# Patient Record
Sex: MALE | Race: WHITE | Employment: OTHER | URBAN - NONMETROPOLITAN AREA
[De-identification: names, ages, dates, MRNs, and addresses within clinical notes are randomized per-mention and may not be internally consistent; named-entity substitution may affect disease eponyms.]

---

## 2017-01-19 ENCOUNTER — OFFICE VISIT (OUTPATIENT)
Dept: FAMILY MEDICINE CLINIC | Age: 52
End: 2017-01-19

## 2017-01-19 VITALS
WEIGHT: 234.4 LBS | HEIGHT: 71 IN | SYSTOLIC BLOOD PRESSURE: 124 MMHG | BODY MASS INDEX: 32.81 KG/M2 | DIASTOLIC BLOOD PRESSURE: 80 MMHG | OXYGEN SATURATION: 96 % | HEART RATE: 81 BPM | RESPIRATION RATE: 20 BRPM | TEMPERATURE: 97.7 F

## 2017-01-19 DIAGNOSIS — J06.9 UPPER RESPIRATORY TRACT INFECTION, UNSPECIFIED TYPE: Primary | ICD-10-CM

## 2017-01-19 DIAGNOSIS — N64.4 BREAST PAIN, RIGHT: ICD-10-CM

## 2017-01-19 DIAGNOSIS — N64.4 BREAST PAIN: Primary | ICD-10-CM

## 2017-01-19 PROCEDURE — G8427 DOCREV CUR MEDS BY ELIG CLIN: HCPCS | Performed by: PHYSICIAN ASSISTANT

## 2017-01-19 PROCEDURE — 99213 OFFICE O/P EST LOW 20 MIN: CPT | Performed by: PHYSICIAN ASSISTANT

## 2017-01-19 PROCEDURE — G8419 CALC BMI OUT NRM PARAM NOF/U: HCPCS | Performed by: PHYSICIAN ASSISTANT

## 2017-01-19 PROCEDURE — 1036F TOBACCO NON-USER: CPT | Performed by: PHYSICIAN ASSISTANT

## 2017-01-19 PROCEDURE — G8484 FLU IMMUNIZE NO ADMIN: HCPCS | Performed by: PHYSICIAN ASSISTANT

## 2017-01-19 PROCEDURE — 3017F COLORECTAL CA SCREEN DOC REV: CPT | Performed by: PHYSICIAN ASSISTANT

## 2017-01-19 RX ORDER — CEFUROXIME AXETIL 500 MG/1
500 TABLET ORAL 2 TIMES DAILY
Qty: 20 TABLET | Refills: 0 | Status: SHIPPED | OUTPATIENT
Start: 2017-01-19 | End: 2017-01-29

## 2017-01-19 RX ORDER — GUAIFENESIN AND CODEINE PHOSPHATE 100; 10 MG/5ML; MG/5ML
5 SOLUTION ORAL 4 TIMES DAILY PRN
Qty: 150 ML | Refills: 1 | Status: SHIPPED | OUTPATIENT
Start: 2017-01-19 | End: 2017-01-26

## 2017-01-19 ASSESSMENT — ENCOUNTER SYMPTOMS
RHINORRHEA: 0
SORE THROAT: 1
COUGH: 1
WHEEZING: 0

## 2017-01-30 ENCOUNTER — TELEPHONE (OUTPATIENT)
Dept: PRIMARY CARE CLINIC | Age: 52
End: 2017-01-30

## 2017-01-30 DIAGNOSIS — D17.1 LIPOMA OF BREAST: Primary | ICD-10-CM

## 2017-02-06 ENCOUNTER — PROCEDURE VISIT (OUTPATIENT)
Dept: SURGERY | Age: 52
End: 2017-02-06

## 2017-02-06 VITALS
WEIGHT: 229 LBS | SYSTOLIC BLOOD PRESSURE: 146 MMHG | TEMPERATURE: 97.2 F | DIASTOLIC BLOOD PRESSURE: 84 MMHG | BODY MASS INDEX: 32.06 KG/M2 | HEIGHT: 71 IN | HEART RATE: 72 BPM

## 2017-02-06 DIAGNOSIS — N64.4 BREAST PAIN, RIGHT: Primary | ICD-10-CM

## 2017-02-06 PROCEDURE — 99213 OFFICE O/P EST LOW 20 MIN: CPT | Performed by: SURGERY

## 2017-02-06 PROCEDURE — 3017F COLORECTAL CA SCREEN DOC REV: CPT | Performed by: SURGERY

## 2017-02-06 PROCEDURE — G8484 FLU IMMUNIZE NO ADMIN: HCPCS | Performed by: SURGERY

## 2017-02-06 PROCEDURE — G8419 CALC BMI OUT NRM PARAM NOF/U: HCPCS | Performed by: SURGERY

## 2017-02-06 PROCEDURE — G8427 DOCREV CUR MEDS BY ELIG CLIN: HCPCS | Performed by: SURGERY

## 2017-02-06 PROCEDURE — 1036F TOBACCO NON-USER: CPT | Performed by: SURGERY

## 2017-02-13 ENCOUNTER — OFFICE VISIT (OUTPATIENT)
Dept: PRIMARY CARE CLINIC | Age: 52
End: 2017-02-13

## 2017-02-13 VITALS
HEIGHT: 71 IN | SYSTOLIC BLOOD PRESSURE: 130 MMHG | OXYGEN SATURATION: 94 % | HEART RATE: 73 BPM | TEMPERATURE: 97 F | DIASTOLIC BLOOD PRESSURE: 70 MMHG | WEIGHT: 226 LBS | BODY MASS INDEX: 31.64 KG/M2

## 2017-02-13 DIAGNOSIS — B34.9 VIRAL ILLNESS: ICD-10-CM

## 2017-02-13 DIAGNOSIS — R05.9 COUGH: Primary | ICD-10-CM

## 2017-02-13 PROCEDURE — 1036F TOBACCO NON-USER: CPT | Performed by: PHYSICIAN ASSISTANT

## 2017-02-13 PROCEDURE — G8417 CALC BMI ABV UP PARAM F/U: HCPCS | Performed by: PHYSICIAN ASSISTANT

## 2017-02-13 PROCEDURE — 3017F COLORECTAL CA SCREEN DOC REV: CPT | Performed by: PHYSICIAN ASSISTANT

## 2017-02-13 PROCEDURE — G8484 FLU IMMUNIZE NO ADMIN: HCPCS | Performed by: PHYSICIAN ASSISTANT

## 2017-02-13 PROCEDURE — G8427 DOCREV CUR MEDS BY ELIG CLIN: HCPCS | Performed by: PHYSICIAN ASSISTANT

## 2017-02-13 PROCEDURE — 99213 OFFICE O/P EST LOW 20 MIN: CPT | Performed by: PHYSICIAN ASSISTANT

## 2017-02-13 RX ORDER — BENZONATATE 200 MG/1
200 CAPSULE ORAL 3 TIMES DAILY PRN
Qty: 30 CAPSULE | Refills: 2 | Status: SHIPPED | OUTPATIENT
Start: 2017-02-13 | End: 2017-02-20

## 2017-02-13 ASSESSMENT — ENCOUNTER SYMPTOMS
WHEEZING: 1
VOMITING: 0
DIARRHEA: 1
RHINORRHEA: 0
TROUBLE SWALLOWING: 0
SORE THROAT: 0
CHEST TIGHTNESS: 0
COUGH: 1
NAUSEA: 0

## 2017-05-08 ENCOUNTER — OFFICE VISIT (OUTPATIENT)
Dept: FAMILY MEDICINE CLINIC | Age: 52
End: 2017-05-08
Payer: MEDICARE

## 2017-05-08 VITALS
HEART RATE: 88 BPM | DIASTOLIC BLOOD PRESSURE: 78 MMHG | WEIGHT: 236 LBS | HEIGHT: 70 IN | BODY MASS INDEX: 33.79 KG/M2 | SYSTOLIC BLOOD PRESSURE: 122 MMHG

## 2017-05-08 DIAGNOSIS — B20 HIV (HUMAN IMMUNODEFICIENCY VIRUS INFECTION) (HCC): ICD-10-CM

## 2017-05-08 DIAGNOSIS — G89.29 CHRONIC BILATERAL LOW BACK PAIN, WITH SCIATICA PRESENCE UNSPECIFIED: Primary | ICD-10-CM

## 2017-05-08 DIAGNOSIS — R73.01 IFG (IMPAIRED FASTING GLUCOSE): ICD-10-CM

## 2017-05-08 DIAGNOSIS — M54.5 CHRONIC BILATERAL LOW BACK PAIN, WITH SCIATICA PRESENCE UNSPECIFIED: Primary | ICD-10-CM

## 2017-05-08 PROCEDURE — 1036F TOBACCO NON-USER: CPT | Performed by: PHYSICIAN ASSISTANT

## 2017-05-08 PROCEDURE — 3017F COLORECTAL CA SCREEN DOC REV: CPT | Performed by: PHYSICIAN ASSISTANT

## 2017-05-08 PROCEDURE — G8417 CALC BMI ABV UP PARAM F/U: HCPCS | Performed by: PHYSICIAN ASSISTANT

## 2017-05-08 PROCEDURE — G8427 DOCREV CUR MEDS BY ELIG CLIN: HCPCS | Performed by: PHYSICIAN ASSISTANT

## 2017-05-08 PROCEDURE — 99214 OFFICE O/P EST MOD 30 MIN: CPT | Performed by: PHYSICIAN ASSISTANT

## 2017-05-08 RX ORDER — MELOXICAM 15 MG/1
15 TABLET ORAL DAILY
Qty: 30 TABLET | Refills: 6 | Status: SHIPPED | OUTPATIENT
Start: 2017-05-08 | End: 2017-08-22 | Stop reason: ALTCHOICE

## 2017-05-08 ASSESSMENT — ENCOUNTER SYMPTOMS
RESPIRATORY NEGATIVE: 1
BACK PAIN: 1
BOWEL INCONTINENCE: 0

## 2017-05-09 ENCOUNTER — TELEPHONE (OUTPATIENT)
Dept: PRIMARY CARE CLINIC | Age: 52
End: 2017-05-09

## 2017-06-11 ENCOUNTER — APPOINTMENT (OUTPATIENT)
Dept: GENERAL RADIOLOGY | Age: 52
End: 2017-06-11
Payer: MEDICARE

## 2017-06-11 ENCOUNTER — HOSPITAL ENCOUNTER (EMERGENCY)
Age: 52
Discharge: ANOTHER HOSPITAL | End: 2017-06-11
Attending: EMERGENCY MEDICINE
Payer: MEDICARE

## 2017-06-11 ENCOUNTER — APPOINTMENT (OUTPATIENT)
Dept: CT IMAGING | Age: 52
End: 2017-06-11
Payer: MEDICARE

## 2017-06-11 VITALS
SYSTOLIC BLOOD PRESSURE: 152 MMHG | RESPIRATION RATE: 12 BRPM | HEART RATE: 96 BPM | DIASTOLIC BLOOD PRESSURE: 88 MMHG | OXYGEN SATURATION: 96 % | TEMPERATURE: 98.6 F

## 2017-06-11 DIAGNOSIS — J18.9 PNEUMONIA DUE TO ORGANISM: Primary | ICD-10-CM

## 2017-06-11 LAB
ABSOLUTE EOS #: 0.3 K/UL (ref 0–0.4)
ABSOLUTE LYMPH #: 2.5 K/UL (ref 1–4.8)
ABSOLUTE MONO #: 0.9 K/UL (ref 0.1–1.2)
ALBUMIN SERPL-MCNC: 4.6 G/DL (ref 3.5–5.2)
ALBUMIN/GLOBULIN RATIO: 1.6 (ref 1–2.5)
ALP BLD-CCNC: 81 U/L (ref 40–129)
ALT SERPL-CCNC: 23 U/L (ref 5–41)
ANION GAP SERPL CALCULATED.3IONS-SCNC: 12 MMOL/L (ref 9–17)
AST SERPL-CCNC: 22 U/L
BASOPHILS # BLD: 1 %
BASOPHILS ABSOLUTE: 0.1 K/UL (ref 0–0.2)
BILIRUB SERPL-MCNC: 0.28 MG/DL (ref 0.3–1.2)
BILIRUBIN DIRECT: <0.08 MG/DL
BILIRUBIN, INDIRECT: ABNORMAL MG/DL (ref 0–1)
BNP INTERPRETATION: NORMAL
BUN BLDV-MCNC: 12 MG/DL (ref 6–20)
BUN/CREAT BLD: 13 (ref 9–20)
CALCIUM SERPL-MCNC: 9.4 MG/DL (ref 8.6–10.4)
CHLORIDE BLD-SCNC: 101 MMOL/L (ref 98–107)
CO2: 25 MMOL/L (ref 20–31)
CREAT SERPL-MCNC: 0.91 MG/DL (ref 0.7–1.2)
DIFFERENTIAL TYPE: ABNORMAL
EOSINOPHILS RELATIVE PERCENT: 3 %
GFR AFRICAN AMERICAN: >60 ML/MIN
GFR NON-AFRICAN AMERICAN: >60 ML/MIN
GFR SERPL CREATININE-BSD FRML MDRD: ABNORMAL ML/MIN/{1.73_M2}
GFR SERPL CREATININE-BSD FRML MDRD: ABNORMAL ML/MIN/{1.73_M2}
GLOBULIN: 2.9 G/DL (ref 1.5–3.8)
GLUCOSE BLD-MCNC: 132 MG/DL (ref 70–99)
HCT VFR BLD CALC: 42.1 % (ref 41–53)
HEMOGLOBIN: 13.9 G/DL (ref 13.5–17.5)
LYMPHOCYTES # BLD: 24 %
MCH RBC QN AUTO: 31.3 PG (ref 26–34)
MCHC RBC AUTO-ENTMCNC: 33.1 G/DL (ref 31–37)
MCV RBC AUTO: 94.7 FL (ref 80–100)
MONOCYTES # BLD: 9 %
MYOGLOBIN: 107 NG/ML (ref 28–72)
MYOGLOBIN: 94 NG/ML (ref 28–72)
PDW BLD-RTO: 12.8 % (ref 11–14.5)
PLATELET # BLD: 268 K/UL (ref 140–450)
PLATELET ESTIMATE: ABNORMAL
PMV BLD AUTO: 7.6 FL (ref 6–12)
POTASSIUM SERPL-SCNC: 4.1 MMOL/L (ref 3.7–5.3)
PRO-BNP: 70 PG/ML
RBC # BLD: 4.44 M/UL (ref 4.5–5.9)
RBC # BLD: ABNORMAL 10*6/UL
SEG NEUTROPHILS: 63 %
SEGMENTED NEUTROPHILS ABSOLUTE COUNT: 6.6 K/UL (ref 1.8–7.7)
SODIUM BLD-SCNC: 138 MMOL/L (ref 135–144)
TOTAL PROTEIN: 7.5 G/DL (ref 6.4–8.3)
TROPONIN INTERP: NORMAL
TROPONIN INTERP: NORMAL
TROPONIN T: <0.03 NG/ML
TROPONIN T: <0.03 NG/ML
WBC # BLD: 10.3 K/UL (ref 3.5–11)
WBC # BLD: ABNORMAL 10*3/UL

## 2017-06-11 PROCEDURE — 70450 CT HEAD/BRAIN W/O DYE: CPT | Performed by: RADIOLOGY

## 2017-06-11 PROCEDURE — 99285 EMERGENCY DEPT VISIT HI MDM: CPT

## 2017-06-11 PROCEDURE — 70450 CT HEAD/BRAIN W/O DYE: CPT

## 2017-06-11 PROCEDURE — 80076 HEPATIC FUNCTION PANEL: CPT

## 2017-06-11 PROCEDURE — 83880 ASSAY OF NATRIURETIC PEPTIDE: CPT

## 2017-06-11 PROCEDURE — 87040 BLOOD CULTURE FOR BACTERIA: CPT

## 2017-06-11 PROCEDURE — 6360000002 HC RX W HCPCS: Performed by: EMERGENCY MEDICINE

## 2017-06-11 PROCEDURE — 80048 BASIC METABOLIC PNL TOTAL CA: CPT

## 2017-06-11 PROCEDURE — 36415 COLL VENOUS BLD VENIPUNCTURE: CPT

## 2017-06-11 PROCEDURE — 71020 XR CHEST STANDARD TWO VW: CPT

## 2017-06-11 PROCEDURE — 71020 XR CHEST STANDARD TWO VW: CPT | Performed by: RADIOLOGY

## 2017-06-11 PROCEDURE — 84484 ASSAY OF TROPONIN QUANT: CPT

## 2017-06-11 PROCEDURE — 83874 ASSAY OF MYOGLOBIN: CPT

## 2017-06-11 PROCEDURE — 85025 COMPLETE CBC W/AUTO DIFF WBC: CPT

## 2017-06-11 PROCEDURE — 96365 THER/PROPH/DIAG IV INF INIT: CPT

## 2017-06-11 PROCEDURE — 6370000000 HC RX 637 (ALT 250 FOR IP): Performed by: EMERGENCY MEDICINE

## 2017-06-11 PROCEDURE — 2580000003 HC RX 258: Performed by: EMERGENCY MEDICINE

## 2017-06-11 PROCEDURE — 93005 ELECTROCARDIOGRAM TRACING: CPT

## 2017-06-11 RX ORDER — ASPIRIN 325 MG
325 TABLET ORAL ONCE
Status: COMPLETED | OUTPATIENT
Start: 2017-06-11 | End: 2017-06-11

## 2017-06-11 RX ORDER — BENZONATATE 100 MG/1
100 CAPSULE ORAL ONCE
Status: COMPLETED | OUTPATIENT
Start: 2017-06-11 | End: 2017-06-11

## 2017-06-11 RX ORDER — 0.9 % SODIUM CHLORIDE 0.9 %
1000 INTRAVENOUS SOLUTION INTRAVENOUS ONCE
Status: COMPLETED | OUTPATIENT
Start: 2017-06-11 | End: 2017-06-11

## 2017-06-11 RX ORDER — LEVOFLOXACIN 5 MG/ML
750 INJECTION, SOLUTION INTRAVENOUS ONCE
Status: COMPLETED | OUTPATIENT
Start: 2017-06-11 | End: 2017-06-11

## 2017-06-11 RX ADMIN — BENZONATATE 100 MG: 100 CAPSULE ORAL at 22:10

## 2017-06-11 RX ADMIN — LEVOFLOXACIN 750 MG: 5 INJECTION, SOLUTION INTRAVENOUS at 22:15

## 2017-06-11 RX ADMIN — SODIUM CHLORIDE 1000 ML: 9 INJECTION, SOLUTION INTRAVENOUS at 22:10

## 2017-06-11 RX ADMIN — ASPIRIN 325 MG ORAL TABLET 325 MG: 325 PILL ORAL at 22:10

## 2017-06-11 ASSESSMENT — ENCOUNTER SYMPTOMS
COUGH: 1
ABDOMINAL DISTENTION: 0
CHEST TIGHTNESS: 0
EYE DISCHARGE: 0
EYE REDNESS: 0
SHORTNESS OF BREATH: 0
ABDOMINAL PAIN: 0
FACIAL SWELLING: 0

## 2017-06-12 LAB
EKG ATRIAL RATE: 104 BPM
EKG P AXIS: 54 DEGREES
EKG P-R INTERVAL: 136 MS
EKG Q-T INTERVAL: 350 MS
EKG QRS DURATION: 112 MS
EKG QTC CALCULATION (BAZETT): 460 MS
EKG R AXIS: 5 DEGREES
EKG T AXIS: 31 DEGREES
EKG VENTRICULAR RATE: 104 BPM

## 2017-06-15 ENCOUNTER — TELEPHONE (OUTPATIENT)
Dept: FAMILY MEDICINE CLINIC | Age: 52
End: 2017-06-15

## 2017-06-16 ENCOUNTER — TELEPHONE (OUTPATIENT)
Dept: FAMILY MEDICINE CLINIC | Age: 52
End: 2017-06-16

## 2017-06-16 ENCOUNTER — TELEPHONE (OUTPATIENT)
Dept: PRIMARY CARE CLINIC | Age: 52
End: 2017-06-16

## 2017-06-16 DIAGNOSIS — J18.9 PNEUMONIA DUE TO INFECTIOUS ORGANISM, UNSPECIFIED LATERALITY, UNSPECIFIED PART OF LUNG: Primary | ICD-10-CM

## 2017-06-16 RX ORDER — NEBULIZER ACCESSORIES
1 KIT MISCELLANEOUS DAILY
Qty: 1 KIT | Refills: 0 | Status: SHIPPED | OUTPATIENT
Start: 2017-06-16

## 2017-06-16 RX ORDER — IPRATROPIUM BROMIDE AND ALBUTEROL SULFATE 2.5; .5 MG/3ML; MG/3ML
1 SOLUTION RESPIRATORY (INHALATION) EVERY 4 HOURS PRN
Qty: 360 ML | Refills: 1 | Status: SHIPPED | OUTPATIENT
Start: 2017-06-16 | End: 2017-06-16 | Stop reason: CLARIF

## 2017-06-16 RX ORDER — ALBUTEROL SULFATE 2.5 MG/3ML
2.5 SOLUTION RESPIRATORY (INHALATION) EVERY 6 HOURS PRN
COMMUNITY

## 2017-06-18 LAB
CULTURE: NORMAL
Lab: NORMAL
Lab: NORMAL
SPECIMEN DESCRIPTION: NORMAL
STATUS: NORMAL
STATUS: NORMAL

## 2017-06-20 ENCOUNTER — TELEPHONE (OUTPATIENT)
Dept: FAMILY MEDICINE CLINIC | Age: 52
End: 2017-06-20

## 2017-06-20 ENCOUNTER — OFFICE VISIT (OUTPATIENT)
Dept: FAMILY MEDICINE CLINIC | Age: 52
End: 2017-06-20
Payer: MEDICARE

## 2017-06-20 VITALS
BODY MASS INDEX: 33.33 KG/M2 | OXYGEN SATURATION: 95 % | HEART RATE: 80 BPM | SYSTOLIC BLOOD PRESSURE: 130 MMHG | DIASTOLIC BLOOD PRESSURE: 80 MMHG | TEMPERATURE: 98 F | WEIGHT: 229 LBS

## 2017-06-20 DIAGNOSIS — H61.23 BILATERAL IMPACTED CERUMEN: ICD-10-CM

## 2017-06-20 DIAGNOSIS — J18.9 PNEUMONIA DUE TO INFECTIOUS ORGANISM, UNSPECIFIED LATERALITY, UNSPECIFIED PART OF LUNG: Primary | ICD-10-CM

## 2017-06-20 DIAGNOSIS — B37.0 THRUSH: ICD-10-CM

## 2017-06-20 PROCEDURE — 69210 REMOVE IMPACTED EAR WAX UNI: CPT | Performed by: PHYSICIAN ASSISTANT

## 2017-06-20 PROCEDURE — 99213 OFFICE O/P EST LOW 20 MIN: CPT | Performed by: PHYSICIAN ASSISTANT

## 2017-06-20 PROCEDURE — 3017F COLORECTAL CA SCREEN DOC REV: CPT | Performed by: PHYSICIAN ASSISTANT

## 2017-06-20 PROCEDURE — G8417 CALC BMI ABV UP PARAM F/U: HCPCS | Performed by: PHYSICIAN ASSISTANT

## 2017-06-20 PROCEDURE — G8427 DOCREV CUR MEDS BY ELIG CLIN: HCPCS | Performed by: PHYSICIAN ASSISTANT

## 2017-06-20 PROCEDURE — 1036F TOBACCO NON-USER: CPT | Performed by: PHYSICIAN ASSISTANT

## 2017-06-20 RX ORDER — GUAIFENESIN AND CODEINE PHOSPHATE 100; 10 MG/5ML; MG/5ML
5 SOLUTION ORAL 4 TIMES DAILY PRN
Qty: 150 ML | Refills: 0 | Status: SHIPPED | OUTPATIENT
Start: 2017-06-20 | End: 2017-06-27

## 2017-06-20 RX ORDER — LEVOFLOXACIN 500 MG/1
TABLET, FILM COATED ORAL
Qty: 7 TABLET | Refills: 0 | OUTPATIENT
Start: 2017-06-20 | End: 2017-08-22 | Stop reason: ALTCHOICE

## 2017-06-25 ASSESSMENT — ENCOUNTER SYMPTOMS
SHORTNESS OF BREATH: 0
RHINORRHEA: 0
CHEST TIGHTNESS: 0
WHEEZING: 0
DIFFICULTY BREATHING: 1
COUGH: 1

## 2017-06-26 DIAGNOSIS — M54.5 CHRONIC BILATERAL LOW BACK PAIN, WITH SCIATICA PRESENCE UNSPECIFIED: ICD-10-CM

## 2017-06-26 DIAGNOSIS — G89.29 CHRONIC BILATERAL LOW BACK PAIN, WITH SCIATICA PRESENCE UNSPECIFIED: ICD-10-CM

## 2017-06-26 RX ORDER — MELOXICAM 15 MG/1
TABLET ORAL
Qty: 90 TABLET | Refills: 6 | OUTPATIENT
Start: 2017-06-26

## 2017-06-28 ENCOUNTER — TELEPHONE (OUTPATIENT)
Dept: FAMILY MEDICINE CLINIC | Age: 52
End: 2017-06-28

## 2017-06-28 DIAGNOSIS — J18.9 PNEUMONIA OF RIGHT LOWER LOBE DUE TO INFECTIOUS ORGANISM: Primary | ICD-10-CM

## 2017-07-05 ENCOUNTER — TELEPHONE (OUTPATIENT)
Dept: FAMILY MEDICINE CLINIC | Age: 52
End: 2017-07-05

## 2017-08-22 ENCOUNTER — OFFICE VISIT (OUTPATIENT)
Dept: FAMILY MEDICINE CLINIC | Age: 52
End: 2017-08-22
Payer: MEDICARE

## 2017-08-22 VITALS
HEIGHT: 70 IN | WEIGHT: 236 LBS | SYSTOLIC BLOOD PRESSURE: 122 MMHG | HEART RATE: 72 BPM | OXYGEN SATURATION: 97 % | DIASTOLIC BLOOD PRESSURE: 78 MMHG | BODY MASS INDEX: 33.79 KG/M2

## 2017-08-22 DIAGNOSIS — M54.10 BACK PAIN WITH RIGHT-SIDED RADICULOPATHY: Primary | ICD-10-CM

## 2017-08-22 DIAGNOSIS — M54.10 BACK PAIN WITH RIGHT-SIDED RADICULOPATHY: ICD-10-CM

## 2017-08-22 PROCEDURE — 1036F TOBACCO NON-USER: CPT | Performed by: PHYSICIAN ASSISTANT

## 2017-08-22 PROCEDURE — 99213 OFFICE O/P EST LOW 20 MIN: CPT | Performed by: PHYSICIAN ASSISTANT

## 2017-08-22 PROCEDURE — G8427 DOCREV CUR MEDS BY ELIG CLIN: HCPCS | Performed by: PHYSICIAN ASSISTANT

## 2017-08-22 PROCEDURE — G8417 CALC BMI ABV UP PARAM F/U: HCPCS | Performed by: PHYSICIAN ASSISTANT

## 2017-08-22 PROCEDURE — 3017F COLORECTAL CA SCREEN DOC REV: CPT | Performed by: PHYSICIAN ASSISTANT

## 2017-08-22 RX ORDER — BACLOFEN 10 MG/1
TABLET ORAL
Qty: 270 TABLET | Refills: 1 | OUTPATIENT
Start: 2017-08-22

## 2017-08-22 RX ORDER — ETODOLAC 400 MG/1
400 TABLET, FILM COATED ORAL 2 TIMES DAILY
Qty: 180 TABLET | Refills: 1 | OUTPATIENT
Start: 2017-08-22 | End: 2018-08-03

## 2017-08-22 RX ORDER — ETODOLAC 400 MG/1
400 TABLET, FILM COATED ORAL 2 TIMES DAILY
Qty: 60 TABLET | Refills: 3 | Status: SHIPPED | OUTPATIENT
Start: 2017-08-22 | End: 2017-08-22 | Stop reason: SDUPTHER

## 2017-08-22 RX ORDER — ETODOLAC 400 MG/1
400 TABLET, FILM COATED ORAL 2 TIMES DAILY
Qty: 180 TABLET | Refills: 1 | Status: CANCELLED | OUTPATIENT
Start: 2017-08-22 | End: 2018-08-22

## 2017-08-22 RX ORDER — BACLOFEN 10 MG/1
10 TABLET ORAL 3 TIMES DAILY PRN
Qty: 180 TABLET | Refills: 1 | OUTPATIENT
Start: 2017-08-22 | End: 2018-07-03 | Stop reason: SINTOL

## 2017-08-22 RX ORDER — ETODOLAC 400 MG/1
TABLET, FILM COATED ORAL
Qty: 180 TABLET | Refills: 3 | OUTPATIENT
Start: 2017-08-22

## 2017-08-22 RX ORDER — BACLOFEN 10 MG/1
10 TABLET ORAL 3 TIMES DAILY PRN
Qty: 60 TABLET | Refills: 1 | Status: SHIPPED | OUTPATIENT
Start: 2017-08-22 | End: 2017-08-22 | Stop reason: SDUPTHER

## 2017-08-22 RX ORDER — BACLOFEN 10 MG/1
10 TABLET ORAL 3 TIMES DAILY PRN
Qty: 180 TABLET | Refills: 1 | Status: CANCELLED | OUTPATIENT
Start: 2017-08-22

## 2017-08-22 ASSESSMENT — ENCOUNTER SYMPTOMS
BOWEL INCONTINENCE: 0
RESPIRATORY NEGATIVE: 1
BACK PAIN: 1

## 2017-09-06 ENCOUNTER — HOSPITAL ENCOUNTER (OUTPATIENT)
Dept: MRI IMAGING | Age: 52
Discharge: HOME | End: 2017-09-06
Payer: MEDICARE

## 2017-09-06 DIAGNOSIS — M54.10 BACK PAIN WITH RIGHT-SIDED RADICULOPATHY: ICD-10-CM

## 2017-09-06 PROCEDURE — 72148 MRI LUMBAR SPINE W/O DYE: CPT

## 2017-09-07 ENCOUNTER — TELEPHONE (OUTPATIENT)
Dept: PRIMARY CARE CLINIC | Age: 52
End: 2017-09-07

## 2017-09-07 DIAGNOSIS — M48.061 LUMBAR STENOSIS: Primary | ICD-10-CM

## 2017-09-21 ENCOUNTER — OFFICE VISIT (OUTPATIENT)
Dept: PAIN MANAGEMENT | Age: 52
End: 2017-09-21
Payer: MEDICARE

## 2017-09-21 VITALS
BODY MASS INDEX: 32.45 KG/M2 | HEIGHT: 71 IN | HEART RATE: 64 BPM | WEIGHT: 231.8 LBS | SYSTOLIC BLOOD PRESSURE: 134 MMHG | DIASTOLIC BLOOD PRESSURE: 88 MMHG

## 2017-09-21 DIAGNOSIS — B20 HIV (HUMAN IMMUNODEFICIENCY VIRUS INFECTION) (HCC): ICD-10-CM

## 2017-09-21 DIAGNOSIS — M47.816 LUMBAR FACET JOINT SYNDROME: ICD-10-CM

## 2017-09-21 DIAGNOSIS — M54.16 LUMBAR RADICULOPATHY: Primary | ICD-10-CM

## 2017-09-21 PROCEDURE — 1036F TOBACCO NON-USER: CPT | Performed by: PHYSICAL MEDICINE & REHABILITATION

## 2017-09-21 PROCEDURE — G8427 DOCREV CUR MEDS BY ELIG CLIN: HCPCS | Performed by: PHYSICAL MEDICINE & REHABILITATION

## 2017-09-21 PROCEDURE — 3017F COLORECTAL CA SCREEN DOC REV: CPT | Performed by: PHYSICAL MEDICINE & REHABILITATION

## 2017-09-21 PROCEDURE — G8417 CALC BMI ABV UP PARAM F/U: HCPCS | Performed by: PHYSICAL MEDICINE & REHABILITATION

## 2017-09-21 PROCEDURE — 99204 OFFICE O/P NEW MOD 45 MIN: CPT | Performed by: PHYSICAL MEDICINE & REHABILITATION

## 2017-09-21 ASSESSMENT — ENCOUNTER SYMPTOMS
CONSTIPATION: 0
EYES NEGATIVE: 1
RESPIRATORY NEGATIVE: 1
NAUSEA: 0
ALLERGIC/IMMUNOLOGIC NEGATIVE: 1
BACK PAIN: 1

## 2017-09-26 ENCOUNTER — HOSPITAL ENCOUNTER (OUTPATIENT)
Age: 52
Setting detail: OUTPATIENT SURGERY
Discharge: HOME | End: 2017-09-26
Attending: PHYSICAL MEDICINE & REHABILITATION | Admitting: PHYSICAL MEDICINE & REHABILITATION
Payer: MEDICARE

## 2017-09-26 ENCOUNTER — APPOINTMENT (OUTPATIENT)
Dept: GENERAL RADIOLOGY | Age: 52
End: 2017-09-26
Attending: PHYSICAL MEDICINE & REHABILITATION
Payer: MEDICARE

## 2017-09-26 VITALS
BODY MASS INDEX: 32.06 KG/M2 | RESPIRATION RATE: 16 BRPM | HEART RATE: 62 BPM | TEMPERATURE: 97.2 F | HEIGHT: 71 IN | SYSTOLIC BLOOD PRESSURE: 144 MMHG | DIASTOLIC BLOOD PRESSURE: 71 MMHG | OXYGEN SATURATION: 99 % | WEIGHT: 229 LBS

## 2017-09-26 PROBLEM — G89.29 CHRONIC LOWER BACK PAIN: Status: ACTIVE | Noted: 2017-09-26

## 2017-09-26 PROBLEM — M54.16 LUMBAR RADICULAR PAIN: Status: ACTIVE | Noted: 2017-09-26

## 2017-09-26 PROBLEM — M54.50 CHRONIC LOWER BACK PAIN: Status: ACTIVE | Noted: 2017-09-26

## 2017-09-26 PROCEDURE — 6360000004 HC RX CONTRAST MEDICATION: Performed by: PHYSICAL MEDICINE & REHABILITATION

## 2017-09-26 PROCEDURE — 64484 NJX AA&/STRD TFRM EPI L/S EA: CPT | Performed by: PHYSICAL MEDICINE & REHABILITATION

## 2017-09-26 PROCEDURE — 64483 NJX AA&/STRD TFRM EPI L/S 1: CPT | Performed by: PHYSICAL MEDICINE & REHABILITATION

## 2017-09-26 PROCEDURE — 2500000003 HC RX 250 WO HCPCS: Performed by: PHYSICAL MEDICINE & REHABILITATION

## 2017-09-26 PROCEDURE — 3209999900 FLUORO FOR SURGICAL PROCEDURES

## 2017-09-26 PROCEDURE — 6360000002 HC RX W HCPCS: Performed by: PHYSICAL MEDICINE & REHABILITATION

## 2017-09-26 PROCEDURE — 7100000010 HC PHASE II RECOVERY - FIRST 15 MIN: Performed by: PHYSICAL MEDICINE & REHABILITATION

## 2017-09-26 PROCEDURE — 3600000056 HC PAIN LEVEL 4 BASE: Performed by: PHYSICAL MEDICINE & REHABILITATION

## 2017-09-26 PROCEDURE — 2580000003 HC RX 258: Performed by: PHYSICAL MEDICINE & REHABILITATION

## 2017-09-26 RX ORDER — DEXAMETHASONE SODIUM PHOSPHATE 10 MG/ML
INJECTION INTRAMUSCULAR; INTRAVENOUS PRN
Status: DISCONTINUED | OUTPATIENT
Start: 2017-09-26 | End: 2017-09-26 | Stop reason: HOSPADM

## 2017-09-26 RX ORDER — 0.9 % SODIUM CHLORIDE 0.9 %
VIAL (ML) INJECTION PRN
Status: DISCONTINUED | OUTPATIENT
Start: 2017-09-26 | End: 2017-09-26 | Stop reason: HOSPADM

## 2017-09-26 ASSESSMENT — PAIN SCALES - GENERAL: PAINLEVEL_OUTOF10: 0

## 2017-09-26 ASSESSMENT — PAIN - FUNCTIONAL ASSESSMENT: PAIN_FUNCTIONAL_ASSESSMENT: 0-10

## 2017-10-03 ENCOUNTER — APPOINTMENT (OUTPATIENT)
Dept: GENERAL RADIOLOGY | Age: 52
End: 2017-10-03
Attending: PHYSICAL MEDICINE & REHABILITATION
Payer: MEDICARE

## 2017-10-03 ENCOUNTER — HOSPITAL ENCOUNTER (OUTPATIENT)
Age: 52
Setting detail: OUTPATIENT SURGERY
Discharge: HOME | End: 2017-10-03
Attending: PHYSICAL MEDICINE & REHABILITATION | Admitting: PHYSICAL MEDICINE & REHABILITATION
Payer: MEDICARE

## 2017-10-03 VITALS
OXYGEN SATURATION: 96 % | HEIGHT: 71 IN | BODY MASS INDEX: 31.36 KG/M2 | TEMPERATURE: 97.2 F | WEIGHT: 224 LBS | HEART RATE: 75 BPM | SYSTOLIC BLOOD PRESSURE: 134 MMHG | RESPIRATION RATE: 16 BRPM | DIASTOLIC BLOOD PRESSURE: 108 MMHG

## 2017-10-03 PROCEDURE — 3600000056 HC PAIN LEVEL 4 BASE: Performed by: PHYSICAL MEDICINE & REHABILITATION

## 2017-10-03 PROCEDURE — 64484 NJX AA&/STRD TFRM EPI L/S EA: CPT | Performed by: PHYSICAL MEDICINE & REHABILITATION

## 2017-10-03 PROCEDURE — 2500000003 HC RX 250 WO HCPCS: Performed by: PHYSICAL MEDICINE & REHABILITATION

## 2017-10-03 PROCEDURE — 6360000002 HC RX W HCPCS: Performed by: PHYSICAL MEDICINE & REHABILITATION

## 2017-10-03 PROCEDURE — 7100000010 HC PHASE II RECOVERY - FIRST 15 MIN: Performed by: PHYSICAL MEDICINE & REHABILITATION

## 2017-10-03 PROCEDURE — 3209999900 FLUORO FOR SURGICAL PROCEDURES

## 2017-10-03 PROCEDURE — 2580000003 HC RX 258: Performed by: PHYSICAL MEDICINE & REHABILITATION

## 2017-10-03 PROCEDURE — 6360000004 HC RX CONTRAST MEDICATION: Performed by: PHYSICAL MEDICINE & REHABILITATION

## 2017-10-03 PROCEDURE — 64483 NJX AA&/STRD TFRM EPI L/S 1: CPT | Performed by: PHYSICAL MEDICINE & REHABILITATION

## 2017-10-03 RX ORDER — DEXAMETHASONE SODIUM PHOSPHATE 10 MG/ML
INJECTION INTRAMUSCULAR; INTRAVENOUS PRN
Status: DISCONTINUED | OUTPATIENT
Start: 2017-10-03 | End: 2017-10-03 | Stop reason: HOSPADM

## 2017-10-03 RX ORDER — 0.9 % SODIUM CHLORIDE 0.9 %
VIAL (ML) INJECTION PRN
Status: DISCONTINUED | OUTPATIENT
Start: 2017-10-03 | End: 2017-10-03 | Stop reason: HOSPADM

## 2017-10-03 RX ORDER — BUPIVACAINE HYDROCHLORIDE 5 MG/ML
INJECTION, SOLUTION EPIDURAL; INTRACAUDAL PRN
Status: DISCONTINUED | OUTPATIENT
Start: 2017-10-03 | End: 2017-10-03 | Stop reason: HOSPADM

## 2017-10-03 ASSESSMENT — PAIN SCALES - GENERAL
PAINLEVEL_OUTOF10: 0
PAINLEVEL_OUTOF10: 0

## 2017-10-03 ASSESSMENT — PAIN - FUNCTIONAL ASSESSMENT: PAIN_FUNCTIONAL_ASSESSMENT: 0-10

## 2017-10-03 ASSESSMENT — PAIN DESCRIPTION - DESCRIPTORS: DESCRIPTORS: CONSTANT

## 2017-10-03 NOTE — INTERVAL H&P NOTE
I have interviewed and examined the patient and reviewed the recent History and Physical.  There have been no changes to the recent H&P documentation. The surgical consent form has been signed. Last anticoagulant medication use was:na    Premedication taken for contrast allergy? No    Valium taken for oral sedation? No    Outpatient Prescriptions Marked as Taking for the 10/3/17 encounter [facility] Encounter)   Medication Sig Dispense Refill    nystatin (MYCOSTATIN) 947082 UNIT/ML suspension Take 5 mLs by mouth 4 times daily 180 mL 1    Darunavir-Cobicistat (PREZCOBIX) 800-150 MG TABS Take by mouth nightly      TURMERIC PO Take by mouth three times daily      Glucosamine HCl (GLUCOSAMINE PO) Take by mouth daily      vitamin D 1000 UNITS CAPS Take by mouth daily      GARLIC PO Take by mouth daily      Multiple Vitamins-Minerals (MULTIVITAMIN PO) Take by mouth daily      INVEGA 6 MG extended release tablet Take 6 mg by mouth daily      DESCOVY 200-25 MG TABS Take 1 tablet by mouth daily      MYTESI 125 MG TBEC Take 125 mg by mouth      chlorhexidine (PERIDEX) 0.12 % solution       nystatin (MYCOSTATIN) 907532 UNIT/GM cream Apply topically 2 times daily Apply topically 2 times daily.  Naproxen Sodium (ALEVE PO) Take 2 tablets by mouth every 4 hours as needed       gabapentin (NEURONTIN) 300 MG capsule Take 300 mg by mouth daily. The patient understands the planned operation and its associated risks and benefits and agrees to proceed.         Electronically signed by Amanda Tafoya MD on 10/3/2017 at 9:40 AM

## 2017-10-03 NOTE — IP AVS SNAPSHOT
After Visit Summary  (Discharge Instructions)    Medication List for Home    Based on the information you provided to us as well as any changes during this visit, the following is your updated medication list.  Compare this with your prescription bottles at home. If you have any questions or concerns, contact your primary care physician's office. Daily Medication List (This medication list can be shared with any healthcare provider who is helping you manage your medications)      These are medications you told us you were taking at home, CONTINUE taking them after you leave the hospital        Last Dose    Next Dose Due AM NOON PM NIGHT    albuterol (2.5 MG/3ML) 0.083% nebulizer solution   Commonly known as:  PROVENTIL   Take 2.5 mg by nebulization every 6 hours as needed for Wheezing                                         ALEVE PO   Take 2 tablets by mouth every 4 hours as needed                                         baclofen 10 MG tablet   Commonly known as:  LIORESAL   Take 1 tablet by mouth 3 times daily as needed (spasm)                                         chlorhexidine 0.12 % solution   Commonly known as:  PERIDEX                                         Compressor/Nebulizer Misc   As directed                                         DESCOVY 200-25 MG Tabs tablet   Generic drug:  emtricitabine-tenofovir alafenamide   Take 1 tablet by mouth daily                                         etodolac 400 MG tablet   Commonly known as:  LODINE   Take 1 tablet by mouth 2 times daily                                         gabapentin 300 MG capsule   Commonly known as:  NEURONTIN   Take 300 mg by mouth daily.                                          GARLIC PO   Take by mouth daily                                         GLUCOSAMINE PO   Take by mouth daily                                         INVEGA 6 MG extended release tablet   Generic drug:  paliperidone   Take 6 mg by mouth daily MULTIVITAMIN PO   Take by mouth daily                                         MYTESI 125 MG Tbec   Generic drug:  crofelemer   Take 125 mg by mouth                                         Nebulizer/Tubing/Mouthpiece Kit   1 kit by Does not apply route daily                                         nystatin 998122 UNIT/GM cream   Commonly known as:  MYCOSTATIN   Apply topically 2 times daily Apply topically 2 times daily. nystatin 869145 UNIT/ML suspension   Commonly known as:  MYCOSTATIN   Take 5 mLs by mouth 4 times daily                                         PREZCOBIX 800-150 MG Tabs   Generic drug:  Darunavir-Cobicistat   Take by mouth nightly                                         TURMERIC PO   Take by mouth three times daily                                         vitamin D 1000 units Caps   Take by mouth daily                                                 Allergies as of 10/3/2017        Reactions    Pcn [Penicillins] Rash    Sulfa Antibiotics Rash      Immunizations as of 10/3/2017     Name Date Dose VIS Date Route    Influenza Vaccine, unspecified formulation 9/19/2016 -- -- --    Comment: unknown    Influenza Vaccine, unspecified formulation 9/8/2015 -- -- --    Comment: unknown    Influenza Virus Vaccine 9/6/2017 0.5 -- --    Pneumococcal 13-valent Conjugate Anne Ariana) 10/26/2016 -- -- --      Last Vitals          Most Recent Value    Temp  97.2 °F (36.2 °C)    Pulse  75    Resp  16    BP  (!)  134/108 [pt states BP gets high when see MD]         After Visit Summary    This summary was created for you. Thank you for entrusting your care to us.   The following information includes details about your hospital/visit stay along with steps you should take to help with your recovery once you leave the hospital.  In this packet, you will find information about the topics listed below: · Instructions about your medications including a list of your home medications  · A summary of your hospital visit  · Follow-up appointments once you have left the hospital  · Your care plan at home      You may receive a survey regarding the care you received during your stay. Your input is valuable to us. We encourage you to complete and return your survey in the envelope provided. We hope you will choose us in the future for your healthcare needs. Patient Information     Patient Name RAYMOND Kwon 1965      Care Provided at:     Name Address Phone       [address] [place] [phone]            Your Visit    Here you will find information about your visit, including the reason for your visit. Please take this sheet with you when you visit your doctor or other health care provider in the future. It will help determine the best possible medical care for you at that time. If you have any questions once you leave the hospital, please call the department phone number listed below. Why you were here     Your primary diagnosis was:  Not on File    Your diagnoses also included:  Lumbar Radicular Pain, Chronic Lower Back Pain      Visit Information     Date & Time Provider Department Dept. Phone    10/3/2017 Yudy Mendez MD [facility]  [phone]       Follow-up Appointments    Below is a list of your follow-up and future appointments. This may not be a complete list as you may have made appointments directly with providers that we are not aware of or your providers may have made some for you. Please call your providers to confirm appointments. It is important to keep your appointments. Please bring your current insurance card, photo ID, co-pay, and all medication bottles to your appointment. If self-pay, payment is expected at the time of service.         Follow-up Information     Follow up with Dada Quezada NP. Specialty:  Nurse Practitioner    Why:  as scheduled    Contact information:    [address] [place] [address] [phone]        Future Appointments     10/17/2017 9:00 AM     Appointment with Xin Dowling NP at [facility] ([phone])   Please arrive 15 minutes prior to appointment, bring photo ID and insurance card. [address]       11/6/2017 10:40 AM     Appointment with TAMMY Ireland at [facility] ([phone])   Please arrive 15 minutes prior to appointment, bring photo ID and insurance card. [address]         Preventive Care        Date Due    Hepatitis C screening is recommended for all adults regardless of risk factors born between [facility] at least once (lifetime) who have never been tested. 1965    Tetanus Combination Vaccine (1 - Tdap) 8/8/1984    Pneumococcal Vaccines (two) for adults aged 19-64  years who are immunocompromised or whose spleen is missing or not working  (2 of 3 - PPSV23) 12/21/2016    Diabetes Screening 5/9/2020    Cholesterol Screening 11/7/2021    Colonoscopy 1/5/2027                 Care Plan Once You Return Home    This section includes instructions you will need to follow once you leave the hospital.  Your care team will discuss these with you, so you and those caring for you know how to best care for your health needs at home. This section may also include educational information about certain health topics that may be of help to you. Discharge Instructions             Home Care after Transforaminal Epidural Steroid Injection/Nerve Block    The doctor has done an injection in your neck; upper back; or lower back to       decrease pain and inflammation. This may help diagnose the source of your pain. You may feel sore at the injection site for the next 2-4 days.  You may apply ice to the site for 20 minutes on and 20 minutes off to decrease this form and make a copy for your own records. Then, mail it back to us or drop it off at the pain management clinic. We will need this information to decide the next step in your treatment plan. Signs of infection  ? Fever greater than 100.4°F by mouth for 2 readings taken 4 hours apart  ? Increased redness, swelling around the site  ? Any drainage from the site    If you have any new symptoms or any signs of infection, please call [phone] during business hours to notify us. You can also notify your primary care physician. After hours, nights and weekends, call [phone]. Important information for a smoker       SMOKING: QUIT SMOKING. THIS IS THE MOST IMPORTANT ACTION YOU CAN TAKE TO IMPROVE YOUR CURRENT AND FUTURE HEALTH. Call the [facility] [place] NOW ([phone])    Smoking harms nonsmokers. When nonsmokers are around people who smoke, they absorb nicotine, carbon monoxide, and other ingredients of tobacco smoke. DO NOT SMOKE AROUND CHILDREN     Children exposed to secondhand smoke are at an increased risk of:  Sudden Infant Death Syndrome (SIDS), acute respiratory infections, inflammation of the middle ear, and severe asthma. Over a longer time, it causes heart disease and lung cancer. There is no safe level of exposure to secondhand smoke. [employer] Signup     [employer] allows you to send messages to your doctor, view your test results, renew your prescriptions, schedule appointments, view visit notes, and more. How Do I Sign Up? 1. In your Internet browser, go to [email or web address]  2. Click on the Sign Up Now link in the Sign In box. You will see the New Member Sign Up page. 3. Enter your [employer] Access Code exactly as it appears below. You will not need to use this code after youve completed the sign-up process. If you do not sign up before the expiration date, you must request a new code. [employer] Access Code: [email or web address]  Expires: 10/21/2017  2:25 PM    4. Enter your Social Security Number ([id number]) and Date of Birth (mm/dd/yyyy) as indicated and click Submit. You will be taken to the next sign-up page. 5. Create a [employer] ID. This will be your [employer] login ID and cannot be changed, so think of one that is secure and easy to remember. 6. Create a [employer] password. You can change your password at any time. 7. Enter your Password Reset Question and Answer. This can be used at a later time if you forget your password. 8. Enter your e-mail address. You will receive e-mail notification when new information is available in [address] 9. Click Sign Up. You can now view your medical record. Additional Information  If you have questions, please contact the physician practice where you receive care. Remember, [employer] is NOT to be used for urgent needs. For medical emergencies, dial 911. For questions regarding your [employer] account call [phone]. If you have a clinical question, please call your doctor's office. View your information online  ? Review your current list of  medications, immunization, and allergies. ? Review your future test results online . ? Review your discharge instructions provided by your caregivers at discharge    Certain functionality such as prescription refills, scheduling appointments or sending messages to your provider are not activated if your provider does not use [employer] in his/her office    For questions regarding your [employer] account call [phone]. If you have a clinical question, please call your doctor's office. The information on all pages of the After Visit Summary has been reviewed with me, the patient and/or responsible adult, by my health care provider(s). I had the opportunity to ask questions regarding this information.  I understand I should dispose of my armband safely at home to protect my health information. A complete copy of the After Visit Summary has been given to me, the patient and/or responsible adult.            Patient Signature/Responsible Adult:____________________    Clinician Signature:_____________________    Date:_____________________    Time:_____________________
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
## 2017-10-17 ENCOUNTER — OFFICE VISIT (OUTPATIENT)
Dept: PAIN MANAGEMENT | Age: 52
End: 2017-10-17
Payer: MEDICARE

## 2017-10-17 VITALS
SYSTOLIC BLOOD PRESSURE: 124 MMHG | DIASTOLIC BLOOD PRESSURE: 82 MMHG | HEART RATE: 80 BPM | HEIGHT: 71 IN | WEIGHT: 229 LBS | BODY MASS INDEX: 32.06 KG/M2

## 2017-10-17 DIAGNOSIS — M54.16 LUMBAR RADICULAR PAIN: ICD-10-CM

## 2017-10-17 DIAGNOSIS — M54.41 CHRONIC RIGHT-SIDED LOW BACK PAIN WITH RIGHT-SIDED SCIATICA: Primary | ICD-10-CM

## 2017-10-17 DIAGNOSIS — G89.29 CHRONIC RIGHT-SIDED LOW BACK PAIN WITH RIGHT-SIDED SCIATICA: Primary | ICD-10-CM

## 2017-10-17 DIAGNOSIS — B37.0 THRUSH: Primary | ICD-10-CM

## 2017-10-17 PROCEDURE — 1036F TOBACCO NON-USER: CPT | Performed by: NURSE PRACTITIONER

## 2017-10-17 PROCEDURE — G8427 DOCREV CUR MEDS BY ELIG CLIN: HCPCS | Performed by: NURSE PRACTITIONER

## 2017-10-17 PROCEDURE — 3017F COLORECTAL CA SCREEN DOC REV: CPT | Performed by: NURSE PRACTITIONER

## 2017-10-17 PROCEDURE — G8482 FLU IMMUNIZE ORDER/ADMIN: HCPCS | Performed by: NURSE PRACTITIONER

## 2017-10-17 PROCEDURE — 99214 OFFICE O/P EST MOD 30 MIN: CPT | Performed by: NURSE PRACTITIONER

## 2017-10-17 PROCEDURE — G8417 CALC BMI ABV UP PARAM F/U: HCPCS | Performed by: NURSE PRACTITIONER

## 2017-10-17 RX ORDER — GABAPENTIN 300 MG/1
300 CAPSULE ORAL 3 TIMES DAILY
Qty: 90 CAPSULE | Refills: 2 | Status: SHIPPED | OUTPATIENT
Start: 2017-10-17 | End: 2018-01-08 | Stop reason: SDUPTHER

## 2017-10-17 RX ORDER — GABAPENTIN 300 MG/1
300 CAPSULE ORAL DAILY
Qty: 90 CAPSULE | Refills: 2 | Status: SHIPPED | OUTPATIENT
Start: 2017-10-17 | End: 2017-10-17 | Stop reason: SDUPTHER

## 2017-10-17 ASSESSMENT — ENCOUNTER SYMPTOMS
RESPIRATORY NEGATIVE: 1
BACK PAIN: 1

## 2017-10-17 NOTE — TELEPHONE ENCOUNTER
Pt requesting a refill, states he uses this for his thrush, no call back unless we're unable to fill.

## 2017-11-20 ENCOUNTER — OFFICE VISIT (OUTPATIENT)
Dept: FAMILY MEDICINE CLINIC | Age: 52
End: 2017-11-20
Payer: MEDICARE

## 2017-11-20 VITALS
BODY MASS INDEX: 32.87 KG/M2 | WEIGHT: 234.8 LBS | DIASTOLIC BLOOD PRESSURE: 76 MMHG | HEART RATE: 92 BPM | HEIGHT: 71 IN | SYSTOLIC BLOOD PRESSURE: 114 MMHG

## 2017-11-20 DIAGNOSIS — R73.01 IFG (IMPAIRED FASTING GLUCOSE): ICD-10-CM

## 2017-11-20 DIAGNOSIS — M54.16 LUMBAR RADICULAR PAIN: ICD-10-CM

## 2017-11-20 DIAGNOSIS — E66.9 CLASS 1 OBESITY WITHOUT SERIOUS COMORBIDITY WITH BODY MASS INDEX (BMI) OF 32.0 TO 32.9 IN ADULT, UNSPECIFIED OBESITY TYPE: ICD-10-CM

## 2017-11-20 DIAGNOSIS — Z23 NEED FOR TDAP VACCINATION: ICD-10-CM

## 2017-11-20 DIAGNOSIS — M54.41 CHRONIC RIGHT-SIDED LOW BACK PAIN WITH RIGHT-SIDED SCIATICA: Primary | ICD-10-CM

## 2017-11-20 DIAGNOSIS — Z23 NEED FOR PNEUMOCOCCAL VACCINATION: ICD-10-CM

## 2017-11-20 DIAGNOSIS — G89.29 CHRONIC RIGHT-SIDED LOW BACK PAIN WITH RIGHT-SIDED SCIATICA: Primary | ICD-10-CM

## 2017-11-20 PROCEDURE — G0009 ADMIN PNEUMOCOCCAL VACCINE: HCPCS | Performed by: PHYSICIAN ASSISTANT

## 2017-11-20 PROCEDURE — 3017F COLORECTAL CA SCREEN DOC REV: CPT | Performed by: PHYSICIAN ASSISTANT

## 2017-11-20 PROCEDURE — 99214 OFFICE O/P EST MOD 30 MIN: CPT | Performed by: PHYSICIAN ASSISTANT

## 2017-11-20 PROCEDURE — 1036F TOBACCO NON-USER: CPT | Performed by: PHYSICIAN ASSISTANT

## 2017-11-20 PROCEDURE — G8427 DOCREV CUR MEDS BY ELIG CLIN: HCPCS | Performed by: PHYSICIAN ASSISTANT

## 2017-11-20 PROCEDURE — 90732 PPSV23 VACC 2 YRS+ SUBQ/IM: CPT | Performed by: PHYSICIAN ASSISTANT

## 2017-11-20 PROCEDURE — G8482 FLU IMMUNIZE ORDER/ADMIN: HCPCS | Performed by: PHYSICIAN ASSISTANT

## 2017-11-20 PROCEDURE — 96372 THER/PROPH/DIAG INJ SC/IM: CPT | Performed by: PHYSICIAN ASSISTANT

## 2017-11-20 PROCEDURE — G8417 CALC BMI ABV UP PARAM F/U: HCPCS | Performed by: PHYSICIAN ASSISTANT

## 2017-11-20 RX ORDER — KETOROLAC TROMETHAMINE 30 MG/ML
60 INJECTION, SOLUTION INTRAMUSCULAR; INTRAVENOUS ONCE
Status: COMPLETED | OUTPATIENT
Start: 2017-11-20 | End: 2017-11-20

## 2017-11-20 RX ORDER — TRAMADOL HYDROCHLORIDE 50 MG/1
50 TABLET ORAL EVERY 6 HOURS PRN
Qty: 30 TABLET | Refills: 0 | Status: SHIPPED | OUTPATIENT
Start: 2017-11-20 | End: 2017-11-30

## 2017-11-20 RX ADMIN — KETOROLAC TROMETHAMINE 60 MG: 30 INJECTION, SOLUTION INTRAMUSCULAR; INTRAVENOUS at 09:17

## 2017-11-24 DIAGNOSIS — M54.5 CHRONIC BILATERAL LOW BACK PAIN, WITH SCIATICA PRESENCE UNSPECIFIED: ICD-10-CM

## 2017-11-24 DIAGNOSIS — G89.29 CHRONIC BILATERAL LOW BACK PAIN, WITH SCIATICA PRESENCE UNSPECIFIED: ICD-10-CM

## 2017-11-24 RX ORDER — MELOXICAM 15 MG/1
15 TABLET ORAL DAILY
Qty: 30 TABLET | Refills: 0 | OUTPATIENT
Start: 2017-11-24

## 2017-11-26 ASSESSMENT — ENCOUNTER SYMPTOMS
BACK PAIN: 1
BOWEL INCONTINENCE: 0

## 2017-11-26 NOTE — PROGRESS NOTES
Subjective:      Patient ID: Alfreda Lomeli is a 46 y.o. male. Back Pain   This is a chronic problem. The current episode started more than 1 month ago. The problem has been waxing and waning since onset. The pain is present in the lumbar spine. The quality of the pain is described as aching. The symptoms are aggravated by position. Associated symptoms include leg pain. Pertinent negatives include no bladder incontinence, bowel incontinence or chest pain. He has tried NSAIDs (injections from pain management) for the symptoms. Diabetes   He presents for his follow-up diabetic visit. Diabetes type: IFG. Pertinent negatives for diabetes include no chest pain, no polydipsia, no polyphagia and no polyuria. Current diabetic treatment includes diet. He rarely participates in exercise. Patient continues to follow closely with his Infectious Disease provider. He has follow-up scheduled next week. Review of Systems   Cardiovascular: Negative for chest pain. Gastrointestinal: Negative for bowel incontinence. Endocrine: Negative for polydipsia, polyphagia and polyuria. Genitourinary: Negative for bladder incontinence. Musculoskeletal: Positive for back pain. Past Medical History:   Diagnosis Date    [facility]     [facility]     HIV (human immunodeficiency virus infection) ([facility] 2001    followed by Claudia Gonzalez (Infectious Disease at [address] [place] [address])    Pneumonia 06/11/2017       Past Surgical History:   Procedure Laterality Date    COLONOSCOPY  01/05/2016    Normal colon. Hemorrhoids.  Dr Christiano Villatoro  2015    full dental extraction    LUMBAR SPINE SURGERY Right 9/26/2017    Right L4 & L5 Transforaminal  performed by Amanda Tafoya MD at [address] [place] [address] Dr Cifuentes 10/3/2017    Right L4 & L5 Transforaminal performed by Amanda Tafoya MD at [place] [address] History   Substance Use Topics    Smoking status: Former Smoker     Packs/day: 3.00     Years: 30.00     Quit date: 10/7/2014    Smokeless tobacco: Never Used    Alcohol use Yes      Comment: socially       Objective:   Physical Exam    Assessment:      1. Chronic right-sided low back pain with right-sided sciatica    2. Lumbar radicular pain    3. IFG (impaired fasting glucose)    4. Need for Tdap vaccination    5. Need for pneumococcal vaccination    6. Class 1 obesity without serious comorbidity with body mass index (BMI) of 32.0 to 32.9 in adult, unspecified obesity type            Plan:      Toradol 60 mg IM x 1. Take NSAIDs consistently. Tramadol PRN pain. Controlled Substances Monitoring: Attestation: The Prescription Monitoring Report for this patient was reviewed today. Oswaldo Rodriguez). Update fasting laboratory studies. Healthy diet and routine exercise. Patient has received influenza vaccination this season. Pneumovax today. Tdap prescription. Continue to follow with Infectious Disease provider and pain management. Follow-up in six months/sooner PRN.

## 2017-11-30 ENCOUNTER — TELEPHONE (OUTPATIENT)
Dept: PRIMARY CARE CLINIC | Age: 52
End: 2017-11-30

## 2017-12-12 ENCOUNTER — OFFICE VISIT (OUTPATIENT)
Dept: PAIN MANAGEMENT | Age: 52
End: 2017-12-12
Payer: MEDICARE

## 2017-12-12 VITALS
HEIGHT: 71 IN | WEIGHT: 228 LBS | DIASTOLIC BLOOD PRESSURE: 96 MMHG | BODY MASS INDEX: 31.92 KG/M2 | RESPIRATION RATE: 16 BRPM | SYSTOLIC BLOOD PRESSURE: 136 MMHG | HEART RATE: 68 BPM

## 2017-12-12 DIAGNOSIS — M54.07 PANNICULITIS INVOLVING LUMBOSACRAL REGION: Primary | ICD-10-CM

## 2017-12-12 DIAGNOSIS — M47.816 LUMBAR FACET JOINT SYNDROME: ICD-10-CM

## 2017-12-12 PROCEDURE — G8482 FLU IMMUNIZE ORDER/ADMIN: HCPCS | Performed by: NURSE PRACTITIONER

## 2017-12-12 PROCEDURE — 99214 OFFICE O/P EST MOD 30 MIN: CPT | Performed by: NURSE PRACTITIONER

## 2017-12-12 PROCEDURE — 1036F TOBACCO NON-USER: CPT | Performed by: NURSE PRACTITIONER

## 2017-12-12 PROCEDURE — G8427 DOCREV CUR MEDS BY ELIG CLIN: HCPCS | Performed by: NURSE PRACTITIONER

## 2017-12-12 PROCEDURE — G8417 CALC BMI ABV UP PARAM F/U: HCPCS | Performed by: NURSE PRACTITIONER

## 2017-12-12 PROCEDURE — 3017F COLORECTAL CA SCREEN DOC REV: CPT | Performed by: NURSE PRACTITIONER

## 2017-12-12 RX ORDER — ACETAMINOPHEN AND CODEINE PHOSPHATE 300; 30 MG/1; MG/1
1 TABLET ORAL EVERY 8 HOURS PRN
Qty: 90 TABLET | Refills: 0 | Status: SHIPPED | OUTPATIENT
Start: 2017-12-12 | End: 2018-02-28 | Stop reason: SDUPTHER

## 2017-12-12 NOTE — PROGRESS NOTES
daily      Glucosamine HCl (GLUCOSAMINE PO) Take by mouth daily      vitamin D 1000 UNITS CAPS Take by mouth daily      GARLIC PO Take by mouth daily      Multiple Vitamins-Minerals (MULTIVITAMIN PO) Take by mouth daily      INVEGA 6 MG extended release tablet Take 6 mg by mouth daily      DESCOVY 200-25 MG TABS Take 1 tablet by mouth daily      MYTESI 125 MG TBEC Take 125 mg by mouth      chlorhexidine (PERIDEX) 0.12 % solution       Naproxen Sodium (ALEVE PO) Take 2 tablets by mouth every 4 hours as needed          Past Medical History:   Diagnosis Date    [facility] ([facility]     follows with Dr. Mat Jimenez    Centrilobular emphysema ([facility]     HIV (human immunodeficiency virus infection) ([facility] 2001    followed by Tania Jensen (Infectious Disease at [address] [place] [address])    Pneumonia 06/11/2017       Past Surgical History:   Procedure Laterality Date    COLONOSCOPY  01/05/2016    Normal colon. Hemorrhoids. Dr Rima Lizarraga  2015    full dental extraction    LUMBAR SPINE SURGERY Right 9/26/2017    Right L4 & L5 Transforaminal  performed by Gurjit Snow MD at [address] [place] [address] Right 10/3/2017    Right L4 & L5 Transforaminal performed by Gurjit Snow MD at [facility] OR       Family History   Problem Relation Age of Onset    Cancer Mother      uterine s/p hysterectomy    Cancer Maternal Grandmother        Social History     Social History    Marital status: Single     Spouse name: N/A    Number of children: N/A    Years of education: N/A     Social History Main Topics    Smoking status: Former Smoker     Packs/day: 3.00     Years: 30.00     Quit date: 10/7/2014    Smokeless tobacco: Never Used    Alcohol use Yes      Comment: socially    Drug use: No    Sexual activity: Not Asked     Other Topics Concern    None     Social History Narrative    None     Review of Systems   Respiratory: Negative. Cardiovascular: Negative.     Musculoskeletal: Positive for arthralgias, back pain and myalgias. Psychiatric/Behavioral: Positive for sleep disturbance. Objective:   Physical Exam   Constitutional: He is oriented to person, place, and time. He appears well-developed and well-nourished. No distress. HENT:   Head: Normocephalic and atraumatic. Cardiovascular: Normal rate. Pulmonary/Chest: Effort normal. No accessory muscle usage. No apnea. No respiratory distress. Musculoskeletal:        Lumbar back: He exhibits decreased range of motion and pain. Neurological: He is alert and oriented to person, place, and time. No cranial nerve deficit. GCS eye subscore is 4. GCS verbal subscore is 5. GCS motor subscore is 6. Skin: Skin is warm, dry and intact. He is not diaphoretic. No cyanosis. No pallor. Nails show no clubbing. Psychiatric: He has a normal mood and affect. His behavior is normal. Judgment normal. His affect is not angry. His speech is not slurred. He is not agitated, not aggressive and not withdrawn. He does not express impulsivity or inappropriate judgment. He does not exhibit a depressed mood. He is communicative. Vitals reviewed. Assessment:      1. Panniculitis involving lumbosacral region    2. Lumbar facet joint syndrome          Plan:     Bilateral L3, L4, L5 Diagnostic Medial Branch Block    Informed consent has been obtained for procedure. Tariq Duckworthis not on blood thinners. Medications to hold have been reviewed with patient. Blood thinners must be held with permission from your cardiologist or primary care physician. A letter is not required to besent to PCP/Cardiologist regarding holding medications for procedure to decrease bleeding risk. Tylenol #3 prn for btp  Controlled Substances Monitoring:     Attestation: The Prescription Monitoring Report for this patient was reviewed today. Juanita Hendricks NP)  Documentation: No signs of potential drug abuse or diversion identified.  Juanita Hendricks NP)

## 2017-12-12 NOTE — PATIENT INSTRUCTIONS
recent infections. If required, please take pre-procedure antibiotic or other pre-procedure medications as instructed. 10. Bring inhalers and pain medications with you to your procedure. 11. Bring your MRI/CT films if they were done outside of the [facility]. 12. If you should develop a cold, sore throat, cough, fever or other new indication of illness or infection, or are started on antibiotics within 2 weeks of the scheduled procedure, please notify the [facility] office as early as possible at [phone].

## 2017-12-14 PROBLEM — M47.816 LUMBAR FACET JOINT SYNDROME: Status: ACTIVE | Noted: 2017-12-14

## 2017-12-14 PROBLEM — M54.07 PANNICULITIS INVOLVING LUMBOSACRAL REGION: Status: ACTIVE | Noted: 2017-12-14

## 2017-12-14 ASSESSMENT — ENCOUNTER SYMPTOMS
RESPIRATORY NEGATIVE: 1
BACK PAIN: 1

## 2018-01-08 RX ORDER — GABAPENTIN 300 MG/1
CAPSULE ORAL
Qty: 90 CAPSULE | Refills: 11 | Status: SHIPPED | OUTPATIENT
Start: 2018-01-08 | End: 2018-03-09 | Stop reason: SDUPTHER

## 2018-01-09 ENCOUNTER — HOSPITAL ENCOUNTER (EMERGENCY)
Age: 53
Discharge: HOME | End: 2018-01-09
Attending: EMERGENCY MEDICINE
Payer: OTHER MISCELLANEOUS

## 2018-01-09 ENCOUNTER — APPOINTMENT (OUTPATIENT)
Dept: GENERAL RADIOLOGY | Age: 53
End: 2018-01-09
Payer: OTHER MISCELLANEOUS

## 2018-01-09 ENCOUNTER — APPOINTMENT (OUTPATIENT)
Dept: CT IMAGING | Age: 53
End: 2018-01-09
Payer: OTHER MISCELLANEOUS

## 2018-01-09 VITALS
RESPIRATION RATE: 14 BRPM | OXYGEN SATURATION: 96 % | WEIGHT: 223 LBS | BODY MASS INDEX: 31.22 KG/M2 | TEMPERATURE: 96.8 F | HEART RATE: 74 BPM | HEIGHT: 71 IN | SYSTOLIC BLOOD PRESSURE: 141 MMHG | DIASTOLIC BLOOD PRESSURE: 70 MMHG

## 2018-01-09 DIAGNOSIS — S39.012A STRAIN OF LUMBAR REGION, INITIAL ENCOUNTER: Primary | ICD-10-CM

## 2018-01-09 DIAGNOSIS — S93.402A SPRAIN OF LEFT ANKLE, UNSPECIFIED LIGAMENT, INITIAL ENCOUNTER: ICD-10-CM
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
## 2018-01-09 PROCEDURE — 72131 CT LUMBAR SPINE W/O DYE: CPT

## 2018-01-09 PROCEDURE — 73610 X-RAY EXAM OF ANKLE: CPT

## 2018-01-09 PROCEDURE — 99284 EMERGENCY DEPT VISIT MOD MDM: CPT

## 2018-01-09 ASSESSMENT — ENCOUNTER SYMPTOMS
TROUBLE SWALLOWING: 0
WHEEZING: 0
BLOOD IN STOOL: 0
DIARRHEA: 0
CONSTIPATION: 0
BACK PAIN: 1
SHORTNESS OF BREATH: 0
VOMITING: 0
NAUSEA: 0
SORE THROAT: 0
ABDOMINAL PAIN: 0

## 2018-01-09 NOTE — ED NOTES
Ace wrap was given to pt and placement was explained- he has a high boot for his footwear and feels he would not be mayra to put it on with the ace wrap in place     Clifford Gutierrez RN  01/09/18 1200
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
## 2018-01-10 ENCOUNTER — CARE COORDINATION (OUTPATIENT)
Dept: CARE COORDINATION | Age: 53
End: 2018-01-10

## 2018-01-10 NOTE — CARE COORDINATION
Patient was called to follow up with most recent ER visit. There was no answer. A message was left on voicemail to have patient call back regarding ER visit. Office number given [phone].

## 2018-01-12 ENCOUNTER — HOSPITAL ENCOUNTER (OUTPATIENT)
Age: 53
Setting detail: OUTPATIENT SURGERY
Discharge: HOME | End: 2018-01-12
Attending: PHYSICAL MEDICINE & REHABILITATION | Admitting: PHYSICAL MEDICINE & REHABILITATION
Payer: MEDICARE

## 2018-01-12 ENCOUNTER — APPOINTMENT (OUTPATIENT)
Dept: GENERAL RADIOLOGY | Age: 53
End: 2018-01-12
Attending: PHYSICAL MEDICINE & REHABILITATION
Payer: MEDICARE

## 2018-01-12 VITALS
DIASTOLIC BLOOD PRESSURE: 98 MMHG | RESPIRATION RATE: 16 BRPM | SYSTOLIC BLOOD PRESSURE: 132 MMHG | HEIGHT: 71 IN | BODY MASS INDEX: 32.34 KG/M2 | WEIGHT: 231 LBS | OXYGEN SATURATION: 95 % | HEART RATE: 69 BPM | TEMPERATURE: 97.5 F

## 2018-01-12 PROCEDURE — 64494 INJ PARAVERT F JNT L/S 2 LEV: CPT | Performed by: PHYSICAL MEDICINE & REHABILITATION

## 2018-01-12 PROCEDURE — 2500000003 HC RX 250 WO HCPCS: Performed by: PHYSICAL MEDICINE & REHABILITATION

## 2018-01-12 PROCEDURE — A6413 ADHESIVE BANDAGE, FIRST-AID: HCPCS | Performed by: PHYSICAL MEDICINE & REHABILITATION

## 2018-01-12 PROCEDURE — 64495 INJ PARAVERT F JNT L/S 3 LEV: CPT | Performed by: PHYSICAL MEDICINE & REHABILITATION

## 2018-01-12 PROCEDURE — 3600000056 HC PAIN LEVEL 4 BASE: Performed by: PHYSICAL MEDICINE & REHABILITATION

## 2018-01-12 PROCEDURE — 6360000004 HC RX CONTRAST MEDICATION: Performed by: PHYSICAL MEDICINE & REHABILITATION

## 2018-01-12 PROCEDURE — 3209999900 FLUORO FOR SURGICAL PROCEDURES

## 2018-01-12 PROCEDURE — 7100000011 HC PHASE II RECOVERY - ADDTL 15 MIN: Performed by: PHYSICAL MEDICINE & REHABILITATION

## 2018-01-12 PROCEDURE — 64493 INJ PARAVERT F JNT L/S 1 LEV: CPT | Performed by: PHYSICAL MEDICINE & REHABILITATION

## 2018-01-12 PROCEDURE — 7100000010 HC PHASE II RECOVERY - FIRST 15 MIN: Performed by: PHYSICAL MEDICINE & REHABILITATION

## 2018-01-12 RX ORDER — LIDOCAINE HYDROCHLORIDE 20 MG/ML
INJECTION, SOLUTION EPIDURAL; INFILTRATION; INTRACAUDAL; PERINEURAL PRN
Status: DISCONTINUED | OUTPATIENT
Start: 2018-01-12 | End: 2018-01-12 | Stop reason: HOSPADM

## 2018-01-12 ASSESSMENT — PAIN - FUNCTIONAL ASSESSMENT: PAIN_FUNCTIONAL_ASSESSMENT: 0-10

## 2018-01-12 ASSESSMENT — PAIN SCALES - GENERAL: PAINLEVEL_OUTOF10: 0

## 2018-01-12 ASSESSMENT — ENCOUNTER SYMPTOMS: BACK PAIN: 1

## 2018-01-12 NOTE — H&P
11/1/16  Yes Historical Provider, MD   Naproxen Sodium (ALEVE PO) Take 2 tablets by mouth every 4 hours as needed    Yes Historical Provider, MD   diclofenac sodium (VOLTAREN) 1 % GEL Apply 4 g topically 4 times daily 11/20/17 12/20/17  TAMMY Moore   nystatin (MYCOSTATIN) 054166 UNIT/ML suspension Take 5 mLs by mouth 4 times daily 10/17/17   Mario Headley, DO   Nebulizers (COMPRESSOR/NEBULIZER) MISC As directed 6/16/17   TAMMY Moore   Respiratory Therapy Supplies (NEBULIZER/TUBING/MOUTHPIECE) KIT 1 kit by Does not apply route daily 6/16/17   TAMMY Moore   albuterol (PROVENTIL) (2.5 MG/3ML) 0.083% nebulizer solution Take 2.5 mg by nebulization every 6 hours as needed for Wheezing    Historical Provider, MD   chlorhexidine (PERIDEX) 0.12 % solution  10/20/16   Historical Provider, MD       Past Medical History:   Diagnosis Date    [facility] ([facility]     follows with Dr. Candy Smith    Centrilobular emphysema ([facility]     HIV (human immunodeficiency virus infection) ([facility] 2001    followed by Kumar Kerr (Infectious Disease at [address] [place] [address])    Pneumonia 06/11/2017       Past Surgical History:   Procedure Laterality Date    COLONOSCOPY  01/05/2016    Normal colon. Hemorrhoids. Dr Usha Nguyen  2015    full dental extraction    LUMBAR SPINE SURGERY Right 9/26/2017    Right L4 & L5 Transforaminal  performed by Briana Silverman MD at [address] [place] [address] Dr Cifuentes 10/3/2017    Right L4 & L5 Transforaminal performed by Briana Silverman MD at [facility] and Social History reviewed in the electronic medical record. Imaging: Reviewed available imaging in our system with the patient. No results found. Objective:     Vitals:    01/12/18 0905   BP: 129/80   Pulse: 92   Resp: 16   Temp: 97.5 °F (36.4 °C)   SpO2: 94%          Physical Exam  Neurologic Exam  Back Exam     Tenderness   The patient is experiencing tenderness in the lumbar.     Comments:  + Kg VILLEDA ,

## 2018-01-12 NOTE — OP NOTE
guidelines.  Needle tip positions were confirmed with 0.2 mL of Omnipaque 240 contrast medium. Then, 1mL of equal volumes of 0.75% bupivacaine // 2% lidocaine / and Celestone> were instilled at each site. EBL: no blood loss    SPECIMEN: none    The patient tolerated the procedure well and without complications and was noted to be in stable condition prior to discharge from the procedure center with discharge instructions. IMPRESSIONS:  1.     bilateral L2, L3, L4, L5 medial branch blocks performed uneventfully. 2.      bilateralL2, L3, L4, L5 medial branch blocks decreased pain to a 2 on 0 to 10 numeric pain scale at 15 and 30 minutes after the procedure. RECOMMENDATIONS:  1. Complete and return the \"Post-Procedure Pain and Activity Diary.   2. Contact me for symptom exacerbation, fever or unusual symptoms. 4.      Post-procedure care according to verbal and written instructions at discharge. 3. Follow this block with physical therapy, as per MD directions. 4. Consider confirmatory MBB if there is > 80% pain relief and improved activity scores. POST-PROCEDURE LUMBAR/CERVICAL SPINE FLUOROSCOPIC IMAGE INTERPRETATION:    EXAMINATION: AP, lateral, and oblique views. FLUORO TIME: 33 seconds    DISCUSSION:  Spot views of the spine reveal normal alignment and segmentation. Spinal needles are positioned at the base of the SAP at the junction with the transverse process/sacral ala  OR center of  the articular pillars on PA and lateral views. Contrast at needle tip confirms satisfactory placement. Visualized spine reveals bilateral See radiology report. Soft tissues reveal no abnormalities. IMPRESSION:  Satisfactory needle placement and contrast dispersal for bilateral L2, L3, L4, L5 medial branch block.      Electronically signed by Briana Silverman MD on 1/12/2018 at 10:15 AM

## 2018-01-18 ENCOUNTER — TELEPHONE (OUTPATIENT)
Dept: PRIMARY CARE CLINIC | Age: 53
End: 2018-01-18

## 2018-01-19 ENCOUNTER — HOSPITAL ENCOUNTER (OUTPATIENT)
Age: 53
Setting detail: OUTPATIENT SURGERY
Discharge: HOME | End: 2018-01-19
Attending: PHYSICAL MEDICINE & REHABILITATION | Admitting: PHYSICAL MEDICINE & REHABILITATION
Payer: MEDICARE

## 2018-01-19 ENCOUNTER — APPOINTMENT (OUTPATIENT)
Dept: GENERAL RADIOLOGY | Age: 53
End: 2018-01-19
Attending: PHYSICAL MEDICINE & REHABILITATION
Payer: MEDICARE

## 2018-01-19 VITALS
HEIGHT: 71 IN | BODY MASS INDEX: 32.68 KG/M2 | SYSTOLIC BLOOD PRESSURE: 150 MMHG | RESPIRATION RATE: 16 BRPM | TEMPERATURE: 97.9 F | OXYGEN SATURATION: 94 % | DIASTOLIC BLOOD PRESSURE: 115 MMHG | WEIGHT: 233.4 LBS | HEART RATE: 75 BPM

## 2018-01-19 PROCEDURE — 6360000004 HC RX CONTRAST MEDICATION: Performed by: PHYSICAL MEDICINE & REHABILITATION

## 2018-01-19 PROCEDURE — 64495 INJ PARAVERT F JNT L/S 3 LEV: CPT | Performed by: PHYSICAL MEDICINE & REHABILITATION

## 2018-01-19 PROCEDURE — 64494 INJ PARAVERT F JNT L/S 2 LEV: CPT | Performed by: PHYSICAL MEDICINE & REHABILITATION

## 2018-01-19 PROCEDURE — 7100000010 HC PHASE II RECOVERY - FIRST 15 MIN: Performed by: PHYSICAL MEDICINE & REHABILITATION

## 2018-01-19 PROCEDURE — 3209999900 FLUORO FOR SURGICAL PROCEDURES

## 2018-01-19 PROCEDURE — 3600000056 HC PAIN LEVEL 4 BASE: Performed by: PHYSICAL MEDICINE & REHABILITATION

## 2018-01-19 PROCEDURE — A6413 ADHESIVE BANDAGE, FIRST-AID: HCPCS | Performed by: PHYSICAL MEDICINE & REHABILITATION

## 2018-01-19 PROCEDURE — 7100000011 HC PHASE II RECOVERY - ADDTL 15 MIN: Performed by: PHYSICAL MEDICINE & REHABILITATION

## 2018-01-19 PROCEDURE — 2500000003 HC RX 250 WO HCPCS: Performed by: PHYSICAL MEDICINE & REHABILITATION

## 2018-01-19 PROCEDURE — 3600000057 HC PAIN LEVEL 4 ADDL 15 MIN: Performed by: PHYSICAL MEDICINE & REHABILITATION

## 2018-01-19 PROCEDURE — 64493 INJ PARAVERT F JNT L/S 1 LEV: CPT | Performed by: PHYSICAL MEDICINE & REHABILITATION

## 2018-01-19 RX ORDER — BUPIVACAINE HYDROCHLORIDE 7.5 MG/ML
INJECTION, SOLUTION EPIDURAL; RETROBULBAR PRN
Status: DISCONTINUED | OUTPATIENT
Start: 2018-01-19 | End: 2018-01-19 | Stop reason: HOSPADM

## 2018-01-19 ASSESSMENT — PAIN SCALES - GENERAL
PAINLEVEL_OUTOF10: 0
PAINLEVEL_OUTOF10: 0

## 2018-01-19 ASSESSMENT — PAIN - FUNCTIONAL ASSESSMENT: PAIN_FUNCTIONAL_ASSESSMENT: 0-10

## 2018-01-19 NOTE — H&P
History reviewed in the electronic medical record. Imaging: Reviewed available imaging in our system with the patient. No results found. Objective:          Vitals:     01/12/18 0905   BP: 129/80   Pulse: 92   Resp: 16   Temp: 97.5 °F (36.4 °C)   SpO2: 94%            Physical Exam  Neurologic Exam  Back Exam      Tenderness   The patient is experiencing tenderness in the lumbar. Comments:  + [place] B , SLR ? B  S-I ? Lab Results   Component Value Date     WBC 10.3 06/11/2017     HGB 13.9 06/11/2017     HCT 42.1 06/11/2017      06/11/2017     CHOL 195 11/07/2016     TRIG 269 (H) 11/07/2016     HDL 55 11/07/2016     ALT 23 06/11/2017     AST 22 06/11/2017      06/11/2017     K 4.1 06/11/2017      06/11/2017     CREATININE 0.91 06/11/2017     BUN 12 06/11/2017     CO2 25 06/11/2017     TSH 1.23 11/07/2016     LABA1C 5.6 05/09/2017         Assessment:                            There are no active hospital problems to display for this patient. Plan:   Proceed with planned procedure  -B L2,3,4,5  Diagnostic MBB     The patient understands the planned operation and its associated risks and benefits and agrees to proceed. The surgical consent form has been signed. Last NSAID/anticoagulant medication use was:na     Premedication taken for contrast allergy? na     Valium taken for oral sedation?  NA                  Routing History    Date/Time From To Method   1/12/2018  9:58 AM MD Haider Back PA In [place]

## 2018-01-25 ENCOUNTER — TELEPHONE (OUTPATIENT)
Dept: PAIN MANAGEMENT | Age: 53
End: 2018-01-25

## 2018-01-25 ENCOUNTER — ANESTHESIA (OUTPATIENT)
Dept: OPERATING ROOM | Age: 53
End: 2018-01-25
Payer: MEDICARE

## 2018-01-25 ENCOUNTER — ANESTHESIA EVENT (OUTPATIENT)
Dept: OPERATING ROOM | Age: 53
End: 2018-01-25
Payer: MEDICARE

## 2018-01-25 ENCOUNTER — HOSPITAL ENCOUNTER (OUTPATIENT)
Age: 53
Setting detail: OUTPATIENT SURGERY
Discharge: HOME | End: 2018-01-25
Attending: PHYSICAL MEDICINE & REHABILITATION | Admitting: PHYSICAL MEDICINE & REHABILITATION
Payer: MEDICARE

## 2018-01-25 ENCOUNTER — APPOINTMENT (OUTPATIENT)
Dept: GENERAL RADIOLOGY | Age: 53
End: 2018-01-25
Attending: PHYSICAL MEDICINE & REHABILITATION
Payer: MEDICARE

## 2018-01-25 VITALS
WEIGHT: 230.8 LBS | HEIGHT: 71 IN | RESPIRATION RATE: 18 BRPM | TEMPERATURE: 98.1 F | SYSTOLIC BLOOD PRESSURE: 118 MMHG | OXYGEN SATURATION: 98 % | HEART RATE: 74 BPM | BODY MASS INDEX: 32.31 KG/M2 | DIASTOLIC BLOOD PRESSURE: 78 MMHG

## 2018-01-25 VITALS
RESPIRATION RATE: 8 BRPM | DIASTOLIC BLOOD PRESSURE: 63 MMHG | OXYGEN SATURATION: 93 % | SYSTOLIC BLOOD PRESSURE: 111 MMHG

## 2018-01-25 DIAGNOSIS — I45.10 BUNDLE BRANCH BLOCK, RIGHT: Primary | ICD-10-CM

## 2018-01-25 DIAGNOSIS — M54.06 PANNICULITIS INVOLVING LUMBAR REGION: ICD-10-CM

## 2018-01-25 PROCEDURE — 2500000003 HC RX 250 WO HCPCS: Performed by: PHYSICAL MEDICINE & REHABILITATION

## 2018-01-25 PROCEDURE — 3700000001 HC ADD 15 MINUTES (ANESTHESIA): Performed by: PHYSICAL MEDICINE & REHABILITATION

## 2018-01-25 PROCEDURE — A6413 ADHESIVE BANDAGE, FIRST-AID: HCPCS | Performed by: PHYSICAL MEDICINE & REHABILITATION

## 2018-01-25 PROCEDURE — 3600000057 HC PAIN LEVEL 4 ADDL 15 MIN: Performed by: PHYSICAL MEDICINE & REHABILITATION

## 2018-01-25 PROCEDURE — 7100000011 HC PHASE II RECOVERY - ADDTL 15 MIN: Performed by: PHYSICAL MEDICINE & REHABILITATION

## 2018-01-25 PROCEDURE — 64636 DESTROY L/S FACET JNT ADDL: CPT | Performed by: PHYSICAL MEDICINE & REHABILITATION

## 2018-01-25 PROCEDURE — 7100000010 HC PHASE II RECOVERY - FIRST 15 MIN: Performed by: PHYSICAL MEDICINE & REHABILITATION

## 2018-01-25 PROCEDURE — 3600000056 HC PAIN LEVEL 4 BASE: Performed by: PHYSICAL MEDICINE & REHABILITATION

## 2018-01-25 PROCEDURE — 3209999900 FLUORO FOR SURGICAL PROCEDURES

## 2018-01-25 PROCEDURE — 6360000002 HC RX W HCPCS

## 2018-01-25 PROCEDURE — 6360000002 HC RX W HCPCS: Performed by: NURSE ANESTHETIST, CERTIFIED REGISTERED

## 2018-01-25 PROCEDURE — 64635 DESTROY LUMB/SAC FACET JNT: CPT | Performed by: PHYSICAL MEDICINE & REHABILITATION

## 2018-01-25 PROCEDURE — 3700000000 HC ANESTHESIA ATTENDED CARE: Performed by: PHYSICAL MEDICINE & REHABILITATION

## 2018-01-25 PROCEDURE — 6360000002 HC RX W HCPCS: Performed by: PHYSICAL MEDICINE & REHABILITATION

## 2018-01-25 PROCEDURE — 01992 ANES DX/THER NRV BLK&INJ PRN: CPT | Performed by: NURSE ANESTHETIST, CERTIFIED REGISTERED

## 2018-01-25 PROCEDURE — 2580000003 HC RX 258: Performed by: PHYSICAL MEDICINE & REHABILITATION

## 2018-01-25 RX ORDER — PROPOFOL 10 MG/ML
INJECTION, EMULSION INTRAVENOUS PRN
Status: DISCONTINUED | OUTPATIENT
Start: 2018-01-25 | End: 2018-01-25 | Stop reason: SDUPTHER

## 2018-01-25 RX ORDER — SODIUM CHLORIDE 0.9 % (FLUSH) 0.9 %
10 SYRINGE (ML) INJECTION EVERY 12 HOURS SCHEDULED
Status: DISCONTINUED | OUTPATIENT
Start: 2018-01-25 | End: 2018-01-25 | Stop reason: HOSPADM

## 2018-01-25 RX ORDER — SODIUM CHLORIDE 0.9 % (FLUSH) 0.9 %
10 SYRINGE (ML) INJECTION PRN
Status: DISCONTINUED | OUTPATIENT
Start: 2018-01-25 | End: 2018-01-25 | Stop reason: HOSPADM

## 2018-01-25 RX ORDER — BUPIVACAINE HYDROCHLORIDE 5 MG/ML
INJECTION, SOLUTION EPIDURAL; INTRACAUDAL PRN
Status: DISCONTINUED | OUTPATIENT
Start: 2018-01-25 | End: 2018-01-25 | Stop reason: HOSPADM

## 2018-01-25 RX ORDER — SODIUM CHLORIDE, SODIUM LACTATE, POTASSIUM CHLORIDE, CALCIUM CHLORIDE 600; 310; 30; 20 MG/100ML; MG/100ML; MG/100ML; MG/100ML
INJECTION, SOLUTION INTRAVENOUS CONTINUOUS
Status: DISCONTINUED | OUTPATIENT
Start: 2018-01-25 | End: 2018-01-25 | Stop reason: HOSPADM

## 2018-01-25 RX ORDER — LORAZEPAM 1 MG/1
1 TABLET ORAL SEE ADMIN INSTRUCTIONS
Qty: 1 TABLET | Refills: 1 | Status: SHIPPED | OUTPATIENT
Start: 2018-01-25 | End: 2018-01-26

## 2018-01-25 RX ORDER — DEXAMETHASONE SODIUM PHOSPHATE 10 MG/ML
INJECTION INTRAMUSCULAR; INTRAVENOUS PRN
Status: DISCONTINUED | OUTPATIENT
Start: 2018-01-25 | End: 2018-01-25 | Stop reason: HOSPADM

## 2018-01-25 RX ORDER — LIDOCAINE HYDROCHLORIDE 40 MG/ML
INJECTION, SOLUTION RETROBULBAR; TOPICAL PRN
Status: DISCONTINUED | OUTPATIENT
Start: 2018-01-25 | End: 2018-01-25 | Stop reason: HOSPADM

## 2018-01-25 RX ADMIN — SODIUM CHLORIDE, POTASSIUM CHLORIDE, SODIUM LACTATE AND CALCIUM CHLORIDE: 600; 310; 30; 20 INJECTION, SOLUTION INTRAVENOUS at 09:46

## 2018-01-25 RX ADMIN — PROPOFOL 600 MG: 10 INJECTION, EMULSION INTRAVENOUS at 10:00

## 2018-01-25 ASSESSMENT — PULMONARY FUNCTION TESTS
PIF_VALUE: 0

## 2018-01-25 ASSESSMENT — PAIN SCALES - GENERAL
PAINLEVEL_OUTOF10: 0
PAINLEVEL_OUTOF10: 1
PAINLEVEL_OUTOF10: 0
PAINLEVEL_OUTOF10: 0

## 2018-01-25 ASSESSMENT — PAIN DESCRIPTION - PAIN TYPE: TYPE: CHRONIC PAIN

## 2018-01-25 ASSESSMENT — COPD QUESTIONNAIRES: CAT_SEVERITY: NO INTERVAL CHANGE

## 2018-01-25 ASSESSMENT — PAIN - FUNCTIONAL ASSESSMENT: PAIN_FUNCTIONAL_ASSESSMENT: 0-10

## 2018-01-25 NOTE — H&P
Ilana Cardoso MD Physician Signed General Surgery  H&P Date of Service: 1/19/2018  9:54 AM   Related encounter: Admission (Discharged) from 1/19/2018 in [address] [facility] copied text  Ilana Cardoso MD Physician Signed General Surgery   H&P Date of Service: 1/12/2018  9:57 AM   Related encounter: Admission (Discharged) from 1/12/2018 in [address] copied text  Rosendo for attribution information     Subjective:       Patient is here today for a diagnostic/therapeutic injection. 1/12/18     There are no active hospital problems to display for this patient. HPI: Patient is here today for a diagnostic/therapeutic injection. The most recent [facility] office visit notes have been reviewed and are unchanged. Review of Systems   Musculoskeletal: Positive for back pain (B paraspinal lumbar pain  was better with TFEs for 3  months now has returned stronger no new injury). Allergies   Allergen Reactions    Pcn [Penicillins] Rash    Sulfa Antibiotics Rash         Home Medications                 Prior to Admission medications    Medication Sig Start Date End Date Taking? Authorizing Provider   gabapentin (NEURONTIN) 300 MG capsule TAKE 1 CAPSULE BY MOUTH THREE TIMES DAILY 1/8/18 2/7/18 Yes Aleks Rosales NP   acetaminophen-codeine (TYLENOL #3) 300-30 MG per tablet Take 1 tablet by mouth every 8 hours as needed for Pain .  12/12/17   Yes Aleks Rosales NP   baclofen (LIORESAL) 10 MG tablet Take 1 tablet by mouth 3 times daily as needed (spasm) 8/22/17   Yes Brennen Morris, [address]   etodolac (LODINE) 400 MG tablet Take 1 tablet by mouth 2 times daily 8/22/17 8/22/18 Yes TAMMY Jansen   Darunavir-Cobicistat (PREZCOBIX) 800-150 MG TABS Take by mouth nightly     Yes Historical Provider, MD   TURMERIC PO Take by mouth three times daily     Yes Historical Provider, MD   Glucosamine HCl (GLUCOSAMINE PO) Take by mouth daily

## 2018-01-25 NOTE — TELEPHONE ENCOUNTER
Pt was notified of Dr. Nav Hinson message and also the schedules appointment for cardiology for clearance.

## 2018-01-25 NOTE — INTERVAL H&P NOTE
I have interviewed and examined the patient and reviewed the recent History and Physical.  There have been no changes to the recent H&P documentation. The surgical consent form has been signed. Last anticoagulant medication use was:na    Premedication taken for contrast allergy? No    Valium taken for oral sedation? No    Outpatient Prescriptions Marked as Taking for the 1/25/18 encounter [facility] Encounter)   Medication Sig Dispense Refill    BLACK ELDERBERRY,BERRY-FLOWER, PO Take 1 tablet by mouth daily      gabapentin (NEURONTIN) 300 MG capsule TAKE 1 CAPSULE BY MOUTH THREE TIMES DAILY 90 capsule 11    acetaminophen-codeine (TYLENOL #3) 300-30 MG per tablet Take 1 tablet by mouth every 8 hours as needed for Pain . 90 tablet 0    baclofen (LIORESAL) 10 MG tablet Take 1 tablet by mouth 3 times daily as needed (spasm) 180 tablet 1    etodolac (LODINE) 400 MG tablet Take 1 tablet by mouth 2 times daily 180 tablet 1    Darunavir-Cobicistat (PREZCOBIX) 800-150 MG TABS Take by mouth nightly      TURMERIC PO Take by mouth three times daily      Glucosamine HCl (GLUCOSAMINE PO) Take by mouth daily      vitamin D 1000 UNITS CAPS Take by mouth daily      GARLIC PO Take by mouth daily      Multiple Vitamins-Minerals (MULTIVITAMIN PO) Take by mouth daily      INVEGA 6 MG extended release tablet Take 6 mg by mouth daily      DESCOVY 200-25 MG TABS Take 1 tablet by mouth daily      MYTESI 125 MG TBEC Take 125 mg by mouth      Naproxen Sodium (ALEVE PO) Take 2 tablets by mouth every 4 hours as needed          The patient understands the planned operation and its associated risks and benefits and agrees to proceed.         Electronically signed by Poppy Han MD on 1/25/2018 at 9:41 AM

## 2018-01-25 NOTE — TELEPHONE ENCOUNTER
Please find out more information. Name of anesthesiologist and the reasoning behind this. Does he see cardiology anywhere?

## 2018-01-25 NOTE — TELEPHONE ENCOUNTER
After speaking with Dr. Kym Lynch and the anesthesiologist, Sherrelljude Antonio, pt was put on the schedule to see cardiology on 2/15/17. Pt had an abnormal EKG and they wanted an updated EKG prior to putting the pt under for the upcoming RFA.  Dr. Kym Lynch stated that he would send a referral.

## 2018-01-25 NOTE — ANESTHESIA PRE PROCEDURE
Take 6 mg by mouth daily      DESCOVY 200-25 MG TABS Take 1 tablet by mouth daily      MYTESI 125 MG TBEC Take 125 mg by mouth      chlorhexidine (PERIDEX) 0.12 % solution       Naproxen Sodium (ALEVE PO) Take 2 tablets by mouth every 4 hours as needed          Allergies: Allergies   Allergen Reactions    Pcn [Penicillins] Rash    Sulfa Antibiotics Rash       Problem List:    Patient Active Problem List   Diagnosis Code    [facility] B[facility]    IFG (impaired fasting glucose) R73.01    Lumbar radicular pain M54.16    Chronic lower back pain M54.5, G89.29    Panniculitis involving lumbosacral region M54.07    Lumbar facet joint syndrome M12.88       Past Medical History:        Diagnosis Date    [facility]     follows with Dr. Tamar Benson Centrilobular emphysema ([facility]     HIV (human immunodeficiency virus infection) ([facility] 2001    followed by Haider Jolly (Infectious Disease at [address] [place] [address])    Pneumonia 06/11/2017       Past Surgical History:        Procedure Laterality Date    ANESTHESIA NERVE BLOCK Bilateral 1/12/2018    Bilat L2 L3 L4 L5 Diagnostic Medial BB performed by Brooke Carreno MD at [address] [place] [address]  01/05/2016    Normal colon. Hemorrhoids.  Dr Rajani Menjivar  2015    full dental extraction    LUMBAR SPINE SURGERY Right 9/26/2017    Right L4 & L5 Transforaminal  performed by Brooke Carreno MD at [address] [place] [address] Dr Cifuentes 10/3/2017    Right L4 & L5 Transforaminal performed by Brooke Carreno MD at [place] [address], LUMBAR PLEXUS Bilateral 1/19/2018    Bilat L2 L3 L4 L5 Confirmatory Medial BB performed by Brooke Carreno MD at [address] [place] [address]       Social History:    Social History   Substance Use Topics    Smoking status: Former Smoker     Packs/day: 3.00     Years: 30.00     Quit date: 10/7/2014    Smokeless tobacco: Never Used    Alcohol use Yes      Comment: socially treatment            GI/Hepatic/Renal: Neg GI/Hepatic/Renal ROS            Endo/Other: Negative Endo/Other ROS                    Abdominal:           Vascular:                                      Anesthesia Plan      MAC     ASA 3     (IV Sedation, MAC, TIVA  Anesthetic plan includes the use of IV sedation. Specifically dicussed risks, benefits, alternatives, personnel involved. Risks include: loss of airway/stop breathing, need for airway device, seizures, drop in blood pressure, pain, pressure, memory of event, ability to hear things, talking but unable to remember conversation, lifting chin/repositioning airway. Benefits continue breathing on own, unless obtunded. Since sedation is on a contuinuum general anesthesia with the use of endotracheal intubation was discussed as a back up plan. Specifically dicussed risks, benefits, alternatives, personnel involved, including risks of: cut or cracked lip, chipped tooth/teeth, broken or removed teeth, sore throat, damaged vocal cords, nausea and vomiting, allergic reaction to medication, failed intubation, need to repeat procedure, emergent airway attempts, case cancellation, need for prolonged intubation/remaining intubated, other monitors, and possible death. The patient will be placed on a cardiac monitor and vital signs, pulse oximetry and level of consciousness will be continuously evaluated throughout the procedure. The patient will be closely monitored until recovery from the medications is complete and the patient has returned to baseline status. Pt verbalized an understanding and agreed to proceed.  )  Induction: intravenous. Anesthetic plan and risks discussed with patient.       Plan discussed with surgical team.                Orlando Ruiz CRNA   1/25/2018

## 2018-02-01 NOTE — PROGRESS NOTES
Subjective:      Patient ID: Randy Hyatt is a 46 y.o. male. Chief Complaint   Patient presents with    Back Pain     2 wk follow up post TFE 10/3/17     HPI Underwent right L4, L5 TFE x2. Improved in pain relief, but still complaining right buttock discomfort.  Takes gabapentin 300mg at bedtime    Pain Assessment  Location of Pain: Back  Location Modifiers: Right (pain in neck, back and Rt hip)  Severity of Pain: 6  Quality of Pain:  (pt states that it's just there)  Duration of Pain: A few hours  Frequency of Pain: Constant  Aggravating Factors: Bending (sitting)  Limiting Behavior: Yes  Relieving Factors:  (standing)  Are there other pain locations you wish to document?: No    Allergies   Allergen Reactions    Pcn [Penicillins] Rash    Sulfa Antibiotics Rash       Outpatient Prescriptions Marked as Taking for the 10/17/17 encounter (Office Visit) with Cathryn Carter NP   Medication Sig Dispense Refill    gabapentin (NEURONTIN) 300 MG capsule Take 1 capsule by mouth 3 times daily 90 capsule 2    nystatin (MYCOSTATIN) 082789 UNIT/ML suspension Take 5 mLs by mouth 4 times daily 180 mL 1    baclofen (LIORESAL) 10 MG tablet Take 1 tablet by mouth 3 times daily as needed (spasm) 180 tablet 1    etodolac (LODINE) 400 MG tablet Take 1 tablet by mouth 2 times daily 180 tablet 1    Darunavir-Cobicistat (PREZCOBIX) 800-150 MG TABS Take by mouth nightly      Nebulizers (COMPRESSOR/NEBULIZER) MISC As directed 1 each 0    Respiratory Therapy Supplies (NEBULIZER/TUBING/MOUTHPIECE) KIT 1 kit by Does not apply route daily 1 kit 0    albuterol (PROVENTIL) (2.5 MG/3ML) 0.083% nebulizer solution Take 2.5 mg by nebulization every 6 hours as needed for Wheezing      TURMERIC PO Take by mouth three times daily      Glucosamine HCl (GLUCOSAMINE PO) Take by mouth daily      vitamin D 1000 UNITS CAPS Take by mouth daily      GARLIC PO Take by mouth daily      Multiple Vitamins-Minerals (MULTIVITAMIN PO) Take by mouth Normal

## 2018-02-13 ENCOUNTER — TELEPHONE (OUTPATIENT)
Dept: FAMILY MEDICINE CLINIC | Age: 53
End: 2018-02-13

## 2018-02-13 DIAGNOSIS — M54.10 BACK PAIN WITH RIGHT-SIDED RADICULOPATHY: ICD-10-CM

## 2018-02-13 RX ORDER — BACLOFEN 10 MG/1
TABLET ORAL
Qty: 270 TABLET | Refills: 0 | Status: SHIPPED | OUTPATIENT
Start: 2018-02-13 | End: 2018-03-07 | Stop reason: SDUPTHER

## 2018-02-13 RX ORDER — ETODOLAC 400 MG/1
TABLET, FILM COATED ORAL
Qty: 180 TABLET | Refills: 0 | Status: SHIPPED | OUTPATIENT
Start: 2018-02-13 | End: 2018-06-01 | Stop reason: SDUPTHER

## 2018-02-15 ENCOUNTER — OFFICE VISIT (OUTPATIENT)
Dept: CARDIOLOGY | Age: 53
End: 2018-02-15
Payer: MEDICARE

## 2018-02-15 VITALS
SYSTOLIC BLOOD PRESSURE: 128 MMHG | WEIGHT: 230 LBS | HEIGHT: 71 IN | HEART RATE: 58 BPM | BODY MASS INDEX: 32.2 KG/M2 | DIASTOLIC BLOOD PRESSURE: 80 MMHG

## 2018-02-15 DIAGNOSIS — R94.31 ABNORMAL EKG: Primary | ICD-10-CM

## 2018-02-15 PROCEDURE — 93000 ELECTROCARDIOGRAM COMPLETE: CPT | Performed by: INTERNAL MEDICINE

## 2018-02-15 PROCEDURE — 99203 OFFICE O/P NEW LOW 30 MIN: CPT | Performed by: INTERNAL MEDICINE

## 2018-02-20 ENCOUNTER — OFFICE VISIT (OUTPATIENT)
Dept: FAMILY MEDICINE CLINIC | Age: 53
End: 2018-02-20
Payer: MEDICARE

## 2018-02-20 VITALS
OXYGEN SATURATION: 95 % | WEIGHT: 235 LBS | SYSTOLIC BLOOD PRESSURE: 120 MMHG | BODY MASS INDEX: 32.9 KG/M2 | HEIGHT: 71 IN | HEART RATE: 83 BPM | DIASTOLIC BLOOD PRESSURE: 80 MMHG | RESPIRATION RATE: 18 BRPM

## 2018-02-20 DIAGNOSIS — R61 HYPERHIDROSIS: ICD-10-CM

## 2018-02-20 DIAGNOSIS — J44.9 CHRONIC OBSTRUCTIVE PULMONARY DISEASE, UNSPECIFIED COPD TYPE (HCC): ICD-10-CM

## 2018-02-20 DIAGNOSIS — M65.4 DE QUERVAIN'S TENOSYNOVITIS, LEFT: Primary | ICD-10-CM

## 2018-02-20 PROCEDURE — G8482 FLU IMMUNIZE ORDER/ADMIN: HCPCS | Performed by: PHYSICIAN ASSISTANT

## 2018-02-20 PROCEDURE — G8427 DOCREV CUR MEDS BY ELIG CLIN: HCPCS | Performed by: PHYSICIAN ASSISTANT

## 2018-02-20 PROCEDURE — G8926 SPIRO NO PERF OR DOC: HCPCS | Performed by: PHYSICIAN ASSISTANT

## 2018-02-20 PROCEDURE — 99214 OFFICE O/P EST MOD 30 MIN: CPT | Performed by: PHYSICIAN ASSISTANT

## 2018-02-20 PROCEDURE — G8417 CALC BMI ABV UP PARAM F/U: HCPCS | Performed by: PHYSICIAN ASSISTANT

## 2018-02-20 PROCEDURE — 1036F TOBACCO NON-USER: CPT | Performed by: PHYSICIAN ASSISTANT

## 2018-02-20 PROCEDURE — 3023F SPIROM DOC REV: CPT | Performed by: PHYSICIAN ASSISTANT

## 2018-02-20 PROCEDURE — 3017F COLORECTAL CA SCREEN DOC REV: CPT | Performed by: PHYSICIAN ASSISTANT

## 2018-02-20 RX ORDER — PREDNISONE 20 MG/1
20 TABLET ORAL 2 TIMES DAILY
Qty: 10 TABLET | Refills: 0 | Status: SHIPPED | OUTPATIENT
Start: 2018-02-20 | End: 2018-02-25

## 2018-02-20 NOTE — PATIENT INSTRUCTIONS
Patient Education        Johanne Stair Disease: Exercises  Your Care Instructions  Here are some examples of typical rehabilitation exercises for your condition. Start each exercise slowly. Ease off the exercise if you start to have pain. Your doctor or your physical or occupational therapist will tell you when you can start these exercises and which ones will work best for you. How to do the exercises  Thumb lifts    1. Place your hand on a flat surface, with your palm up. 2. Lift your thumb away from your palm to make a \"C\" shape. 3. Hold for about 6 seconds. 4. Repeat 8 to 12 times. Passive thumb MP flexion    1. Hold your hand in front of you, and turn your hand so your little finger faces down and your thumb faces up. (Your hand should be in the position used for shaking someone's hand.) You may also rest your hand on a flat surface. 2. Use the fingers on your other hand to bend your thumb down at the point where your thumb connects to your palm. 3. Hold for at least 15 to 30 seconds. 4. Repeat 2 to 4 times. Finkelstein stretch    1. Hold your arms out in front of you. (Your hand should be in the position used for shaking someone's hand.)  2. Bend your thumb toward your palm. 3. Use your other hand to gently stretch your thumb and wrist downward until you feel the stretch on the thumb side of your wrist.  4. Hold for at least 15 to 30 seconds. 5. Repeat 2 to 4 times. Resisted ulnar deviation    For this exercise, you will need elastic exercise material, such as surgical tubing or Thera-Band. 1. Sit leaning forward with your legs slightly spread and your elbow on your thigh. 2. Grasp one end of the band with your palm down, and step on the other end with the foot opposite the hand holding the band. 3. Slowly bend your wrist sideways and away from your knee. 4. Repeat 8 to 12 times. Follow-up care is a key part of your treatment and safety.  Be sure to make and go to all appointments, and call your doctor if you are having problems. It's also a good idea to know your test results and keep a list of the medicines you take. Where can you learn more? Go to https://GetFeedbackpeStuffle.[employer]. org and sign in to your [employer] account. Enter Y975 in the [employer] box to learn more about \"De Quervain's Disease: Exercises. \"     If you do not have an account, please click on the \"Sign Up Now\" link. Current as of: March 21, 2017  Content Version: 11.5  © 9936-4714 Healthwise, Incorporated. Care instructions adapted under license by [facility] ([facility]). If you have questions about a medical condition or this instruction, always ask your healthcare professional. [address] any warranty or liability for your use of this information.

## 2018-02-25 ASSESSMENT — COPD QUESTIONNAIRES: COPD: 1

## 2018-02-25 ASSESSMENT — ENCOUNTER SYMPTOMS
DIFFICULTY BREATHING: 1
RESPIRATORY NEGATIVE: 1

## 2018-02-28 ENCOUNTER — TELEPHONE (OUTPATIENT)
Dept: FAMILY MEDICINE CLINIC | Age: 53
End: 2018-02-28

## 2018-02-28 ENCOUNTER — HOSPITAL ENCOUNTER (OUTPATIENT)
Dept: NON INVASIVE DIAGNOSTICS | Age: 53
Discharge: HOME | End: 2018-02-28
Payer: MEDICARE

## 2018-02-28 ENCOUNTER — HOSPITAL ENCOUNTER (OUTPATIENT)
Dept: NUCLEAR MEDICINE | Age: 53
Discharge: HOME | End: 2018-03-02
Payer: MEDICARE

## 2018-02-28 DIAGNOSIS — M47.816 LUMBAR FACET JOINT SYNDROME: Primary | ICD-10-CM

## 2018-02-28 DIAGNOSIS — R94.31 ABNORMAL EKG: ICD-10-CM

## 2018-02-28 PROCEDURE — 78452 HT MUSCLE IMAGE SPECT MULT: CPT

## 2018-02-28 PROCEDURE — 3430000000 HC RX DIAGNOSTIC RADIOPHARMACEUTICAL: Performed by: INTERNAL MEDICINE

## 2018-02-28 PROCEDURE — 93018 CV STRESS TEST I&R ONLY: CPT | Performed by: INTERNAL MEDICINE

## 2018-02-28 PROCEDURE — 93017 CV STRESS TEST TRACING ONLY: CPT

## 2018-02-28 PROCEDURE — 6360000002 HC RX W HCPCS: Performed by: INTERNAL MEDICINE

## 2018-02-28 PROCEDURE — A9500 TC99M SESTAMIBI: HCPCS | Performed by: INTERNAL MEDICINE

## 2018-02-28 RX ORDER — ACETAMINOPHEN AND CODEINE PHOSPHATE 300; 30 MG/1; MG/1
1 TABLET ORAL EVERY 8 HOURS PRN
Qty: 90 TABLET | Refills: 0 | Status: SHIPPED | OUTPATIENT
Start: 2018-02-28 | End: 2018-04-02 | Stop reason: SDUPTHER

## 2018-02-28 RX ADMIN — TETRAKIS(2-METHOXYISOBUTYLISOCYANIDE)COPPER(I) TETRAFLUOROBORATE 10 MILLICURIE: 1 INJECTION, POWDER, LYOPHILIZED, FOR SOLUTION INTRAVENOUS at 09:03

## 2018-02-28 RX ADMIN — REGADENOSON 0.4 MG: 0.08 INJECTION, SOLUTION INTRAVENOUS at 10:04

## 2018-02-28 RX ADMIN — TETRAKIS(2-METHOXYISOBUTYLISOCYANIDE)COPPER(I) TETRAFLUOROBORATE 30 MILLICURIE: 1 INJECTION, POWDER, LYOPHILIZED, FOR SOLUTION INTRAVENOUS at 11:48

## 2018-02-28 NOTE — PROGRESS NOTES
  vitamin D 1000 UNITS CAPS, Take by mouth daily, Disp: , Rfl:     GARLIC PO, Take by mouth daily, Disp: , Rfl:     Multiple Vitamins-Minerals (MULTIVITAMIN PO), Take by mouth daily, Disp: , Rfl:     INVEGA 6 MG extended release tablet, Take 6 mg by mouth daily, Disp: , Rfl:     DESCOVY 200-25 MG TABS, Take 1 tablet by mouth daily, Disp: , Rfl:     MYTESI 125 MG TBEC, Take 125 mg by mouth, Disp: , Rfl:     chlorhexidine (PERIDEX) 0.12 % solution, , Disp: , Rfl:     Naproxen Sodium (ALEVE PO), Take 2 tablets by mouth every 4 hours as needed , Disp: , Rfl:   No current facility-administered medications for this encounter. Facility-Administered Medications Ordered in Other Encounters:     technetium sestamibi (CARDIOLITE) injection 30 millicurie, 30 millicurie, Intravenous, ONCE PRN, Kina Weir MD      ----------------------------------------------------------------------------------------------------------                Lexiscan 0.4 mg injected over 10 seconds. IV Cardiolite injected 20 seconds post Lexiscan injection. Heart Rate:  75  Resting Blood Pressure:  136/83   ----------------------------------------------------------------------------------------------------------     HR BP   1 min 121 138/82   2 min 116    3 min 114 133/86   4 min 111    5 min 114 137/78   6 min 102    7 min 97 130/82   8 min 112    9 min 108 128/85   10 min 106      Symptoms:  Chest Pain:  No      Nausea:  No     Headache:  Yes    Shortness of Breath:  No     Other:  Yes-hot feeling     Electronically signed by Nurys Mondragon RCP on 2/28/18 at 9:44 AM    ----------------------------------------------------------------------------------------------------------  Resting EKG:  NSR, RBBB. Arrhythmias:  None     EKG Changes:  None. Maximum Changes:  N/A     Leads with maximum changes:  N/A     EKG returned to baseline N/A minutes in recovery.      Interpretation:      1- No EKG changes to indicate

## 2018-03-01 NOTE — TELEPHONE ENCOUNTER
UptoDate suggests trial of OTC cortisone cream to area of irritated skin to continue with Drysol therapy.

## 2018-03-02 ENCOUNTER — TELEPHONE (OUTPATIENT)
Dept: CARDIOLOGY | Age: 53
End: 2018-03-02

## 2018-03-02 ENCOUNTER — TELEPHONE (OUTPATIENT)
Dept: PAIN MANAGEMENT | Age: 53
End: 2018-03-02

## 2018-03-07 ENCOUNTER — OFFICE VISIT (OUTPATIENT)
Dept: PAIN MANAGEMENT | Age: 53
End: 2018-03-07
Payer: MEDICARE

## 2018-03-07 VITALS
HEIGHT: 71 IN | DIASTOLIC BLOOD PRESSURE: 82 MMHG | BODY MASS INDEX: 32.56 KG/M2 | WEIGHT: 232.6 LBS | SYSTOLIC BLOOD PRESSURE: 134 MMHG | RESPIRATION RATE: 16 BRPM

## 2018-03-07 DIAGNOSIS — M54.06 PANNICULITIS INVOLVING LUMBAR REGION: Primary | ICD-10-CM

## 2018-03-07 DIAGNOSIS — M47.816 LUMBAR FACET JOINT SYNDROME: ICD-10-CM

## 2018-03-07 PROCEDURE — G8482 FLU IMMUNIZE ORDER/ADMIN: HCPCS | Performed by: NURSE PRACTITIONER

## 2018-03-07 PROCEDURE — 99213 OFFICE O/P EST LOW 20 MIN: CPT | Performed by: NURSE PRACTITIONER

## 2018-03-07 PROCEDURE — 3017F COLORECTAL CA SCREEN DOC REV: CPT | Performed by: NURSE PRACTITIONER

## 2018-03-07 PROCEDURE — G8427 DOCREV CUR MEDS BY ELIG CLIN: HCPCS | Performed by: NURSE PRACTITIONER

## 2018-03-07 PROCEDURE — G8417 CALC BMI ABV UP PARAM F/U: HCPCS | Performed by: NURSE PRACTITIONER

## 2018-03-07 PROCEDURE — 1036F TOBACCO NON-USER: CPT | Performed by: NURSE PRACTITIONER

## 2018-03-08 PROBLEM — M54.06 PANNICULITIS INVOLVING LUMBAR REGION: Status: ACTIVE | Noted: 2017-12-14

## 2018-03-08 ASSESSMENT — ENCOUNTER SYMPTOMS
BACK PAIN: 1
RESPIRATORY NEGATIVE: 1

## 2018-03-08 NOTE — PROGRESS NOTES
N/A    Number of children: N/A    Years of education: N/A     Social History Main Topics    Smoking status: Former Smoker     Packs/day: 3.00     Years: 30.00     Quit date: 10/7/2014    Smokeless tobacco: Never Used    Alcohol use Yes      Comment: socially    Drug use: No    Sexual activity: Not on file     Other Topics Concern    Not on file     Social History Narrative    No narrative on file     Review of Systems   Respiratory: Negative. Cardiovascular: Negative. Musculoskeletal: Positive for arthralgias, back pain and myalgias. Psychiatric/Behavioral: Positive for sleep disturbance. Objective:   Physical Exam   Constitutional: He is oriented to person, place, and time. He appears well-developed and well-nourished. No distress. HENT:   Head: Normocephalic and atraumatic. Cardiovascular: Normal rate. Pulmonary/Chest: Effort normal. No accessory muscle usage. No apnea. No respiratory distress. Musculoskeletal:        Lumbar back: He exhibits decreased range of motion and pain. Neurological: He is alert and oriented to person, place, and time. No cranial nerve deficit. GCS eye subscore is 4. GCS verbal subscore is 5. GCS motor subscore is 6. Skin: Skin is warm, dry and intact. He is not diaphoretic. No cyanosis. No pallor. Nails show no clubbing. Psychiatric: He has a normal mood and affect. His behavior is normal. Judgment normal. His affect is not angry. His speech is not slurred. He is not agitated, not aggressive and not withdrawn. He does not express impulsivity or inappropriate judgment. He does not exhibit a depressed mood. He is communicative. Vitals reviewed. Assessment:      1. Panniculitis involving lumbar region    2. Lumbar facet joint syndrome          Plan:    Schedule left L2, L3, L4, L5 radiofrequency ablation.

## 2018-03-09 RX ORDER — GABAPENTIN 300 MG/1
CAPSULE ORAL
Qty: 270 CAPSULE | Refills: 3 | OUTPATIENT
Start: 2018-03-09 | End: 2018-06-01

## 2018-03-15 ENCOUNTER — APPOINTMENT (OUTPATIENT)
Dept: GENERAL RADIOLOGY | Age: 53
End: 2018-03-15
Attending: PHYSICAL MEDICINE & REHABILITATION
Payer: MEDICARE

## 2018-03-15 ENCOUNTER — HOSPITAL ENCOUNTER (OUTPATIENT)
Age: 53
Setting detail: OUTPATIENT SURGERY
Discharge: HOME | End: 2018-03-15
Attending: PHYSICAL MEDICINE & REHABILITATION | Admitting: PHYSICAL MEDICINE & REHABILITATION
Payer: MEDICARE

## 2018-03-15 VITALS
HEIGHT: 71 IN | SYSTOLIC BLOOD PRESSURE: 135 MMHG | BODY MASS INDEX: 31.92 KG/M2 | WEIGHT: 228 LBS | HEART RATE: 94 BPM | TEMPERATURE: 97.7 F | OXYGEN SATURATION: 95 % | DIASTOLIC BLOOD PRESSURE: 84 MMHG | RESPIRATION RATE: 16 BRPM

## 2018-03-15 PROCEDURE — 3600000056 HC PAIN LEVEL 4 BASE: Performed by: PHYSICAL MEDICINE & REHABILITATION

## 2018-03-15 PROCEDURE — 3209999900 FLUORO FOR SURGICAL PROCEDURES

## 2018-03-15 PROCEDURE — 2580000003 HC RX 258: Performed by: PHYSICAL MEDICINE & REHABILITATION

## 2018-03-15 PROCEDURE — 6360000002 HC RX W HCPCS: Performed by: PHYSICAL MEDICINE & REHABILITATION

## 2018-03-15 PROCEDURE — 99153 MOD SED SAME PHYS/QHP EA: CPT | Performed by: PHYSICAL MEDICINE & REHABILITATION

## 2018-03-15 PROCEDURE — 3600000057 HC PAIN LEVEL 4 ADDL 15 MIN: Performed by: PHYSICAL MEDICINE & REHABILITATION

## 2018-03-15 PROCEDURE — 64635 DESTROY LUMB/SAC FACET JNT: CPT | Performed by: PHYSICAL MEDICINE & REHABILITATION

## 2018-03-15 PROCEDURE — 64636 DESTROY L/S FACET JNT ADDL: CPT | Performed by: PHYSICAL MEDICINE & REHABILITATION

## 2018-03-15 PROCEDURE — 99152 MOD SED SAME PHYS/QHP 5/>YRS: CPT | Performed by: PHYSICAL MEDICINE & REHABILITATION

## 2018-03-15 PROCEDURE — 2500000003 HC RX 250 WO HCPCS: Performed by: PHYSICAL MEDICINE & REHABILITATION

## 2018-03-15 PROCEDURE — 7100000010 HC PHASE II RECOVERY - FIRST 15 MIN: Performed by: PHYSICAL MEDICINE & REHABILITATION

## 2018-03-15 PROCEDURE — 7100000011 HC PHASE II RECOVERY - ADDTL 15 MIN: Performed by: PHYSICAL MEDICINE & REHABILITATION

## 2018-03-15 RX ORDER — SODIUM CHLORIDE 0.9 % (FLUSH) 0.9 %
10 SYRINGE (ML) INJECTION PRN
Status: DISCONTINUED | OUTPATIENT
Start: 2018-03-15 | End: 2018-03-15 | Stop reason: HOSPADM

## 2018-03-15 RX ORDER — DEXAMETHASONE SODIUM PHOSPHATE 10 MG/ML
INJECTION INTRAMUSCULAR; INTRAVENOUS PRN
Status: DISCONTINUED | OUTPATIENT
Start: 2018-03-15 | End: 2018-03-15 | Stop reason: HOSPADM

## 2018-03-15 RX ORDER — BUPIVACAINE HYDROCHLORIDE 5 MG/ML
INJECTION, SOLUTION EPIDURAL; INTRACAUDAL PRN
Status: DISCONTINUED | OUTPATIENT
Start: 2018-03-15 | End: 2018-03-15 | Stop reason: HOSPADM

## 2018-03-15 RX ORDER — SODIUM CHLORIDE 0.9 % (FLUSH) 0.9 %
10 SYRINGE (ML) INJECTION EVERY 12 HOURS SCHEDULED
Status: DISCONTINUED | OUTPATIENT
Start: 2018-03-15 | End: 2018-03-15 | Stop reason: HOSPADM

## 2018-03-15 RX ORDER — LIDOCAINE HYDROCHLORIDE 40 MG/ML
INJECTION, SOLUTION RETROBULBAR; TOPICAL PRN
Status: DISCONTINUED | OUTPATIENT
Start: 2018-03-15 | End: 2018-03-15 | Stop reason: HOSPADM

## 2018-03-15 RX ORDER — SODIUM CHLORIDE, SODIUM LACTATE, POTASSIUM CHLORIDE, CALCIUM CHLORIDE 600; 310; 30; 20 MG/100ML; MG/100ML; MG/100ML; MG/100ML
INJECTION, SOLUTION INTRAVENOUS CONTINUOUS
Status: DISCONTINUED | OUTPATIENT
Start: 2018-03-15 | End: 2018-03-15 | Stop reason: HOSPADM

## 2018-03-15 RX ORDER — LORAZEPAM 1 MG/1
1 TABLET ORAL EVERY 6 HOURS PRN
COMMUNITY

## 2018-03-15 RX ORDER — FENTANYL CITRATE 50 UG/ML
INJECTION, SOLUTION INTRAMUSCULAR; INTRAVENOUS PRN
Status: DISCONTINUED | OUTPATIENT
Start: 2018-03-15 | End: 2018-03-15 | Stop reason: HOSPADM

## 2018-03-15 RX ORDER — MIDAZOLAM HYDROCHLORIDE 1 MG/ML
INJECTION INTRAMUSCULAR; INTRAVENOUS PRN
Status: DISCONTINUED | OUTPATIENT
Start: 2018-03-15 | End: 2018-03-15 | Stop reason: HOSPADM

## 2018-03-15 RX ADMIN — SODIUM CHLORIDE, POTASSIUM CHLORIDE, SODIUM LACTATE AND CALCIUM CHLORIDE: 600; 310; 30; 20 INJECTION, SOLUTION INTRAVENOUS at 09:57

## 2018-03-15 ASSESSMENT — PAIN SCALES - GENERAL
PAINLEVEL_OUTOF10: 0
PAINLEVEL_OUTOF10: 0

## 2018-03-15 ASSESSMENT — PAIN - FUNCTIONAL ASSESSMENT: PAIN_FUNCTIONAL_ASSESSMENT: 0-10

## 2018-03-15 ASSESSMENT — PAIN DESCRIPTION - DESCRIPTORS: DESCRIPTORS: CONSTANT

## 2018-03-15 NOTE — OP NOTE
minutes. FACET RF  Under image-intensifier control, a 10190  mm Intelligent Fingerprinting RFK insulated radiofrequency probe with 5 mm active tips were successfully directed at the Left L2, L3, L4, L5 medial branches of the dorsal rami at the target points described by ADAM. Needle tip positions were confirmed in 3 views and sensory and motor test stimulation was performed. The patient reported sensory stimulation at 0.0 to 0.0 volts at 50 Hz and motor stimulation at 0.0 to 0.9 volts at 2 Hz, which confirmed satisfactory sensory motor dissociation. 1 ml of 2% lidocaine was instilled  at each site prior to lesioning. 3 lesions were performed at each level  BY SAGITTAL APPROACH at a temperature of 80 degrees Celsius for a duration of 90 seconds, as described by ADAM. Impedance was measured and ranged from 198-258 ohms. Then, 0.5mL of  0.5% bupivacaine was instilled at each site after lesioning. The patient tolerated the procedure(s) well and without complications and was noted to be in stable condition prior to discharge from procedure center with discharge instructions. EBL: no blood loss    SPECIMEN: none    IMPRESSION:    1. Left L2, L3, L4, L5 radiofrequency neurotomies performed uneventfully. RECOMMENDATIONS:  1. Follow up in the [address] in 2 to 4 weeks  2. Continue Neurontin and opioid analgesia, as per protocol. 3.    Complete and return the \"Post-Procedure Pain and Activity Diary. 4.    Contact the [address] for symptom exacerbation, fever or unusual symptoms. 5.    Follow post-procedure care according to verbal and written instructions. POST-PROCEDURE LUMBAR SPINE FLUOROSCOPIC IMAGE INTERPRETATION:    EXAMINATION:  AP, lateral and oblique views of the lumbar spine    FLUORO TIME: 28 seconds    DISCUSSION:  Spot views of the spine reveal normal alignment and segmentation.  Spinal needles are positioned at the base of the SAP and across the pedicle on AP and lateral views. #3:  across the ventral and middle third of the articular pillar. Visualized spine reveals See radiology report. Soft tissues reveal no abnormalities. IMPRESSION: Satisfactory probe placement for RF (radiofrequency) lesioning of the Left L2, L3, L4, L5 medial branches.     Electronically signed by Mckayla De La Vega MD on 3/15/2018 at 11:22 AM

## 2018-03-15 NOTE — H&P
 albuterol (PROVENTIL) (2.5 MG/3ML) 0.083% nebulizer solution Take 2.5 mg by nebulization every 6 hours as needed for Wheezing        TURMERIC PO Take by mouth three times daily        Glucosamine HCl (GLUCOSAMINE PO) Take by mouth daily        vitamin D 1000 UNITS CAPS Take by mouth daily        GARLIC PO Take by mouth daily        Multiple Vitamins-Minerals (MULTIVITAMIN PO) Take by mouth daily        INVEGA 6 MG extended release tablet Take 6 mg by mouth daily        DESCOVY 200-25 MG TABS Take 1 tablet by mouth daily        MYTESI 125 MG TBEC Take 125 mg by mouth        chlorhexidine (PERIDEX) 0.12 % solution          Naproxen Sodium (ALEVE PO) Take 2 tablets by mouth every 4 hours as needed                 Past Medical History        Past Medical History:   Diagnosis Date    [facility] disorder ([facility]       follows with Dr. Coni Terrell    Centrilobular emphysema ([facility]      HIV (human immunodeficiency virus infection) ([facility] 2001     followed by Lily Snellen (Infectious Disease at [address] [place] [address])    Pneumonia 06/11/2017            Past Surgical History         Past Surgical History:   Procedure Laterality Date    ANESTHESIA NERVE BLOCK Bilateral 1/12/2018     Bilat L2 L3 L4 L5 Diagnostic Medial BB performed by Ilana Cardoso MD at [address] [place] [address]   01/05/2016     Normal colon. Hemorrhoids.  Dr Yajaira Rivera   2015     full dental extraction    LUMBAR SPINE SURGERY Right 9/26/2017     Right L4 & L5 Transforaminal  performed by Ilana Cardoso MD at [id number] [place] [address] Dr Cifuentes 10/3/2017     Right L4 & L5 Transforaminal performed by Ilana Cardoso MD at [facility] Bilateral 1/19/2018     Bilat L2 L3 L4 L5 Confirmatory Medial BB performed by Ilana Cardoso MD at [address] [place] [address] 1, W IMAGE [address] LEVEL Right 1/25/2018     Right L2 L3 L4 L5 RADIOFREQUENCY performed by Ilana Cardoso MD at [facility] Assessment:   1. Panniculitis involving lumbar region    2. Lumbar facet joint syndrome                     Plan:    Schedule left L2, L3, L4, L5 radiofrequency ablation.

## 2018-03-15 NOTE — INTERVAL H&P NOTE
I have interviewed and examined the patient and reviewed the recent History and Physical.  There have been no changes to the recent H&P documentation. The surgical consent form has been signed. Last anticoagulant medication use was:na    Premedication taken for contrast allergy? No    Valium taken for oral sedation? No    Outpatient Prescriptions Marked as Taking for the 3/15/18 encounter [facility] Encounter)   Medication Sig Dispense Refill    LORazepam (ATIVAN) 1 MG tablet Take 1 mg by mouth every 6 hours as needed for Anxiety.  gabapentin (NEURONTIN) 300 MG capsule Take 1 capsule by mouth three time daily. 270 capsule 3    acetaminophen-codeine (TYLENOL #3) 300-30 MG per tablet Take 1 tablet by mouth every 8 hours as needed for Pain for up to 30 days. 90 tablet 0    aluminum chloride (DRYSOL) 20 % external solution Apply topically nightly.  60 mL 3    etodolac (LODINE) 400 MG tablet TAKE 1 TABLET BY MOUTH TWICE DAILY 180 tablet 0    BLACK ELDERBERRY,BERRY-FLOWER, PO Take 1 tablet by mouth daily      nystatin (MYCOSTATIN) 931405 UNIT/ML suspension Take 5 mLs by mouth 4 times daily 180 mL 1    baclofen (LIORESAL) 10 MG tablet Take 1 tablet by mouth 3 times daily as needed (spasm) 180 tablet 1    etodolac (LODINE) 400 MG tablet Take 1 tablet by mouth 2 times daily 180 tablet 1    Darunavir-Cobicistat (PREZCOBIX) 800-150 MG TABS Take by mouth nightly      Nebulizers (COMPRESSOR/NEBULIZER) MISC As directed 1 each 0    Respiratory Therapy Supplies (NEBULIZER/TUBING/MOUTHPIECE) KIT 1 kit by Does not apply route daily 1 kit 0    TURMERIC PO Take by mouth three times daily      Glucosamine HCl (GLUCOSAMINE PO) Take by mouth daily      vitamin D 1000 UNITS CAPS Take by mouth daily      GARLIC PO Take by mouth daily      Multiple Vitamins-Minerals (MULTIVITAMIN PO) Take by mouth daily      INVEGA 6 MG extended release tablet Take 6 mg by mouth daily      DESCOVY 200-25 MG TABS Take 1 tablet by mouth

## 2018-03-16 ENCOUNTER — TELEPHONE (OUTPATIENT)
Dept: PRIMARY CARE CLINIC | Age: 53
End: 2018-03-16

## 2018-03-16 NOTE — LETTER
[facility]  Glenn Jain  Phone: [phone]  Fax: [phone]    Oswaldo Newman        March 16, 2018     Josiah Dura  [address]      Dear Arthur Mclean:    This letter is a reminder that your fasting labs tests are due. Please call our office to schedule an appointment. If you've already underwent or scheduled these procedures, please disregard this notice.     Sincerely,        TAMMY Newman

## 2018-03-21 ENCOUNTER — HOSPITAL ENCOUNTER (OUTPATIENT)
Dept: PULMONOLOGY | Age: 53
Discharge: HOME | End: 2018-03-21
Payer: MEDICARE

## 2018-03-21 DIAGNOSIS — J44.9 CHRONIC OBSTRUCTIVE PULMONARY DISEASE, UNSPECIFIED COPD TYPE (HCC): ICD-10-CM

## 2018-03-21 PROCEDURE — 6360000002 HC RX W HCPCS: Performed by: INTERNAL MEDICINE

## 2018-03-21 PROCEDURE — 94729 DIFFUSING CAPACITY: CPT

## 2018-03-21 PROCEDURE — 94726 PLETHYSMOGRAPHY LUNG VOLUMES: CPT

## 2018-03-21 PROCEDURE — 94060 EVALUATION OF WHEEZING: CPT

## 2018-03-21 PROCEDURE — 94640 AIRWAY INHALATION TREATMENT: CPT

## 2018-03-21 RX ORDER — ALBUTEROL SULFATE 2.5 MG/3ML
2.5 SOLUTION RESPIRATORY (INHALATION) ONCE
Status: COMPLETED | OUTPATIENT
Start: 2018-03-21 | End: 2018-03-21

## 2018-03-21 RX ADMIN — ALBUTEROL SULFATE 2.5 MG: 2.5 SOLUTION RESPIRATORY (INHALATION) at 09:46

## 2018-04-02 ENCOUNTER — OFFICE VISIT (OUTPATIENT)
Dept: PRIMARY CARE CLINIC | Age: 53
End: 2018-04-02
Payer: MEDICARE

## 2018-04-02 ENCOUNTER — TELEPHONE (OUTPATIENT)
Dept: PAIN MANAGEMENT | Age: 53
End: 2018-04-02

## 2018-04-02 VITALS
OXYGEN SATURATION: 95 % | DIASTOLIC BLOOD PRESSURE: 80 MMHG | HEIGHT: 71 IN | BODY MASS INDEX: 32.51 KG/M2 | SYSTOLIC BLOOD PRESSURE: 130 MMHG | HEART RATE: 86 BPM | TEMPERATURE: 98.1 F | WEIGHT: 232.2 LBS

## 2018-04-02 DIAGNOSIS — M47.816 LUMBAR FACET JOINT SYNDROME: ICD-10-CM

## 2018-04-02 DIAGNOSIS — R50.9 FEVER, UNSPECIFIED FEVER CAUSE: ICD-10-CM

## 2018-04-02 DIAGNOSIS — R05.9 COUGH: ICD-10-CM

## 2018-04-02 DIAGNOSIS — J10.1 INFLUENZA B: Primary | ICD-10-CM

## 2018-04-02 LAB
INFLUENZA A ANTIBODY: NEGATIVE
INFLUENZA B ANTIBODY: POSITIVE

## 2018-04-02 PROCEDURE — 87804 INFLUENZA ASSAY W/OPTIC: CPT | Performed by: NURSE PRACTITIONER

## 2018-04-02 PROCEDURE — 3017F COLORECTAL CA SCREEN DOC REV: CPT | Performed by: NURSE PRACTITIONER

## 2018-04-02 PROCEDURE — G8427 DOCREV CUR MEDS BY ELIG CLIN: HCPCS | Performed by: NURSE PRACTITIONER

## 2018-04-02 PROCEDURE — 99213 OFFICE O/P EST LOW 20 MIN: CPT | Performed by: NURSE PRACTITIONER

## 2018-04-02 PROCEDURE — 1036F TOBACCO NON-USER: CPT | Performed by: NURSE PRACTITIONER

## 2018-04-02 PROCEDURE — G8417 CALC BMI ABV UP PARAM F/U: HCPCS | Performed by: NURSE PRACTITIONER

## 2018-04-02 RX ORDER — DOXYCYCLINE HYCLATE 100 MG
100 TABLET ORAL 2 TIMES DAILY
Qty: 20 TABLET | Refills: 0 | Status: SHIPPED | OUTPATIENT
Start: 2018-04-02 | End: 2018-04-12

## 2018-04-02 RX ORDER — ACETAMINOPHEN AND CODEINE PHOSPHATE 300; 30 MG/1; MG/1
1 TABLET ORAL EVERY 8 HOURS PRN
Qty: 90 TABLET | Refills: 0 | Status: SHIPPED | OUTPATIENT
Start: 2018-04-02 | End: 2018-05-01 | Stop reason: SDUPTHER

## 2018-04-02 RX ORDER — PREDNISONE 20 MG/1
20 TABLET ORAL 2 TIMES DAILY
Qty: 10 TABLET | Refills: 0 | Status: SHIPPED | OUTPATIENT
Start: 2018-04-02 | End: 2018-04-07

## 2018-04-02 RX ORDER — BENZONATATE 100 MG/1
100 CAPSULE ORAL 3 TIMES DAILY PRN
Qty: 30 CAPSULE | Refills: 0 | Status: SHIPPED | OUTPATIENT
Start: 2018-04-02 | End: 2018-06-14 | Stop reason: ALTCHOICE

## 2018-04-02 ASSESSMENT — ENCOUNTER SYMPTOMS
DIARRHEA: 0
COUGH: 1
WHEEZING: 1
RHINORRHEA: 1
NAUSEA: 0
VOMITING: 0
SORE THROAT: 0
SHORTNESS OF BREATH: 1
SINUS PRESSURE: 0

## 2018-04-20 ENCOUNTER — OFFICE VISIT (OUTPATIENT)
Dept: PAIN MANAGEMENT | Age: 53
End: 2018-04-20
Payer: MEDICARE

## 2018-04-20 VITALS
DIASTOLIC BLOOD PRESSURE: 98 MMHG | RESPIRATION RATE: 16 BRPM | WEIGHT: 229 LBS | BODY MASS INDEX: 32.06 KG/M2 | SYSTOLIC BLOOD PRESSURE: 155 MMHG | HEIGHT: 71 IN

## 2018-04-20 DIAGNOSIS — M54.06 PANNICULITIS INVOLVING LUMBAR REGION: Primary | ICD-10-CM

## 2018-04-20 DIAGNOSIS — M47.816 LUMBAR FACET JOINT SYNDROME: ICD-10-CM

## 2018-04-20 DIAGNOSIS — M54.16 LUMBAR RADICULAR PAIN: ICD-10-CM

## 2018-04-20 PROCEDURE — 3017F COLORECTAL CA SCREEN DOC REV: CPT | Performed by: NURSE PRACTITIONER

## 2018-04-20 PROCEDURE — 1036F TOBACCO NON-USER: CPT | Performed by: NURSE PRACTITIONER

## 2018-04-20 PROCEDURE — G8427 DOCREV CUR MEDS BY ELIG CLIN: HCPCS | Performed by: NURSE PRACTITIONER

## 2018-04-20 PROCEDURE — G8417 CALC BMI ABV UP PARAM F/U: HCPCS | Performed by: NURSE PRACTITIONER

## 2018-04-20 PROCEDURE — 99213 OFFICE O/P EST LOW 20 MIN: CPT | Performed by: NURSE PRACTITIONER

## 2018-04-20 ASSESSMENT — ENCOUNTER SYMPTOMS
RESPIRATORY NEGATIVE: 1
BACK PAIN: 1

## 2018-04-30 DIAGNOSIS — M54.16 LUMBAR RADICULAR PAIN: ICD-10-CM

## 2018-04-30 DIAGNOSIS — M54.41 CHRONIC RIGHT-SIDED LOW BACK PAIN WITH RIGHT-SIDED SCIATICA: ICD-10-CM

## 2018-04-30 DIAGNOSIS — G89.29 CHRONIC RIGHT-SIDED LOW BACK PAIN WITH RIGHT-SIDED SCIATICA: ICD-10-CM

## 2018-05-01 ENCOUNTER — TELEPHONE (OUTPATIENT)
Dept: FAMILY MEDICINE CLINIC | Age: 53
End: 2018-05-01

## 2018-05-01 DIAGNOSIS — M47.816 LUMBAR FACET JOINT SYNDROME: ICD-10-CM

## 2018-05-01 RX ORDER — ACETAMINOPHEN AND CODEINE PHOSPHATE 300; 30 MG/1; MG/1
1 TABLET ORAL EVERY 8 HOURS PRN
Qty: 90 TABLET | Refills: 0 | Status: SHIPPED | OUTPATIENT
Start: 2018-05-02 | End: 2018-06-01

## 2018-05-02 ENCOUNTER — TELEPHONE (OUTPATIENT)
Dept: FAMILY MEDICINE CLINIC | Age: 53
End: 2018-05-02

## 2018-05-13 DIAGNOSIS — M54.10 BACK PAIN WITH RIGHT-SIDED RADICULOPATHY: ICD-10-CM

## 2018-05-14 RX ORDER — BACLOFEN 10 MG/1
TABLET ORAL
Qty: 270 TABLET | Refills: 0 | Status: SHIPPED | OUTPATIENT
Start: 2018-05-14 | End: 2018-06-01 | Stop reason: SDUPTHER

## 2018-05-17 ENCOUNTER — OFFICE VISIT (OUTPATIENT)
Dept: CARDIOLOGY | Age: 53
End: 2018-05-17
Payer: MEDICARE

## 2018-05-17 VITALS
BODY MASS INDEX: 31.39 KG/M2 | DIASTOLIC BLOOD PRESSURE: 64 MMHG | WEIGHT: 224.2 LBS | HEIGHT: 71 IN | SYSTOLIC BLOOD PRESSURE: 114 MMHG

## 2018-05-17 DIAGNOSIS — I10 HYPERTENSION, UNSPECIFIED TYPE: ICD-10-CM

## 2018-05-17 DIAGNOSIS — R06.00 DYSPNEA, UNSPECIFIED TYPE: Primary | ICD-10-CM

## 2018-05-17 PROCEDURE — 93000 ELECTROCARDIOGRAM COMPLETE: CPT | Performed by: INTERNAL MEDICINE

## 2018-05-17 PROCEDURE — 99212 OFFICE O/P EST SF 10 MIN: CPT | Performed by: INTERNAL MEDICINE

## 2018-05-21 ENCOUNTER — OFFICE VISIT (OUTPATIENT)
Dept: FAMILY MEDICINE CLINIC | Age: 53
End: 2018-05-21
Payer: MEDICARE

## 2018-05-21 ENCOUNTER — HOSPITAL ENCOUNTER (OUTPATIENT)
Dept: LAB | Age: 53
Setting detail: SPECIMEN
Discharge: HOME | End: 2018-05-21
Payer: MEDICARE

## 2018-05-21 VITALS — DIASTOLIC BLOOD PRESSURE: 80 MMHG | SYSTOLIC BLOOD PRESSURE: 128 MMHG | OXYGEN SATURATION: 98 % | HEART RATE: 84 BPM

## 2018-05-21 DIAGNOSIS — E66.9 CLASS 1 OBESITY WITHOUT SERIOUS COMORBIDITY WITH BODY MASS INDEX (BMI) OF 32.0 TO 32.9 IN ADULT, UNSPECIFIED OBESITY TYPE: ICD-10-CM

## 2018-05-21 DIAGNOSIS — R73.01 IFG (IMPAIRED FASTING GLUCOSE): ICD-10-CM

## 2018-05-21 DIAGNOSIS — Z11.59 NEED FOR HEPATITIS C SCREENING TEST: ICD-10-CM

## 2018-05-21 DIAGNOSIS — J43.2 CENTRILOBULAR EMPHYSEMA (HCC): ICD-10-CM

## 2018-05-21 DIAGNOSIS — Z23 NEED FOR DIPHTHERIA-TETANUS-PERTUSSIS (TDAP) VACCINE: ICD-10-CM

## 2018-05-21 DIAGNOSIS — B20 HIV (HUMAN IMMUNODEFICIENCY VIRUS INFECTION) (HCC): ICD-10-CM

## 2018-05-21 DIAGNOSIS — H61.23 BILATERAL IMPACTED CERUMEN: ICD-10-CM

## 2018-05-21 DIAGNOSIS — M54.41 CHRONIC RIGHT-SIDED LOW BACK PAIN WITH RIGHT-SIDED SCIATICA: ICD-10-CM

## 2018-05-21 DIAGNOSIS — R73.01 IFG (IMPAIRED FASTING GLUCOSE): Primary | ICD-10-CM

## 2018-05-21 DIAGNOSIS — G89.29 CHRONIC RIGHT-SIDED LOW BACK PAIN WITH RIGHT-SIDED SCIATICA: ICD-10-CM

## 2018-05-21 DIAGNOSIS — Z23 NEED FOR SHINGLES VACCINE: ICD-10-CM

## 2018-05-21 LAB
ALBUMIN SERPL-MCNC: 4.2 G/DL (ref 3.5–5.2)
ALBUMIN/GLOBULIN RATIO: 1.7 (ref 1–2.5)
ALP BLD-CCNC: 74 U/L (ref 40–129)
ALT SERPL-CCNC: 27 U/L (ref 5–41)
ANION GAP SERPL CALCULATED.3IONS-SCNC: 9 MMOL/L (ref 9–17)
AST SERPL-CCNC: 25 U/L
BILIRUB SERPL-MCNC: 0.29 MG/DL (ref 0.3–1.2)
BUN BLDV-MCNC: 13 MG/DL (ref 6–20)
BUN/CREAT BLD: 15 (ref 9–20)
CALCIUM SERPL-MCNC: 9.3 MG/DL (ref 8.6–10.4)
CHLORIDE BLD-SCNC: 106 MMOL/L (ref 98–107)
CHOLESTEROL/HDL RATIO: 5.1
CHOLESTEROL: 208 MG/DL
CO2: 27 MMOL/L (ref 20–31)
CREAT SERPL-MCNC: 0.86 MG/DL (ref 0.7–1.2)
ESTIMATED AVERAGE GLUCOSE: 108 MG/DL
GFR AFRICAN AMERICAN: >60 ML/MIN
GFR NON-AFRICAN AMERICAN: >60 ML/MIN
GFR SERPL CREATININE-BSD FRML MDRD: ABNORMAL ML/MIN/{1.73_M2}
GFR SERPL CREATININE-BSD FRML MDRD: ABNORMAL ML/MIN/{1.73_M2}
GLUCOSE BLD-MCNC: 115 MG/DL (ref 70–99)
HBA1C MFR BLD: 5.4 % (ref 4.8–5.9)
HDLC SERPL-MCNC: 41 MG/DL
HEPATITIS C ANTIBODY: NONREACTIVE
LDL CHOLESTEROL: 96 MG/DL (ref 0–130)
POTASSIUM SERPL-SCNC: 4.9 MMOL/L (ref 3.7–5.3)
SODIUM BLD-SCNC: 142 MMOL/L (ref 135–144)
TOTAL PROTEIN: 6.7 G/DL (ref 6.4–8.3)
TRIGL SERPL-MCNC: 355 MG/DL
VLDLC SERPL CALC-MCNC: ABNORMAL MG/DL (ref 1–30)

## 2018-05-21 PROCEDURE — 99214 OFFICE O/P EST MOD 30 MIN: CPT | Performed by: PHYSICIAN ASSISTANT

## 2018-05-21 PROCEDURE — 83036 HEMOGLOBIN GLYCOSYLATED A1C: CPT

## 2018-05-21 PROCEDURE — G8417 CALC BMI ABV UP PARAM F/U: HCPCS | Performed by: PHYSICIAN ASSISTANT

## 2018-05-21 PROCEDURE — 69210 REMOVE IMPACTED EAR WAX UNI: CPT | Performed by: PHYSICIAN ASSISTANT

## 2018-05-21 PROCEDURE — 86803 HEPATITIS C AB TEST: CPT

## 2018-05-21 PROCEDURE — 36415 COLL VENOUS BLD VENIPUNCTURE: CPT

## 2018-05-21 PROCEDURE — G8427 DOCREV CUR MEDS BY ELIG CLIN: HCPCS | Performed by: PHYSICIAN ASSISTANT

## 2018-05-21 PROCEDURE — G8926 SPIRO NO PERF OR DOC: HCPCS | Performed by: PHYSICIAN ASSISTANT

## 2018-05-21 PROCEDURE — 1036F TOBACCO NON-USER: CPT | Performed by: PHYSICIAN ASSISTANT

## 2018-05-21 PROCEDURE — 80061 LIPID PANEL: CPT

## 2018-05-21 PROCEDURE — 3023F SPIROM DOC REV: CPT | Performed by: PHYSICIAN ASSISTANT

## 2018-05-21 PROCEDURE — 3017F COLORECTAL CA SCREEN DOC REV: CPT | Performed by: PHYSICIAN ASSISTANT

## 2018-05-21 PROCEDURE — 80053 COMPREHEN METABOLIC PANEL: CPT

## 2018-05-21 PROCEDURE — G8510 SCR DEP NEG, NO PLAN REQD: HCPCS | Performed by: PHYSICIAN ASSISTANT

## 2018-05-21 ASSESSMENT — PATIENT HEALTH QUESTIONNAIRE - PHQ9
1. LITTLE INTEREST OR PLEASURE IN DOING THINGS: 0
SUM OF ALL RESPONSES TO PHQ9 QUESTIONS 1 & 2: 0
2. FEELING DOWN, DEPRESSED OR HOPELESS: 0
SUM OF ALL RESPONSES TO PHQ QUESTIONS 1-9: 0

## 2018-05-21 ASSESSMENT — ENCOUNTER SYMPTOMS
GASTROINTESTINAL NEGATIVE: 1
DIFFICULTY BREATHING: 1
WHEEZING: 1
BACK PAIN: 1
COUGH: 0

## 2018-05-21 ASSESSMENT — COPD QUESTIONNAIRES: COPD: 1

## 2018-05-22 ENCOUNTER — TELEPHONE (OUTPATIENT)
Dept: FAMILY MEDICINE CLINIC | Age: 53
End: 2018-05-22

## 2018-05-22 DIAGNOSIS — E78.1 HYPERTRIGLYCERIDEMIA: Primary | ICD-10-CM

## 2018-05-22 DIAGNOSIS — E78.1 HYPERTRIGLYCERIDEMIA: ICD-10-CM

## 2018-05-22 RX ORDER — FENOFIBRIC ACID 135 MG/1
135 CAPSULE, DELAYED RELEASE ORAL DAILY
Qty: 30 CAPSULE | Refills: 3 | Status: SHIPPED | OUTPATIENT
Start: 2018-05-22 | End: 2018-09-18 | Stop reason: SDUPTHER

## 2018-05-23 ENCOUNTER — HOSPITAL ENCOUNTER (OUTPATIENT)
Dept: NON INVASIVE DIAGNOSTICS | Age: 53
Discharge: HOME | End: 2018-05-23
Payer: MEDICARE

## 2018-05-23 DIAGNOSIS — R06.00 DYSPNEA, UNSPECIFIED TYPE: ICD-10-CM

## 2018-05-23 LAB
LV EF: 50 %
LVEF MODALITY: NORMAL

## 2018-05-23 PROCEDURE — 93306 TTE W/DOPPLER COMPLETE: CPT

## 2018-05-23 RX ORDER — FENOFIBRIC ACID 135 MG/1
135 CAPSULE, DELAYED RELEASE ORAL DAILY
Qty: 90 CAPSULE | Refills: 3 | OUTPATIENT
Start: 2018-05-23

## 2018-06-01 ENCOUNTER — OFFICE VISIT (OUTPATIENT)
Dept: PAIN MANAGEMENT | Age: 53
End: 2018-06-01
Payer: MEDICARE

## 2018-06-01 VITALS
WEIGHT: 218 LBS | SYSTOLIC BLOOD PRESSURE: 136 MMHG | HEIGHT: 71 IN | BODY MASS INDEX: 30.52 KG/M2 | DIASTOLIC BLOOD PRESSURE: 84 MMHG | HEART RATE: 84 BPM

## 2018-06-01 DIAGNOSIS — M47.816 LUMBAR FACET JOINT SYNDROME: Primary | ICD-10-CM

## 2018-06-01 DIAGNOSIS — M54.16 LUMBAR RADICULAR PAIN: ICD-10-CM

## 2018-06-01 DIAGNOSIS — M54.41 CHRONIC LOW BACK PAIN WITH RIGHT-SIDED SCIATICA, UNSPECIFIED BACK PAIN LATERALITY: ICD-10-CM

## 2018-06-01 DIAGNOSIS — G89.29 CHRONIC LOW BACK PAIN WITH RIGHT-SIDED SCIATICA, UNSPECIFIED BACK PAIN LATERALITY: ICD-10-CM

## 2018-06-01 DIAGNOSIS — M54.16 LUMBAR RADICULOPATHY: ICD-10-CM

## 2018-06-01 PROCEDURE — 99214 OFFICE O/P EST MOD 30 MIN: CPT | Performed by: NURSE PRACTITIONER

## 2018-06-01 PROCEDURE — 1036F TOBACCO NON-USER: CPT | Performed by: NURSE PRACTITIONER

## 2018-06-01 PROCEDURE — G8417 CALC BMI ABV UP PARAM F/U: HCPCS | Performed by: NURSE PRACTITIONER

## 2018-06-01 PROCEDURE — G8427 DOCREV CUR MEDS BY ELIG CLIN: HCPCS | Performed by: NURSE PRACTITIONER

## 2018-06-01 PROCEDURE — 3017F COLORECTAL CA SCREEN DOC REV: CPT | Performed by: NURSE PRACTITIONER

## 2018-06-01 RX ORDER — GABAPENTIN 600 MG/1
600 TABLET ORAL 3 TIMES DAILY
Qty: 90 TABLET | Refills: 3 | Status: SHIPPED | OUTPATIENT
Start: 2018-06-01 | End: 2018-06-19

## 2018-06-01 RX ORDER — METHYLPREDNISOLONE 4 MG/1
TABLET ORAL
Qty: 1 KIT | Refills: 0 | Status: SHIPPED | OUTPATIENT
Start: 2018-06-01 | End: 2018-06-07

## 2018-06-06 ASSESSMENT — ENCOUNTER SYMPTOMS
RESPIRATORY NEGATIVE: 1
BACK PAIN: 1

## 2018-06-11 ENCOUNTER — HOSPITAL ENCOUNTER (OUTPATIENT)
Dept: MRI IMAGING | Age: 53
Discharge: HOME | End: 2018-06-13
Payer: MEDICARE

## 2018-06-11 DIAGNOSIS — M47.816 LUMBAR FACET JOINT SYNDROME: ICD-10-CM

## 2018-06-11 PROCEDURE — 72148 MRI LUMBAR SPINE W/O DYE: CPT

## 2018-06-14 ENCOUNTER — OFFICE VISIT (OUTPATIENT)
Dept: FAMILY MEDICINE CLINIC | Age: 53
End: 2018-06-14
Payer: MEDICARE

## 2018-06-14 VITALS
BODY MASS INDEX: 30.85 KG/M2 | RESPIRATION RATE: 12 BRPM | WEIGHT: 220.4 LBS | OXYGEN SATURATION: 96 % | DIASTOLIC BLOOD PRESSURE: 80 MMHG | SYSTOLIC BLOOD PRESSURE: 120 MMHG | HEIGHT: 71 IN | HEART RATE: 82 BPM | TEMPERATURE: 97 F

## 2018-06-14 DIAGNOSIS — B37.0 THRUSH: Primary | ICD-10-CM

## 2018-06-14 PROCEDURE — 1036F TOBACCO NON-USER: CPT | Performed by: NURSE PRACTITIONER

## 2018-06-14 PROCEDURE — 99213 OFFICE O/P EST LOW 20 MIN: CPT | Performed by: NURSE PRACTITIONER

## 2018-06-14 PROCEDURE — G8417 CALC BMI ABV UP PARAM F/U: HCPCS | Performed by: NURSE PRACTITIONER

## 2018-06-14 PROCEDURE — G8427 DOCREV CUR MEDS BY ELIG CLIN: HCPCS | Performed by: NURSE PRACTITIONER

## 2018-06-14 PROCEDURE — 3017F COLORECTAL CA SCREEN DOC REV: CPT | Performed by: NURSE PRACTITIONER

## 2018-06-14 ASSESSMENT — ENCOUNTER SYMPTOMS
NAUSEA: 0
VOMITING: 0
CONSTIPATION: 0
EYES NEGATIVE: 1
SHORTNESS OF BREATH: 0
DIARRHEA: 0
TROUBLE SWALLOWING: 0
CHEST TIGHTNESS: 0
ALLERGIC/IMMUNOLOGIC NEGATIVE: 1
COUGH: 0
SINUS PRESSURE: 0
ABDOMINAL PAIN: 0

## 2018-06-19 ENCOUNTER — OFFICE VISIT (OUTPATIENT)
Dept: PAIN MANAGEMENT | Age: 53
End: 2018-06-19
Payer: MEDICARE

## 2018-06-19 VITALS
RESPIRATION RATE: 18 BRPM | WEIGHT: 223 LBS | SYSTOLIC BLOOD PRESSURE: 138 MMHG | BODY MASS INDEX: 31.22 KG/M2 | DIASTOLIC BLOOD PRESSURE: 84 MMHG | HEIGHT: 71 IN

## 2018-06-19 DIAGNOSIS — G89.29 CHRONIC BILATERAL LOW BACK PAIN WITH RIGHT-SIDED SCIATICA: ICD-10-CM

## 2018-06-19 DIAGNOSIS — M54.41 CHRONIC BILATERAL LOW BACK PAIN WITH RIGHT-SIDED SCIATICA: ICD-10-CM

## 2018-06-19 DIAGNOSIS — M54.16 LUMBAR RADICULAR PAIN: ICD-10-CM

## 2018-06-19 DIAGNOSIS — M47.816 LUMBAR FACET JOINT SYNDROME: Primary | ICD-10-CM

## 2018-06-19 PROCEDURE — 1036F TOBACCO NON-USER: CPT | Performed by: NURSE PRACTITIONER

## 2018-06-19 PROCEDURE — G8417 CALC BMI ABV UP PARAM F/U: HCPCS | Performed by: NURSE PRACTITIONER

## 2018-06-19 PROCEDURE — 3017F COLORECTAL CA SCREEN DOC REV: CPT | Performed by: NURSE PRACTITIONER

## 2018-06-19 PROCEDURE — 99214 OFFICE O/P EST MOD 30 MIN: CPT | Performed by: NURSE PRACTITIONER

## 2018-06-19 PROCEDURE — G8427 DOCREV CUR MEDS BY ELIG CLIN: HCPCS | Performed by: NURSE PRACTITIONER

## 2018-06-19 RX ORDER — PREGABALIN 75 MG/1
75 CAPSULE ORAL 2 TIMES DAILY
Qty: 60 CAPSULE | Refills: 3 | Status: SHIPPED | OUTPATIENT
Start: 2018-06-19 | End: 2018-07-03 | Stop reason: SDUPTHER

## 2018-06-19 RX ORDER — GABAPENTIN 300 MG/1
CAPSULE ORAL
Qty: 270 CAPSULE | Refills: 3 | Status: SHIPPED | OUTPATIENT
Start: 2018-06-19 | End: 2018-07-03

## 2018-06-19 ASSESSMENT — ENCOUNTER SYMPTOMS
RESPIRATORY NEGATIVE: 1
BACK PAIN: 1

## 2018-07-03 ENCOUNTER — OFFICE VISIT (OUTPATIENT)
Dept: PAIN MANAGEMENT | Age: 53
End: 2018-07-03
Payer: MEDICARE

## 2018-07-03 VITALS
HEART RATE: 80 BPM | BODY MASS INDEX: 31.22 KG/M2 | SYSTOLIC BLOOD PRESSURE: 130 MMHG | DIASTOLIC BLOOD PRESSURE: 88 MMHG | WEIGHT: 223 LBS | HEIGHT: 71 IN

## 2018-07-03 DIAGNOSIS — M54.16 LUMBAR RADICULAR PAIN: ICD-10-CM

## 2018-07-03 DIAGNOSIS — M47.816 LUMBAR FACET JOINT SYNDROME: ICD-10-CM

## 2018-07-03 DIAGNOSIS — M62.838 MUSCLE SPASM: Primary | ICD-10-CM

## 2018-07-03 PROCEDURE — 3017F COLORECTAL CA SCREEN DOC REV: CPT | Performed by: NURSE PRACTITIONER

## 2018-07-03 PROCEDURE — G8427 DOCREV CUR MEDS BY ELIG CLIN: HCPCS | Performed by: NURSE PRACTITIONER

## 2018-07-03 PROCEDURE — 1036F TOBACCO NON-USER: CPT | Performed by: NURSE PRACTITIONER

## 2018-07-03 PROCEDURE — G8417 CALC BMI ABV UP PARAM F/U: HCPCS | Performed by: NURSE PRACTITIONER

## 2018-07-03 PROCEDURE — 99214 OFFICE O/P EST MOD 30 MIN: CPT | Performed by: NURSE PRACTITIONER

## 2018-07-03 RX ORDER — CYCLOBENZAPRINE HCL 10 MG
10 TABLET ORAL 3 TIMES DAILY PRN
Qty: 90 TABLET | Refills: 5 | Status: SHIPPED | OUTPATIENT
Start: 2018-07-03 | End: 2018-12-26 | Stop reason: SDUPTHER

## 2018-07-03 RX ORDER — PREGABALIN 75 MG/1
75 CAPSULE ORAL 3 TIMES DAILY
Qty: 90 CAPSULE | Refills: 3 | Status: SHIPPED | OUTPATIENT
Start: 2018-07-03 | End: 2018-08-03

## 2018-07-03 ASSESSMENT — ENCOUNTER SYMPTOMS
BACK PAIN: 1
RESPIRATORY NEGATIVE: 1

## 2018-07-03 NOTE — PROGRESS NOTES
Social History    Marital status: Single     Spouse name: N/A    Number of children: N/A    Years of education: N/A     Social History Main Topics    Smoking status: Former Smoker     Packs/day: 3.00     Years: 30.00     Quit date: 10/7/2014    Smokeless tobacco: Never Used    Alcohol use Yes      Comment: socially    Drug use: No    Sexual activity: Not Asked     Other Topics Concern    None     Social History Narrative    None     Review of Systems   Respiratory: Negative. Cardiovascular: Negative. Musculoskeletal: Positive for arthralgias, back pain and myalgias. Psychiatric/Behavioral: Positive for sleep disturbance. Objective:   Physical Exam   Constitutional: He is oriented to person, place, and time. He appears well-developed and well-nourished. No distress. HENT:   Head: Normocephalic and atraumatic. Cardiovascular: Normal rate. Pulmonary/Chest: Effort normal. No accessory muscle usage. No apnea. No respiratory distress. Neurological: He is alert and oriented to person, place, and time. No cranial nerve deficit. GCS eye subscore is 4. GCS verbal subscore is 5. GCS motor subscore is 6. Skin: Skin is warm, dry and intact. He is not diaphoretic. No cyanosis. No pallor. Nails show no clubbing. Psychiatric: He has a normal mood and affect. His behavior is normal. Judgment normal. His affect is not angry. His speech is not slurred. He is not agitated, not aggressive and not withdrawn. He does not express impulsivity or inappropriate judgment. He does not exhibit a depressed mood. He is communicative. Vitals reviewed. Assessment:      1. Muscle spasm    2. Lumbar facet joint syndrome    3. Lumbar radicular pain          Plan:    Stop gabapentin. Increase Lyrica 75mg tid. Start flexeril prn.  Follow up 1 month

## 2018-07-27 ENCOUNTER — OFFICE VISIT (OUTPATIENT)
Dept: FAMILY MEDICINE CLINIC | Age: 53
End: 2018-07-27
Payer: MEDICARE

## 2018-07-27 VITALS
DIASTOLIC BLOOD PRESSURE: 70 MMHG | SYSTOLIC BLOOD PRESSURE: 120 MMHG | TEMPERATURE: 96.5 F | BODY MASS INDEX: 31.47 KG/M2 | WEIGHT: 224.8 LBS | HEART RATE: 86 BPM | OXYGEN SATURATION: 98 % | RESPIRATION RATE: 12 BRPM | HEIGHT: 71 IN

## 2018-07-27 DIAGNOSIS — J30.9 ACUTE ALLERGIC RHINITIS, UNSPECIFIED SEASONALITY, UNSPECIFIED TRIGGER: ICD-10-CM

## 2018-07-27 DIAGNOSIS — J30.9 ACUTE ALLERGIC RHINITIS, UNSPECIFIED SEASONALITY, UNSPECIFIED TRIGGER: Primary | ICD-10-CM

## 2018-07-27 PROCEDURE — 1036F TOBACCO NON-USER: CPT | Performed by: NURSE PRACTITIONER

## 2018-07-27 PROCEDURE — G8427 DOCREV CUR MEDS BY ELIG CLIN: HCPCS | Performed by: NURSE PRACTITIONER

## 2018-07-27 PROCEDURE — G8417 CALC BMI ABV UP PARAM F/U: HCPCS | Performed by: NURSE PRACTITIONER

## 2018-07-27 PROCEDURE — 99213 OFFICE O/P EST LOW 20 MIN: CPT | Performed by: NURSE PRACTITIONER

## 2018-07-27 PROCEDURE — 3017F COLORECTAL CA SCREEN DOC REV: CPT | Performed by: NURSE PRACTITIONER

## 2018-07-27 RX ORDER — FLUTICASONE PROPIONATE 50 MCG
SPRAY, SUSPENSION (ML) NASAL
Qty: 3 BOTTLE | Refills: 1 | Status: SHIPPED | OUTPATIENT
Start: 2018-07-27 | End: 2018-09-26 | Stop reason: SDUPTHER

## 2018-07-27 RX ORDER — FLUTICASONE PROPIONATE 50 MCG
2 SPRAY, SUSPENSION (ML) NASAL DAILY
Qty: 1 BOTTLE | Refills: 5 | Status: SHIPPED | OUTPATIENT
Start: 2018-07-27 | End: 2018-07-27 | Stop reason: SDUPTHER

## 2018-07-27 ASSESSMENT — ENCOUNTER SYMPTOMS
SHORTNESS OF BREATH: 0
ALLERGIC/IMMUNOLOGIC NEGATIVE: 1
SINUS PRESSURE: 0
EYES NEGATIVE: 1
TROUBLE SWALLOWING: 0
COUGH: 0
VOMITING: 0
CHEST TIGHTNESS: 0
DIARRHEA: 0
CONSTIPATION: 0
NAUSEA: 0
ABDOMINAL PAIN: 0

## 2018-08-03 ENCOUNTER — OFFICE VISIT (OUTPATIENT)
Dept: PAIN MANAGEMENT | Age: 53
End: 2018-08-03
Payer: MEDICARE

## 2018-08-03 VITALS
HEIGHT: 71 IN | BODY MASS INDEX: 31.64 KG/M2 | DIASTOLIC BLOOD PRESSURE: 86 MMHG | WEIGHT: 226 LBS | SYSTOLIC BLOOD PRESSURE: 124 MMHG | HEART RATE: 76 BPM

## 2018-08-03 DIAGNOSIS — M54.16 LUMBAR RADICULAR PAIN: ICD-10-CM

## 2018-08-03 DIAGNOSIS — M47.816 LUMBAR FACET JOINT SYNDROME: ICD-10-CM

## 2018-08-03 DIAGNOSIS — M54.06 PANNICULITIS INVOLVING LUMBAR REGION: Primary | ICD-10-CM

## 2018-08-03 DIAGNOSIS — G89.29 CHRONIC BILATERAL LOW BACK PAIN WITH RIGHT-SIDED SCIATICA: ICD-10-CM

## 2018-08-03 DIAGNOSIS — M54.41 CHRONIC BILATERAL LOW BACK PAIN WITH RIGHT-SIDED SCIATICA: ICD-10-CM

## 2018-08-03 PROCEDURE — 1036F TOBACCO NON-USER: CPT | Performed by: NURSE PRACTITIONER

## 2018-08-03 PROCEDURE — G8417 CALC BMI ABV UP PARAM F/U: HCPCS | Performed by: NURSE PRACTITIONER

## 2018-08-03 PROCEDURE — 99214 OFFICE O/P EST MOD 30 MIN: CPT | Performed by: NURSE PRACTITIONER

## 2018-08-03 PROCEDURE — G8427 DOCREV CUR MEDS BY ELIG CLIN: HCPCS | Performed by: NURSE PRACTITIONER

## 2018-08-03 PROCEDURE — 3017F COLORECTAL CA SCREEN DOC REV: CPT | Performed by: NURSE PRACTITIONER

## 2018-08-03 RX ORDER — CELECOXIB 200 MG/1
200 CAPSULE ORAL 2 TIMES DAILY
Qty: 60 CAPSULE | Refills: 3 | Status: SHIPPED | OUTPATIENT
Start: 2018-08-03 | End: 2018-08-07

## 2018-08-03 RX ORDER — GABAPENTIN 600 MG/1
600 TABLET ORAL 3 TIMES DAILY
Qty: 90 TABLET | Refills: 3 | Status: SHIPPED | OUTPATIENT
Start: 2018-08-03 | End: 2019-01-18 | Stop reason: SDUPTHER

## 2018-08-03 ASSESSMENT — ENCOUNTER SYMPTOMS
BACK PAIN: 1
RESPIRATORY NEGATIVE: 1

## 2018-08-03 NOTE — PROGRESS NOTES
Subjective:      Patient ID: Abhishek Moseley is a 46 y.o. male. Chief Complaint   Patient presents with    Back Pain     1 month follow up medication check up     HPI Here today for routine pain clinic recheck. Lyrica tid made him too drowsy, so he decreased back down to bid. Still taking gabapentin prn. No side effects, states it helps. Alleve also works really well but causes bruising, abdominal discomfort, would like to try alternative nsaid    Pain Assessment  Location of Pain: Back  Location Modifiers: Left  Severity of Pain: 6  Quality of Pain: Throbbing, Sharp, Dull, Aching  Duration of Pain: Persistent  Frequency of Pain: Constant  Aggravating Factors: Standing, Walking (sitting)  Limiting Behavior: Yes  Relieving Factors: Rest, Ice, Nsaids  Are there other pain locations you wish to document?: No    Allergies   Allergen Reactions    Pcn [Penicillins] Rash    Sulfa Antibiotics Rash       Outpatient Prescriptions Marked as Taking for the 8/3/18 encounter (Office Visit) with FELICE Rae CNP   Medication Sig Dispense Refill    gabapentin (NEURONTIN) 600 MG tablet Take 1 tablet by mouth 3 times daily for 30 days. . 90 tablet 3    celecoxib (CELEBREX) 200 MG capsule Take 1 capsule by mouth 2 times daily 60 capsule 3    fluticasone (FLONASE) 50 MCG/ACT nasal spray SHAKE LIQUID AND USE 2 SPRAYS IN EACH NOSTRIL DAILY 3 Bottle 1    nystatin (MYCOSTATIN) 226473 UNIT/ML suspension Take 5 mLs by mouth 4 times daily for 10 days 180 mL 3    fenofibric acid (FIBRICOR) 135 MG CPDR capsule Take 1 capsule by mouth daily 30 capsule 3    LORazepam (ATIVAN) 1 MG tablet Take 1 mg by mouth every 6 hours as needed for Anxiety.       BLACK ELDERBERRY,BERRY-FLOWER, PO Take 1 tablet by mouth daily      Darunavir-Cobicistat (PREZCOBIX) 800-150 MG TABS Take by mouth nightly      Nebulizers (COMPRESSOR/NEBULIZER) MISC As directed 1 each 0    Respiratory Therapy Supplies (NEBULIZER/TUBING/MOUTHPIECE) KIT 1 kit by Does not apply route daily 1 kit 0    albuterol (PROVENTIL) (2.5 MG/3ML) 0.083% nebulizer solution Take 2.5 mg by nebulization every 6 hours as needed for Wheezing      TURMERIC PO Take by mouth three times daily      Glucosamine HCl (GLUCOSAMINE PO) Take by mouth daily      vitamin D 1000 UNITS CAPS Take by mouth daily      GARLIC PO Take by mouth daily      Multiple Vitamins-Minerals (MULTIVITAMIN PO) Take by mouth daily      INVEGA 6 MG extended release tablet Take 6 mg by mouth daily      DESCOVY 200-25 MG TABS Take 1 tablet by mouth daily      MYTESI 125 MG TBEC Take 125 mg by mouth         Past Medical History:   Diagnosis Date    [facility] ([facility]     follows with Dr. Lina Moncada    Centrilobular emphysema ([facility]     HIV (human immunodeficiency virus infection) ([facility] 2001    followed by Karol Rosenberg (Infectious Disease at [address] [place] [address])    Pneumonia 06/11/2017       Past Surgical History:   Procedure Laterality Date    ANESTHESIA NERVE BLOCK Bilateral 1/12/2018    Bilat L2 L3 L4 L5 Diagnostic Medial BB performed by Garrick Hood MD at [address] [place] [address]  01/05/2016    Normal colon. Hemorrhoids.  Dr Steven Holloway  2015    full dental extraction    LUMBAR SPINE SURGERY Right 9/26/2017    Right L4 & L5 Transforaminal  performed by Garrick Hood MD at [address] [place] [address] Dr Cifuentes 10/3/2017    Right L4 & L5 Transforaminal performed by Garrick Hood MD at [address] [place] [address] Rd Bilateral 1/19/2018    Bilat L2 L3 L4 L5 Confirmatory Medial BB performed by Garrick Hood MD at [address] [place] [address], W IMAGE 2858 [facility] LEVEL Right 1/25/2018    Right L2 L3 L4 L5 RADIOFREQUENCY performed by Garrick Hood MD at [address] [place] [address] 1, W IMAGE 2858 [facility] Left 3/15/2018    Left L2 L3 L4 L5 RADIOFREQUENCY performed by Garrick Hood MD at [facility] OR       Family History   Problem right-sided sciatica          Plan:    Stop Lyrica. Restart gabapentin 600mg tid  Start celebrex bid.  Follow up one month

## 2018-08-07 ENCOUNTER — TELEPHONE (OUTPATIENT)
Dept: PAIN MANAGEMENT | Age: 53
End: 2018-08-07

## 2018-08-12 DIAGNOSIS — M54.10 BACK PAIN WITH RIGHT-SIDED RADICULOPATHY: ICD-10-CM

## 2018-08-13 RX ORDER — BACLOFEN 10 MG/1
TABLET ORAL
Qty: 270 TABLET | Refills: 0 | Status: SHIPPED | OUTPATIENT
Start: 2018-08-13 | End: 2018-11-09 | Stop reason: SDUPTHER

## 2018-08-13 NOTE — TELEPHONE ENCOUNTER
Attempted to contact patient again with no response. Sent a message to the patient via [employer] asking that he call the office to discuss his medications.

## 2018-08-30 ENCOUNTER — OFFICE VISIT (OUTPATIENT)
Dept: OTOLARYNGOLOGY | Age: 53
End: 2018-08-30
Payer: MEDICARE

## 2018-08-30 VITALS
HEIGHT: 71 IN | DIASTOLIC BLOOD PRESSURE: 84 MMHG | HEART RATE: 78 BPM | BODY MASS INDEX: 31.5 KG/M2 | RESPIRATION RATE: 14 BRPM | WEIGHT: 225 LBS | SYSTOLIC BLOOD PRESSURE: 128 MMHG

## 2018-08-30 DIAGNOSIS — H69.83 ETD (EUSTACHIAN TUBE DYSFUNCTION), BILATERAL: ICD-10-CM

## 2018-08-30 DIAGNOSIS — H65.93 MIDDLE EAR EFFUSION, BILATERAL: ICD-10-CM

## 2018-08-30 DIAGNOSIS — H93.8X2 SENSATION OF PLUGGED EAR ON LEFT SIDE: Primary | ICD-10-CM

## 2018-08-30 PROCEDURE — G8427 DOCREV CUR MEDS BY ELIG CLIN: HCPCS | Performed by: NURSE PRACTITIONER

## 2018-08-30 PROCEDURE — 1036F TOBACCO NON-USER: CPT | Performed by: NURSE PRACTITIONER

## 2018-08-30 PROCEDURE — 99203 OFFICE O/P NEW LOW 30 MIN: CPT | Performed by: NURSE PRACTITIONER

## 2018-08-30 PROCEDURE — G8417 CALC BMI ABV UP PARAM F/U: HCPCS | Performed by: NURSE PRACTITIONER

## 2018-08-30 PROCEDURE — 3017F COLORECTAL CA SCREEN DOC REV: CPT | Performed by: NURSE PRACTITIONER

## 2018-08-30 RX ORDER — METHYLPREDNISOLONE 4 MG/1
TABLET ORAL
Qty: 1 KIT | Refills: 0 | Status: SHIPPED | OUTPATIENT
Start: 2018-08-30 | End: 2018-09-05

## 2018-08-30 ASSESSMENT — ENCOUNTER SYMPTOMS: RESPIRATORY NEGATIVE: 1

## 2018-08-30 NOTE — PROGRESS NOTES
[facility]  8/30/18    Chief Complaint   Patient presents with    Hearing Loss     left ear plugged for one month duration       HPI: Rickie Lee presents today with a one month history of his left ear feeling plugged. He was seen by his PCP, treated with Flonase and oral antihistamine. It temporarily got better, and then he went on an airplane and the plugged sensation returned. His hearing is decreased. No otalgia, just bothersome. No ear infections. No nasal congestion. Past Med Hx:     Past Medical History:   Diagnosis Date    B[facility] disorder ([facility]     follows with Dr. Ball Session Centrilobular emphysema [facility])     HIV (human immunodeficiency virus infection) ([facility] 2001    followed by Aishwarya Mar (Infectious Disease at [address] [place] [address])    Pneumonia 06/11/2017        Past Surgical History:   Procedure Laterality Date    ANESTHESIA NERVE BLOCK Bilateral 1/12/2018    Bilat L2 L3 L4 L5 Diagnostic Medial BB performed by Abner Tan MD at [address] [place] [address]  01/05/2016    Normal colon. Hemorrhoids.  Dr Sheri Lennon  2015    full dental extraction    LUMBAR SPINE SURGERY Right 9/26/2017    Right L4 & L5 Transforaminal  performed by Abner Tan MD at [address] [place] [address] Dr Cifuentes 10/3/2017    Right L4 & L5 Transforaminal performed by Abner Tan MD at [address] [place] [address] Bilateral 1/19/2018    Bilat L2 L3 L4 L5 Confirmatory Medial BB performed by Abner Tan MD at [address] [place] [address], W IMAGE 2858 [facility] LEVEL Right 1/25/2018    Right L2 L3 L4 L5 RADIOFREQUENCY performed by Abner Tan MD at [address] [place] [address] 1, W IMAGE 2858 [facility] LEVEL Left 3/15/2018    Left L2 L3 L4 L5 RADIOFREQUENCY performed by Abner Tan MD at [place] [address] Hx:     Social History     Social History    Marital status: Single     Spouse name: N/A    Number of children: N/A    Years of education: N/A     Occupational History    Not on file. Social History Main Topics    Smoking status: Former Smoker     Packs/day: 3.00     Years: 30.00     Quit date: 10/7/2014    Smokeless tobacco: Never Used    Alcohol use Yes      Comment: socially    Drug use: No    Sexual activity: Not on file     Other Topics Concern    Not on file     Social History Narrative    No narrative on file       Lab Results   Component Value Date    LABA1C 5.4 05/21/2018     Lab Results   Component Value Date     05/21/2018     Lab Results   Component Value Date    WBC 10.3 06/11/2017    HGB 13.9 06/11/2017    HCT 42.1 06/11/2017    MCV 94.7 06/11/2017     06/11/2017     Lab Results   Component Value Date     05/21/2018    K 4.9 05/21/2018     05/21/2018    CO2 27 05/21/2018    BUN 13 05/21/2018    CREATININE 0.86 05/21/2018    GLUCOSE 115 (H) 05/21/2018    CALCIUM 9.3 05/21/2018    PROT 6.7 05/21/2018    LABALBU 4.2 05/21/2018    BILITOT 0.29 (L) 05/21/2018    ALKPHOS 74 05/21/2018    AST 25 05/21/2018    ALT 27 05/21/2018    LABGLOM >60 05/21/2018    GFRAA >60 05/21/2018    GLOB 2.9 06/11/2017       Outpatient Encounter Prescriptions as of 8/30/2018   Medication Sig Dispense Refill    methylPREDNISolone (MEDROL DOSEPACK) 4 MG tablet Take by mouth. 1 kit 0    baclofen (LIORESAL) 10 MG tablet TAKE 1 TABLET BY MOUTH THREE TIMES DAILY AS NEEDED FOR SPASMS 270 tablet 0    gabapentin (NEURONTIN) 600 MG tablet Take 1 tablet by mouth 3 times daily for 30 days. . 90 tablet 3    fluticasone (FLONASE) 50 MCG/ACT nasal spray SHAKE LIQUID AND USE 2 SPRAYS IN EACH NOSTRIL DAILY 3 Bottle 1    fenofibric acid (FIBRICOR) 135 MG CPDR capsule Take 1 capsule by mouth daily 30 capsule 3    LORazepam (ATIVAN) 1 MG tablet Take 1 mg by mouth every 6 hours as needed for Anxiety.       BLACK ELDERBERRY,BERRY-FLOWER, PO Take 1 tablet by mouth daily      Darunavir-Cobicistat (PREZCOBIX) 800-150 MG TABS Take by

## 2018-09-05 ENCOUNTER — OFFICE VISIT (OUTPATIENT)
Dept: PAIN MANAGEMENT | Age: 53
End: 2018-09-05
Payer: MEDICARE

## 2018-09-05 VITALS
BODY MASS INDEX: 32.2 KG/M2 | HEIGHT: 71 IN | SYSTOLIC BLOOD PRESSURE: 136 MMHG | WEIGHT: 230 LBS | DIASTOLIC BLOOD PRESSURE: 88 MMHG | HEART RATE: 68 BPM

## 2018-09-05 DIAGNOSIS — M54.16 LUMBAR RADICULAR PAIN: Primary | ICD-10-CM

## 2018-09-05 DIAGNOSIS — M54.41 CHRONIC BILATERAL LOW BACK PAIN WITH RIGHT-SIDED SCIATICA: ICD-10-CM

## 2018-09-05 DIAGNOSIS — G89.29 CHRONIC BILATERAL LOW BACK PAIN WITH RIGHT-SIDED SCIATICA: ICD-10-CM

## 2018-09-05 DIAGNOSIS — M47.816 LUMBAR FACET JOINT SYNDROME: ICD-10-CM

## 2018-09-05 PROCEDURE — 3017F COLORECTAL CA SCREEN DOC REV: CPT | Performed by: NURSE PRACTITIONER

## 2018-09-05 PROCEDURE — 1036F TOBACCO NON-USER: CPT | Performed by: NURSE PRACTITIONER

## 2018-09-05 PROCEDURE — G8417 CALC BMI ABV UP PARAM F/U: HCPCS | Performed by: NURSE PRACTITIONER

## 2018-09-05 PROCEDURE — G8427 DOCREV CUR MEDS BY ELIG CLIN: HCPCS | Performed by: NURSE PRACTITIONER

## 2018-09-05 PROCEDURE — 99213 OFFICE O/P EST LOW 20 MIN: CPT | Performed by: NURSE PRACTITIONER

## 2018-09-05 NOTE — PROGRESS NOTES
MG/3ML) 0.083% nebulizer solution Take 2.5 mg by nebulization every 6 hours as needed for Wheezing      TURMERIC PO Take by mouth three times daily      Glucosamine HCl (GLUCOSAMINE PO) Take by mouth daily      vitamin D 1000 UNITS CAPS Take by mouth daily      GARLIC PO Take by mouth daily      Multiple Vitamins-Minerals (MULTIVITAMIN PO) Take by mouth daily      INVEGA 6 MG extended release tablet Take 6 mg by mouth daily      DESCOVY 200-25 MG TABS Take 1 tablet by mouth daily      MYTESI 125 MG TBEC Take 125 mg by mouth         Past Medical History:   Diagnosis Date    B[facility] disorder ([facility]     follows with Dr. Pearce Slight Centrilobular emphysema ([facility]     [facility]    followed by Karol Rosenberg (Infectious Disease at [address] [place] [address])    Pneumonia 06/11/2017       Past Surgical History:   Procedure Laterality Date    ANESTHESIA NERVE BLOCK Bilateral 1/12/2018    Bilat L2 L3 L4 L5 Diagnostic Medial BB performed by Garrick Hood MD at [address] [place] [address]  01/05/2016    Normal colon. Hemorrhoids.  Dr Steven Holloway  2015    full dental extraction    LUMBAR SPINE SURGERY Right 9/26/2017    Right L4 & L5 Transforaminal  performed by Garrick Hood MD at [address] [place] [address] Dr Cifuentes 10/3/2017    Right L4 & L5 Transforaminal performed by Garrick Hood MD at [address] [place] [address] Bilateral 1/19/2018    Bilat L2 L3 L4 L5 Confirmatory Medial BB performed by Garrick Hood MD at [address] [place] [address] 1, W IMAGE [address] [facility] Street LEVEL Right 1/25/2018    Right L2 L3 L4 L5 RADIOFREQUENCY performed by Garrick Hood MD at [address] [place] [address],  IMAGE GUIDE,EA ADDL LEVEL Left 3/15/2018    Left L2 L3 L4 L5 RADIOFREQUENCY performed by Garrick Hood MD at [address] [place] [address] OR       Family History   Problem Relation Age of Onset    Cancer Mother         uterine s/p function.    Follow up 6 months        Lela Dobbs, FELICE - CNP

## 2018-09-08 ASSESSMENT — ENCOUNTER SYMPTOMS
RESPIRATORY NEGATIVE: 1
BACK PAIN: 1

## 2018-09-18 DIAGNOSIS — E78.1 HYPERTRIGLYCERIDEMIA: ICD-10-CM

## 2018-09-18 RX ORDER — FENOFIBRIC ACID 135 MG/1
135 CAPSULE, DELAYED RELEASE ORAL DAILY
Qty: 30 CAPSULE | Refills: 0 | Status: SHIPPED | OUTPATIENT
Start: 2018-09-18 | End: 2018-10-16 | Stop reason: SDUPTHER

## 2018-09-18 NOTE — TELEPHONE ENCOUNTER
Last Appt:  7/27/2018  Next Appt:   11/21/2018  Med verified in [facility] [address]  He is due for bloow work I will call

## 2018-09-20 ENCOUNTER — HOSPITAL ENCOUNTER (OUTPATIENT)
Dept: LAB | Age: 53
Setting detail: SPECIMEN
Discharge: HOME | End: 2018-09-20
Payer: MEDICARE

## 2018-09-20 ENCOUNTER — TELEPHONE (OUTPATIENT)
Dept: FAMILY MEDICINE CLINIC | Age: 53
End: 2018-09-20

## 2018-09-20 DIAGNOSIS — E78.1 HYPERTRIGLYCERIDEMIA: ICD-10-CM

## 2018-09-20 LAB
ALBUMIN SERPL-MCNC: 4.3 G/DL (ref 3.5–5.2)
ALBUMIN/GLOBULIN RATIO: 1.4 (ref 1–2.5)
ALP BLD-CCNC: 75 U/L (ref 40–129)
ALT SERPL-CCNC: 30 U/L (ref 5–41)
ANION GAP SERPL CALCULATED.3IONS-SCNC: 13 MMOL/L (ref 9–17)
AST SERPL-CCNC: 18 U/L
BILIRUB SERPL-MCNC: 0.24 MG/DL (ref 0.3–1.2)
BUN BLDV-MCNC: 19 MG/DL (ref 6–20)
BUN/CREAT BLD: 18 (ref 9–20)
CALCIUM SERPL-MCNC: 9.6 MG/DL (ref 8.6–10.4)
CHLORIDE BLD-SCNC: 104 MMOL/L (ref 98–107)
CHOLESTEROL/HDL RATIO: 3.2
CHOLESTEROL: 223 MG/DL
CO2: 25 MMOL/L (ref 20–31)
CREAT SERPL-MCNC: 1.05 MG/DL (ref 0.7–1.2)
GFR AFRICAN AMERICAN: >60 ML/MIN
GFR NON-AFRICAN AMERICAN: >60 ML/MIN
GFR SERPL CREATININE-BSD FRML MDRD: ABNORMAL ML/MIN/{1.73_M2}
GFR SERPL CREATININE-BSD FRML MDRD: ABNORMAL ML/MIN/{1.73_M2}
GLUCOSE BLD-MCNC: 123 MG/DL (ref 70–99)
HDLC SERPL-MCNC: 69 MG/DL
LDL CHOLESTEROL: 87 MG/DL (ref 0–130)
POTASSIUM SERPL-SCNC: 4.4 MMOL/L (ref 3.7–5.3)
SODIUM BLD-SCNC: 142 MMOL/L (ref 135–144)
TOTAL PROTEIN: 7.3 G/DL (ref 6.4–8.3)
TRIGL SERPL-MCNC: 334 MG/DL
VLDLC SERPL CALC-MCNC: ABNORMAL MG/DL (ref 1–30)

## 2018-09-20 PROCEDURE — 80053 COMPREHEN METABOLIC PANEL: CPT

## 2018-09-20 PROCEDURE — 80061 LIPID PANEL: CPT

## 2018-09-20 PROCEDURE — 36415 COLL VENOUS BLD VENIPUNCTURE: CPT

## 2018-09-24 DIAGNOSIS — M47.816 LUMBAR FACET JOINT SYNDROME: ICD-10-CM

## 2018-09-24 RX ORDER — GABAPENTIN 600 MG/1
TABLET ORAL
Qty: 90 TABLET | Refills: 5 | Status: SHIPPED | OUTPATIENT
Start: 2018-09-24 | End: 2019-08-06

## 2018-09-24 NOTE — TELEPHONE ENCOUNTER
Last Appt:  9/5/2018  Next Appt:   3/6/2019  Med verified in [address] checked for [place], and [place]:   08/25/2018 Gabapentin 600MG #90 Due 09/24/2018 09/14/2018 Gabapentin 300MG #270. Due 12/13/2018      Is patient supposed to be on both 300MG and 600mg gabapentin? Looks like you stopped the Lyrica and restarted Gabapentin back on 08/05/2018, but note did not say if he was to continue with both. Please clarify.

## 2018-09-26 ENCOUNTER — OFFICE VISIT (OUTPATIENT)
Dept: OTOLARYNGOLOGY | Age: 53
End: 2018-09-26
Payer: MEDICARE

## 2018-09-26 VITALS
SYSTOLIC BLOOD PRESSURE: 132 MMHG | HEART RATE: 74 BPM | BODY MASS INDEX: 32.2 KG/M2 | RESPIRATION RATE: 14 BRPM | DIASTOLIC BLOOD PRESSURE: 90 MMHG | WEIGHT: 230 LBS | HEIGHT: 71 IN

## 2018-09-26 DIAGNOSIS — H91.93 HIGH FREQUENCY HEARING LOSS OF BOTH EARS: ICD-10-CM

## 2018-09-26 DIAGNOSIS — H69.83 DYSFUNCTION OF BOTH EUSTACHIAN TUBES: Primary | ICD-10-CM

## 2018-09-26 PROCEDURE — G8417 CALC BMI ABV UP PARAM F/U: HCPCS | Performed by: NURSE PRACTITIONER

## 2018-09-26 PROCEDURE — 3017F COLORECTAL CA SCREEN DOC REV: CPT | Performed by: NURSE PRACTITIONER

## 2018-09-26 PROCEDURE — G8427 DOCREV CUR MEDS BY ELIG CLIN: HCPCS | Performed by: NURSE PRACTITIONER

## 2018-09-26 PROCEDURE — 1036F TOBACCO NON-USER: CPT | Performed by: NURSE PRACTITIONER

## 2018-09-26 PROCEDURE — 99213 OFFICE O/P EST LOW 20 MIN: CPT | Performed by: NURSE PRACTITIONER

## 2018-09-26 RX ORDER — FLUTICASONE PROPIONATE 50 MCG
2 SPRAY, SUSPENSION (ML) NASAL DAILY
Qty: 3 BOTTLE | Refills: 1 | Status: SHIPPED | OUTPATIENT
Start: 2018-09-26 | End: 2019-05-06 | Stop reason: SDUPTHER

## 2018-09-26 ASSESSMENT — ENCOUNTER SYMPTOMS: RESPIRATORY NEGATIVE: 1

## 2018-09-26 NOTE — PROGRESS NOTES
rhythm and normal heart sounds. Pulmonary/Chest: Effort normal and breath sounds normal.   Musculoskeletal: Normal range of motion. Neurological: He is alert and oriented to person, place, and time. Skin: Skin is warm. Psychiatric: He has a normal mood and affect. Data reviewed:      ASSESSMENT:   Diagnosis Orders   1. Dysfunction of both eustachian tubes         PLAN:  Return in about 6 months (around 3/26/2019) for cerumen removal.    Orders Placed This Encounter   Medications    fluticasone (FLONASE) 50 MCG/ACT nasal spray     Si sprays by Nasal route daily     Dispense:  3 Bottle     Refill:  1     **Patient requests 90 days supply**     Flonase Refilled  Encounter sent to PCP for other suggestions on thrush  Use Nystatin as prescribed for symptoms.     Return every 6 months or as needed for cerumen removal  Repeat audiogram in 1 year, sooner if problems happen again    Electronically signed by FELICE Dupree CNP on 18 at 10:36 AM

## 2018-09-27 ENCOUNTER — TELEPHONE (OUTPATIENT)
Dept: FAMILY MEDICINE CLINIC | Age: 53
End: 2018-09-27

## 2018-09-27 DIAGNOSIS — B37.0 ORAL CANDIDIASIS: Primary | ICD-10-CM

## 2018-09-27 RX ORDER — CLOTRIMAZOLE 10 MG/1
10 LOZENGE ORAL; TOPICAL
Qty: 70 TABLET | Refills: 0 | Status: SHIPPED | OUTPATIENT
Start: 2018-09-27 | End: 2018-11-02 | Stop reason: SDUPTHER

## 2018-10-16 DIAGNOSIS — E78.1 HYPERTRIGLYCERIDEMIA: ICD-10-CM

## 2018-10-16 RX ORDER — FENOFIBRIC ACID 135 MG/1
135 CAPSULE, DELAYED RELEASE ORAL DAILY
Qty: 30 CAPSULE | Refills: 1 | Status: SHIPPED | OUTPATIENT
Start: 2018-10-16 | End: 2018-12-12 | Stop reason: SDUPTHER

## 2018-11-02 DIAGNOSIS — B37.0 ORAL CANDIDIASIS: ICD-10-CM

## 2018-11-09 DIAGNOSIS — M54.10 BACK PAIN WITH RIGHT-SIDED RADICULOPATHY: ICD-10-CM

## 2018-11-09 RX ORDER — BACLOFEN 10 MG/1
TABLET ORAL
Qty: 270 TABLET | Refills: 0 | Status: SHIPPED | OUTPATIENT
Start: 2018-11-09 | End: 2019-02-06 | Stop reason: SDUPTHER

## 2018-11-09 NOTE — TELEPHONE ENCOUNTER
Last Appt:  7/27/2018  Next Appt:   11/21/2018  Med verified in [facility] 11/9/18

## 2018-11-21 ENCOUNTER — OFFICE VISIT (OUTPATIENT)
Dept: FAMILY MEDICINE CLINIC | Age: 53
End: 2018-11-21
Payer: MEDICARE

## 2018-11-21 VITALS
SYSTOLIC BLOOD PRESSURE: 120 MMHG | HEIGHT: 71 IN | BODY MASS INDEX: 34.58 KG/M2 | WEIGHT: 247 LBS | HEART RATE: 88 BPM | DIASTOLIC BLOOD PRESSURE: 80 MMHG

## 2018-11-21 DIAGNOSIS — B20 HIV (HUMAN IMMUNODEFICIENCY VIRUS INFECTION) (HCC): ICD-10-CM

## 2018-11-21 DIAGNOSIS — Z00.00 ROUTINE GENERAL MEDICAL EXAMINATION AT A HEALTH CARE FACILITY: Primary | ICD-10-CM

## 2018-11-21 DIAGNOSIS — E78.5 HYPERLIPIDEMIA, UNSPECIFIED HYPERLIPIDEMIA TYPE: ICD-10-CM

## 2018-11-21 DIAGNOSIS — B37.0 ORAL CANDIDIASIS: ICD-10-CM

## 2018-11-21 DIAGNOSIS — R73.01 IFG (IMPAIRED FASTING GLUCOSE): ICD-10-CM

## 2018-11-21 DIAGNOSIS — Z12.5 SCREENING PSA (PROSTATE SPECIFIC ANTIGEN): ICD-10-CM

## 2018-11-21 PROCEDURE — G8484 FLU IMMUNIZE NO ADMIN: HCPCS | Performed by: PHYSICIAN ASSISTANT

## 2018-11-21 PROCEDURE — G8417 CALC BMI ABV UP PARAM F/U: HCPCS | Performed by: PHYSICIAN ASSISTANT

## 2018-11-21 PROCEDURE — 3017F COLORECTAL CA SCREEN DOC REV: CPT | Performed by: PHYSICIAN ASSISTANT

## 2018-11-21 PROCEDURE — G8427 DOCREV CUR MEDS BY ELIG CLIN: HCPCS | Performed by: PHYSICIAN ASSISTANT

## 2018-11-21 PROCEDURE — 99213 OFFICE O/P EST LOW 20 MIN: CPT | Performed by: PHYSICIAN ASSISTANT

## 2018-11-21 PROCEDURE — G0438 PPPS, INITIAL VISIT: HCPCS | Performed by: PHYSICIAN ASSISTANT

## 2018-11-21 PROCEDURE — 1036F TOBACCO NON-USER: CPT | Performed by: PHYSICIAN ASSISTANT

## 2018-11-21 ASSESSMENT — LIFESTYLE VARIABLES
HOW OFTEN DURING THE LAST YEAR HAVE YOU FAILED TO DO WHAT WAS NORMALLY EXPECTED FROM YOU BECAUSE OF DRINKING: 0
HOW OFTEN DURING THE LAST YEAR HAVE YOU BEEN UNABLE TO REMEMBER WHAT HAPPENED THE NIGHT BEFORE BECAUSE YOU HAD BEEN DRINKING: 0
HOW OFTEN DURING THE LAST YEAR HAVE YOU FOUND THAT YOU WERE NOT ABLE TO STOP DRINKING ONCE YOU HAD STARTED: 0
AUDIT-C TOTAL SCORE: 1
HAS A RELATIVE, FRIEND, DOCTOR, OR ANOTHER HEALTH PROFESSIONAL EXPRESSED CONCERN ABOUT YOUR DRINKING OR SUGGESTED YOU CUT DOWN: 0
AUDIT TOTAL SCORE: 1
HOW OFTEN DURING THE LAST YEAR HAVE YOU NEEDED AN ALCOHOLIC DRINK FIRST THING IN THE MORNING TO GET YOURSELF GOING AFTER A NIGHT OF HEAVY DRINKING: 0
HOW OFTEN DO YOU HAVE SIX OR MORE DRINKS ON ONE OCCASION: 0
HOW OFTEN DO YOU HAVE A DRINK CONTAINING ALCOHOL: 1
HOW OFTEN DURING THE LAST YEAR HAVE YOU HAD A FEELING OF GUILT OR REMORSE AFTER DRINKING: 0
HAVE YOU OR SOMEONE ELSE BEEN INJURED AS A RESULT OF YOUR DRINKING: 0
HOW MANY STANDARD DRINKS CONTAINING ALCOHOL DO YOU HAVE ON A TYPICAL DAY: 0

## 2018-11-21 ASSESSMENT — ANXIETY QUESTIONNAIRES: GAD7 TOTAL SCORE: 0

## 2018-11-21 ASSESSMENT — PATIENT HEALTH QUESTIONNAIRE - PHQ9
SUM OF ALL RESPONSES TO PHQ QUESTIONS 1-9: 0
SUM OF ALL RESPONSES TO PHQ QUESTIONS 1-9: 0

## 2018-11-21 NOTE — PATIENT INSTRUCTIONS
degrees, bringing your hand toward your forehead. Count to 2 as you pull the band toward your head. Relax and slowly return to your starting position. Count to 5 as you return to the start. Repeat 8 to 12 times. Follow-up care is a key part of your treatment and safety. Be sure to make and go to all appointments, and call your doctor if you are having problems. It's also a good idea to know your test results and keep a list of the medicines you take. Where can you learn more? Go to [email or web address] org and sign in to your [employer] account. Enter E412 in the goDog Fetch box to learn more about \"Biceps Tendinitis: Exercises. \"     If you do not have an account, please click on the \"Sign Up Now\" link. Current as of: November 29, 2017  Content Version: 11.8  © 6284-6206 Healthwise, Incorporated. Care instructions adapted under license by [facility] ([facility]). If you have questions about a medical condition or this instruction, always ask your healthcare professional. [address] any warranty or liability for your use of this information.

## 2018-11-25 ASSESSMENT — ENCOUNTER SYMPTOMS
GASTROINTESTINAL NEGATIVE: 1
BACK PAIN: 1
RESPIRATORY NEGATIVE: 1

## 2018-11-25 NOTE — PROGRESS NOTES
09/20/2018 75  40 - 129 U/L Final    ALT 09/20/2018 30  5 - 41 U/L Final    AST 09/20/2018 18  <40 U/L Final    Total Bilirubin 09/20/2018 0.24* 0.3 - 1.2 mg/dL Final    Total Protein 09/20/2018 7.3  6.4 - 8.3 g/dL Final    Alb 09/20/2018 4.3  3.5 - 5.2 g/dL Final    Albumin/Globulin Ratio 09/20/2018 1.4  1.0 - 2.5 Final    GFR Non- 09/20/2018 >60  >60 mL/min Final    GFR  09/20/2018 >60  >60 mL/min Final    GFR Comment 09/20/2018        Final    Comment: Average GFR for 52-63 years old:   80 mL/min/1.73sq m  Chronic Kidney Disease:   <60 mL/min/1.73sq m  Kidney failure:   <15 mL/min/1.73sq m              eGFR calculated using average adult body mass. Additional eGFR calculator   available at:        [email or web address]            GFR Staging 09/20/2018 NOT REPORTED   Final    Cholesterol 09/20/2018 223* <200 mg/dL Final    Comment:    Cholesterol Guidelines:      <200  Desirable   200-240  Borderline      >240  Undesirable         HDL 09/20/2018 69  >40 mg/dL Final    Comment:    HDL Guidelines:    <40     Undesirable   40-59    Borderline    >59     Desirable         LDL Cholesterol 09/20/2018 87  0 - 130 mg/dL Final    Comment:    LDL Guidelines:     <100    Desirable   100-129   Near to/above Desirable   130-159   Borderline      >159   Undesirable     Direct (measured) LDL and calculated LDL are not interchangeable tests.  Chol/HDL Ratio 09/20/2018 3.2  <5 Final            Triglycerides 09/20/2018 334* <150 mg/dL Final    Comment:    Triglyceride Guidelines:     <150   Desirable   150-199  Borderline   200-499  High     >499   Very high   Based on AHA Guidelines for fasting triglyceride, October 2012.  VLDL 09/20/2018 NOT REPORTED  1 - 30 mg/dL Final       Assessment:      1. Routine general medical examination at a health care facility    2. Hyperlipidemia, unspecified hyperlipidemia type    3.  IFG (impaired fasting glucose)

## 2018-12-11 ENCOUNTER — OFFICE VISIT (OUTPATIENT)
Dept: PAIN MANAGEMENT | Age: 53
End: 2018-12-11
Payer: MEDICARE

## 2018-12-11 VITALS
SYSTOLIC BLOOD PRESSURE: 148 MMHG | WEIGHT: 246.8 LBS | DIASTOLIC BLOOD PRESSURE: 88 MMHG | BODY MASS INDEX: 34.55 KG/M2 | RESPIRATION RATE: 18 BRPM | HEIGHT: 71 IN

## 2018-12-11 DIAGNOSIS — M48.061 NEURAL FORAMINAL STENOSIS OF LUMBAR SPINE: ICD-10-CM

## 2018-12-11 DIAGNOSIS — M54.16 LUMBAR RADICULAR PAIN: Primary | ICD-10-CM

## 2018-12-11 DIAGNOSIS — M47.817 LUMBOSACRAL SPONDYLOSIS WITHOUT MYELOPATHY: ICD-10-CM

## 2018-12-11 PROCEDURE — G8484 FLU IMMUNIZE NO ADMIN: HCPCS | Performed by: NURSE PRACTITIONER

## 2018-12-11 PROCEDURE — G8417 CALC BMI ABV UP PARAM F/U: HCPCS | Performed by: NURSE PRACTITIONER

## 2018-12-11 PROCEDURE — G8427 DOCREV CUR MEDS BY ELIG CLIN: HCPCS | Performed by: NURSE PRACTITIONER

## 2018-12-11 PROCEDURE — 1036F TOBACCO NON-USER: CPT | Performed by: NURSE PRACTITIONER

## 2018-12-11 PROCEDURE — 3017F COLORECTAL CA SCREEN DOC REV: CPT | Performed by: NURSE PRACTITIONER

## 2018-12-11 PROCEDURE — 99214 OFFICE O/P EST MOD 30 MIN: CPT | Performed by: NURSE PRACTITIONER

## 2018-12-11 ASSESSMENT — PATIENT HEALTH QUESTIONNAIRE - PHQ9
1. LITTLE INTEREST OR PLEASURE IN DOING THINGS: 0
SUM OF ALL RESPONSES TO PHQ9 QUESTIONS 1 & 2: 0
2. FEELING DOWN, DEPRESSED OR HOPELESS: 0
SUM OF ALL RESPONSES TO PHQ QUESTIONS 1-9: 0
SUM OF ALL RESPONSES TO PHQ QUESTIONS 1-9: 0

## 2018-12-11 ASSESSMENT — ENCOUNTER SYMPTOMS
CONSTIPATION: 0
EYES NEGATIVE: 1
RESPIRATORY NEGATIVE: 1
DIARRHEA: 0
BACK PAIN: 1

## 2018-12-11 NOTE — PROGRESS NOTES
paraspinal mass identified. [place] is a cyst in the left  kidney that is not fully evaluated.  Ultrasound could better evaluate.     L1-L2:  The thecal sac and neural foramina are intact.     L2-L3:  There is mild facet and ligamentum flavum hypertrophy.  The thecal  sac is not stenotic.  Disc and/or osteophytes cause minimal narrowing of the  neural foramina bilaterally.     L3-L4: The thecal sac is not stenotic.   Disc and/or osteophytes result in  mild narrowing of the neural foramina bilaterally.   There is mild facet and  ligamentum flavum hypertrophy.     L4-L5:  There is moderate facet and ligamentum flavum hypertrophy.  The  thecal sac is not stenotic.  Disc and/or osteophytes result in moderate  narrowing of the neural foramina bilaterally. The left neural foramen is  narrowed greater than right.     L5-S1:  There is mild facet and ligamentum flavum hypertrophy.  The thecal  sac is not stenotic.  The S1 nerve roots are intact. Disc and/or osteophytes  result in moderate narrowing of the neural foramina bilaterally. The right  neural foramen is narrowed greater than the left.     There is no significant change in the findings from the prior study.        Impression  Disc and osteophytes result in narrowing of the neural foramina at several  levels as discussed above.  No significant stenosis of thecal sac in the  lumbar region. Neurological: He is alert and oriented to person, place, and time. No cranial nerve deficit. GCS eye subscore is 4. GCS verbal subscore is 5. GCS motor subscore is 6. Skin: Skin is warm, dry and intact. Capillary refill takes less than 2 seconds. He is not diaphoretic. No cyanosis. No pallor. Nails show no clubbing. Psychiatric: He has a normal mood and affect. His behavior is normal. Judgment normal. His affect is not angry. His speech is not slurred. He is not agitated, not aggressive and not withdrawn. He does not express impulsivity or inappropriate judgment.  He does not

## 2018-12-11 NOTE — PATIENT INSTRUCTIONS
[facility]  [address] [address] [facility], [address]  Telephone [phone]  Fax [phone]      PROCEDURE INSTRUCTIONS FOR  PAIN MANAGEMENT PROCEDURES WITHOUT IV SEDATION    Ishan oL scheduled to see Dr. Leon Wolfe to undergo the following procedure:  Right L4 and L5 Transforaminal Epidural Steroid Injection    Procedure Date: ____Tuesday 1/8/19____  Arrival Time: ____7:30am____     Report to the [place] [address], Registration office on the 1st floor in the hospital, after check in and signing of paper work you will then go to the second floor to the surgery center. 1. Stop the following medications prior to the procedure:    NONE    2. Take all routine medications unless otherwise instructed. Ok to take vitamins and antiinflammatory medications    3. EATING & DRINKING:  Ok to eat or drink before the injection - no IV sedation will be used. 4. If you are allergic to contrast or iodine, you must take benadryl and prednisone prior to the injection to prevent an allergic reaction. Follow the directions on the prescription for the times to take the medication. 5. Oral valium can be prescribed if your are anxious about the injections or feel that you can not lay still during the injection. If you take valium, you must have a . Make arrangements for a family member or friend to drive you to the surgery center. Your ride must stay in the hospital while you are having the injection done. If they cannot stay, the injection will be rescheduled. The valium may affect your judgment following the procedure and driving a vehicle within 24 hours after the sedation could be dangerous. 6.  Wear simple loose clothing, which can be easily changed. 7.  Leave jewelry (including rings) and other valuables at home.      8.  You will be asked to sign several forms prior to surgery; patients under the age of 25 must have a parent or legal guardian sign the permit to be able to do the

## 2018-12-12 DIAGNOSIS — E78.1 HYPERTRIGLYCERIDEMIA: ICD-10-CM

## 2018-12-12 RX ORDER — FENOFIBRIC ACID 135 MG/1
135 CAPSULE, DELAYED RELEASE ORAL DAILY
Qty: 30 CAPSULE | Refills: 0 | Status: SHIPPED | OUTPATIENT
Start: 2018-12-12 | End: 2018-12-12 | Stop reason: SDUPTHER

## 2018-12-13 RX ORDER — FENOFIBRIC ACID 135 MG/1
135 CAPSULE, DELAYED RELEASE ORAL DAILY
Qty: 90 CAPSULE | Refills: 0 | Status: SHIPPED | OUTPATIENT
Start: 2018-12-13

## 2018-12-27 RX ORDER — CYCLOBENZAPRINE HCL 10 MG
TABLET ORAL
Qty: 90 TABLET | Refills: 0 | OUTPATIENT
Start: 2018-12-27

## 2018-12-27 RX ORDER — CYCLOBENZAPRINE HCL 10 MG
10 TABLET ORAL 3 TIMES DAILY PRN
Qty: 90 TABLET | Refills: 0 | OUTPATIENT
Start: 2018-12-27 | End: 2019-08-06

## 2018-12-28 ENCOUNTER — TELEPHONE (OUTPATIENT)
Dept: UROLOGY | Age: 53
End: 2018-12-28

## 2019-01-08 ENCOUNTER — APPOINTMENT (OUTPATIENT)
Dept: GENERAL RADIOLOGY | Age: 54
End: 2019-01-08
Attending: PHYSICAL MEDICINE & REHABILITATION
Payer: MEDICARE

## 2019-01-08 ENCOUNTER — HOSPITAL ENCOUNTER (OUTPATIENT)
Age: 54
Setting detail: OUTPATIENT SURGERY
Discharge: HOME | End: 2019-01-08
Attending: PHYSICAL MEDICINE & REHABILITATION | Admitting: PHYSICAL MEDICINE & REHABILITATION
Payer: MEDICARE

## 2019-01-08 VITALS
DIASTOLIC BLOOD PRESSURE: 87 MMHG | HEART RATE: 73 BPM | OXYGEN SATURATION: 95 % | RESPIRATION RATE: 16 BRPM | BODY MASS INDEX: 34.55 KG/M2 | HEIGHT: 71 IN | WEIGHT: 246.8 LBS | SYSTOLIC BLOOD PRESSURE: 140 MMHG | TEMPERATURE: 97.9 F

## 2019-01-08 PROCEDURE — 64483 NJX AA&/STRD TFRM EPI L/S 1: CPT | Performed by: PHYSICAL MEDICINE & REHABILITATION

## 2019-01-08 PROCEDURE — 6360000004 HC RX CONTRAST MEDICATION: Performed by: PHYSICAL MEDICINE & REHABILITATION

## 2019-01-08 PROCEDURE — 2500000003 HC RX 250 WO HCPCS: Performed by: PHYSICAL MEDICINE & REHABILITATION

## 2019-01-08 PROCEDURE — 2580000003 HC RX 258: Performed by: PHYSICAL MEDICINE & REHABILITATION

## 2019-01-08 PROCEDURE — 2709999900 HC NON-CHARGEABLE SUPPLY: Performed by: PHYSICAL MEDICINE & REHABILITATION

## 2019-01-08 PROCEDURE — 3600000056 HC PAIN LEVEL 4 BASE: Performed by: PHYSICAL MEDICINE & REHABILITATION

## 2019-01-08 PROCEDURE — 3209999900 FLUORO FOR SURGICAL PROCEDURES

## 2019-01-08 PROCEDURE — 7100000010 HC PHASE II RECOVERY - FIRST 15 MIN: Performed by: PHYSICAL MEDICINE & REHABILITATION

## 2019-01-08 PROCEDURE — 6360000002 HC RX W HCPCS: Performed by: PHYSICAL MEDICINE & REHABILITATION

## 2019-01-08 RX ORDER — 0.9 % SODIUM CHLORIDE 0.9 %
VIAL (ML) INJECTION PRN
Status: DISCONTINUED | OUTPATIENT
Start: 2019-01-08 | End: 2019-01-08 | Stop reason: HOSPADM

## 2019-01-08 RX ORDER — DEXAMETHASONE SODIUM PHOSPHATE 10 MG/ML
INJECTION INTRAMUSCULAR; INTRAVENOUS PRN
Status: DISCONTINUED | OUTPATIENT
Start: 2019-01-08 | End: 2019-01-08 | Stop reason: HOSPADM

## 2019-01-08 RX ORDER — BUPIVACAINE HYDROCHLORIDE 5 MG/ML
INJECTION, SOLUTION EPIDURAL; INTRACAUDAL PRN
Status: DISCONTINUED | OUTPATIENT
Start: 2019-01-08 | End: 2019-01-08 | Stop reason: HOSPADM

## 2019-01-08 ASSESSMENT — PAIN SCALES - GENERAL: PAINLEVEL_OUTOF10: 3

## 2019-01-08 ASSESSMENT — PAIN DESCRIPTION - DESCRIPTORS: DESCRIPTORS: ACHING;DISCOMFORT

## 2019-01-08 ASSESSMENT — PAIN - FUNCTIONAL ASSESSMENT: PAIN_FUNCTIONAL_ASSESSMENT: 0-10

## 2019-01-08 ASSESSMENT — PAIN DESCRIPTION - PAIN TYPE: TYPE: CHRONIC PAIN

## 2019-01-12 DIAGNOSIS — E78.1 HYPERTRIGLYCERIDEMIA: ICD-10-CM

## 2019-01-14 RX ORDER — FENOFIBRIC ACID 135 MG/1
135 CAPSULE, DELAYED RELEASE ORAL DAILY
Qty: 30 CAPSULE | Refills: 4 | Status: SHIPPED | OUTPATIENT
Start: 2019-01-14 | End: 2019-01-18 | Stop reason: SDUPTHER

## 2019-01-18 ENCOUNTER — OFFICE VISIT (OUTPATIENT)
Dept: PAIN MANAGEMENT | Age: 54
End: 2019-01-18
Payer: MEDICARE

## 2019-01-18 VITALS
HEART RATE: 92 BPM | SYSTOLIC BLOOD PRESSURE: 132 MMHG | DIASTOLIC BLOOD PRESSURE: 86 MMHG | HEIGHT: 71 IN | WEIGHT: 250 LBS | BODY MASS INDEX: 35 KG/M2

## 2019-01-18 DIAGNOSIS — M48.061 NEURAL FORAMINAL STENOSIS OF LUMBAR SPINE: ICD-10-CM

## 2019-01-18 DIAGNOSIS — M47.817 LUMBOSACRAL SPONDYLOSIS WITHOUT MYELOPATHY: ICD-10-CM

## 2019-01-18 DIAGNOSIS — M54.16 LUMBAR RADICULAR PAIN: Primary | ICD-10-CM

## 2019-01-18 DIAGNOSIS — M47.816 LUMBAR FACET JOINT SYNDROME: ICD-10-CM

## 2019-01-18 PROCEDURE — G8484 FLU IMMUNIZE NO ADMIN: HCPCS | Performed by: NURSE PRACTITIONER

## 2019-01-18 PROCEDURE — G8427 DOCREV CUR MEDS BY ELIG CLIN: HCPCS | Performed by: NURSE PRACTITIONER

## 2019-01-18 PROCEDURE — 1036F TOBACCO NON-USER: CPT | Performed by: NURSE PRACTITIONER

## 2019-01-18 PROCEDURE — G8417 CALC BMI ABV UP PARAM F/U: HCPCS | Performed by: NURSE PRACTITIONER

## 2019-01-18 PROCEDURE — 99214 OFFICE O/P EST MOD 30 MIN: CPT | Performed by: NURSE PRACTITIONER

## 2019-01-18 PROCEDURE — 3017F COLORECTAL CA SCREEN DOC REV: CPT | Performed by: NURSE PRACTITIONER

## 2019-01-18 RX ORDER — GABAPENTIN 600 MG/1
TABLET ORAL
Qty: 90 TABLET | Refills: 0 | OUTPATIENT
Start: 2019-01-20 | End: 2019-08-06

## 2019-01-18 ASSESSMENT — ENCOUNTER SYMPTOMS
BACK PAIN: 1
EYES NEGATIVE: 1
RESPIRATORY NEGATIVE: 1
DIARRHEA: 0
CONSTIPATION: 0

## 2019-01-24 ENCOUNTER — TELEPHONE (OUTPATIENT)
Dept: FAMILY MEDICINE CLINIC | Age: 54
End: 2019-01-24

## 2019-01-24 DIAGNOSIS — R61 EXCESSIVE SWEATING: Primary | ICD-10-CM

## 2019-01-28 ENCOUNTER — TELEPHONE (OUTPATIENT)
Dept: FAMILY MEDICINE CLINIC | Age: 54
End: 2019-01-28

## 2019-01-28 ENCOUNTER — HOSPITAL ENCOUNTER (OUTPATIENT)
Dept: LAB | Age: 54
Discharge: HOME | End: 2019-01-28
Payer: MEDICARE

## 2019-01-28 DIAGNOSIS — R61 EXCESSIVE SWEATING: ICD-10-CM

## 2019-01-28 LAB
THYROXINE, FREE: 1.01 NG/DL (ref 0.93–1.7)
TSH SERPL DL<=0.05 MIU/L-ACNC: 1.09 MIU/L (ref 0.3–5)

## 2019-01-28 PROCEDURE — 36415 COLL VENOUS BLD VENIPUNCTURE: CPT

## 2019-01-28 PROCEDURE — 84443 ASSAY THYROID STIM HORMONE: CPT

## 2019-01-28 PROCEDURE — 84439 ASSAY OF FREE THYROXINE: CPT

## 2019-01-30 ENCOUNTER — TELEPHONE (OUTPATIENT)
Dept: PAIN MANAGEMENT | Age: 54
End: 2019-01-30

## 2019-02-06 DIAGNOSIS — M54.10 BACK PAIN WITH RIGHT-SIDED RADICULOPATHY: ICD-10-CM

## 2019-02-06 RX ORDER — BACLOFEN 10 MG/1
TABLET ORAL
Qty: 270 TABLET | Refills: 0 | Status: SHIPPED | OUTPATIENT
Start: 2019-02-06 | End: 2019-04-26 | Stop reason: SDUPTHER

## 2019-02-15 ENCOUNTER — TELEPHONE (OUTPATIENT)
Dept: FAMILY MEDICINE CLINIC | Age: 54
End: 2019-02-15

## 2019-03-08 DIAGNOSIS — B37.0 ORAL CANDIDIASIS: ICD-10-CM

## 2019-03-08 DIAGNOSIS — B37.0 THRUSH: ICD-10-CM

## 2019-04-02 ENCOUNTER — OFFICE VISIT (OUTPATIENT)
Dept: OTOLARYNGOLOGY | Age: 54
End: 2019-04-02
Payer: MEDICARE

## 2019-04-02 VITALS
HEIGHT: 71 IN | WEIGHT: 244 LBS | BODY MASS INDEX: 34.16 KG/M2 | HEART RATE: 70 BPM | DIASTOLIC BLOOD PRESSURE: 76 MMHG | SYSTOLIC BLOOD PRESSURE: 122 MMHG

## 2019-04-02 DIAGNOSIS — H61.23 BILATERAL IMPACTED CERUMEN: ICD-10-CM

## 2019-04-02 DIAGNOSIS — H91.93 HIGH FREQUENCY HEARING LOSS OF BOTH EARS: Primary | ICD-10-CM

## 2019-04-02 DIAGNOSIS — H69.83 ETD (EUSTACHIAN TUBE DYSFUNCTION), BILATERAL: ICD-10-CM

## 2019-04-02 PROCEDURE — 3017F COLORECTAL CA SCREEN DOC REV: CPT | Performed by: OTOLARYNGOLOGY

## 2019-04-02 PROCEDURE — G8427 DOCREV CUR MEDS BY ELIG CLIN: HCPCS | Performed by: OTOLARYNGOLOGY

## 2019-04-02 PROCEDURE — 1036F TOBACCO NON-USER: CPT | Performed by: OTOLARYNGOLOGY

## 2019-04-02 PROCEDURE — 99213 OFFICE O/P EST LOW 20 MIN: CPT | Performed by: OTOLARYNGOLOGY

## 2019-04-02 PROCEDURE — G8417 CALC BMI ABV UP PARAM F/U: HCPCS | Performed by: OTOLARYNGOLOGY

## 2019-04-02 NOTE — PROGRESS NOTES
Rahat Soto  4/2/19    Chief Complaint   Patient presents with    6 Month Follow-Up     ear cleaning       HPI:  co AU blocked x 3 wks, no pain, hx of cerumen    Past Med Hx:     Past Medical History:   Diagnosis Date    Bipolar disorder ([facility]     follows with Dr. Latanya Millan Centrilobular emphysema ([facility] [facility]     HIV (human immunodeficiency virus infection) ([facility] [address] 2001    followed by Sly Jo (Infectious Disease at [address] [place] [address])    Pneumonia 06/11/2017        Past Surgical History:   Procedure Laterality Date    ANESTHESIA NERVE BLOCK Bilateral 1/12/2018    Bilat L2 L3 L4 L5 Diagnostic Medial BB performed by Dede Villagomez MD at [address] [place] [address]  01/05/2016    Normal colon. Hemorrhoids.  Dr Gabino Ventura  2015    full dental extraction    LUMBAR SPINE SURGERY Right 9/26/2017    Right L4 & L5 Transforaminal  performed by Dede Villagomez MD at [address] [place] [address] Dr Cifuentes 10/3/2017    Right L4 & L5 Transforaminal performed by Dede Villagomez MD at [address] [place] [address] Dr Cifuentes 1/8/2019    Right L4 L5 TRANSFORAMINAL performed by Dede Villagomez MD at [place] [address], LUMBAR PLEXUS Bilateral 1/19/2018    Bilat L2 L3 L4 L5 Confirmatory Medial BB performed by Dede Villagomez MD at [address] [place] [address] 1, W IMAGE GUIDE,EA ADDL LEVEL Right 1/25/2018    Right L2 L3 L4 L5 RADIOFREQUENCY performed by Dede Villagomez MD at [address] [place] [address] 1, W IMAGE GUIDE,EA ADDL LEVEL Left 3/15/2018    Left L2 L3 L4 L5 RADIOFREQUENCY performed by Ddee Villagomez MD at [facility] OR       Family History:     Family History   Problem Relation Age of Onset    Cancer Mother         uterine s/p hysterectomy    Cancer Maternal Grandmother        Social Hx:     Social History     Socioeconomic History    Marital status: Single     Spouse name: Not on file    Number of children: Not on file    Years of education: Not on file   Carlo Highest education level: Not on file   Occupational History    Not on file   Social Needs    Financial resource strain: Not on file    Food insecurity:     Worry: Not on file     Inability: Not on file    Transportation needs:     Medical: Not on file     Non-medical: Not on file   Tobacco Use    Smoking status: Former Smoker     Packs/day: 3.00     Years: 30.00     Pack years: 90.00     Last attempt to quit: 10/7/2014     Years since quittin.4    Smokeless tobacco: Never Used   Substance and Sexual Activity    Alcohol use: Yes     Comment: socially    Drug use: No    Sexual activity: Not on file   Lifestyle    Physical activity:     Days per week: Not on file     Minutes per session: Not on file    Stress: Not on file   Relationships    Social connections:     Talks on phone: Not on file     Gets together: Not on file     Attends Mu-ism service: Not on file     Active member of club or organization: Not on file     Attends meetings of clubs or organizations: Not on file     Relationship status: Not on file    Intimate partner violence:     Fear of current or ex partner: Not on file     Emotionally abused: Not on file     Physically abused: Not on file     Forced sexual activity: Not on file   Other Topics Concern    Not on file   Social History Narrative    Not on file       Current Medications:     Current Outpatient Medications:     Clotrimazole (MYCELEX) 10 MG LOZG lozenge, Take 1 lozenge by mouth 5 times daily, Disp: 70 lozenge, Rfl: 1    baclofen (LIORESAL) 10 MG tablet, TAKE 1 TABLET BY MOUTH THREE TIMES DAILY AS NEEDED FOR SPASMS, Disp: 270 tablet, Rfl: 0    cyclobenzaprine (FLEXERIL) 10 MG tablet, Take 1 tablet by mouth 3 times daily as needed for Muscle spasms, Disp: 90 tablet, Rfl: 0    fenofibric acid (FIBRICOR) 135 MG CPDR capsule, TAKE 1 CAPSULE BY MOUTH DAILY, Disp: 90 capsule, Rfl: 0    LORazepam (ATIVAN) 1 MG tablet, Take 1 mg by mouth every 6 hours as needed for Anxiety. , Disp: , Rfl:     BLACK ELDERBERRY,BERRY-FLOWER, PO, Take 1 tablet by mouth daily, Disp: , Rfl:     Darunavir-Cobicistat (PREZCOBIX) 800-150 MG TABS, Take by mouth nightly, Disp: , Rfl:     Nebulizers (COMPRESSOR/NEBULIZER) MISC, As directed, Disp: 1 each, Rfl: 0    Respiratory Therapy Supplies (NEBULIZER/TUBING/MOUTHPIECE) KIT, 1 kit by Does not apply route daily, Disp: 1 kit, Rfl: 0    albuterol (PROVENTIL) (2.5 MG/3ML) 0.083% nebulizer solution, Take 2.5 mg by nebulization every 6 hours as needed for Wheezing, Disp: , Rfl:     TURMERIC PO, Take by mouth three times daily, Disp: , Rfl:     Glucosamine HCl (GLUCOSAMINE PO), Take by mouth daily, Disp: , Rfl:     vitamin D 1000 UNITS CAPS, Take by mouth daily, Disp: , Rfl:     GARLIC PO, Take by mouth daily, Disp: , Rfl:     Multiple Vitamins-Minerals (MULTIVITAMIN PO), Take by mouth daily, Disp: , Rfl:     INVEGA 6 MG extended release tablet, Take 6 mg by mouth daily, Disp: , Rfl:     DESCOVY 200-25 MG TABS, Take 1 tablet by mouth daily, Disp: , Rfl:     MYTESI 125 MG TBEC, Take 125 mg by mouth, Disp: , Rfl:     gabapentin (NEURONTIN) 600 MG tablet, TAKE 1 TABLET BY MOUTH THREE TIMES DAILY, Disp: 90 tablet, Rfl: 0    fluticasone (FLONASE) 50 MCG/ACT nasal spray, 2 sprays by Nasal route daily, Disp: 3 Bottle, Rfl: 1    gabapentin (NEURONTIN) 600 MG tablet, TAKE 1 TABLET BY MOUTH THREE TIMES DAILY, Disp: 90 tablet, Rfl: 5    nystatin (MYCOSTATIN) 818446 UNIT/ML suspension, Take 5 mLs by mouth 4 times daily for 10 days, Disp: 180 mL, Rfl: 3     ROS:   CV:    Endocrine:    Resp:     GI:       Neuro: bi polar, HIV  PE:     General appearance:  Normal                 Ability to Communicate:  Normal       Head & Face:  Normal   Salivary Glands:  Normal              Facial Strength:  Normal   Ears:    Pinna:  Normal            EAC:  AS cerumen irrigated, AD unable to remove     TMs:  Normal       [place]ear[place]:  Florence Rinne     Finger Rub     Nose:    External: Normal    Septum:  Normal    Turbinates: Normal             Nasal Cavity: Normal         Naso Pharyngoscopy:     Nasal Endoscopy:      Oral Cavity & Oral Pharynx:    Tongue:  Normal    Teeth & Gums:             Palate:  Pattie     Tonsils:      FOM:  Normal     Other:      Neck:    Thyroid:Normal       Lymph nodes: Normal           Trachea:  Normal      Masses:  Normal    Other:        Eyes:    EOMs:      Nystagmus:      Neurological:    CN V:      CN VII:       Gait & Station:      Romberg:      Tandem Gait:      Halpikes:       Oriented x 3: Normal     Affect:  Normal    Data reviewed:      ASSESSMENT:   Diagnosis Orders   1. High frequency hearing loss of both ears     2. ETD (Eustachian tube dysfunction), bilateral     3. Bilateral impacted cerumen         PLAN: use debrox return if wax persists, otherwise see 6 mos  Return in about 6 months (around 10/2/2019). No orders of the defined types were placed in this encounter.         Madelyn Morales MD

## 2019-04-03 ENCOUNTER — OFFICE VISIT (OUTPATIENT)
Dept: PAIN MANAGEMENT | Age: 54
End: 2019-04-03
Payer: MEDICARE

## 2019-04-03 VITALS
SYSTOLIC BLOOD PRESSURE: 118 MMHG | RESPIRATION RATE: 16 BRPM | WEIGHT: 245 LBS | DIASTOLIC BLOOD PRESSURE: 88 MMHG | HEIGHT: 71 IN | BODY MASS INDEX: 34.3 KG/M2

## 2019-04-03 DIAGNOSIS — M54.16 LUMBAR RADICULAR PAIN: ICD-10-CM

## 2019-04-03 DIAGNOSIS — M48.061 NEURAL FORAMINAL STENOSIS OF LUMBAR SPINE: ICD-10-CM

## 2019-04-03 DIAGNOSIS — M47.816 LUMBAR FACET JOINT SYNDROME: Primary | ICD-10-CM

## 2019-04-03 DIAGNOSIS — M54.41 CHRONIC BILATERAL LOW BACK PAIN WITH RIGHT-SIDED SCIATICA: ICD-10-CM

## 2019-04-03 DIAGNOSIS — G89.29 CHRONIC BILATERAL LOW BACK PAIN WITH RIGHT-SIDED SCIATICA: ICD-10-CM

## 2019-04-03 PROCEDURE — G8417 CALC BMI ABV UP PARAM F/U: HCPCS | Performed by: NURSE PRACTITIONER

## 2019-04-03 PROCEDURE — 1036F TOBACCO NON-USER: CPT | Performed by: NURSE PRACTITIONER

## 2019-04-03 PROCEDURE — G8427 DOCREV CUR MEDS BY ELIG CLIN: HCPCS | Performed by: NURSE PRACTITIONER

## 2019-04-03 PROCEDURE — 99214 OFFICE O/P EST MOD 30 MIN: CPT | Performed by: NURSE PRACTITIONER

## 2019-04-03 PROCEDURE — 3017F COLORECTAL CA SCREEN DOC REV: CPT | Performed by: NURSE PRACTITIONER

## 2019-04-03 RX ORDER — OMEPRAZOLE 20 MG/1
1 CAPSULE, DELAYED RELEASE ORAL DAILY
Refills: 4 | COMMUNITY
Start: 2019-03-20 | End: 2020-01-13 | Stop reason: DRUGHIGH

## 2019-04-03 RX ORDER — ACETAMINOPHEN, ASPIRIN AND CAFFEINE 250; 250; 65 MG/1; MG/1; MG/1
1 TABLET, FILM COATED ORAL EVERY 6 HOURS PRN
COMMUNITY

## 2019-04-03 RX ORDER — DARUNAVIR, COBICISTAT, EMTRICITABINE, AND TENOFOVIR ALAFENAMIDE 800; 150; 200; 10 MG/1; MG/1; MG/1; MG/1
TABLET, FILM COATED ORAL
Refills: 6 | COMMUNITY
Start: 2019-03-27

## 2019-04-03 ASSESSMENT — ENCOUNTER SYMPTOMS
RESPIRATORY NEGATIVE: 1
DIARRHEA: 0
EYES NEGATIVE: 1
CONSTIPATION: 0
BACK PAIN: 1

## 2019-04-03 NOTE — PROGRESS NOTES
Subjective:      Patient ID: Al Mcdaniel is a 48 y.o. male. Chief Complaint   Patient presents with    Back Pain    Other     pt states that the \"surgery\" is making his counts for his other illnesses go down, so he is stuc on what to do    Leg Pain     right side, all the way down to the ankle    Other     physical therapy?  Migraine     seems to have been having increase in headaches     Back Pain     Other   Associated symptoms include arthralgias, fatigue and myalgias. Here today for routine pain clinic recheck.  See above CC    Pain Assessment  Location of Pain: Back  Location Modifiers: Right(right leg/ankle)  Severity of Pain: 6  Quality of Pain: Sharp, Aching, Throbbing(shooting pain down right leg)  Duration of Pain: Persistent  Frequency of Pain: Constant  Aggravating Factors: Bending, Stretching, Straightening, Exercise, Kneeling, Squatting, Standing, Walking, Stairs  Limiting Behavior: Yes  Relieving Factors: (nothing )  Result of Injury: No  Work-Related Injury: No  Are there other pain locations you wish to document?: No    Allergies   Allergen Reactions    Pcn [Penicillins] Rash    Sulfa Antibiotics Rash       Outpatient Medications Marked as Taking for the 4/3/19 encounter (Office Visit) with FELICE Edmond - CNP   Medication Sig Dispense Refill    omeprazole (PRILOSEC) 20 MG delayed release capsule Take 1 capsule by mouth daily  4    SYMTUZA [id number] MG TABS TK 1 T PO  QD  6    aspirin-acetaminophen-caffeine (EXCEDRIN MIGRAINE) 250-250-65 MG per tablet Take 1 tablet by mouth every 6 hours as needed for Headaches      Clotrimazole (MYCELEX) 10 MG LOZG lozenge Take 1 lozenge by mouth 5 times daily 70 lozenge 1    baclofen (LIORESAL) 10 MG tablet TAKE 1 TABLET BY MOUTH THREE TIMES DAILY AS NEEDED FOR SPASMS 270 tablet 0    cyclobenzaprine (FLEXERIL) 10 MG tablet Take 1 tablet by mouth 3 times daily as needed for Muscle spasms 90 tablet 0    fenofibric acid (FIBRICOR) 135 MG CPDR capsule TAKE 1 CAPSULE BY MOUTH DAILY 90 capsule 0    LORazepam (ATIVAN) 1 MG tablet Take 1 mg by mouth every 6 hours as needed for Anxiety.  BLACK ELDERBERRY,BERRY-FLOWER, PO Take 1 tablet by mouth daily      Darunavir-Cobicistat (PREZCOBIX) 800-150 MG TABS Take by mouth nightly      Nebulizers (COMPRESSOR/NEBULIZER) MISC As directed 1 each 0    Respiratory Therapy Supplies (NEBULIZER/TUBING/MOUTHPIECE) KIT 1 kit by Does not apply route daily 1 kit 0    albuterol (PROVENTIL) (2.5 MG/3ML) 0.083% nebulizer solution Take 2.5 mg by nebulization every 6 hours as needed for Wheezing      TURMERIC PO Take by mouth three times daily      Glucosamine HCl (GLUCOSAMINE PO) Take by mouth daily      vitamin D 1000 UNITS CAPS Take by mouth daily      GARLIC PO Take by mouth daily      Multiple Vitamins-Minerals (MULTIVITAMIN PO) Take by mouth daily      INVEGA 6 MG extended release tablet Take 6 mg by mouth daily      DESCOVY 200-25 MG TABS Take 1 tablet by mouth daily      MYTESI 125 MG TBEC Take 125 mg by mouth         Past Medical History:   Diagnosis Date    Bipolar disorder ([facility] [address]     follows with Dr. Aby Kim    Centrilobular emphysema ([facility] [address]     HIV (human immunodeficiency virus infection) ([facility] [address] 2001    followed by Carmela Mccann (Infectious Disease at [address] [place] [address])    Pneumonia 06/11/2017       Past Surgical History:   Procedure Laterality Date    ANESTHESIA NERVE BLOCK Bilateral 1/12/2018    Bilat L2 L3 L4 L5 Diagnostic Medial BB performed by Iban Brush MD at [address] [place] [address]  01/05/2016    Normal colon. Hemorrhoids.  Dr Rayo Harper  2015    full dental extraction    LUMBAR SPINE SURGERY Right 9/26/2017    Right L4 & L5 Transforaminal  performed by Iban Brush MD at [address] [place] [address] Right 10/3/2017    Right L4 & L5 Transforaminal performed by Iban Brush MD at [address] [place] [address] Right 1/8/2019    Right L4 L5 partner: Not on file     Emotionally abused: Not on file     Physically abused: Not on file     Forced sexual activity: Not on file   Other Topics Concern    Not on file   Social History Narrative    Not on file       Review of Systems   Constitutional: Positive for activity change and fatigue. HENT: Negative. Eyes: Negative. Respiratory: Negative. Cardiovascular: Negative. Gastrointestinal: Negative for constipation and diarrhea. Endocrine: Negative. Genitourinary: Negative for difficulty urinating. Musculoskeletal: Positive for arthralgias, back pain and myalgias. Skin: Negative. Psychiatric/Behavioral: Positive for sleep disturbance. Objective:   Physical Exam   Constitutional: He is oriented to person, place, and time. He appears well-developed and well-nourished. He is active and cooperative. Non-toxic appearance. He does not have a sickly appearance. He does not appear ill. No distress. HENT:   Head: Normocephalic and atraumatic. Cardiovascular: Normal rate. Pulmonary/Chest: Effort normal. No accessory muscle usage. No apnea. No respiratory distress. Musculoskeletal:        Lumbar back: He exhibits pain. MRI OF THE LUMBAR SPINE WITHOUT CONTRAST, 6/11/2018 10:22 am     TECHNIQUE:  Multiplanar multisequence MRI of the lumbar spine was performed without the  administration of intravenous contrast.     COMPARISON:  None     HISTORY:  ORDERING SYSTEM PROVIDED HISTORY: Lumbar facet joint syndrome  TECHNOLOGIST PROVIDED HISTORY:  Reason for exam:->right lumbar radiculopathy  Ordering Physician Provided Reason for Exam:  Low back pain to the right  side, right leg pain. Symptoms for about one week. Acuity: Acute  Type of Exam: Initial  Additional signs and symptoms:  Lumbar facet syndrome; Right lumbar  radiculopathy     Initial evaluation.     FINDINGS:  BONES/ALIGNMENT: There is normal alignment of the spine. The vertebral body  heights are maintained.  The bone marrow signal appears unremarkable.  There  is degenerative disc disease with disc space narrowing and osteophytes at  L3-4, L4-5, and L5-S1.     SPINAL CORD: The conus terminates normally.     SOFT TISSUES: No paraspinal mass identified. Mary Ann Deras is a cyst in the left  kidney that is not fully evaluated.  Ultrasound could better evaluate.     L1-L2:  The thecal sac and neural foramina are intact.     L2-L3:  There is mild facet and ligamentum flavum hypertrophy.  The thecal  sac is not stenotic.  Disc and/or osteophytes cause minimal narrowing of the  neural foramina bilaterally.     L3-L4: The thecal sac is not stenotic.   Disc and/or osteophytes result in  mild narrowing of the neural foramina bilaterally.   There is mild facet and  ligamentum flavum hypertrophy.     L4-L5:  There is moderate facet and ligamentum flavum hypertrophy.  The  thecal sac is not stenotic.  Disc and/or osteophytes result in moderate  narrowing of the neural foramina bilaterally. The left neural foramen is  narrowed greater than right.     L5-S1:  There is mild facet and ligamentum flavum hypertrophy.  The thecal  sac is not stenotic.  The S1 nerve roots are intact. Disc and/or osteophytes  result in moderate narrowing of the neural foramina bilaterally. The right  neural foramen is narrowed greater than the left.     There is no significant change in the findings from the prior study.        Impression  Disc and osteophytes result in narrowing of the neural foramina at several  levels as discussed above.  No significant stenosis of thecal sac in the  lumbar region. Neurological: He is alert and oriented to person, place, and time. No cranial nerve deficit. GCS eye subscore is 4. GCS verbal subscore is 5. GCS motor subscore is 6. Skin: Skin is warm, dry and intact. Capillary refill takes less than 2 seconds. He is not diaphoretic. No cyanosis. No pallor. Nails show no clubbing. Psychiatric: He has a normal mood and affect.  His behavior is normal. Judgment normal. His affect is not angry. His speech is not slurred. He is not agitated, not aggressive and not withdrawn. He does not express impulsivity or inappropriate judgment. He does not exhibit a depressed mood. He is communicative. Vitals reviewed. Assessment:      1. Lumbar facet joint syndrome    2. Lumbar radicular pain    3. Neural foraminal stenosis of lumbar spine    4. Chronic bilateral low back pain with right-sided sciatica          Plan:    No refills needed today, interested in decreasing oral medications. PT eval and treat for additional pain control has he is now hesitant to do epidurals.  Follow up 2 months    Nikki Fletcher, FELICE - CNP

## 2019-04-08 ENCOUNTER — HOSPITAL ENCOUNTER (OUTPATIENT)
Dept: PHYSICAL THERAPY | Age: 54
Setting detail: THERAPIES SERIES
Discharge: HOME | End: 2019-04-08
Payer: MEDICARE

## 2019-04-11 ENCOUNTER — APPOINTMENT (OUTPATIENT)
Dept: PHYSICAL THERAPY | Age: 54
End: 2019-04-11
Payer: MEDICARE

## 2019-04-15 ENCOUNTER — HOSPITAL ENCOUNTER (OUTPATIENT)
Dept: PHYSICAL THERAPY | Age: 54
Setting detail: THERAPIES SERIES
Discharge: HOME | End: 2019-04-15
Payer: MEDICARE

## 2019-04-15 PROCEDURE — 97110 THERAPEUTIC EXERCISES: CPT | Performed by: PHYSICAL THERAPIST

## 2019-04-15 PROCEDURE — 97161 PT EVAL LOW COMPLEX 20 MIN: CPT | Performed by: PHYSICAL THERAPIST

## 2019-04-15 ASSESSMENT — PAIN SCALES - GENERAL: PAINLEVEL_OUTOF10: 6

## 2019-04-15 ASSESSMENT — PAIN DESCRIPTION - PROGRESSION: CLINICAL_PROGRESSION: GRADUALLY WORSENING

## 2019-04-15 ASSESSMENT — PAIN DESCRIPTION - LOCATION: LOCATION: BACK;BUTTOCKS;LEG

## 2019-04-15 ASSESSMENT — PAIN DESCRIPTION - DESCRIPTORS: DESCRIPTORS: ACHING;PINS AND NEEDLES;SHARP

## 2019-04-15 ASSESSMENT — PAIN DESCRIPTION - FREQUENCY: FREQUENCY: INTERMITTENT

## 2019-04-15 ASSESSMENT — PAIN DESCRIPTION - ONSET: ONSET: ON-GOING

## 2019-04-15 ASSESSMENT — PAIN - FUNCTIONAL ASSESSMENT: PAIN_FUNCTIONAL_ASSESSMENT: PREVENTS OR INTERFERES SOME ACTIVE ACTIVITIES AND ADLS

## 2019-04-15 ASSESSMENT — PAIN DESCRIPTION - PAIN TYPE: TYPE: CHRONIC PAIN

## 2019-04-15 ASSESSMENT — PAIN DESCRIPTION - ORIENTATION: ORIENTATION: RIGHT

## 2019-04-15 NOTE — FLOWSHEET NOTE
Physical Therapy Daily Treatment Note    Date:  4/15/2019    Patient Name:  Gina Billingsley    :  1965  MRN: [id number]  Restrictions/Precautions:     Medical/Treatment Diagnosis Information:   · Diagnosis: M54.41, G89.29 chronic LBP, R sciatica  · Treatment Diagnosis: M54.41 LBP, with R sciatica  Insurance/Certification information:  PT Insurance Information: [facility]  Physician Information:  Referring Practitioner: Severo Hanly CNP  Plan of care signed (Y/N):  n  Visit# / total visits: 1 /10  Pain level: 6/10     Functional Assessment Tool Used: Oswestry LBP Scale  Score: 3150 = 62% disability    Time In:9:50   Time Out:10:35    Progress Note: [x]  Yes  []  No  Next due by: Visit #10  , or 19    Subjective:   See eval    Objective: See eval  Observation:   Test measurements:      Exercises: there ex for ROM, and reduction of post derangement  Exercise/Equipment Resistance/Repetitions Other comments   Lay prone in R side glide 5'    Prone on elbows 5'    Press up 10x x3    B PKF 10x x2    Modified extension 10x x2 At counter   Back bend 10x         Lumbar roll 3'    R sciatic n floss           TM retro                    [x] Provided verbal/tactile cueing for activities related to strengthening, flexibility, endurance, ROM. (45062)  [] Provided verbal/tactile cueing for activities related to improving balance, coordination, kinesthetic sense, posture, motor skill, proprioception. (19607)    Therapeutic Activities:     [] Therapeutic activities, direct (one-on-one) patient contact (use of dynamic activities to improve functional performance). (88897)    Gait:   [] Provided training and instruction to the patient for ambulation re-education. (76426)    Self-Care/ADL's  [] Self-care/home management training and compensatory training, meal preparation, safety procedures, and instructions in use of assistive technology devices/adaptive equipment, direct one-on-one contact.  (30575)    Home Exercise Program:   Lumbar extension progression for post derangement  [x] Reviewed/Progressed HEP activities related to strengthening, flexibility, endurance, ROM. (06417)  [] Reviewed/Progressed HEP activities related to improving balance, coordination, kinesthetic sense, posture, motor skill, proprioception.  (64279)    Manual Treatments:    [] Provided manual therapy to mobilize soft tissue/joints for the purpose of modulating pain, promoting relaxation,  increasing ROM, reducing/eliminating soft tissue swelling/inflammation/restriction, improving soft tissue extensibility. (63737)    Service Based Modalities:      Timed Code Treatment Minutes:    There ex /HEP 15'    Total Treatment Minutes:   15'    Treatment/Activity Tolerance:  [x] Patient tolerated treatment well [] Patient limited by fatique  [] Patient limited by pain  [] Patient limited by other medical complications  [] Other:     Prognosis: [x] Good [] Fair  [] Poor    Patient Requires Follow-up: [x] Yes  [] No      Goals:  Short term goals  Time Frame for Short term goals: 1 week  Short term goal 1: Start HEP    Long term goals  Time Frame for Long term goals : 4 weeks  Long term goal 1: Pain 2/10 to allow regular ADL  Long term goal 2: Resolve R sciatica pain pattern  Long term goal 3: Tolerate sitting 60 min for required driving needs  Long term goal 4: Able to stand 45-60 min for kitchen duty          Plan:   [] Continue per plan of care [] Alter current plan (see comments)  [x] Plan of care initiated [] Hold pending MD visit [] Discharge  Plan for Next Session:   [facility]    Electronically signed by:  Rodriguez Albrecht

## 2019-04-15 NOTE — PROGRESS NOTES
EIS, DANILO produce , no worse. NICHOLAS, RFIL increase, worse. EIL, REIL decrease, better, and increased ROM. R SGIL decrease, better, centralized. Joint Mobility  Spine: Lumbar post derangement    Strength RLE  Comment: weak plantarfelxors, 4/5  Strength LLE  Strength LLE: WNL     Additional Measures  Special Tests: R Slump + with pain to the knee  Other: R SLR + 40 deg to the knee        Assessment   Conditions Requiring Skilled Therapeutic Intervention  Body structures, Functions, Activity limitations: Decreased ROM; Decreased strength  Assessment: lumbar post derangement  Treatment Diagnosis: M54.41 LBP, with R sciatica  Prognosis: Good  Decision Making: Low Complexity  REQUIRES PT FOLLOW UP: Yes  Activity Tolerance  Activity Tolerance: Patient Tolerated treatment well         Plan   Plan  Times per week: 2-3  Current Treatment Recommendations: Strengthening, ROM, Manual Therapy - Joint Manipulation, Patient/Caregiver Education & Training, Home Exercise Program, Modalities    G-Code  PT G-Codes  Functional Assessment Tool Used: Oswestry LBP Scale  Score: 31/50 = 62% disability    Goals  Short term goals  Time Frame for Short term goals: 1 week  Short term goal 1: Start HEP  Long term goals  Time Frame for Long term goals : 4 weeks  Long term goal 1: Pain 2/10 to allow regular ADL  Long term goal 2: Resolve R sciatica pain pattern  Long term goal 3: Tolerate sitting 60 min for required driving needs  Long term goal 4: Able to stand 45-60 min for kitchen duty       Therapy Time   Individual Concurrent Group Co-treatment   Time In [facility] 30         Time Out 1035         Minutes 45                 Ivory Alvarenga [place]

## 2019-04-15 NOTE — PLAN OF CARE
[facility]    [x] [place]  Phone: [phone]  Fax: [phone]      [] [place]  Phone: [phone]  Fax: [phone]        To: Referring Practitioner: Lina Aaron CNP      Patient: Leda Martínez  : 1965   MRN: [id number]  Evaluation Date: 4/15/2019      Diagnosis Information:  · Diagnosis: M54.41, G89.29 chronic LBP, R sciatica   · Treatment Diagnosis: M54.41 LBP, with R sciatica     Physical Therapy Certification Form  Dear Lina Aaron CNP  The following patient has been evaluated for physical therapy services and for therapy to continue, Medicare requires monthly physician review of the treatment plan. Please review the attached evaluation and/or summary of the patient's plan of care, and verify that you agree therapy should continue by signing the attached document and sending it back to our office. Plan of Care/Treatment to date:  [x] Therapeutic Exercise    [] Modalities:  [] Therapeutic Activity     [] Ultrasound  [x] Electrical Stimulation  [] Gait Training      [] Cervical Traction [] Lumbar Traction  [] Neuromuscular Re-education    [] Cold/hotpack [] Iontophoresis   [x] Instruction in HEP     Other:  [x] Manual Therapy      []             [] Aquatic Therapy      []           ? Goals:  Short term goals  Time Frame for Short term goals: 1 week  Short term goal 1: Start HEP    Long term goals  Time Frame for Long term goals : 4 weeks  Long term goal 1: Pain 2/10 to allow regular ADL  Long term goal 2: Resolve R sciatica pain pattern  Long term goal 3: Tolerate sitting 60 min for required driving needs  Long term goal 4: Able to stand 45-60 min for kitchen duty    Frequency/Duration:4/15/19 - 19  # Days per week: [] 1 day # Weeks: [] 1 week [] 5 weeks     [] 2 days?    [] 2 weeks [] 6 weeks     [x] 3 days   [] 3 weeks [] 7 weeks     [] 4 days   [] 4 weeks [] 8 weeks    Rehab Potential: [] Excellent [x] Good [] Fair  [] Poor     Electronically signed by:  Fritz Wells PT       If you have any questions or concerns, please don't hesitate to call.   Thank you for your referral.      Physician Signature:________________________________Date:__________________  By signing above, therapists plan is approved by physician

## 2019-04-16 ENCOUNTER — HOSPITAL ENCOUNTER (OUTPATIENT)
Dept: PHYSICAL THERAPY | Age: 54
Setting detail: THERAPIES SERIES
Discharge: HOME | End: 2019-04-16
Payer: MEDICARE

## 2019-04-16 PROCEDURE — 97110 THERAPEUTIC EXERCISES: CPT | Performed by: PHYSICAL THERAPIST

## 2019-04-16 PROCEDURE — G0283 ELEC STIM OTHER THAN WOUND: HCPCS | Performed by: PHYSICAL THERAPIST

## 2019-04-16 NOTE — FLOWSHEET NOTE
assistive technology devices/adaptive equipment, direct one-on-one contact. (04978)    Home Exercise Program:   Lumbar extension progression for post derangement  [x] Reviewed/Progressed HEP activities related to strengthening, flexibility, endurance, ROM. (52910)  [] Reviewed/Progressed HEP activities related to improving balance, coordination, kinesthetic sense, posture, motor skill, proprioception.  (09927)    Manual Treatments:    [] Provided manual therapy to mobilize soft tissue/joints for the purpose of modulating pain, promoting relaxation,  increasing ROM, reducing/eliminating soft tissue swelling/inflammation/restriction, improving soft tissue extensibility. (35718)    Service Based Modalities:  IFC 15' for pain control    Timed Code Treatment Minutes:    There ex 30'    Total Treatment Minutes:   39'    Treatment/Activity Tolerance:  [x] Patient tolerated treatment well [] Patient limited by fatique  [] Patient limited by pain  [] Patient limited by other medical complications  [] Other:     Prognosis: [x] Good [] Fair  [] Poor    Patient Requires Follow-up: [x] Yes  [] No      Goals:  Short term goals  Time Frame for Short term goals: 1 week  Short term goal 1: Start HEP    Long term goals  Time Frame for Long term goals : 4 weeks  Long term goal 1: Pain 2/10 to allow regular ADL  Long term goal 2: Resolve R sciatica pain pattern  Long term goal 3: Tolerate sitting 60 min for required driving needs  Long term goal 4: Able to stand 45-60 min for kitchen duty          Plan:   [x] Continue per plan of care [] Alter current plan (see comments)  [] Plan of care initiated [] Hold pending MD visit [] Discharge  Plan for Next Session:   VIA [facility]    Electronically signed by:  Christos George

## 2019-04-19 ENCOUNTER — HOSPITAL ENCOUNTER (OUTPATIENT)
Dept: PHYSICAL THERAPY | Age: 54
Setting detail: THERAPIES SERIES
Discharge: HOME | End: 2019-04-19
Payer: MEDICARE

## 2019-04-19 NOTE — PROGRESS NOTES
Physical Therapy  Outpatient Physical Therapy    [x] [place]  Phone: [phone]  Fax: [phone]      [] [place]  Phone: [phone]  Fax: [phone]    Physical Therapy  Cancellation/No-show Note  Patient Name:  Sowmya Botello  :  1965   Date:  2019  Cancelled visits to date: 2  No-shows to date: 0    For today's appointment patient:  [x]  Cancelled  []  Rescheduled appointment  []  No-show     Reason given by patient:  []  Patient ill  [x]  Conflicting appointment  []  No transportation    []  Conflict with work  []  No reason given  []  Other:     Comments:      Electronically signed by: Chet Avelar PTA

## 2019-04-22 ENCOUNTER — HOSPITAL ENCOUNTER (OUTPATIENT)
Dept: PHYSICAL THERAPY | Age: 54
Setting detail: THERAPIES SERIES
Discharge: HOME | End: 2019-04-22
Payer: MEDICARE

## 2019-04-22 PROCEDURE — 97110 THERAPEUTIC EXERCISES: CPT

## 2019-04-22 PROCEDURE — G0283 ELEC STIM OTHER THAN WOUND: HCPCS

## 2019-04-22 NOTE — FLOWSHEET NOTE
Physical Therapy Daily Treatment Note    Date:  2019    Patient Name:  Rahat Soto    :  1965  MRN: [id number]  Restrictions/Precautions:     Medical/Treatment Diagnosis Information:   · Diagnosis: M54.41, G89.29 chronic LBP, R sciatica  · Treatment Diagnosis: M54.41 LBP, with R sciatica  Insurance/Certification information:  PT Insurance Information: [facility]  Physician Information:  Referring Practitioner: Deep Lawson CNP  Plan of care signed (Y/N):  y  Visit# / total visits: 3/10  Pain level: 5/10     Functional Assessment Tool Used: Oswestry LBP Scale  Score: 31/50 = 62% disability    Time In: 9:02   Time Out: 9:50    Progress Note: []  Yes  [x]  No  Next due by: Visit #10  , or 19    Subjective: Pt rpts to clinic with moderate complaints of LBP with radicular symptoms on R side stating \"Yesterday the entire leg was lit up, but this morning everything felt a little better\". Objective: Pt tolerated todays session well indicating decreased complaints of pain and radicular symptoms post session. Pt was able to initiate LTR, and PPT this date requiring vc to ensure proper performance. Most challenged by prone press ups this date. Instructed to perform standing back bends and modified extension at counter while working outside as pt was concerned about yard work. Continues to tolerate IFC application well. Observation: Rpts with upright posture, slow rise from chair.    Test measurements:    No R LE pain during or after extension directional preference ex program    Exercises: there ex for ROM, and reduction of post derangement  Exercise/Equipment Resistance/Repetitions Other comments   Lay prone in R side glide 5'    Prone on elbows 3'    Press up 10x x3    B PKF 10x x3    Prone hip extension x10 ea    Prone hip IR/ER x20    LTR 10x3\"    PPT 15x3\"    Modified extension 10x x2 At counter   Back bend 10x, x2    Standing hip ext,abd xx10 ea    Lumbar roll     R sciatic n floss      P/A mob 2'    TM retro                    [x] Provided verbal/tactile cueing for activities related to strengthening, flexibility, endurance, ROM. (21632)  [] Provided verbal/tactile cueing for activities related to improving balance, coordination, kinesthetic sense, posture, motor skill, proprioception. (21525)    Therapeutic Activities:     [] Therapeutic activities, direct (one-on-one) patient contact (use of dynamic activities to improve functional performance). (33363)    Gait:   [] Provided training and instruction to the patient for ambulation re-education. (49806)    Self-Care/ADL's  [] Self-care/home management training and compensatory training, meal preparation, safety procedures, and instructions in use of assistive technology devices/adaptive equipment, direct one-on-one contact. (68453)    Home Exercise Program:   Lumbar extension progression for post derangement  [x] Reviewed/Progressed HEP activities related to strengthening, flexibility, endurance, ROM. (37089)  [] Reviewed/Progressed HEP activities related to improving balance, coordination, kinesthetic sense, posture, motor skill, proprioception.  (95916)    Manual Treatments:    [] Provided manual therapy to mobilize soft tissue/joints for the purpose of modulating pain, promoting relaxation,  increasing ROM, reducing/eliminating soft tissue swelling/inflammation/restriction, improving soft tissue extensibility. (60394)    Service Based Modalities:  IFC 15' for pain control to lumbar region    Timed Code Treatment Minutes:    There ex 33'    Total Treatment Minutes:   50'    Treatment/Activity Tolerance:  [x] Patient tolerated treatment well [] Patient limited by fatique  [] Patient limited by pain  [] Patient limited by other medical complications  [] Other:     Prognosis: [x] Good [] Fair  [] Poor    Patient Requires Follow-up: [x] Yes  [] No      Goals:  Short term goals  Time Frame for Short term goals: 1 week  Short term goal 1: Start HEP    Long term goals  Time Frame for Long term goals : 4 weeks  Long term goal 1: Pain 2/10 to allow regular ADL  Long term goal 2: Resolve R sciatica pain pattern  Long term goal 3: Tolerate sitting 60 min for required driving needs  Long term goal 4: Able to stand 45-60 min for kitchen duty          Plan:   [x] Continue per plan of care [] Alter current plan (see comments)  [] Plan of care initiated [] Hold pending MD visit [] Discharge    Plan for Next Session:   Progress to tolerance    Electronically signed by:  Denny Sandoval PTA

## 2019-04-24 ENCOUNTER — HOSPITAL ENCOUNTER (OUTPATIENT)
Dept: PHYSICAL THERAPY | Age: 54
Setting detail: THERAPIES SERIES
Discharge: HOME | End: 2019-04-24
Payer: MEDICARE

## 2019-04-24 PROCEDURE — G0283 ELEC STIM OTHER THAN WOUND: HCPCS

## 2019-04-24 PROCEDURE — 97110 THERAPEUTIC EXERCISES: CPT

## 2019-04-24 NOTE — PROGRESS NOTES
I have reviewed and agree to the content of the note written by the PTA.   Electronically signed by Heike Lemos PT [id number]

## 2019-04-24 NOTE — FLOWSHEET NOTE
Physical Therapy Daily Treatment Note    Date:  2019    Patient Name:  Eloy Sauer    :  1965  MRN: [id number]  Restrictions/Precautions:     Medical/Treatment Diagnosis Information:   · Diagnosis: M54.41, G89.29 chronic LBP, R sciatica  · Treatment Diagnosis: M54.41 LBP, with R sciatica  Insurance/Certification information:  PT Insurance Information: [facility]  Physician Information:  Referring Practitioner: Genesis Goldman CNP  Plan of care signed (Y/N):  y  Visit# / total visits: 4/10  Pain level: 7/10     Functional Assessment Tool Used: Oswestry LBP Scale  Score: 31/50 = 62% disability    Time In: 8:56   Time Out: 9:50    Progress Note: []  Yes  [x]  No  Next due by: Visit #10  , or 19    Subjective: Pt rpts to clinic with moderate complaints of LBP stating \"the weather and the fact that I did yard work yesterday didn't help\". Objective: Pt tolerated todays session well indicating decreased complaints of pain post session. Pt was able to perform all ERIK per flow chart to reduce back pain with vc required for proper performance. Responds well to IFC to reduce pain. Most challenged by standing back bends. Observation: Rpts with upright posture, slow rise from chair.     Test measurements:    No R LE pain during or after extension directional preference ex program    Exercises: there ex for ROM, and reduction of post derangement  Exercise/Equipment Resistance/Repetitions Other comments   Lay prone in R side glide 5'    Prone on elbows 3'    Press up     B PKF 10x x3    Prone hip extension x15 ea    Prone hip IR/ER x20    LTR 10x3\"    PPT 15x3\"    Modified extension 10x x2 At counter   Back bend 10x, x2    Standing hip ext,abd xx10 ea    Lumbar roll     R sciatic n floss      P/A mob 2'    TM retro     bridges x10              [x] Provided verbal/tactile cueing for activities related to strengthening, flexibility, endurance, ROM. (87141)  [] Provided verbal/tactile cueing for activities related to improving balance, coordination, kinesthetic sense, posture, motor skill, proprioception. (66253)    Therapeutic Activities:     [] Therapeutic activities, direct (one-on-one) patient contact (use of dynamic activities to improve functional performance). (88663)    Gait:   [] Provided training and instruction to the patient for ambulation re-education. (91998)    Self-Care/ADL's  [] Self-care/home management training and compensatory training, meal preparation, safety procedures, and instructions in use of assistive technology devices/adaptive equipment, direct one-on-one contact. (56731)    Home Exercise Program:   Lumbar extension progression for post derangement  [x] Reviewed/Progressed HEP activities related to strengthening, flexibility, endurance, ROM. (04128)  [] Reviewed/Progressed HEP activities related to improving balance, coordination, kinesthetic sense, posture, motor skill, proprioception.  (49145)    Manual Treatments:    [] Provided manual therapy to mobilize soft tissue/joints for the purpose of modulating pain, promoting relaxation,  increasing ROM, reducing/eliminating soft tissue swelling/inflammation/restriction, improving soft tissue extensibility. (12169)    Service Based Modalities:  IFC 15' for pain control to lumbar region    Timed Code Treatment Minutes:    There ex 39'    Total Treatment Minutes:   47'    Treatment/Activity Tolerance:  [x] Patient tolerated treatment well [] Patient limited by fatique  [] Patient limited by pain  [] Patient limited by other medical complications  [] Other:     Prognosis: [x] Good [] Fair  [] Poor    Patient Requires Follow-up: [x] Yes  [] No      Goals:  Short term goals  Time Frame for Short term goals: 1 week  Short term goal 1: Start HEP    Long term goals  Time Frame for Long term goals : 4 weeks  Long term goal 1: Pain 2/10 to allow regular ADL  Long term goal 2: Resolve R sciatica pain pattern  Long term goal 3: Tolerate sitting 60 min for required driving needs  Long term goal 4: Able to stand 45-60 min for kitchen duty          Plan:   [x] Continue per plan of care [] Parsons Cools current plan (see comments)  [] Plan of care initiated [] Hold pending MD visit [] Discharge    Plan for Next Session:   Progress to tolerance.     Electronically signed by:  Isac Orellana PTA

## 2019-04-25 NOTE — PROGRESS NOTES
I have reviewed and agree to the content of the note written by the PTA.   Electronically signed by Diamond Hogue PT [id number]

## 2019-04-26 ENCOUNTER — APPOINTMENT (OUTPATIENT)
Dept: PHYSICAL THERAPY | Age: 54
End: 2019-04-26
Payer: MEDICARE

## 2019-04-26 DIAGNOSIS — M54.10 BACK PAIN WITH RIGHT-SIDED RADICULOPATHY: ICD-10-CM

## 2019-04-26 RX ORDER — BACLOFEN 10 MG/1
TABLET ORAL
Qty: 270 TABLET | Refills: 0 | Status: SHIPPED | OUTPATIENT
Start: 2019-04-26 | End: 2021-10-15

## 2019-04-26 NOTE — TELEPHONE ENCOUNTER
Last Appt:  11/21/2018  Next Appt:   5/21/2019  Med verified in Epic  Few days early but pharmacy says trying to coordinate refills. do you want to do?

## 2019-04-30 ENCOUNTER — HOSPITAL ENCOUNTER (OUTPATIENT)
Dept: PHYSICAL THERAPY | Age: 54
Setting detail: THERAPIES SERIES
Discharge: HOME | End: 2019-04-30
Payer: MEDICARE

## 2019-04-30 PROCEDURE — 97110 THERAPEUTIC EXERCISES: CPT

## 2019-04-30 PROCEDURE — G0283 ELEC STIM OTHER THAN WOUND: HCPCS

## 2019-04-30 NOTE — FLOWSHEET NOTE
Physical Therapy Daily Treatment Note    Date:  2019    Patient Name:  Qian Lopez    :  1965  MRN: [id number]  Restrictions/Precautions:     Medical/Treatment Diagnosis Information:   · Diagnosis: M54.41, G89.29 chronic LBP, R sciatica  · Treatment Diagnosis: M54.41 LBP, with R sciatica  Insurance/Certification information:  PT Insurance Information: [facility]  Physician Information:  Referring Practitioner: Chris Wilson CNP  Plan of care signed (Y/N):  y  Visit# / total visits: 5/10  Pain level: 5-6/10     Functional Assessment Tool Used: Oswestry LBP Scale  Score: 31/50 = 62% disability    Time In: 9:00   Time Out: 9:54    Progress Note: []  Yes  [x]  No  Next due by: Visit #10  , or 19    Subjective: Pt rpts to clinic with moderate complaints of LBP stating \"Some of my symptoms are on the L side now which has never happened. I'm still getting some slight numbness in my R leg but the L side of my back hurts pretty bad now\". Objective: Pt tolerated todays session well noting decreased complaints of pain post session. Pt was able to perform all ERIK per flow chart to decrease LBP and radicular symptoms. Avoided side glide positioning and movement this date as pt was noting symptoms switching sides. Responds well to IFC application. Most challenged by bridges this date. Re-initiated retro TM and press ups with good tolerance noted. Observation: Rpts with upright posture, slow rise from chair.     Test measurements:    No R LE pain during or after extension directional preference ex program    Exercises: there ex for ROM, and reduction of post derangement  Exercise/Equipment Resistance/Repetitions Other comments   Lay prone  5'    Prone on elbows 3'    Press up x10    B PKF 15x2    Prone hip extension x15 ea    Prone hip IR/ER x20    LTR 10x3\"    PPT 15x3\"    Modified extension 10x x2 At counter   Back bend 10x, x2    Standing hip ext,abd xx10 ea    Lumbar roll     R sciatic n floss      P/A mob 2' (Met. 30APR)    Long term goals  Time Frame for Long term goals : 4 weeks  Long term goal 1: Pain 2/10 to allow regular ADL (Not met. 30APR)  Long term goal 2: Resolve R sciatica pain pattern (Not met. 30APR)  Long term goal 3: Tolerate sitting 60 min for required driving needs (Stated able to tolerate 45'. 30APR)  Long term goal 4: Able to stand 45-60 min for kitchen duty (Can accomplish with increased pain at 30 min. 30APR)          Plan:   [x] Continue per plan of care [] Alter current plan (see comments)  [] Plan of care initiated [] Hold pending MD visit [] Discharge    Plan for Next Session:   Progress to tolerance.     Electronically signed by:  Ulices Renee PTA

## 2019-05-01 NOTE — PROGRESS NOTES
I have reviewed and agree to the content of the note written by the PTA.   Electronically signed by Jorge Newton PT [id number]

## 2019-05-02 ENCOUNTER — HOSPITAL ENCOUNTER (OUTPATIENT)
Dept: PHYSICAL THERAPY | Age: 54
Setting detail: THERAPIES SERIES
Discharge: HOME | End: 2019-05-02
Payer: MEDICARE

## 2019-05-02 PROCEDURE — G0283 ELEC STIM OTHER THAN WOUND: HCPCS

## 2019-05-02 PROCEDURE — 97110 THERAPEUTIC EXERCISES: CPT

## 2019-05-02 NOTE — FLOWSHEET NOTE
Physical Therapy Daily Treatment Note    Date:  2019    Patient Name:  Flash Gallo    :  1965  MRN: [id number]  Restrictions/Precautions:     Medical/Treatment Diagnosis Information:   · Diagnosis: M54.41, G89.29 chronic LBP, R sciatica  · Treatment Diagnosis: M54.41 LBP, with R sciatica  Insurance/Certification information:  PT Insurance Information: [facility]  Physician Information:  Referring Practitioner: Dragan Shearer CNP  Plan of care signed (Y/N):  y   Visit# / total visits: 6/10  Pain level: 5/10     Functional Assessment Tool Used: Oswestry LBP Scale  Score: 31/50 = 62% disability    Time In: 9:03   Time Out: 10:00    Progress Note: []  Yes  [x]  No  Next due by: Visit #10  , or 19    Subjective: Pt rpts to clinic with moderate complaints of LBP with R radicular symptoms to R foot stating \"I did yard work again yesterday and I think that with the weather has aggravated it a bit\". Objective: Pt tolerated todays session well indicating decreased pain post session. Pt was able to progress press ups and mod extension with good tolerance noted. Most challenged by standing hip ext this date. Continues to tolerate IFC well and is showing some signs of centralization. Observation: Rpts with upright posture, slow rise from chair.     Test measurements:    No R LE pain during or after extension directional preference ex program    Exercises: there ex for ROM, and reduction of post derangement  Exercise/Equipment Resistance/Repetitions Other comments   Lay prone  5'    Prone on elbows 3'    Press up 2x10    B PKF 15x2    Prone hip extension x15 ea    Prone hip IR/ER x20    LTR 10x3\"    PPT 15x3\"    Modified extension 10x x2 At counter   Back bend 10x, x2    Standing hip ext,abd x15 ea    Lumbar roll     R sciatic n floss x15     P/A mob 2'    TM retro 5'    bridges x10              [x] Provided verbal/tactile cueing for activities related to strengthening, flexibility, endurance, ROM. (40006)  [] Provided verbal/tactile cueing for activities related to improving balance, coordination, kinesthetic sense, posture, motor skill, proprioception. (62721)    Therapeutic Activities:     [] Therapeutic activities, direct (one-on-one) patient contact (use of dynamic activities to improve functional performance). (36078)    Gait:   [] Provided training and instruction to the patient for ambulation re-education. (03474)    Self-Care/ADL's  [] Self-care/home management training and compensatory training, meal preparation, safety procedures, and instructions in use of assistive technology devices/adaptive equipment, direct one-on-one contact. (82891)    Home Exercise Program:   Lumbar extension progression for post derangement  [x] Reviewed/Progressed HEP activities related to strengthening, flexibility, endurance, ROM. (04395)  [] Reviewed/Progressed HEP activities related to improving balance, coordination, kinesthetic sense, posture, motor skill, proprioception.  (13987)    Manual Treatments:    [] Provided manual therapy to mobilize soft tissue/joints for the purpose of modulating pain, promoting relaxation,  increasing ROM, reducing/eliminating soft tissue swelling/inflammation/restriction, improving soft tissue extensibility. (43945)    Service Based Modalities:  IFC 15' for pain control to lumbar region    Timed Code Treatment Minutes: There ex 42'    Total Treatment Minutes:   62'    Treatment/Activity Tolerance:  [x] Patient tolerated treatment well [] Patient limited by fatique  [] Patient limited by pain  [] Patient limited by other medical complications  [] Other:     Prognosis: [x] Good [] Fair  [] Poor    Patient Requires Follow-up: [x] Yes  [] No      Goals:  Short term goals  Time Frame for Short term goals: 1 week  Short term goal 1: Start HEP (Met. 30APR)    Long term goals  Time Frame for Long term goals : 4 weeks  Long term goal 1: Pain 2/10 to allow regular ADL (Not met.  30APR)  Long term goal 2: Resolve R sciatica pain pattern (Not met. 30APR)  Long term goal 3: Tolerate sitting 60 min for required driving needs (Stated able to tolerate 45'. 30APR)  Long term goal 4: Able to stand 45-60 min for kitchen duty (Can accomplish with increased pain at 30 min. 30APR)          Plan:   [x] Continue per plan of care [] Alter current plan (see comments)  [] Plan of care initiated [] Hold pending MD visit [] Discharge    Plan for Next Session:   Progress to tolerance.     Electronically signed by:  Saloni Spain PTA

## 2019-05-03 NOTE — PROGRESS NOTES
I have reviewed and agree to the content of the note written by the PTA.   Electronically signed by Grayson Pham PT [id number]

## 2019-05-07 ENCOUNTER — HOSPITAL ENCOUNTER (OUTPATIENT)
Dept: PHYSICAL THERAPY | Age: 54
Setting detail: THERAPIES SERIES
Discharge: HOME | End: 2019-05-07
Payer: MEDICARE

## 2019-05-07 PROCEDURE — G0283 ELEC STIM OTHER THAN WOUND: HCPCS

## 2019-05-07 PROCEDURE — 97110 THERAPEUTIC EXERCISES: CPT

## 2019-05-07 NOTE — FLOWSHEET NOTE
Physical Therapy Daily Treatment Note    Date:  2019    Patient Name:  Sekou Strange    :  1965  MRN: [id number]  Restrictions/Precautions:     Medical/Treatment Diagnosis Information:   · Diagnosis: M54.41, G89.29 chronic LBP, R sciatica  · Treatment Diagnosis: M54.41 LBP, with R sciatica  Insurance/Certification information:  PT Insurance Information: [facility]  Physician Information:  Referring Practitioner: Maikol Gonzalez CNP  Plan of care signed (Y/N):  y   Visit# / total visits: 7/10  Pain level: 5/10     Functional Assessment Tool Used: Oswestry LBP Scale  Score: 31/50 = 62% disability    Time In: 10:32   Time Out:  11:20    Progress Note: []  Yes  [x]  No  Next due by: Visit #10  , or 19    Subjective: Pt rpts to clinic with moderate complaints of LBP but noting much more R heel pain than anything else this date stating \"Ever since my last session the R leg and the foot have really been aggravating me. I feel it more in the foot than anything else\". Objective: Pt tolerated todays session well indicating centralized LBP with minimal complaints of back pain post session. Pt was able to perform all ERIK per flow chart to decrease LBP and radicular symptoms with vc required to ensure proper performance as pt tends to combine exercises. Most challenged by prone press ups this date noting UE fatigue. Continues to respond well to IFC application for pain reduction. Observation: Rpts with upright posture, slow rise from chair.     Test measurements:    No R LE pain during or after extension directional preference ex program    Exercises: there ex for ROM, and reduction of post derangement  Exercise/Equipment Resistance/Repetitions Other comments   Lay prone  5'    Prone on elbows 3'    Press up 2x10    B PKF 15x2    Prone hip extension x15 ea    Prone hip IR/ER x20    LTR 10x3\"    PPT 15x3\"    Modified extension 10x x2 At counter   Back bend 10x, x2    Standing hip ext,abd x15 ea    Lumbar roll     R sciatic n floss x15     P/A mob 2'    TM retro 5'    bridges x10              [x] Provided verbal/tactile cueing for activities related to strengthening, flexibility, endurance, ROM. (03390)  [] Provided verbal/tactile cueing for activities related to improving balance, coordination, kinesthetic sense, posture, motor skill, proprioception. (06328)    Therapeutic Activities:     [] Therapeutic activities, direct (one-on-one) patient contact (use of dynamic activities to improve functional performance). (40667)    Gait:   [] Provided training and instruction to the patient for ambulation re-education. (28653)    Self-Care/ADL's  [] Self-care/home management training and compensatory training, meal preparation, safety procedures, and instructions in use of assistive technology devices/adaptive equipment, direct one-on-one contact. (15461)    Home Exercise Program:   Lumbar extension progression for post derangement  [x] Reviewed/Progressed HEP activities related to strengthening, flexibility, endurance, ROM. (35414)  [] Reviewed/Progressed HEP activities related to improving balance, coordination, kinesthetic sense, posture, motor skill, proprioception.  (68805)    Manual Treatments:    [] Provided manual therapy to mobilize soft tissue/joints for the purpose of modulating pain, promoting relaxation,  increasing ROM, reducing/eliminating soft tissue swelling/inflammation/restriction, improving soft tissue extensibility. (34746)    Service Based Modalities:  IFC 15' for pain control to lumbar region      Timed Code Treatment Minutes:    There ex 33'    Total Treatment Minutes:   50'    Treatment/Activity Tolerance:  [x] Patient tolerated treatment well [] Patient limited by fatique  [] Patient limited by pain  [] Patient limited by other medical complications  [] Other:     Prognosis: [x] Good [] Fair  [] Poor    Patient Requires Follow-up: [x] Yes  [] No      Goals:  Short term goals  Time Frame for Short term goals: 1 week  Short term goal 1: Start HEP (Met. 30APR)    Long term goals  Time Frame for Long term goals : 4 weeks  Long term goal 1: Pain 2/10 to allow regular ADL (Not met. 30APR)  Long term goal 2: Resolve R sciatica pain pattern (Not met. 30APR)   Long term goal 3: Tolerate sitting 60 min for required driving needs (Stated able to tolerate 45'. 30APR)  Long term goal 4: Able to stand 45-60 min for kitchen duty (Can accomplish with increased pain at 30 min. 30APR)          Plan:   [x] Continue per plan of care [] Alter current plan (see comments)  [] Plan of care initiated [] Hold pending MD visit [] Discharge    Plan for Next Session:   Progress to tolerance.     Electronically signed by:  Alysa Corona PTA

## 2019-05-08 NOTE — PROGRESS NOTES
I have reviewed and agree to the content of the note written by the PTA.   Electronically signed by Annelise Reed PT [id number]

## 2019-05-09 ENCOUNTER — HOSPITAL ENCOUNTER (OUTPATIENT)
Dept: PHYSICAL THERAPY | Age: 54
Setting detail: THERAPIES SERIES
Discharge: HOME | End: 2019-05-09
Payer: MEDICARE

## 2019-05-09 PROCEDURE — 97110 THERAPEUTIC EXERCISES: CPT | Performed by: PHYSICAL THERAPIST

## 2019-05-09 NOTE — FLOWSHEET NOTE
Physical Therapy Daily Treatment Note    Date:  2019    Patient Name:  Gianna Irizarry    :  1965  MRN: [id number]  Restrictions/Precautions:     Medical/Treatment Diagnosis Information:   · Diagnosis: M54.41, G89.29 chronic LBP, R sciatica  · Treatment Diagnosis: M54.41 LBP, with R sciatica  Insurance/Certification information:  PT Insurance Information: [facility]  Physician Information:  Referring Practitioner: Gunner Villavicencio CNP  Plan of care signed (Y/N):  y   Visit# / total visits: 8/10  Pain level: 5/10     Functional Assessment Tool Used: Oswestry LBP Scale  Score: 31/50 = 62% disability    Time In: 8:57   Time Out:  9:43    Progress Note: []  Yes  [x]  No  Next due by: Visit #10  , or 19    Subjective: Only had a couple of short term episodes of R LE pain yesterday. Occasionally as far as the R ankle. Much less severity and frequency than 3-4 weeks ago. Objective:   Observation:  Large , firm abdomen  Test measurements:    Prone extension to end-range, R sciatica abolished, centralized. R SLR 75-80 deg negative dural tension  FIS , 1 rep, no pain   Good reduction of post derangement so far.       Exercises: there ex for ROM, and reduction of post derangement  Exercise/Equipment Resistance/Repetitions Other comments   Lay prone , R roadkill 5'    Prone on elbows 5'    Press up 10x x3    B PKF 10x x3    Prone hip extension 10x x3    Prone hip IR/ER x20    LTR 10x3\"    Clamshell, R 15x    Modified extension 10x x2 At counter   Back bend 10x, x2    Standing hip ext,abd x15 ea    Lumbar roll     R sciatic n floss 3'     P/A mob 2'    TM retro 5', 1 MPH         B rowing/ extension          [x] Provided verbal/tactile cueing for activities related to strengthening, flexibility, endurance, ROM. (48668)  [] Provided verbal/tactile cueing for activities related to improving balance, coordination, kinesthetic sense, posture, motor skill, proprioception. (62347)    Therapeutic Activities:     [] Therapeutic activities, direct (one-on-one) patient contact (use of dynamic activities to improve functional performance). (09726)    Gait:   [] Provided training and instruction to the patient for ambulation re-education. (37554)    Self-Care/ADL's  [] Self-care/home management training and compensatory training, meal preparation, safety procedures, and instructions in use of assistive technology devices/adaptive equipment, direct one-on-one contact. (72602)    Home Exercise Program:   Lumbar extension progression for post derangement  [x] Reviewed/Progressed HEP activities related to strengthening, flexibility, endurance, ROM. (04458)  [] Reviewed/Progressed HEP activities related to improving balance, coordination, kinesthetic sense, posture, motor skill, proprioception.  (44509)    Manual Treatments:    [] Provided manual therapy to mobilize soft tissue/joints for the purpose of modulating pain, promoting relaxation,  increasing ROM, reducing/eliminating soft tissue swelling/inflammation/restriction, improving soft tissue extensibility. (14307)    Service Based Modalities:     Timed Code Treatment Minutes:    There ex 46'    Total Treatment Minutes:   55'    Treatment/Activity Tolerance:  [x] Patient tolerated treatment well [] Patient limited by fatique  [] Patient limited by pain  [] Patient limited by other medical complications  [] Other:     Prognosis: [x] Good [] Fair  [] Poor    Patient Requires Follow-up: [x] Yes  [] No      Goals:  Short term goals  Time Frame for Short term goals: 1 week  Short term goal 1: Start HEP - met    Long term goals  Time Frame for Long term goals : 4 weeks  Long term goal 1: Pain 2/10 to allow regular ADL -part met  Long term goal 2: Resolve R sciatica pain pattern -part met  Long term goal 3: Tolerate sitting 60 min for required driving needs - part met  Long term goal 4: Able to stand 45-60 min for kitchen duty - part met          Plan:   [x] Continue per plan of care [] Alter current plan (see comments)  [] Plan of care initiated [] Hold pending MD visit [] Discharge    Plan for Next Session:   Progress to tolerance.     Electronically signed by:  Maria Victoria Villareal PT

## 2019-05-14 ENCOUNTER — HOSPITAL ENCOUNTER (OUTPATIENT)
Dept: PHYSICAL THERAPY | Age: 54
Setting detail: THERAPIES SERIES
Discharge: HOME | End: 2019-05-14
Payer: MEDICARE

## 2019-05-14 PROCEDURE — 97110 THERAPEUTIC EXERCISES: CPT

## 2019-05-14 NOTE — FLOWSHEET NOTE
Physical Therapy Daily Treatment Note    Date:  2019    Patient Name:  Leroy Sage    :  1965  MRN: [id number]  Restrictions/Precautions:     Medical/Treatment Diagnosis Information:   · Diagnosis: M54.41, G89.29 chronic LBP, R sciatica   · Treatment Diagnosis: M54.41 LBP, with R sciatica  Insurance/Certification information:  PT Insurance Information: [facility]  Physician Information:  Referring Practitioner: Sanket Loredo CNP  Plan of care signed (Y/N):  y   Visit# / total visits: 9/10  Pain level: 4/10     Functional Assessment Tool Used: Oswestry LBP Scale  Score: 31/50 = 62% disability (Scored 20/50=40% disability. 14MAY)    Time In: 9:00   Time Out:  9:46     Progress Note: []  Yes  [x]  No  Next due by: Visit #10  , or 19    Subjective: Pt rpts to clinic with moderate complaints of LBP with L hip pain stating \"The pain is better today which is surprising because this weekend the pain was pretty bad\". Objective: Pt tolerated todays session well noting centralization of symptoms post session. Pt was able to progress multiple exercises per flow chart to decrease LBP and radicular symptoms with vc required for proper performance. Most challenged by prone press ups noting fatigue. Hips within alignment this date. Agreed to continue therapy as pt is improving slowly. Observation:   Large , firm abdomen  Test measurements:    Prone extension to end-range, R sciatica abolished, centralized. R SLR 75-80 deg negative dural tension  FIS , 1 rep, no pain   Good reduction of post derangement so far.       Exercises: there ex for ROM, and reduction of post derangement  Exercise/Equipment Resistance/Repetitions Other comments   Lay prone , R roadkill 5'    Prone on elbows 5'    Press up 10x x3    B PKF 10x x3    Prone hip extension 10x x2    Prone hip IR/ER x20    LTR 10x5\"    Howard, R 15x    Modified extension 10x x2 At counter   Back bend 10x, x2    Standing hip ext,abd x15 ea    Lumbar roll     R sciatic n floss 3'     P/A mob 2'    TM retro 5', 1 MPH    bridges 2x10    B rowing/ extension          [x] Provided verbal/tactile cueing for activities related to strengthening, flexibility, endurance, ROM. (55473)  [] Provided verbal/tactile cueing for activities related to improving balance, coordination, kinesthetic sense, posture, motor skill, proprioception. (39871)    Therapeutic Activities:     [] Therapeutic activities, direct (one-on-one) patient contact (use of dynamic activities to improve functional performance). (67531)    Gait:   [] Provided training and instruction to the patient for ambulation re-education. (21720)    Self-Care/ADL's  [] Self-care/home management training and compensatory training, meal preparation, safety procedures, and instructions in use of assistive technology devices/adaptive equipment, direct one-on-one contact. (46511)    Home Exercise Program:   Lumbar extension progression for post derangement  [x] Reviewed/Progressed HEP activities related to strengthening, flexibility, endurance, ROM. (86653)  [] Reviewed/Progressed HEP activities related to improving balance, coordination, kinesthetic sense, posture, motor skill, proprioception.  (15652)    Manual Treatments:    [] Provided manual therapy to mobilize soft tissue/joints for the purpose of modulating pain, promoting relaxation,  increasing ROM, reducing/eliminating soft tissue swelling/inflammation/restriction, improving soft tissue extensibility. (31476)    Service Based Modalities:     Timed Code Treatment Minutes:    There ex 46'    Total Treatment Minutes:   55'    Treatment/Activity Tolerance:  [x] Patient tolerated treatment well [] Patient limited by fatique  [] Patient limited by pain  [] Patient limited by other medical complications  [] Other:     Prognosis: [x] Good [] Fair  [] Poor    Patient Requires Follow-up: [x] Yes  [] No      Goals:  Short term goals  Time Frame for Short term goals: 1 week  Short term goal 1: Start HEP - met    Long term goals  Time Frame for Long term goals : 4 weeks  Long term goal 1: Pain 2/10 to allow regular ADL -part met   Long term goal 2: Resolve R sciatica pain pattern -part met  Long term goal 3: Tolerate sitting 60 min for required driving needs - part met  Long term goal 4: Able to stand 45-60 min for kitchen duty - part met          Plan:   [x] Continue per plan of care [] Alter current plan (see comments)  [] Plan of care initiated [] Hold pending MD visit [] Discharge    Plan for Next Session:   Progress to tolerance.     Electronically signed by:  Ulices Renee PTA

## 2019-05-14 NOTE — PROGRESS NOTES
I have reviewed and agree to the content of the note written by the PTA.   Electronically signed by Diamond Hogue PT [id number]

## 2019-05-16 ENCOUNTER — APPOINTMENT (OUTPATIENT)
Dept: PHYSICAL THERAPY | Age: 54
End: 2019-05-16
Payer: MEDICARE

## 2019-05-16 DIAGNOSIS — M54.10 BACK PAIN WITH RIGHT-SIDED RADICULOPATHY: ICD-10-CM

## 2019-05-16 RX ORDER — BACLOFEN 10 MG/1
TABLET ORAL
Qty: 270 TABLET | Refills: 0 | OUTPATIENT
Start: 2019-05-16

## 2019-05-23 ENCOUNTER — OFFICE VISIT (OUTPATIENT)
Dept: CARDIOLOGY | Age: 54
End: 2019-05-23
Payer: MEDICARE

## 2019-05-23 ENCOUNTER — HOSPITAL ENCOUNTER (OUTPATIENT)
Dept: PHYSICAL THERAPY | Age: 54
Setting detail: THERAPIES SERIES
Discharge: HOME | End: 2019-05-23
Payer: MEDICARE

## 2019-05-23 VITALS
DIASTOLIC BLOOD PRESSURE: 70 MMHG | HEIGHT: 71 IN | HEART RATE: 98 BPM | BODY MASS INDEX: 32.2 KG/M2 | SYSTOLIC BLOOD PRESSURE: 106 MMHG | WEIGHT: 230 LBS

## 2019-05-23 DIAGNOSIS — I10 HYPERTENSION, UNSPECIFIED TYPE: Primary | ICD-10-CM

## 2019-05-23 PROCEDURE — 3017F COLORECTAL CA SCREEN DOC REV: CPT | Performed by: INTERNAL MEDICINE

## 2019-05-23 PROCEDURE — 93000 ELECTROCARDIOGRAM COMPLETE: CPT | Performed by: INTERNAL MEDICINE

## 2019-05-23 PROCEDURE — G8417 CALC BMI ABV UP PARAM F/U: HCPCS | Performed by: INTERNAL MEDICINE

## 2019-05-23 PROCEDURE — G8427 DOCREV CUR MEDS BY ELIG CLIN: HCPCS | Performed by: INTERNAL MEDICINE

## 2019-05-23 PROCEDURE — 1036F TOBACCO NON-USER: CPT | Performed by: INTERNAL MEDICINE

## 2019-05-23 PROCEDURE — 99212 OFFICE O/P EST SF 10 MIN: CPT | Performed by: INTERNAL MEDICINE

## 2019-05-23 NOTE — PROGRESS NOTES
Physical Therapy    Outpatient Physical Therapy    [x] [place]  Phone: [phone]  Fax: [phone]      [] [place]  Phone: [phone]  Fax: [phone]    Physical Therapy  Cancellation/No-show Note  Patient Name:  Ewa Guthrie  :  1965   Date:  2019  Cancelled visits to date: 1  No-shows to date: 0    For today's appointment patient:  [x]  Cancelled  []  Rescheduled appointment  []  No-show     Reason given by patient:  [x]  Patient ill  []  Conflicting appointment  []  No transportation    []  Conflict with work  []  No reason given  []  Other:     Comments:      Electronically signed by: Yahir Greer PTA

## 2019-05-23 NOTE — PROGRESS NOTES
Today's Date: 5/23/2019  Patient Name: Sandhya Staley  Patient's age: 48 y.o., 1965          The patient is a 48 y.o.  male is in the office for f/u, Patient has been doing well cardiac wise, no new cardiac complaints. He denies angina, PND/Orthopnea. Past Medical History:   has a past medical history of Bipolar disorder ([facility] [address] Centrilobular emphysema ([facility] [address] HIV (human immunodeficiency virus infection) ([facility] [address] and Pneumonia. Past Surgical History:   has a past surgical history that includes Dental surgery (2015); Colonoscopy (01/05/2016); Lumbar spine surgery (Right, 9/26/2017); Lumbar spine surgery (Right, 10/3/2017); Anesthesia Nerve Block (Bilateral, 1/12/2018); pr n block inj, lumbar plexus (Bilateral, 1/19/2018); pr radiofreq neurotomy lumbar or sacral, w image guide,ea addl level (Right, 1/25/2018); pr radiofreq neurotomy lumbar or sacral, w image guide,ea addl level (Left, 3/15/2018); and Lumbar spine surgery (Right, 1/8/2019). Home Medications:    Prior to Admission medications    Medication Sig Start Date End Date Taking?  Authorizing Provider   fluticasone (FLONASE) 50 MCG/ACT nasal spray PLACE 2 SPRAYS INTO EACH NOSTRIL ONCE DAILY 5/22/19  Yes FELICE You - CNP   baclofen (LIORESAL) 10 MG tablet TAKE 1 TABLET BY MOUTH THREE TIMES DAILY AS NEEDED FOR SPASMS 4/26/19  Yes TAMMY Reese   omeprazole (PRILOSEC) 20 MG delayed release capsule Take 1 capsule by mouth daily 3/20/19  Yes Historical Provider, MD KINCAIDUZA [phone] MG TABS TK 1 T PO  QD 3/27/19  Yes Historical Provider, MD   aspirin-acetaminophen-caffeine (Marcina Maxcy) [phone] MG per tablet Take 1 tablet by mouth every 6 hours as needed for Headaches   Yes Historical Provider, MD   Clotrimazole (MYCELEX) 10 MG LOZG lozenge Take 1 lozenge by mouth 5 times daily 3/8/19  Yes TAMMY Reese   gabapentin (NEURONTIN) 600 MG tablet TAKE 1 TABLET BY MOUTH THREE TIMES DAILY Allergies:  Pcn [penicillins] and Sulfa antibiotics    Social History:   reports that he quit smoking about 4 years ago. He has a 90.00 pack-year smoking history. He has never used smokeless tobacco. He reports that he drinks alcohol. He reports that he does not use drugs. REVIEW OF SYSTEMS:  CONSTITUTIONAL:NEGATIVE  HEENT:NEG  Cardiovascular: No chest pain, No dyspnea on exertion, No palpitations. Lower extremity edema: No  RESPIRATORY: neg  GASTROINTESTINAL:  negative  GENITOURINARY:  negative  INTEGUMENT:  negative  MUSCULOSKELETAL:  positive for  pain  NEUROLOGICAL:  negative    PHYSICAL EXAM:      /70   Pulse 98   Ht 5' 11\" (1.803 m)   Wt 230 lb (104.3 kg)   BMI 32.08 kg/m²    HEENT: PERRL, no cervical lymphadenopathy. No masses palpable. Cardiovascular:  · The apical impulse is not displaced  · Heart  Sounds:RRR, NO S3/RUB  · Jugular venous pulsation Normal  · The carotid upstroke is NL  · Peripheral pulses are symmetrical and full  Respiratory: Good respiratory effort. On auscultation: clear to auscultation bilaterally  Abdomen:  · No masses or tenderness  · Bowel sounds present  Extremities:  ·  No Cyanosis or Clubbing  ·  Lower extremity edema: No  Skin: Warm and dry    Cardiac data:    EKG: Sinus  Rhythm   -Incomplete right bundle branch block. ABNORMAL     Echo 5/23/2018:  Summary  Left ventricle is normal in size Global left ventricular systolic function  is low normal Estimated ejection fraction is 50 % . Aortic root is mildly dilated. No significant valvular regurgitation or stenosis seen. Labs:     CBC: No results for input(s): WBC, HGB, HCT, PLT in the last 72 hours. BMP: No results for input(s): NA, K, CO2, BUN, CREATININE, LABGLOM, GLUCOSE in the last 72 hours. PT/INR: No results for input(s): PROTIME, INR in the last 72 hours.   FASTING LIPID PANEL:  Lab Results   Component Value Date    HDL 69 09/20/2018    TRIG 334 09/20/2018     LIVER PROFILE:No results for input(s): AST, ALT, LABALBU in the last 72 hours.     IMPRESSION:    IRBBB  OBESITY  PRESERVED LV SYSTOLIC FUNCTION  Patient Active Problem List   Diagnosis    HIV (human immunodeficiency virus infection) ([facility] [address]    IFG (impaired fasting glucose)    Lumbar radicular pain    Chronic bilateral low back pain with right-sided sciatica    Panniculitis involving lumbar region    Lumbar facet joint syndrome    Abnormal EKG    Centrilobular emphysema (HCC)    Muscle spasm    Lumbosacral spondylosis without myelopathy    Neural foraminal stenosis of lumbar spine       RECOMMENDATIONS:  REGULAR EXERCISE  CALORIE RESTRICTION  WEIGHT LOSS  CONTINUE CURRENT TREATMENT    [facility] [address], [address] Cardiology Consult           [phone]

## 2019-05-28 ENCOUNTER — HOSPITAL ENCOUNTER (OUTPATIENT)
Dept: PHYSICAL THERAPY | Age: 54
Setting detail: THERAPIES SERIES
Discharge: HOME | End: 2019-05-28
Payer: MEDICARE

## 2019-05-28 PROCEDURE — 97110 THERAPEUTIC EXERCISES: CPT

## 2019-05-28 NOTE — PROGRESS NOTES
Physical Therapy  [facility]  Rehabilitation and Sports Medicine    [x] [place]  Phone: [phone]  Fax: [phone]      [] [place]  Phone: [phone]  Fax: [phone]    Physical Therapy Progress Note  Date: 2019        Patient Name:  Francoise Meeks    :  1965  MRN: [id number]  Restrictions/Precautions:     Medical/Treatment Diagnosis Information:   · Diagnosis: M54.41, G89.29 chronic LBP, R sciatica   · Treatment Diagnosis: M54.41 LBP, with R sciatica  Insurance/Certification information:  PT Insurance Information: [facility]  Physician Information:  Referring Practitioner: Caity Kaur CNP  Plan of care signed (Y/N):  y   Visit# / total visits: 10/10  Pain level:      4-5/10       Functional Assessment Tool Used: Oswestry LBP Scale  Score: 31/50 = 62% disability (Scored 19/50=38% disability. 28MAY)    Time Period for Report: 4/15/19-19  Cancels/No-shows to date:  0    Plan of Care/Treatment to date:  [x] Therapeutic Exercise    [x] Modalities:  [x] Therapeutic Activity     [x] Ultrasound  [x] Electrical Stimulation  [x] Gait Training      [] Cervical Traction    [] Lumbar Traction  [x] Neuromuscular Re-education  [x] Cold/hotpack [] Iontophoresis  [x] Instruction in HEP      Other:  [x] Manual Therapy       []    [] Aquatic Therapy       []                    ? Subjective:  ·   Pt rpts to clinic with moderate complaints of LBP 4-5/10. Patient noting no radicular symptoms currently. Patient noting at worst 7-8/10. Patient reporting 50% overall improvement since starting therapy. ·    · Objective: Pt tolerated todays session well noting centralization of symptoms post session. Pt was able to progress multiple exercises per flow chart to decrease LBP and radicular symptoms with vc required for proper performance. Most challenged by prone press ups noting fatigue. Receives most relief from prone roadkill position.    ·  Observation:   · Large , firm abdomen  · Test

## 2019-05-28 NOTE — FLOWSHEET NOTE
Physical Therapy Daily Treatment Note    Date:  2019    Patient Name:  Dinora Moreau    :  1965  MRN: [id number]  Restrictions/Precautions:     Medical/Treatment Diagnosis Information:   · Diagnosis: M54.41, G89.29 chronic LBP, R sciatica   · Treatment Diagnosis: M54.41 LBP, with R sciatica  Insurance/Certification information:  PT Insurance Information: [facility]  Physician Information:  Referring Practitioner: Chantal Dotson CNP  Plan of care signed (Y/N):  y   Visit# / total visits: 10/10  Pain level: 4-5/10     Functional Assessment Tool Used: Oswestry LBP Scale  Score: 31/50 = 62% disability (Scored 50=38% disability. 28MAY)    Time In: 1004 bernadette Out: 1042    Progress Note: []  Yes  [x]  No  Next due by: Visit #10  , or 19    Subjective: Pt rpts to clinic with moderate complaints of LBP 4-5/10. Patient noting no radicular symptoms currently. Patient noting at worst 7-8/10. Patient reporting 50% overall improvement since starting therapy. Objective: Pt tolerated todays session well noting centralization of symptoms post session. Pt was able to progress multiple exercises per flow chart to decrease LBP and radicular symptoms with vc required for proper performance. Most challenged by prone press ups noting fatigue. Receives most relief from prone roadkill position. Observation:   Large , firm abdomen  Test measurements:    Prone extension to end-range, R sciatica abolished, centralized. R SLR 75-80 deg negative dural tension  FIS , 1 rep, no pain   Good reduction of post derangement so far.       Exercises: there ex for ROM, and reduction of post derangement  Exercise/Equipment Resistance/Repetitions Other comments   Lay prone , R roadkill 5'    Prone on elbows 5'    Press up 10x x3    B PKF 10x x3    Prone hip extension 10x x2    Prone hip IR/ER x20    LTR 10x5\"    Clamshell, B 10x    Modified extension 10x x2 At counter   Back bend 10x, x2    Standing hip ext,abd x15 ea    Lumbar roll R sciatic n floss 10x x 3      P/A mob 2'    TM retro 5', 1 MPH    bridges 2x10    B rowing/ extension          [x] Provided verbal/tactile cueing for activities related to strengthening, flexibility, endurance, ROM. (13344)  [] Provided verbal/tactile cueing for activities related to improving balance, coordination, kinesthetic sense, posture, motor skill, proprioception. (94702)    Therapeutic Activities:     [] Therapeutic activities, direct (one-on-one) patient contact (use of dynamic activities to improve functional performance). (53110)    Gait:   [] Provided training and instruction to the patient for ambulation re-education. (30335)    Self-Care/ADL's  [] Self-care/home management training and compensatory training, meal preparation, safety procedures, and instructions in use of assistive technology devices/adaptive equipment, direct one-on-one contact. (64008)    Home Exercise Program:   Lumbar extension progression for post derangement  [x] Reviewed/Progressed HEP activities related to strengthening, flexibility, endurance, ROM. (54240)  [] Reviewed/Progressed HEP activities related to improving balance, coordination, kinesthetic sense, posture, motor skill, proprioception.  (99547)    Manual Treatments:    [] Provided manual therapy to mobilize soft tissue/joints for the purpose of modulating pain, promoting relaxation,  increasing ROM, reducing/eliminating soft tissue swelling/inflammation/restriction, improving soft tissue extensibility. (45967)    Service Based Modalities:     Timed Code Treatment Minutes:    There ex 38'    Total Treatment Minutes:   45'    Treatment/Activity Tolerance:  [x] Patient tolerated treatment well [] Patient limited by fatique  [] Patient limited by pain  [] Patient limited by other medical complications  [] Other:     Prognosis: [x] Good [] Fair  [] Poor    Patient Requires Follow-up: [x] Yes  [] No      Goals:  Short term goals  Time Frame for Short term goals: 1 week  Short term goal 1: Start HEP - met    Long term goals  Time Frame for Long term goals : 4 weeks  Long term goal 1: Pain 2/10 to allow regular ADL -not met 5-6/10. Long term goal 2: Resolve R sciatica pain pattern -part met  Long term goal 3: Tolerate sitting 60 min for required driving needs - part met, patient now able to sit approx 30min. Long term goal 4: Able to stand 45-60 min for kitchen duty - part met, able to stand up to 30min          Plan:   [x] Continue per plan of care [] Alter current plan (see comments)  [] Plan of care initiated [] Hold pending MD visit [] Discharge    Plan for Next Session:   Progress to tolerance.     Electronically signed by:  Lora Morales PT

## 2019-05-30 ENCOUNTER — HOSPITAL ENCOUNTER (OUTPATIENT)
Dept: PHYSICAL THERAPY | Age: 54
Setting detail: THERAPIES SERIES
Discharge: HOME | End: 2019-05-30
Payer: MEDICARE

## 2019-05-30 PROCEDURE — 97110 THERAPEUTIC EXERCISES: CPT

## 2019-05-30 NOTE — FLOWSHEET NOTE
Physical Therapy Daily Treatment Note    Date:  2019    Patient Name:  Tariq Klein    :  1965  MRN: [id number]  Restrictions/Precautions:     Medical/Treatment Diagnosis Information:   · Diagnosis: M54.41, G89.29 chronic LBP, R sciatica   · Treatment Diagnosis: M54.41 LBP, with R sciatica  Insurance/Certification information:  PT Insurance Information: [facility]  Physician Information:  Referring Practitioner: Belem Fenton CNP  Plan of care signed (Y/N):  y   Visit# / total visits: 1/10,  total  Pain level: 4-5/10     Functional Assessment Tool Used: Oswestry LBP Scale  Score: 31/50 = 62% disability (Scored /50=38% disability. 28MAY)    Time In: 950 bernadette Out: 1035    Progress Note: []  Yes  [x]  No  Next due by: Visit #10  , or 19    Subjective: Pt rpts to clinic with moderate complaints of right leg pain 7/10 this date. Patient noting less back pain. Objective: Pt tolerated todays session well noting centralization of symptoms post session. Pt was able to progress multiple exercises per flow chart to decrease LBP and radicular symptoms with vc required for proper performance. Fatigue with pressups and bridging. Receives most relief from prone roadkill position. Patient has moderate elicited response with nerve flossing. Observation:   Large , firm abdomen  Test measurements:    Prone extension to end-range, R sciatica abolished, centralized. R SLR 75-80 deg negative dural tension  FIS , 1 rep, no pain   Good reduction of post derangement so far.       Exercises: there ex for ROM, and reduction of post derangement  Exercise/Equipment Resistance/Repetitions Other comments   Lay prone , R roadkill 5'    Prone on elbows 5'    Press up 10x x3    B PKF 10x x3    Prone hip extension 10x x2    Prone hip IR/ER x20    LTR 15x5\"    Clamshell, B 10x    Modified extension 10x x2 At counter   Back bend 10x, x2    Standing hip ext,abd x15 ea    Lumbar roll     R sciatic n floss 10x x 3      P/A mob TM retro     bridges 2x10    B rowing/ extension     Abd Braceing  5\" x 15     [x] Provided verbal/tactile cueing for activities related to strengthening, flexibility, endurance, ROM. (67308)  [] Provided verbal/tactile cueing for activities related to improving balance, coordination, kinesthetic sense, posture, motor skill, proprioception. (33321)    Therapeutic Activities:     [] Therapeutic activities, direct (one-on-one) patient contact (use of dynamic activities to improve functional performance). (34195)    Gait:   [] Provided training and instruction to the patient for ambulation re-education. (25578)    Self-Care/ADL's  [] Self-care/home management training and compensatory training, meal preparation, safety procedures, and instructions in use of assistive technology devices/adaptive equipment, direct one-on-one contact. (92476)    Home Exercise Program:   Lumbar extension progression for post derangement  [x] Reviewed/Progressed HEP activities related to strengthening, flexibility, endurance, ROM. (75136)  [] Reviewed/Progressed HEP activities related to improving balance, coordination, kinesthetic sense, posture, motor skill, proprioception.  (90543)    Manual Treatments:    [] Provided manual therapy to mobilize soft tissue/joints for the purpose of modulating pain, promoting relaxation,  increasing ROM, reducing/eliminating soft tissue swelling/inflammation/restriction, improving soft tissue extensibility. (91188)    Service Based Modalities:     Timed Code Treatment Minutes:    There ex 45'    Total Treatment Minutes:   39'    Treatment/Activity Tolerance:  [x] Patient tolerated treatment well [] Patient limited by fatique  [] Patient limited by pain  [] Patient limited by other medical complications  [] Other:     Prognosis: [x] Good [] Fair  [] Poor    Patient Requires Follow-up: [x] Yes  [] No      Goals:  Short term goals  Time Frame for Short term goals: 1 week  Short term goal 1: Start HEP - met    Long term goals  Time Frame for Long term goals : 4 weeks  Long term goal 1: Pain 2/10 to allow regular ADL -not met 5-6/10. Long term goal 2: Resolve R sciatica pain pattern -part met  Long term goal 3: Tolerate sitting 60 min for required driving needs - part met, patient now able to sit approx 30min. Long term goal 4: Able to stand 45-60 min for kitchen duty - part met, able to stand up to 30min          Plan:   [x] Continue per plan of care [] Alter current plan (see comments)  [] Plan of care initiated [] Hold pending MD visit [] Discharge    Plan for Next Session:   Progress to tolerance.     Electronically signed by:  Bernestine Schirmer, PT

## 2019-06-04 ENCOUNTER — HOSPITAL ENCOUNTER (OUTPATIENT)
Dept: PHYSICAL THERAPY | Age: 54
Setting detail: THERAPIES SERIES
Discharge: HOME | End: 2019-06-04
Payer: MEDICARE

## 2019-06-04 PROCEDURE — 97110 THERAPEUTIC EXERCISES: CPT | Performed by: PHYSICAL THERAPY ASSISTANT

## 2019-06-04 NOTE — FLOWSHEET NOTE
endurance, ROM. (36961)  [] Provided verbal/tactile cueing for activities related to improving balance, coordination, kinesthetic sense, posture, motor skill, proprioception. (76647)    Therapeutic Activities:     [] Therapeutic activities, direct (one-on-one) patient contact (use of dynamic activities to improve functional performance). (17468)    Gait:   [] Provided training and instruction to the patient for ambulation re-education. (27730)    Self-Care/ADL's  [] Self-care/home management training and compensatory training, meal preparation, safety procedures, and instructions in use of assistive technology devices/adaptive equipment, direct one-on-one contact. (66674)    Home Exercise Program:   Lumbar extension progression for post derangement  [x] Reviewed/Progressed HEP activities related to strengthening, flexibility, endurance, ROM. (25381)  [] Reviewed/Progressed HEP activities related to improving balance, coordination, kinesthetic sense, posture, motor skill, proprioception.  (88780)    Manual Treatments:    [] Provided manual therapy to mobilize soft tissue/joints for the purpose of modulating pain, promoting relaxation,  increasing ROM, reducing/eliminating soft tissue swelling/inflammation/restriction, improving soft tissue extensibility. (03074)    Service Based Modalities:     Timed Code Treatment Minutes: There ex 39'    Total Treatment Minutes:   44'    Treatment/Activity Tolerance:  [x] Patient tolerated treatment well [] Patient limited by fatique  [] Patient limited by pain  [] Patient limited by other medical complications  [] Other:     Prognosis: [x] Good [] Fair  [] Poor    Patient Requires Follow-up: [x] Yes  [] No      Goals:  Short term goals  Time Frame for Short term goals: 1 week  Short term goal 1: Start HEP - met    Long term goals  Time Frame for Long term goals : 4 weeks  Long term goal 1: Pain 2/10 to allow regular ADL -not met 5-6/10.     Long term goal 2: Resolve R sciatica pain pattern -part met  Long term goal 3: Tolerate sitting 60 min for required driving needs - part met, patient now able to sit approx 30min. Long term goal 4: Able to stand 45-60 min for kitchen duty - part met, able to stand up to 30min          Plan:   [x] Continue per plan of care [] Alter current plan (see comments)  [] Plan of care initiated [] Hold pending MD visit [] Discharge    Plan for Next Session:   Progress to tolerance. Monitor tolerance to new exercises. Electronically signed by:   Blair Hui PTA

## 2019-06-06 ENCOUNTER — HOSPITAL ENCOUNTER (OUTPATIENT)
Dept: PHYSICAL THERAPY | Age: 54
Setting detail: THERAPIES SERIES
Discharge: HOME | End: 2019-06-06
Payer: MEDICARE

## 2019-06-06 PROCEDURE — 97110 THERAPEUTIC EXERCISES: CPT | Performed by: PHYSICAL THERAPY ASSISTANT

## 2019-06-06 NOTE — FLOWSHEET NOTE
mob     TM retro          B rowing/ extension 15x GR Initiated        [x] Provided verbal/tactile cueing for activities related to strengthening, flexibility, endurance, ROM. (63070)  [] Provided verbal/tactile cueing for activities related to improving balance, coordination, kinesthetic sense, posture, motor skill, proprioception. (11456)    Therapeutic Activities:     [] Therapeutic activities, direct (one-on-one) patient contact (use of dynamic activities to improve functional performance). (25087)    Gait:   [] Provided training and instruction to the patient for ambulation re-education. (83669)    Self-Care/ADL's  [] Self-care/home management training and compensatory training, meal preparation, safety procedures, and instructions in use of assistive technology devices/adaptive equipment, direct one-on-one contact. (62899)    Home Exercise Program:   Lumbar extension progression for post derangement  [x] Reviewed/Progressed HEP activities related to strengthening, flexibility, endurance, ROM. (67641)  [] Reviewed/Progressed HEP activities related to improving balance, coordination, kinesthetic sense, posture, motor skill, proprioception.  (58972)    Manual Treatments:    [] Provided manual therapy to mobilize soft tissue/joints for the purpose of modulating pain, promoting relaxation,  increasing ROM, reducing/eliminating soft tissue swelling/inflammation/restriction, improving soft tissue extensibility. (33060)    Service Based Modalities:     Timed Code Treatment Minutes:    There ex47'    Total Treatment Minutes:   52'    Treatment/Activity Tolerance:  [x] Patient tolerated treatment well [] Patient limited by fatique  [] Patient limited by pain  [] Patient limited by other medical complications  [] Other:     Prognosis: [x] Good [] Fair  [] Poor    Patient Requires Follow-up: [x] Yes  [] No      Goals:  Short term goals  Time Frame for Short term goals: 1 week  Short term goal 1: Start HEP - met    Long term goals  Time Frame for Long term goals : 4 weeks  Long term goal 1: Pain 2/10 to allow regular ADL -not met 4/10. Long term goal 2: Resolve R sciatica pain pattern -part met  Long term goal 3: Tolerate sitting 60 min for required driving needs - part met, patient now able to sit approx 30min. Long term goal 4: Able to stand 45-60 min for kitchen duty - part met, able to stand up to 30min    Plan:   [x] Continue per plan of care [] Alter current plan (see comments)  [] Plan of care initiated [] Hold pending MD visit [] Discharge    Plan for Next Session:   Progress to tolerance. Monitor tolerance to new exercises. Electronically signed by:   Maylin Jain PTA

## 2019-06-07 DIAGNOSIS — M47.816 LUMBAR FACET JOINT SYNDROME: ICD-10-CM

## 2019-06-07 DIAGNOSIS — M54.16 LUMBAR RADICULAR PAIN: ICD-10-CM

## 2019-06-07 NOTE — PROGRESS NOTES
I have reviewed and agree to the content of the note written by the PTA.   Electronically signed by Gloria Ponce PT [id number]

## 2019-06-10 RX ORDER — GABAPENTIN 300 MG/1
CAPSULE ORAL
Qty: 270 CAPSULE | Refills: 0 | OUTPATIENT
Start: 2019-06-12 | End: 2019-08-06

## 2019-06-11 ENCOUNTER — HOSPITAL ENCOUNTER (OUTPATIENT)
Dept: PHYSICAL THERAPY | Age: 54
Setting detail: THERAPIES SERIES
Discharge: HOME | End: 2019-06-11
Payer: MEDICARE

## 2019-06-11 PROCEDURE — 97110 THERAPEUTIC EXERCISES: CPT

## 2019-06-11 NOTE — FLOWSHEET NOTE
bend 10x, x2    Standing hip ext,abd x15 ea    Lumbar roll     R sciatic n floss       P/A mob     TM retro          B rowing/ extension/SPO's 15x cate Initiated        [x] Provided verbal/tactile cueing for activities related to strengthening, flexibility, endurance, ROM. (92524)  [] Provided verbal/tactile cueing for activities related to improving balance, coordination, kinesthetic sense, posture, motor skill, proprioception. (64611)    Therapeutic Activities:     [] Therapeutic activities, direct (one-on-one) patient contact (use of dynamic activities to improve functional performance). (70487)    Gait:   [] Provided training and instruction to the patient for ambulation re-education. (11794)    Self-Care/ADL's  [] Self-care/home management training and compensatory training, meal preparation, safety procedures, and instructions in use of assistive technology devices/adaptive equipment, direct one-on-one contact. (36706)    Home Exercise Program:   Lumbar extension progression for post derangement  [x] Reviewed/Progressed HEP activities related to strengthening, flexibility, endurance, ROM. (70163)  [] Reviewed/Progressed HEP activities related to improving balance, coordination, kinesthetic sense, posture, motor skill, proprioception.  (77270)    Manual Treatments:    [] Provided manual therapy to mobilize soft tissue/joints for the purpose of modulating pain, promoting relaxation,  increasing ROM, reducing/eliminating soft tissue swelling/inflammation/restriction, improving soft tissue extensibility. (89579)    Service Based Modalities:     Timed Code Treatment Minutes:    There ex 43'    Total Treatment Minutes:   37'    Treatment/Activity Tolerance:  [x] Patient tolerated treatment well [] Patient limited by fatique  [] Patient limited by pain  [] Patient limited by other medical complications  [] Other:     Prognosis: [x] Good [] Fair  [] Poor    Patient Requires Follow-up: [x] Yes  [] No      Goals:  Short term goals  Time Frame for Short term goals: 1 week  Short term goal 1: Start HEP - met    Long term goals  Time Frame for Long term goals : 4 weeks  Long term goal 1: Pain 2/10 to allow regular ADL -not met 4/10. Long term goal 2: Resolve R sciatica pain pattern -part met  Long term goal 3: Tolerate sitting 60 min for required driving needs - part met, patient now able to sit approx 30min. Long term goal 4: Able to stand 45-60 min for kitchen duty - part met, able to stand up to 30min    Plan:   [x] Continue per plan of care [] Alter current plan (see comments)  [] Plan of care initiated [] Hold pending MD visit [] Discharge    Plan for Next Session:   Progress to tolerance. Monitor tolerance to new exercises.      Electronically signed by:  Erika Damon PTA

## 2019-06-13 ENCOUNTER — APPOINTMENT (OUTPATIENT)
Dept: PHYSICAL THERAPY | Age: 54
End: 2019-06-13
Payer: MEDICARE

## 2019-06-18 ENCOUNTER — HOSPITAL ENCOUNTER (OUTPATIENT)
Dept: PHYSICAL THERAPY | Age: 54
Setting detail: THERAPIES SERIES
Discharge: HOME | End: 2019-06-18
Payer: MEDICARE

## 2019-06-18 PROCEDURE — 97110 THERAPEUTIC EXERCISES: CPT

## 2019-06-18 NOTE — FLOWSHEET NOTE
Physical Therapy Daily Treatment Note    Date:  2019    Patient Name:  Mary Paulson    :  1965  MRN: [id number]  Restrictions/Precautions:     Medical/Treatment Diagnosis Information:   · Diagnosis: M54.41, G89.29 chronic LBP, R sciatica   · Treatment Diagnosis: M54.41 LBP, with R sciatica  Insurance/Certification information:  PT Insurance Information: [facility]  Physician Information:  Referring Practitioner: Agata Cervantes CNP  Plan of care signed (Y/N):  y    Visit# / total visits: 4/10,( 2nd POC) 14 total  Pain level: 3-10      Functional Assessment Tool Used: Oswestry LBP Scale  Score: 31/50 = 62% disability (Scored 19/50=38% disability. 28MAY)    Time In: 9:00  Time Out: 9:39      Progress Note: []  Yes  [x]  No  Next due by: Visit #10  , or 19    Subjective: Pt rpts to clinic with mild complaints of LBP more in B hip region this date stating \"I took a pain pill last session and that's why I was pain free last session\". Objective: Pt tolerated todays session well indicating no increase in pain post session and seeing a reduction in B hip pain post session. Pt was able to perform all ERIK per flow chart but abstained from any exercise causing excessive abdominal pressure d/t pt having complaints of possible hernia. Most challenged by QPED leg extension this date. No rest breaks needed this date.    Observation:   Large , firm abdomen  Bilateral tight IR  Test measurements:        Exercises:   Exercise/Equipment Resistance/Repetitions Other comments   Lay prone , R roadkill     Prone on elbows 5'    Press up 10x x3    B PKF 10x x3    Prone hip extension 10x x2    Prone hip IR/ER x20 grn        Cat / Camel  Initiated   Quadruped LE 10x Initiated                  LTR 15x5\"    Clamshell, B 20x ADV   bridges 2x10 Wide / Narrow   Abd Braceing      Hip abd/add 15x ea (ball/cate) Initiated (With ab bracing)             Modified extension 10x x2 At counter   Back bend 10x, x2    Standing hip ext,abd x15 ea Lumbar roll     R sciatic n floss       P/A mob     TM retro 5'         B rowing/ extension/SPO's 15x cate Initiated        [x] Provided verbal/tactile cueing for activities related to strengthening, flexibility, endurance, ROM. (06310)  [] Provided verbal/tactile cueing for activities related to improving balance, coordination, kinesthetic sense, posture, motor skill, proprioception. (76049)    Therapeutic Activities:     [] Therapeutic activities, direct (one-on-one) patient contact (use of dynamic activities to improve functional performance). (82208)    Gait:   [] Provided training and instruction to the patient for ambulation re-education. (45240)    Self-Care/ADL's  [] Self-care/home management training and compensatory training, meal preparation, safety procedures, and instructions in use of assistive technology devices/adaptive equipment, direct one-on-one contact. (50904)    Home Exercise Program:   Lumbar extension progression for post derangement  [x] Reviewed/Progressed HEP activities related to strengthening, flexibility, endurance, ROM. (59935)   [] Reviewed/Progressed HEP activities related to improving balance, coordination, kinesthetic sense, posture, motor skill, proprioception.  (53632)    Manual Treatments:    [] Provided manual therapy to mobilize soft tissue/joints for the purpose of modulating pain, promoting relaxation,  increasing ROM, reducing/eliminating soft tissue swelling/inflammation/restriction, improving soft tissue extensibility. (27723)    Service Based Modalities:      Timed Code Treatment Minutes:    There ex 39'     Total Treatment Minutes:   44'    Treatment/Activity Tolerance:  [x] Patient tolerated treatment well [] Patient limited by fatique  [] Patient limited by pain  [] Patient limited by other medical complications  [] Other:     Prognosis: [x] Good [] Fair  [] Poor    Patient Requires Follow-up: [x] Yes  [] No      Goals:  Short term goals  Time Frame for Short term goals: 1 week  Short term goal 1: Start HEP - met    Long term goals  Time Frame for Long term goals : 4 weeks   Long term goal 1: Pain 2/10 to allow regular ADL -not met 4/10. Long term goal 2: Resolve R sciatica pain pattern -part met  Long term goal 3: Tolerate sitting 60 min for required driving needs - part met, patient now able to sit approx 30min. Long term goal 4: Able to stand 45-60 min for kitchen duty - part met, able to stand up to 30min    Plan:   [x] Continue per plan of care [] Alter current plan (see comments)  [] Plan of care initiated [] Hold pending MD visit [] Discharge    Plan for Next Session:   Progress to tolerance. Monitor tolerance to new exercises.      Electronically signed by:  Alysa Corona PTA

## 2019-06-19 NOTE — PROGRESS NOTES
I have reviewed and agree to the content of the note written by the PTA.   Electronically signed by Jahaira Sawyer PT [id number]

## 2019-06-20 ENCOUNTER — OFFICE VISIT (OUTPATIENT)
Dept: PAIN MANAGEMENT | Age: 54
End: 2019-06-20
Payer: MEDICARE

## 2019-06-20 ENCOUNTER — HOSPITAL ENCOUNTER (OUTPATIENT)
Dept: PHYSICAL THERAPY | Age: 54
Setting detail: THERAPIES SERIES
Discharge: HOME | End: 2019-06-20
Payer: MEDICARE

## 2019-06-20 VITALS
DIASTOLIC BLOOD PRESSURE: 80 MMHG | HEART RATE: 70 BPM | SYSTOLIC BLOOD PRESSURE: 128 MMHG | HEIGHT: 71 IN | WEIGHT: 223 LBS | BODY MASS INDEX: 31.22 KG/M2

## 2019-06-20 DIAGNOSIS — G89.29 CHRONIC BILATERAL LOW BACK PAIN WITH RIGHT-SIDED SCIATICA: ICD-10-CM

## 2019-06-20 DIAGNOSIS — M48.061 NEURAL FORAMINAL STENOSIS OF LUMBAR SPINE: Primary | ICD-10-CM

## 2019-06-20 DIAGNOSIS — M54.41 CHRONIC BILATERAL LOW BACK PAIN WITH RIGHT-SIDED SCIATICA: ICD-10-CM

## 2019-06-20 DIAGNOSIS — M47.816 LUMBAR FACET JOINT SYNDROME: ICD-10-CM

## 2019-06-20 DIAGNOSIS — M54.16 LUMBAR RADICULAR PAIN: ICD-10-CM

## 2019-06-20 PROCEDURE — 1036F TOBACCO NON-USER: CPT | Performed by: NURSE PRACTITIONER

## 2019-06-20 PROCEDURE — G8417 CALC BMI ABV UP PARAM F/U: HCPCS | Performed by: NURSE PRACTITIONER

## 2019-06-20 PROCEDURE — 99214 OFFICE O/P EST MOD 30 MIN: CPT | Performed by: NURSE PRACTITIONER

## 2019-06-20 PROCEDURE — G8427 DOCREV CUR MEDS BY ELIG CLIN: HCPCS | Performed by: NURSE PRACTITIONER

## 2019-06-20 PROCEDURE — 97110 THERAPEUTIC EXERCISES: CPT

## 2019-06-20 PROCEDURE — 3017F COLORECTAL CA SCREEN DOC REV: CPT | Performed by: NURSE PRACTITIONER

## 2019-06-20 ASSESSMENT — ENCOUNTER SYMPTOMS
DIARRHEA: 0
RESPIRATORY NEGATIVE: 1
EYES NEGATIVE: 1
CONSTIPATION: 0
BACK PAIN: 1

## 2019-06-20 NOTE — FLOWSHEET NOTE
Physical Therapy Daily Treatment Note    Date:  2019    Patient Name:  Francoise Meeks    :  1965  MRN: [id number]  Restrictions/Precautions:     Medical/Treatment Diagnosis Information:   · Diagnosis: M54.41, G89.29 chronic LBP, R sciatica   · Treatment Diagnosis: M54.41 LBP, with R sciatica  Insurance/Certification information:  PT Insurance Information: [facility]  Physician Information:  Referring Practitioner: Caity Kaur CNP  Plan of care signed (Y/N):  y    Visit# / total visits: 5/10,( 2nd POC) 15 total  Pain level: 3/10      Functional Assessment Tool Used: Oswestry LBP Scale  Score: 31/50 = 62% disability (Scored 19/50=38% disability. 28MAY)    Time In: 9:02  Time Out:  9:45     Progress Note: []  Yes  [x]  No  Next due by: Visit #10  , or 19    Subjective: Pt rpts to clinic with mild complaints of LBP with slight radicular symptoms stating \"It only started to feel tingly while I was waiting in the waiting area\". Objective: Pt tolerated todays session well indicating no increase in pain post session. Pt was able to perform all ERIK per flow chart to decrease LBP and radicular symptoms requiring little vc required for proper performance. Most challenged by position changes this date on bed. Continued to abstain from prone press ups and any other exercise that affected abdominal area.    Observation:   Large , firm abdomen  Bilateral tight IR  F/u for possible hernia 27JUN  Test measurements:        Exercises:   Exercise/Equipment Resistance/Repetitions Other comments   Lay prone , R roadkill     Prone on elbows 5'    Press up     B PKF 10x x3    Prone hip extension 10x x2    Prone hip IR/ER x20 grn        Cat / Camel  Initiated   Quadruped LE 10x Initiated                  LTR 15x5\"    Clamshell, B 20x ADV   bridges 2x10 Wide / Narrow   Abd Braceing      Hip abd/add 15x ea (ball/cate) Initiated (With ab bracing)             Modified extension 10x x2 At counter   Back bend 10x, x2    Standing hip for Short term goals: 1 week  Short term goal 1: Start HEP - met    Long term goals  Time Frame for Long term goals : 4 weeks   Long term goal 1: Pain 2/10 to allow regular ADL -not met 4/10. Long term goal 2: Resolve R sciatica pain pattern -part met  Long term goal 3: Tolerate sitting 60 min for required driving needs - part met, patient now able to sit approx 30min. Long term goal 4: Able to stand 45-60 min for kitchen duty - part met, able to stand up to 30min    Plan:   [x] Continue per plan of care [] Alter current plan (see comments)  [] Plan of care initiated [] Hold pending MD visit [] Discharge    Plan for Next Session:   Progress to tolerance. Monitor tolerance to new exercises.      Electronically signed by:  Erika Damon PTA

## 2019-06-20 NOTE — PROGRESS NOTES
Subjective:      Patient ID: Sandhya Staley is a 48 y.o. male. Chief Complaint   Patient presents with    Lower Back Pain     pt states PT has helped with pain     Back Pain     Other   Associated symptoms include arthralgias, fatigue and myalgias. Here today for routine pain clinic recheck. Overall doing well in regards to pain control, satisfied with the additional pain reduction physical therapy has provided. No complaints.      Pain Assessment  Location of Pain: Back  Location Modifiers: Left, Right, Inferior  Severity of Pain: 2  Quality of Pain: Aching, Throbbing  Duration of Pain: Persistent  Frequency of Pain: Constant  Aggravating Factors: Bending, Walking, Straightening, Stretching, Exercise, Kneeling, Squatting, Standing  Limiting Behavior: Yes  Relieving Factors: Rest, Nsaids  Result of Injury: No  Work-Related Injury: No  Are there other pain locations you wish to document?: No    Allergies   Allergen Reactions    Pcn [Penicillins] Rash    Sulfa Antibiotics Rash       Outpatient Medications Marked as Taking for the 6/20/19 encounter (Office Visit) with FELICE Broderick CNP   Medication Sig Dispense Refill    gabapentin (NEURONTIN) 300 MG capsule TAKE 1 CAPSULE BY MOUTH THREE TIMES DAILY 270 capsule 0    fluticasone (FLONASE) 50 MCG/ACT nasal spray PLACE 2 SPRAYS INTO EACH NOSTRIL ONCE DAILY 1 Bottle 2    baclofen (LIORESAL) 10 MG tablet TAKE 1 TABLET BY MOUTH THREE TIMES DAILY AS NEEDED FOR SPASMS 270 tablet 0    omeprazole (PRILOSEC) 20 MG delayed release capsule Take 1 capsule by mouth daily  4    SYMTUZA [phone] MG TABS TK 1 T PO  QD  6    aspirin-acetaminophen-caffeine (EXCEDRIN MIGRAINE) 250-250-65 MG per tablet Take 1 tablet by mouth every 6 hours as needed for Headaches      Clotrimazole (MYCELEX) 10 MG LOZG lozenge Take 1 lozenge by mouth 5 times daily 70 lozenge 1    cyclobenzaprine (FLEXERIL) 10 MG tablet Take 1 tablet by mouth 3 times daily as needed for Muscle spasms 90 tablet 0    fenofibric acid (FIBRICOR) 135 MG CPDR capsule TAKE 1 CAPSULE BY MOUTH DAILY 90 capsule 0    LORazepam (ATIVAN) 1 MG tablet Take 1 mg by mouth every 6 hours as needed for Anxiety.  BLACK ELDERBERRY,BERRY-FLOWER, PO Take 1 tablet by mouth daily      Darunavir-Cobicistat (PREZCOBIX) 800-150 MG TABS Take by mouth nightly      Nebulizers (COMPRESSOR/NEBULIZER) MISC As directed 1 each 0    Respiratory Therapy Supplies (NEBULIZER/TUBING/MOUTHPIECE) KIT 1 kit by Does not apply route daily 1 kit 0    albuterol (PROVENTIL) (2.5 MG/3ML) 0.083% nebulizer solution Take 2.5 mg by nebulization every 6 hours as needed for Wheezing      TURMERIC PO Take by mouth three times daily      Glucosamine HCl (GLUCOSAMINE PO) Take by mouth daily      vitamin D 1000 UNITS CAPS Take by mouth daily      GARLIC PO Take by mouth daily      Multiple Vitamins-Minerals (MULTIVITAMIN PO) Take by mouth daily      INVEGA 6 MG extended release tablet Take 6 mg by mouth daily      DESCOVY 200-25 MG TABS Take 1 tablet by mouth daily      MYTESI 125 MG TBEC Take 125 mg by mouth         Past Medical History:   Diagnosis Date    Bipolar disorder ([facility] [address]     follows with Dr. Eileen Vaughn    Centrilobular emphysema ([facility] [address]     HIV (human immunodeficiency virus infection) ([facility] [address] 2001    followed by Jeimy Valladares (Infectious Disease at [address] [place] [address])    Pneumonia 06/11/2017       Past Surgical History:   Procedure Laterality Date    ANESTHESIA NERVE BLOCK Bilateral 1/12/2018    Bilat L2 L3 L4 L5 Diagnostic Medial BB performed by Mariam Wolfe MD at [address] [place] [address]  01/05/2016    Normal colon. Hemorrhoids.  Dr Aidee Hobbs  2015    full dental extraction    LUMBAR SPINE SURGERY Right 9/26/2017    Right L4 & L5 Transforaminal  performed by Mariam Wolfe MD at [id number] [place] [address] Dr Cifuentes 10/3/2017    Right L4 & L5 Transforaminal performed by Mariam Wolfe MD at [address] SURGERY Right 2019    Right L4 L5 TRANSFORAMINAL performed by Cherrie Lance MD at [facility], LUMBAR PLEXUS Bilateral 2018    Bilat L2 L3 L4 L5 Confirmatory Medial BB performed by Cherrie Lance MD at [facility] IMAGE GUIDE,EA ADDL LEVEL Right 2018    Right L2 L3 L4 L5 RADIOFREQUENCY performed by Cherrie Lance MD at [facility], W IMAGE GUIDE,EA ADDL LEVEL Left 3/15/2018    Left L2 L3 L4 L5 RADIOFREQUENCY performed by Cherrie Lance MD at [place] [address] History   Problem Relation Age of Onset    Cancer Mother         uterine s/p hysterectomy    Cancer Maternal Grandmother        Social History     Socioeconomic History    Marital status: Single     Spouse name: None    Number of children: None    Years of education: None    Highest education level: None   Occupational History    None   Social Needs    Financial resource strain: None    Food insecurity:     Worry: None     Inability: None    Transportation needs:     Medical: None     Non-medical: None   Tobacco Use    Smoking status: Former Smoker     Packs/day: 3.00     Years: 30.00     Pack years: 90.00     Last attempt to quit: 10/7/2014     Years since quittin.7    Smokeless tobacco: Never Used   Substance and Sexual Activity    Alcohol use: Yes     Comment: socially    Drug use: No    Sexual activity: None   Lifestyle    Physical activity:     Days per week: None     Minutes per session: None    Stress: None   Relationships    Social connections:     Talks on phone: None     Gets together: None     Attends Tenriism service: None     Active member of club or organization: None     Attends meetings of clubs or organizations: None     Relationship status: None    Intimate partner violence:     Fear of current or ex partner: None     Emotionally abused: None     Physically abused: None     Forced sexual activity: None Other Topics Concern    None   Social History Narrative    None       Review of Systems   Constitutional: Positive for activity change and fatigue. HENT: Negative. Eyes: Negative. Respiratory: Negative. Cardiovascular: Negative. Gastrointestinal: Negative for constipation and diarrhea. Endocrine: Negative. Genitourinary: Negative for difficulty urinating. Musculoskeletal: Positive for arthralgias, back pain and myalgias. Skin: Negative. Psychiatric/Behavioral: Positive for sleep disturbance. Objective:   Physical Exam   Constitutional: He is oriented to person, place, and time. He appears well-developed and well-nourished. He is active and cooperative. Non-toxic appearance. He does not have a sickly appearance. He does not appear ill. No distress. HENT:   Head: Normocephalic and atraumatic. Cardiovascular: Normal rate. Pulmonary/Chest: Effort normal. No accessory muscle usage. No apnea. No respiratory distress. Musculoskeletal:        Lumbar back: He exhibits pain. MRI OF THE LUMBAR SPINE WITHOUT CONTRAST, 6/11/2018 10:22 am     TECHNIQUE:  Multiplanar multisequence MRI of the lumbar spine was performed without the  administration of intravenous contrast.     COMPARISON:  None     HISTORY:  ORDERING SYSTEM PROVIDED HISTORY: Lumbar facet joint syndrome  TECHNOLOGIST PROVIDED HISTORY:  Reason for exam:->right lumbar radiculopathy  Ordering Physician Provided Reason for Exam:  Low back pain to the right  side, right leg pain. Symptoms for about one week. Acuity: Acute  Type of Exam: Initial  Additional signs and symptoms:  Lumbar facet syndrome; Right lumbar  radiculopathy     Initial evaluation.     FINDINGS:  BONES/ALIGNMENT: There is normal alignment of the spine. The vertebral body  heights are maintained.  The bone marrow signal appears unremarkable.  There  is degenerative disc disease with disc space narrowing and osteophytes at  L3-4, L4-5, and L5-S1.     SPINAL CORD: The conus terminates normally.     SOFT TISSUES: No paraspinal mass identified. Wilhelmena Rasher is a cyst in the left  kidney that is not fully evaluated.  Ultrasound could better evaluate.     L1-L2:  The thecal sac and neural foramina are intact.     L2-L3:  There is mild facet and ligamentum flavum hypertrophy.  The thecal  sac is not stenotic.  Disc and/or osteophytes cause minimal narrowing of the  neural foramina bilaterally.     L3-L4: The thecal sac is not stenotic.   Disc and/or osteophytes result in  mild narrowing of the neural foramina bilaterally.   There is mild facet and  ligamentum flavum hypertrophy.     L4-L5:  There is moderate facet and ligamentum flavum hypertrophy.  The  thecal sac is not stenotic.  Disc and/or osteophytes result in moderate  narrowing of the neural foramina bilaterally. The left neural foramen is  narrowed greater than right.     L5-S1:  There is mild facet and ligamentum flavum hypertrophy.  The thecal  sac is not stenotic.  The S1 nerve roots are intact. Disc and/or osteophytes  result in moderate narrowing of the neural foramina bilaterally. The right  neural foramen is narrowed greater than the left.     There is no significant change in the findings from the prior study.        Impression  Disc and osteophytes result in narrowing of the neural foramina at several  levels as discussed above.  No significant stenosis of thecal sac in the  lumbar region. Neurological: He is alert and oriented to person, place, and time. No cranial nerve deficit. GCS eye subscore is 4. GCS verbal subscore is 5. GCS motor subscore is 6. Skin: Skin is warm, dry and intact. Capillary refill takes less than 2 seconds. He is not diaphoretic. No cyanosis. No pallor. Nails show no clubbing. Psychiatric: He has a normal mood and affect. His behavior is normal. Judgment normal. His affect is not angry. His speech is not slurred. He is not agitated, not aggressive and not withdrawn.  He does not express impulsivity or inappropriate judgment. He does not exhibit a depressed mood. He is communicative. Vitals reviewed. Assessment:      1. Neural foraminal stenosis of lumbar spine    2. Lumbar radicular pain    3. Lumbar facet joint syndrome    4. Chronic bilateral low back pain with right-sided sciatica          Plan:    No refills needed today  Chronic pain diagnoses such as   1. Neural foraminal stenosis of lumbar spine    2. Lumbar radicular pain    3. Lumbar facet joint syndrome    4. Chronic bilateral low back pain with right-sided sciatica     controlled on current medication regime, wll continue current pain medications to improve quality of life and function.     Follow up 6 months    Cleave FELICE Zaragoza - CNP

## 2019-06-21 NOTE — PROGRESS NOTES
I have reviewed and agree to the content of the note written by the PTA.   Electronically signed by Juan Morales PT [id number]

## 2019-06-25 ENCOUNTER — HOSPITAL ENCOUNTER (OUTPATIENT)
Dept: PHYSICAL THERAPY | Age: 54
Setting detail: THERAPIES SERIES
Discharge: HOME | End: 2019-06-25
Payer: MEDICARE

## 2019-06-27 ENCOUNTER — HOSPITAL ENCOUNTER (OUTPATIENT)
Dept: CT IMAGING | Age: 54
Discharge: HOME | End: 2019-06-29
Payer: MEDICARE

## 2019-06-27 DIAGNOSIS — R10.9 ABDOMINAL PAIN, UNSPECIFIED ABDOMINAL LOCATION: ICD-10-CM

## 2019-06-27 PROCEDURE — 6360000004 HC RX CONTRAST MEDICATION: Performed by: NURSE PRACTITIONER

## 2019-06-27 PROCEDURE — 2709999900 CT ABDOMEN PELVIS W IV CONTRAST

## 2019-06-27 RX ADMIN — IOPAMIDOL 100 ML: 755 INJECTION, SOLUTION INTRAVENOUS at 09:36

## 2019-07-05 ENCOUNTER — TELEPHONE (OUTPATIENT)
Dept: FAMILY MEDICINE CLINIC | Age: 54
End: 2019-07-05

## 2019-07-15 DIAGNOSIS — E78.1 HYPERTRIGLYCERIDEMIA: ICD-10-CM

## 2019-07-15 RX ORDER — FENOFIBRIC ACID 135 MG/1
135 CAPSULE, DELAYED RELEASE ORAL DAILY
Qty: 90 CAPSULE | Refills: 0 | OUTPATIENT
Start: 2019-07-15

## 2019-07-15 NOTE — DISCHARGE SUMMARY
[facility]    [x] [place]  Phone: [phone]  Fax: [phone]      [] [place]  Phone: [phone]  Fax: [phone]    Physical Therapy Discharge Note  Date: 7/15/2019        Patient Name:  Adriana Rose    :  1965  MRN: [id number]  Restrictions/Precautions:      Medical/Treatment Diagnosis Information:  ·   Diagnosis: M54.41, G89.29 chronic LBP, R sciatica   · Treatment Diagnosis: M54.41 LBP, with R sciatica  ·    Insurance/Certification information:    [facility]  Physician Information:     Arin Eduardo CNP  Plan of care signed (Y/N): y  Visit# / total visits:  15  Pain level: 3/10     Time Period for Report:    Cancels/No-shows to date:      Plan of Care/Treatment to date:  [x] Therapeutic Exercise    [] Modalities:  [] Therapeutic Activity     [] Ultrasound  [x] Electrical Stimulation  [] Gait Training      [] Cervical Traction    [] Lumbar Traction  [] Neuromuscular Re-education  [] Cold/hotpack [] Iontophoresis  [x] Instruction in HEP      Other:  [x] Manual Therapy       []    [] Aquatic Therapy       []                    ? Subjective:     Pt rpts to clinic with mild complaints of LBP with slight radicular symptoms         Objective:  ·   Observation:   · Large , firm abdomen  · Bilateral tight IR  F/u for possible hernia [id number]    LBP would reduce, but not totally resolve        Plan:    d/c       Goals:    Short term goals  Time Frame for Short term goals: 1 week  Short term goal 1: Start HEP - met     Long term goals  Time Frame for Long term goals : 4 weeks   Long term goal 1: Pain 2/10 to allow regular ADL -part met 4/10. Long term goal 2: Resolve R sciatica pain pattern -part met  Long term goal 3: Tolerate sitting 60 min for required driving needs - part met, patient now able to sit approx 30min.   Long term goal 4: Able to stand 45-60 min for kitchen duty - part met, able to stand up to 30min         Percentage of Goals Met: 60 Discharge Prognosis: [] Excellent [] Good [x] Fair  [] Poor     Goal Status:  [] Achieved [x] Partially Achieved  [] Not Achieved       Electronically signed by:  Chet Wall PT

## 2019-07-26 ENCOUNTER — TELEPHONE (OUTPATIENT)
Dept: SURGERY | Age: 54
End: 2019-07-26

## 2019-08-06 ENCOUNTER — INITIAL CONSULT (OUTPATIENT)
Dept: SURGERY | Age: 54
End: 2019-08-06
Payer: MEDICARE

## 2019-08-06 VITALS
HEIGHT: 71 IN | HEART RATE: 95 BPM | WEIGHT: 216 LBS | SYSTOLIC BLOOD PRESSURE: 138 MMHG | BODY MASS INDEX: 30.24 KG/M2 | DIASTOLIC BLOOD PRESSURE: 86 MMHG | OXYGEN SATURATION: 98 % | TEMPERATURE: 96.7 F | RESPIRATION RATE: 18 BRPM

## 2019-08-06 DIAGNOSIS — Z21 ASYMPTOMATIC HIV INFECTION (HCC): ICD-10-CM

## 2019-08-06 DIAGNOSIS — K64.8 INTERNAL HEMORRHOIDS WITHOUT COMPLICATION: ICD-10-CM

## 2019-08-06 DIAGNOSIS — K62.5 BLOOD PER RECTUM: ICD-10-CM

## 2019-08-06 DIAGNOSIS — K40.90 LEFT INGUINAL HERNIA: ICD-10-CM

## 2019-08-06 DIAGNOSIS — R39.12 WEAK URINE STREAM: ICD-10-CM

## 2019-08-06 DIAGNOSIS — K43.9 VENTRAL HERNIA WITHOUT OBSTRUCTION OR GANGRENE: Primary | ICD-10-CM

## 2019-08-06 DIAGNOSIS — R39.11 URINARY HESITANCY: ICD-10-CM

## 2019-08-06 PROCEDURE — 99214 OFFICE O/P EST MOD 30 MIN: CPT | Performed by: SURGERY

## 2019-08-06 PROCEDURE — 3017F COLORECTAL CA SCREEN DOC REV: CPT | Performed by: SURGERY

## 2019-08-06 PROCEDURE — G8417 CALC BMI ABV UP PARAM F/U: HCPCS | Performed by: SURGERY

## 2019-08-06 PROCEDURE — G8427 DOCREV CUR MEDS BY ELIG CLIN: HCPCS | Performed by: SURGERY

## 2019-08-06 PROCEDURE — 1036F TOBACCO NON-USER: CPT | Performed by: SURGERY

## 2019-08-06 RX ORDER — GABAPENTIN 600 MG/1
TABLET ORAL
Refills: 5 | COMMUNITY
Start: 2019-06-21 | End: 2019-09-26 | Stop reason: SDUPTHER

## 2019-08-06 NOTE — LETTER
[address]  Phone: [phone]  Fax: [phone]      19    Patient: Jennifer Pineda  MRN: [id number]  : 1965  Date of visit: 2019    Dear Ms Gregg Ken:     Thank you for the request for consultation for Jennifer Pineda to me for the evaluation of multiple issues. Below are the relevant portions of my assessment and plan of care. If you have questions, please do not hesitate to call me. I look forward to following Khangrufino Miriam along with you.     Sincerely,        Lena Gutierrez, DO

## 2019-08-06 NOTE — PROGRESS NOTES
tablet by mouth daily      MYTESI 125 MG TBEC Take 125 mg by mouth      nystatin (MYCOSTATIN) 308271 UNIT/ML suspension Take 5 mLs by mouth 4 times daily for 10 days 180 mL 3     No current facility-administered medications for this visit. Home Medications:   Prior to Admission medications    Medication Sig Start Date End Date Taking? Authorizing Provider   gabapentin (NEURONTIN) 600 MG tablet TK 1 T PO  TID 6/21/19  Yes Historical Provider, MD   fluticasone (FLONASE) 50 MCG/ACT nasal spray PLACE 2 SPRAYS INTO EACH NOSTRIL ONCE DAILY 5/22/19  Yes FELICE Crandall - SOPHIE   baclofen (LIORESAL) 10 MG tablet TAKE 1 TABLET BY MOUTH THREE TIMES DAILY AS NEEDED FOR SPASMS 4/26/19  Yes TAMMY Carr   omeprazole (PRILOSEC) 20 MG delayed release capsule Take 1 capsule by mouth daily 3/20/19  Yes Historical Provider, MD   SYMTUZA [phone] MG TABS TK 1 T PO  QD 3/27/19  Yes Historical Provider, MD   aspirin-acetaminophen-caffeine (Maryistine [place]) [id number] MG per tablet Take 1 tablet by mouth every 6 hours as needed for Headaches   Yes Historical Provider, MD   Clotrimazole (MYCELEX) 10 MG LOZG lozenge Take 1 lozenge by mouth 5 times daily 3/8/19  Yes TAMMY Carr   fenofibric acid (FIBRICOR) 135 MG CPDR capsule TAKE 1 CAPSULE BY MOUTH DAILY 12/13/18  Yes Kimmie Rodriguez MD   LORazepam (ATIVAN) 1 MG tablet Take 1 mg by mouth every 6 hours as needed for Anxiety.    Yes Historical Provider, MALCOM SOLITARIO, PO Take 1 tablet by mouth daily   Yes Historical Provider, MD   Darunavir-Cobicistat (PREZCOBIX) 800-150 MG TABS Take by mouth nightly   Yes Historical Provider, MD   Nebulizers (COMPRESSOR/NEBULIZER) MISC As directed 6/16/17  Yes TAMMY Carr   Respiratory Therapy Supplies (NEBULIZER/TUBING/MOUTHPIECE) KIT 1 kit by Does not apply route daily 6/16/17  Yes TAMMY Carr   albuterol (PROVENTIL) (2.5 MG/3ML) 0.083% nebulizer solution Take 2.5 mg by nebulization

## 2019-08-12 ENCOUNTER — TELEPHONE (OUTPATIENT)
Dept: FAMILY MEDICINE CLINIC | Age: 54
End: 2019-08-12

## 2019-09-03 DIAGNOSIS — M54.16 LUMBAR RADICULAR PAIN: ICD-10-CM

## 2019-09-03 DIAGNOSIS — M47.816 LUMBAR FACET JOINT SYNDROME: ICD-10-CM

## 2019-09-03 RX ORDER — GABAPENTIN 300 MG/1
CAPSULE ORAL
Qty: 270 CAPSULE | Refills: 0 | Status: CANCELLED | OUTPATIENT
Start: 2019-09-03

## 2019-09-03 RX ORDER — GABAPENTIN 300 MG/1
300 CAPSULE ORAL 3 TIMES DAILY
Qty: 270 CAPSULE | Refills: 3 | OUTPATIENT
Start: 2019-09-03 | End: 2020-01-13

## 2019-09-26 RX ORDER — GABAPENTIN 600 MG/1
TABLET ORAL
Qty: 90 TABLET | Refills: 0 | Status: SHIPPED | OUTPATIENT
Start: 2019-09-26 | End: 2019-10-28 | Stop reason: SDUPTHER

## 2019-10-02 ENCOUNTER — OFFICE VISIT (OUTPATIENT)
Dept: OTOLARYNGOLOGY | Age: 54
End: 2019-10-02
Payer: MEDICARE

## 2019-10-02 VITALS
HEIGHT: 70 IN | DIASTOLIC BLOOD PRESSURE: 78 MMHG | HEART RATE: 68 BPM | BODY MASS INDEX: 30.92 KG/M2 | WEIGHT: 216 LBS | SYSTOLIC BLOOD PRESSURE: 128 MMHG | RESPIRATION RATE: 14 BRPM

## 2019-10-02 DIAGNOSIS — H61.23 BILATERAL IMPACTED CERUMEN: Primary | ICD-10-CM

## 2019-10-02 PROCEDURE — G8417 CALC BMI ABV UP PARAM F/U: HCPCS | Performed by: OTOLARYNGOLOGY

## 2019-10-02 PROCEDURE — 99213 OFFICE O/P EST LOW 20 MIN: CPT | Performed by: OTOLARYNGOLOGY

## 2019-10-02 PROCEDURE — G8484 FLU IMMUNIZE NO ADMIN: HCPCS | Performed by: OTOLARYNGOLOGY

## 2019-10-02 PROCEDURE — 3017F COLORECTAL CA SCREEN DOC REV: CPT | Performed by: OTOLARYNGOLOGY

## 2019-10-02 PROCEDURE — G8427 DOCREV CUR MEDS BY ELIG CLIN: HCPCS | Performed by: OTOLARYNGOLOGY

## 2019-10-02 PROCEDURE — 1036F TOBACCO NON-USER: CPT | Performed by: OTOLARYNGOLOGY

## 2019-10-28 RX ORDER — GABAPENTIN 600 MG/1
600 TABLET ORAL 3 TIMES DAILY
Qty: 90 TABLET | Refills: 3 | OUTPATIENT
Start: 2019-10-28 | End: 2020-01-13

## 2019-10-31 ENCOUNTER — OFFICE VISIT (OUTPATIENT)
Dept: PRIMARY CARE CLINIC | Age: 54
End: 2019-10-31
Payer: MEDICARE

## 2019-10-31 VITALS
OXYGEN SATURATION: 96 % | WEIGHT: 211.4 LBS | HEIGHT: 70 IN | TEMPERATURE: 96.6 F | DIASTOLIC BLOOD PRESSURE: 78 MMHG | HEART RATE: 84 BPM | SYSTOLIC BLOOD PRESSURE: 130 MMHG | BODY MASS INDEX: 30.26 KG/M2 | RESPIRATION RATE: 12 BRPM

## 2019-10-31 DIAGNOSIS — J30.9 CHRONIC ALLERGIC RHINITIS: ICD-10-CM

## 2019-10-31 DIAGNOSIS — G43.909 MIGRAINE WITHOUT STATUS MIGRAINOSUS, NOT INTRACTABLE, UNSPECIFIED MIGRAINE TYPE: Primary | ICD-10-CM

## 2019-10-31 PROCEDURE — G8417 CALC BMI ABV UP PARAM F/U: HCPCS | Performed by: NURSE PRACTITIONER

## 2019-10-31 PROCEDURE — 99214 OFFICE O/P EST MOD 30 MIN: CPT | Performed by: NURSE PRACTITIONER

## 2019-10-31 PROCEDURE — 3017F COLORECTAL CA SCREEN DOC REV: CPT | Performed by: NURSE PRACTITIONER

## 2019-10-31 PROCEDURE — G8484 FLU IMMUNIZE NO ADMIN: HCPCS | Performed by: NURSE PRACTITIONER

## 2019-10-31 PROCEDURE — 1036F TOBACCO NON-USER: CPT | Performed by: NURSE PRACTITIONER

## 2019-10-31 PROCEDURE — G8427 DOCREV CUR MEDS BY ELIG CLIN: HCPCS | Performed by: NURSE PRACTITIONER

## 2019-10-31 PROCEDURE — 96372 THER/PROPH/DIAG INJ SC/IM: CPT | Performed by: NURSE PRACTITIONER

## 2019-10-31 RX ORDER — KETOROLAC TROMETHAMINE 30 MG/ML
30 INJECTION, SOLUTION INTRAMUSCULAR; INTRAVENOUS ONCE
Status: COMPLETED | OUTPATIENT
Start: 2019-10-31 | End: 2019-10-31

## 2019-10-31 RX ORDER — FLUTICASONE PROPIONATE 50 MCG
1 SPRAY, SUSPENSION (ML) NASAL DAILY
Qty: 1 BOTTLE | Refills: 3 | Status: SHIPPED | OUTPATIENT
Start: 2019-10-31 | End: 2019-10-31 | Stop reason: SDUPTHER

## 2019-10-31 RX ADMIN — KETOROLAC TROMETHAMINE 30 MG: 30 INJECTION, SOLUTION INTRAMUSCULAR; INTRAVENOUS at 09:44

## 2019-10-31 ASSESSMENT — ENCOUNTER SYMPTOMS
VOMITING: 0
NAUSEA: 1
SINUS PRESSURE: 0
FACIAL SWEATING: 1
RHINORRHEA: 0
EYE DISCHARGE: 1
SINUS PAIN: 0
PHOTOPHOBIA: 0
COUGH: 0
BLURRED VISION: 0
SORE THROAT: 0

## 2019-10-31 ASSESSMENT — PATIENT HEALTH QUESTIONNAIRE - PHQ9
SUM OF ALL RESPONSES TO PHQ QUESTIONS 1-9: 0
2. FEELING DOWN, DEPRESSED OR HOPELESS: 0
1. LITTLE INTEREST OR PLEASURE IN DOING THINGS: 0
SUM OF ALL RESPONSES TO PHQ9 QUESTIONS 1 & 2: 0
SUM OF ALL RESPONSES TO PHQ QUESTIONS 1-9: 0

## 2019-11-21 ENCOUNTER — OFFICE VISIT (OUTPATIENT)
Dept: NEUROLOGY | Age: 54
End: 2019-11-21
Payer: MEDICARE

## 2019-11-21 VITALS
SYSTOLIC BLOOD PRESSURE: 124 MMHG | DIASTOLIC BLOOD PRESSURE: 78 MMHG | BODY MASS INDEX: 30.81 KG/M2 | HEIGHT: 70 IN | WEIGHT: 215.2 LBS | RESPIRATION RATE: 20 BRPM | HEART RATE: 84 BPM

## 2019-11-21 DIAGNOSIS — G89.29 CHRONIC BILATERAL LOW BACK PAIN WITH RIGHT-SIDED SCIATICA: ICD-10-CM

## 2019-11-21 DIAGNOSIS — Z21 ASYMPTOMATIC HIV INFECTION (HCC): ICD-10-CM

## 2019-11-21 DIAGNOSIS — M47.816 LUMBAR FACET JOINT SYNDROME: ICD-10-CM

## 2019-11-21 DIAGNOSIS — R93.89 ABNORMAL CT OF THE CHEST: ICD-10-CM

## 2019-11-21 DIAGNOSIS — M54.41 CHRONIC BILATERAL LOW BACK PAIN WITH RIGHT-SIDED SCIATICA: ICD-10-CM

## 2019-11-21 DIAGNOSIS — R73.01 IFG (IMPAIRED FASTING GLUCOSE): ICD-10-CM

## 2019-11-21 DIAGNOSIS — Z87.891 FORMER SMOKER: ICD-10-CM

## 2019-11-21 DIAGNOSIS — M48.061 NEURAL FORAMINAL STENOSIS OF LUMBAR SPINE: ICD-10-CM

## 2019-11-21 DIAGNOSIS — M54.2 NECK PAIN: ICD-10-CM

## 2019-11-21 DIAGNOSIS — M47.817 LUMBOSACRAL SPONDYLOSIS WITHOUT MYELOPATHY: ICD-10-CM

## 2019-11-21 DIAGNOSIS — M62.838 MUSCLE SPASM: ICD-10-CM

## 2019-11-21 DIAGNOSIS — G44.59 OTHER COMPLICATED HEADACHE SYNDROME: Primary | ICD-10-CM

## 2019-11-21 DIAGNOSIS — F31.9 BIPOLAR AFFECTIVE DISORDER, REMISSION STATUS UNSPECIFIED (HCC): ICD-10-CM

## 2019-11-21 DIAGNOSIS — G43.919 INTRACTABLE MIGRAINE WITHOUT STATUS MIGRAINOSUS, UNSPECIFIED MIGRAINE TYPE: ICD-10-CM

## 2019-11-21 DIAGNOSIS — R41.3 MEMORY PROBLEM: ICD-10-CM

## 2019-11-21 DIAGNOSIS — J43.2 CENTRILOBULAR EMPHYSEMA (HCC): ICD-10-CM

## 2019-11-21 DIAGNOSIS — M54.16 LUMBAR RADICULAR PAIN: ICD-10-CM

## 2019-11-21 PROCEDURE — G8926 SPIRO NO PERF OR DOC: HCPCS | Performed by: PSYCHIATRY & NEUROLOGY

## 2019-11-21 PROCEDURE — G8427 DOCREV CUR MEDS BY ELIG CLIN: HCPCS | Performed by: PSYCHIATRY & NEUROLOGY

## 2019-11-21 PROCEDURE — 3023F SPIROM DOC REV: CPT | Performed by: PSYCHIATRY & NEUROLOGY

## 2019-11-21 PROCEDURE — G8484 FLU IMMUNIZE NO ADMIN: HCPCS | Performed by: PSYCHIATRY & NEUROLOGY

## 2019-11-21 PROCEDURE — G8417 CALC BMI ABV UP PARAM F/U: HCPCS | Performed by: PSYCHIATRY & NEUROLOGY

## 2019-11-21 PROCEDURE — 99205 OFFICE O/P NEW HI 60 MIN: CPT | Performed by: PSYCHIATRY & NEUROLOGY

## 2019-11-21 RX ORDER — ONDANSETRON 4 MG/1
4 TABLET, ORALLY DISINTEGRATING ORAL EVERY 8 HOURS PRN
Qty: 30 TABLET | Refills: 1 | Status: SHIPPED | OUTPATIENT
Start: 2019-11-21 | End: 2020-01-06 | Stop reason: SDUPTHER

## 2019-11-21 RX ORDER — SUMATRIPTAN 100 MG/1
TABLET, FILM COATED ORAL
Qty: 12 TABLET | Refills: 1 | Status: SHIPPED | OUTPATIENT
Start: 2019-11-21 | End: 2020-01-13

## 2019-11-21 RX ORDER — NAPROXEN 500 MG/1
500 TABLET ORAL 2 TIMES DAILY WITH MEALS
Qty: 60 TABLET | Refills: 3 | Status: SHIPPED | OUTPATIENT
Start: 2019-11-21 | End: 2020-05-28 | Stop reason: SDUPTHER

## 2019-11-21 RX ORDER — RANITIDINE 300 MG/1
300 TABLET ORAL NIGHTLY
Qty: 30 TABLET | Refills: 1 | Status: SHIPPED | OUTPATIENT
Start: 2019-11-21 | End: 2020-01-06 | Stop reason: ALTCHOICE

## 2019-11-21 RX ORDER — BUTALBITAL, ACETAMINOPHEN AND CAFFEINE 50; 325; 40 MG/1; MG/1; MG/1
TABLET ORAL
Qty: 60 TABLET | Refills: 1 | Status: SHIPPED | OUTPATIENT
Start: 2019-11-21 | End: 2020-01-06 | Stop reason: SDUPTHER

## 2019-11-21 ASSESSMENT — ENCOUNTER SYMPTOMS
EYE PAIN: 0
DIARRHEA: 0
TROUBLE SWALLOWING: 0
ABDOMINAL PAIN: 0
EYE DISCHARGE: 0
VISUAL CHANGE: 0
PHOTOPHOBIA: 1
BLURRED VISION: 0
CHEST TIGHTNESS: 0
EYE REDNESS: 0
SCALP TENDERNESS: 0
VOICE CHANGE: 0
NAUSEA: 1
CONSTIPATION: 0
ABDOMINAL DISTENTION: 0
VOMITING: 0
COLOR CHANGE: 0
BACK PAIN: 1
APNEA: 0
SHORTNESS OF BREATH: 0
SWOLLEN GLANDS: 0
SORE THROAT: 0
SINUS PRESSURE: 0
COUGH: 0
BLOOD IN STOOL: 0
WHEEZING: 0
EYE ITCHING: 0
FACIAL SWELLING: 0
RHINORRHEA: 0
EYE WATERING: 0
FACIAL SWEATING: 0
CHOKING: 0

## 2019-11-22 ENCOUNTER — HOSPITAL ENCOUNTER (OUTPATIENT)
Dept: LAB | Age: 54
Discharge: HOME | End: 2019-11-22
Payer: MEDICARE

## 2019-11-22 DIAGNOSIS — R41.3 MEMORY PROBLEM: ICD-10-CM

## 2019-11-22 DIAGNOSIS — Z87.891 FORMER SMOKER: ICD-10-CM

## 2019-11-22 DIAGNOSIS — R93.89 ABNORMAL CT OF THE CHEST: ICD-10-CM

## 2019-11-22 DIAGNOSIS — G43.919 INTRACTABLE MIGRAINE WITHOUT STATUS MIGRAINOSUS, UNSPECIFIED MIGRAINE TYPE: ICD-10-CM

## 2019-11-22 LAB
ALBUMIN SERPL-MCNC: 4.3 G/DL (ref 3.5–5.2)
ALBUMIN/GLOBULIN RATIO: 1.6 (ref 1–2.5)
ALP BLD-CCNC: 68 U/L (ref 40–129)
ALT SERPL-CCNC: 16 U/L (ref 5–41)
ANION GAP SERPL CALCULATED.3IONS-SCNC: 11 MMOL/L (ref 9–17)
AST SERPL-CCNC: 17 U/L
BILIRUB SERPL-MCNC: 0.21 MG/DL (ref 0.3–1.2)
BUN BLDV-MCNC: 14 MG/DL (ref 6–20)
BUN/CREAT BLD: 17 (ref 9–20)
CALCIUM SERPL-MCNC: 9.6 MG/DL (ref 8.6–10.4)
CHLORIDE BLD-SCNC: 103 MMOL/L (ref 98–107)
CO2: 28 MMOL/L (ref 20–31)
CREAT SERPL-MCNC: 0.82 MG/DL (ref 0.7–1.2)
FOLATE: >20 NG/ML
GFR AFRICAN AMERICAN: >60 ML/MIN
GFR NON-AFRICAN AMERICAN: >60 ML/MIN
GFR SERPL CREATININE-BSD FRML MDRD: ABNORMAL ML/MIN/{1.73_M2}
GFR SERPL CREATININE-BSD FRML MDRD: ABNORMAL ML/MIN/{1.73_M2}
GLUCOSE BLD-MCNC: 115 MG/DL (ref 70–99)
HCT VFR BLD CALC: 44.4 % (ref 41–53)
HEMOGLOBIN: 14.8 G/DL (ref 13.5–17.5)
MCH RBC QN AUTO: 32.3 PG (ref 26–34)
MCHC RBC AUTO-ENTMCNC: 33.3 G/DL (ref 31–37)
MCV RBC AUTO: 97.1 FL (ref 80–100)
NRBC AUTOMATED: ABNORMAL PER 100 WBC
PDW BLD-RTO: 14.8 % (ref 11–14.5)
PLATELET # BLD: 319 K/UL (ref 140–450)
PMV BLD AUTO: 7.1 FL (ref 6–12)
POTASSIUM SERPL-SCNC: 3.9 MMOL/L (ref 3.7–5.3)
RBC # BLD: 4.57 M/UL (ref 4.5–5.9)
RHEUMATOID FACTOR: <10 IU/ML
SEDIMENTATION RATE, ERYTHROCYTE: 12 MM (ref 0–20)
SODIUM BLD-SCNC: 142 MMOL/L (ref 135–144)
T. PALLIDUM, IGG: NONREACTIVE
TOTAL PROTEIN: 7 G/DL (ref 6.4–8.3)
TSH SERPL DL<=0.05 MIU/L-ACNC: 1.62 MIU/L (ref 0.3–5)
VITAMIN B-12: 586 PG/ML (ref 232–1245)
WBC # BLD: 5.4 K/UL (ref 3.5–11)

## 2019-11-22 PROCEDURE — 85613 RUSSELL VIPER VENOM DILUTED: CPT

## 2019-11-22 PROCEDURE — 80053 COMPREHEN METABOLIC PANEL: CPT

## 2019-11-22 PROCEDURE — 86038 ANTINUCLEAR ANTIBODIES: CPT

## 2019-11-22 PROCEDURE — 86780 TREPONEMA PALLIDUM: CPT

## 2019-11-22 PROCEDURE — 85730 THROMBOPLASTIN TIME PARTIAL: CPT

## 2019-11-22 PROCEDURE — 85610 PROTHROMBIN TIME: CPT

## 2019-11-22 PROCEDURE — 86147 CARDIOLIPIN ANTIBODY EA IG: CPT

## 2019-11-22 PROCEDURE — 85027 COMPLETE CBC AUTOMATED: CPT

## 2019-11-22 PROCEDURE — 84443 ASSAY THYROID STIM HORMONE: CPT

## 2019-11-22 PROCEDURE — 36415 COLL VENOUS BLD VENIPUNCTURE: CPT

## 2019-11-22 PROCEDURE — 86431 RHEUMATOID FACTOR QUANT: CPT

## 2019-11-22 PROCEDURE — 85651 RBC SED RATE NONAUTOMATED: CPT

## 2019-11-22 PROCEDURE — 82607 VITAMIN B-12: CPT

## 2019-11-22 PROCEDURE — 82746 ASSAY OF FOLIC ACID SERUM: CPT

## 2019-11-25 LAB — ANTI-NUCLEAR ANTIBODY (ANA): NEGATIVE

## 2019-11-26 LAB
ANTICARDIOLIPIN IGA ANTIBODY: 0.3 APU
ANTICARDIOLIPIN IGG ANTIBODY: 1 GPU
CARDIOLIPIN AB IGM: 3.2 MPU
DILUTE RUSSELL VIPER VENOM TIME: NORMAL
INR BLD: 0.9
LUPUS ANTICOAG: NORMAL
PARTIAL THROMBOPLASTIN TIME: 24 SEC (ref 20.5–30.5)
PROTHROMBIN TIME: 9.7 SEC (ref 9–12)

## 2019-12-17 ENCOUNTER — INITIAL CONSULT (OUTPATIENT)
Dept: SURGERY | Age: 54
End: 2019-12-17
Payer: MEDICARE

## 2019-12-17 VITALS
BODY MASS INDEX: 30.06 KG/M2 | SYSTOLIC BLOOD PRESSURE: 130 MMHG | WEIGHT: 210 LBS | DIASTOLIC BLOOD PRESSURE: 88 MMHG | HEIGHT: 70 IN | TEMPERATURE: 96.3 F | HEART RATE: 94 BPM | RESPIRATION RATE: 16 BRPM

## 2019-12-17 DIAGNOSIS — R13.10 ODYNOPHAGIA: Primary | ICD-10-CM

## 2019-12-17 DIAGNOSIS — R13.19 ESOPHAGEAL DYSPHAGIA: ICD-10-CM

## 2019-12-17 PROCEDURE — G8484 FLU IMMUNIZE NO ADMIN: HCPCS | Performed by: SURGERY

## 2019-12-17 PROCEDURE — 99202 OFFICE O/P NEW SF 15 MIN: CPT | Performed by: SURGERY

## 2019-12-17 PROCEDURE — G8427 DOCREV CUR MEDS BY ELIG CLIN: HCPCS | Performed by: SURGERY

## 2019-12-17 PROCEDURE — G8417 CALC BMI ABV UP PARAM F/U: HCPCS | Performed by: SURGERY

## 2019-12-17 PROCEDURE — 1036F TOBACCO NON-USER: CPT | Performed by: SURGERY

## 2019-12-17 PROCEDURE — 3017F COLORECTAL CA SCREEN DOC REV: CPT | Performed by: SURGERY

## 2019-12-17 RX ORDER — FLUCONAZOLE 100 MG/1
TABLET ORAL
Refills: 0 | COMMUNITY
Start: 2019-11-19 | End: 2020-01-13 | Stop reason: ALTCHOICE

## 2019-12-19 ENCOUNTER — HOSPITAL ENCOUNTER (OUTPATIENT)
Dept: MRI IMAGING | Age: 54
Discharge: HOME | End: 2019-12-21
Payer: MEDICARE

## 2019-12-19 ENCOUNTER — HOSPITAL ENCOUNTER (OUTPATIENT)
Dept: CT IMAGING | Age: 54
Discharge: HOME | End: 2019-12-21
Payer: MEDICARE

## 2019-12-19 DIAGNOSIS — M54.2 NECK PAIN: ICD-10-CM

## 2019-12-19 DIAGNOSIS — R41.3 MEMORY PROBLEM: ICD-10-CM

## 2019-12-19 DIAGNOSIS — G43.919 INTRACTABLE MIGRAINE WITHOUT STATUS MIGRAINOSUS, UNSPECIFIED MIGRAINE TYPE: ICD-10-CM

## 2019-12-19 PROCEDURE — 71250 CT THORAX DX C-: CPT

## 2019-12-23 ENCOUNTER — HOSPITAL ENCOUNTER (OUTPATIENT)
Dept: NEUROLOGY | Age: 54
Discharge: HOME | End: 2019-12-23
Payer: MEDICARE

## 2019-12-23 DIAGNOSIS — G43.919 INTRACTABLE MIGRAINE WITHOUT STATUS MIGRAINOSUS, UNSPECIFIED MIGRAINE TYPE: ICD-10-CM

## 2019-12-23 DIAGNOSIS — R41.3 MEMORY PROBLEM: ICD-10-CM

## 2019-12-23 PROCEDURE — 95813 EEG EXTND MNTR 61-119 MIN: CPT

## 2019-12-23 PROCEDURE — 95957 EEG DIGITAL ANALYSIS: CPT

## 2019-12-24 ENCOUNTER — HOSPITAL ENCOUNTER (OUTPATIENT)
Dept: MRI IMAGING | Age: 54
Discharge: HOME | End: 2019-12-26
Payer: MEDICARE

## 2019-12-24 PROCEDURE — 70553 MRI BRAIN STEM W/O & W/DYE: CPT

## 2019-12-24 PROCEDURE — 72141 MRI NECK SPINE W/O DYE: CPT

## 2019-12-24 PROCEDURE — 6360000004 HC RX CONTRAST MEDICATION: Performed by: PSYCHIATRY & NEUROLOGY

## 2019-12-24 PROCEDURE — A9579 GAD-BASE MR CONTRAST NOS,1ML: HCPCS | Performed by: PSYCHIATRY & NEUROLOGY

## 2019-12-24 RX ADMIN — GADOTERIDOL 15 ML: 279.3 INJECTION, SOLUTION INTRAVENOUS at 11:10

## 2019-12-30 ENCOUNTER — OFFICE VISIT (OUTPATIENT)
Dept: PRIMARY CARE CLINIC | Age: 54
End: 2019-12-30
Payer: MEDICARE

## 2019-12-30 VITALS
TEMPERATURE: 98.2 F | SYSTOLIC BLOOD PRESSURE: 136 MMHG | HEART RATE: 80 BPM | WEIGHT: 206 LBS | DIASTOLIC BLOOD PRESSURE: 80 MMHG | BODY MASS INDEX: 29.49 KG/M2 | HEIGHT: 70 IN

## 2019-12-30 DIAGNOSIS — R61 DIAPHORESIS: Primary | ICD-10-CM

## 2019-12-30 PROCEDURE — 99213 OFFICE O/P EST LOW 20 MIN: CPT | Performed by: FAMILY MEDICINE

## 2019-12-30 PROCEDURE — 1036F TOBACCO NON-USER: CPT | Performed by: FAMILY MEDICINE

## 2019-12-30 PROCEDURE — G8427 DOCREV CUR MEDS BY ELIG CLIN: HCPCS | Performed by: FAMILY MEDICINE

## 2019-12-30 PROCEDURE — G8484 FLU IMMUNIZE NO ADMIN: HCPCS | Performed by: FAMILY MEDICINE

## 2019-12-30 PROCEDURE — G8417 CALC BMI ABV UP PARAM F/U: HCPCS | Performed by: FAMILY MEDICINE

## 2019-12-30 PROCEDURE — 3017F COLORECTAL CA SCREEN DOC REV: CPT | Performed by: FAMILY MEDICINE

## 2019-12-30 ASSESSMENT — ENCOUNTER SYMPTOMS
EYES NEGATIVE: 1
COUGH: 0
ALLERGIC/IMMUNOLOGIC NEGATIVE: 1
GASTROINTESTINAL NEGATIVE: 1
RESPIRATORY NEGATIVE: 1
SORE THROAT: 0

## 2020-01-02 ENCOUNTER — TELEPHONE (OUTPATIENT)
Dept: SURGERY | Age: 55
End: 2020-01-02

## 2020-01-06 ENCOUNTER — OFFICE VISIT (OUTPATIENT)
Dept: NEUROLOGY | Age: 55
End: 2020-01-06
Payer: MEDICARE

## 2020-01-06 VITALS
HEIGHT: 70 IN | DIASTOLIC BLOOD PRESSURE: 78 MMHG | OXYGEN SATURATION: 97 % | BODY MASS INDEX: 29.86 KG/M2 | SYSTOLIC BLOOD PRESSURE: 122 MMHG | HEART RATE: 81 BPM | WEIGHT: 208.6 LBS

## 2020-01-06 PROCEDURE — 3017F COLORECTAL CA SCREEN DOC REV: CPT | Performed by: PSYCHIATRY & NEUROLOGY

## 2020-01-06 PROCEDURE — G8926 SPIRO NO PERF OR DOC: HCPCS | Performed by: PSYCHIATRY & NEUROLOGY

## 2020-01-06 PROCEDURE — 1036F TOBACCO NON-USER: CPT | Performed by: PSYCHIATRY & NEUROLOGY

## 2020-01-06 PROCEDURE — 3023F SPIROM DOC REV: CPT | Performed by: PSYCHIATRY & NEUROLOGY

## 2020-01-06 PROCEDURE — G8427 DOCREV CUR MEDS BY ELIG CLIN: HCPCS | Performed by: PSYCHIATRY & NEUROLOGY

## 2020-01-06 PROCEDURE — G8484 FLU IMMUNIZE NO ADMIN: HCPCS | Performed by: PSYCHIATRY & NEUROLOGY

## 2020-01-06 PROCEDURE — G8417 CALC BMI ABV UP PARAM F/U: HCPCS | Performed by: PSYCHIATRY & NEUROLOGY

## 2020-01-06 PROCEDURE — 99214 OFFICE O/P EST MOD 30 MIN: CPT | Performed by: PSYCHIATRY & NEUROLOGY

## 2020-01-06 RX ORDER — ONDANSETRON 4 MG/1
4 TABLET, ORALLY DISINTEGRATING ORAL EVERY 8 HOURS PRN
Qty: 30 TABLET | Refills: 1 | Status: SHIPPED | OUTPATIENT
Start: 2020-01-06 | End: 2020-05-28 | Stop reason: SDUPTHER

## 2020-01-06 RX ORDER — OMEPRAZOLE 40 MG/1
40 CAPSULE, DELAYED RELEASE ORAL DAILY
Qty: 30 CAPSULE | Refills: 3 | Status: SHIPPED | OUTPATIENT
Start: 2020-01-06 | End: 2020-01-06

## 2020-01-06 RX ORDER — BUTALBITAL, ACETAMINOPHEN AND CAFFEINE 50; 325; 40 MG/1; MG/1; MG/1
TABLET ORAL
Qty: 60 TABLET | Refills: 1 | Status: SHIPPED | OUTPATIENT
Start: 2020-01-06 | End: 2020-03-30

## 2020-01-06 RX ORDER — OMEPRAZOLE 40 MG/1
40 CAPSULE, DELAYED RELEASE ORAL DAILY
Qty: 90 CAPSULE | Refills: 0 | Status: ON HOLD | OUTPATIENT
Start: 2020-01-06 | End: 2020-03-02 | Stop reason: HOSPADM

## 2020-01-06 ASSESSMENT — ENCOUNTER SYMPTOMS
BACK PAIN: 1
TROUBLE SWALLOWING: 0
EYE ITCHING: 0
VOMITING: 0
WHEEZING: 0
EYE WATERING: 0
ABDOMINAL PAIN: 0
CHEST TIGHTNESS: 0
CHOKING: 0
EYE PAIN: 0
FACIAL SWELLING: 0
COUGH: 0
EYE DISCHARGE: 0
SWOLLEN GLANDS: 0
CONSTIPATION: 0
RHINORRHEA: 0
COLOR CHANGE: 0
SINUS PRESSURE: 0
SCALP TENDERNESS: 0
BLURRED VISION: 0
NAUSEA: 1
FACIAL SWEATING: 0
SORE THROAT: 0
BLOOD IN STOOL: 0
ABDOMINAL DISTENTION: 0
PHOTOPHOBIA: 1
APNEA: 0
EYE REDNESS: 0
VOICE CHANGE: 0
SHORTNESS OF BREATH: 0
DIARRHEA: 0
VISUAL CHANGE: 0

## 2020-01-06 NOTE — PROGRESS NOTES
[facility] ([place], [place])  Neurology  [address]:[phone]   Fax: [phone]    SUBJECTIVE:     PATIENT ID:  Tania Panchal is a  RIGHT     HANDED 47 y.o. male. Migraine    This is a chronic problem. Episode onset: SINCE  TEENAGE   The problem has been waxing and waning. The pain is located in the bilateral region. The pain does not radiate. The pain quality is similar to prior headaches. The quality of the pain is described as aching, throbbing and pulsating. The pain is at a severity of 3/10. The pain is mild. Associated symptoms include back pain, a loss of balance, nausea, neck pain, phonophobia and photophobia. Pertinent negatives include no abdominal pain, abnormal behavior, anorexia, blurred vision, coughing, dizziness, drainage, ear pain, eye pain, eye redness, eye watering, facial sweating, fever, hearing loss, insomnia, muscle aches, numbness, rhinorrhea, scalp tenderness, seizures, sinus pressure, sore throat, swollen glands, tingling, tinnitus, visual change, vomiting, weakness or weight loss. The symptoms are aggravated by unknown. He has tried NSAIDs and Excedrin for the symptoms. The treatment provided moderate relief. His past medical history is significant for immunosuppression and migraine headaches. There is no history of cancer, cluster headaches, hypertension, migraines in the family, obesity, pseudotumor cerebri, recent head traumas, sinus disease or TMJ. Headache    This is a new problem. Episode onset: SINCE  OCTOBER 2019. The problem occurs constantly. The problem has been gradually worsening. The pain is located in the occipital region. The pain does not radiate. The pain quality is not similar to prior headaches. The quality of the pain is described as pulsating and throbbing. The pain is at a severity of 3/10. The pain is mild. Associated symptoms include back pain, a loss of balance, nausea, neck pain, phonophobia and photophobia. Pertinent negatives include no abdominal pain, abnormal behavior, anorexia, blurred vision, coughing, dizziness, drainage, ear pain, eye pain, eye redness, eye watering, facial sweating, fever, hearing loss, insomnia, muscle aches, numbness, rhinorrhea, scalp tenderness, seizures, sinus pressure, sore throat, swollen glands, tingling, tinnitus, visual change, vomiting, weakness or weight loss. The symptoms are aggravated by unknown. He has tried ergotamines and NSAIDs for the symptoms. The treatment provided no relief. His past medical history is significant for immunosuppression and migraine headaches. There is no history of cancer, cluster headaches, hypertension, migraines in the family, obesity, pseudotumor cerebri, recent head traumas, sinus disease or TMJ. History obtained from  The patient   AND  HIS  MALE  FRIEND       and other  available   medical  records   were  Also  reviewed. The  Duration,  Quality,  Severity,  Location,  Timing,  Context,  Modifying  Factors   Of   The   Chief   Complaint   And  Present  Illness       Was   Reviewed   In   Chronological   Manner. PATIENT'S  MAIN  CONCERNS INCLUDE :                       1)     H /O    CHRONIC   MIGRAINES    SINCE  TEENAGE                            2)     PREVIOUS     H/O   BAD  MIGRAINE       8   YEARS   AGO                           3)     H/O     HEADACHE   IN  [facility]   AREA        SINCE   October 2019                                 PATIENT  TRIED     EXCEDRIN  MIGRAINE,    ALEVE                                                WHICH  DID  NOT  HELP.                                   PATIENT  COMPLAINS  OF     WORSENING    OF  HEADACHES                                    DAILY                             4)     H/O    NECK PAIN       WITH  RADIATION  TO     THE  LEFT    SIDE                                         WORSE     SINCE    6  MONTHS                              5)     NO PRECIPITATING   FACTORS. NO   FEVER. NO  TRAUMA                            6)     H/O    CHRONIC    LUMBAR  PAIN   AND  RADICULOPATHY                                       BEING  FOLLOWED  BY  PAIN  MANAGEMENT                           -   ON  NEURONTIN,   BACLOFEN                                  7)       H/O   CHRONIC  ANXIETY,    BIPOLAR  DISORDER                                      -  ON  INVEGA, ,     ATIVAN     PRN                            8)     H/O     HIV  INFECTION       ASYMPTOMATIC                                    - ON  MEDICATION          FROM    [facility]  [facility]     PROVIDERS                        9)     NO   H/O   FALLING                           10)      H/O    CHRONIC    MILD     MEMORY  PROBLEMS                          11)   H/O   EX  SMOKER                            H/O   ABNORMAL   CT   CHEST     IN    2017                                          12)    HAD  NEURO  DIAGNOSTIC  EVALUATIONS  IN NOV./ DEC.  2019                             LABS  WORK  UP    DID  NOT    SHOW  ANY  SIGNIFICANT  ABNORMALITIES                           MRI  BRAIN      SHOWED  CHRONIC  CEREBRAL ISCHEMIA                                   MRI  CERVICAL SPINE       SHOWED    MULTI LEVEL  DISC  DISEASES                       13)     CURRENTLY    PATIENT   FEELS  BETTER  WITH  HIS  MIGRAINES                                 AND   HEADACHES            AND  TOLERATING  THE  MEDICATIONS                                  FOR  THE  SAME. 14)    PATIENT  DENIES  ANY  NEW  NEUROLOGICAL  CONCERNS                                                 15)      VARIOUS  RISK   FACTORS   WERE  REVIEWED   AND   DISCUSSED. PATIENT   HAS  MULTIPLE   MEDICAL, NEUROLOGICAL                        AND   MENTAL HEALTH   PROBLEMS . PATIENT'S   MANAGEMENT  IS  CHALLENGING.                                   PRECIPITATING  FACTORS: including fever/infection, exertion/relaxation, position change, stress, weather change,                        medications/alcohol, time of day/darkness/light  Are  absent                                                              MODIFYING  FACTORS:  fever/infection, exertion/relaxation, position change, stress, weather change,                        medications/alcohol, time of day/darkness/light  Are  absent             Patient   Indicates   The  Presence   And  The  Absence  Of  The  Following    Associated  And   Additional  Neurological    Symptoms:                                Balance  And coordination   problems  absent           Gait problems     absent            Headaches      present              Migraines           present           Memory problemspresent              Confusion        absent            Paresthesia   numbness          absent           Seizures  And  Starring  Episodes           absent           Syncope,  Near  syncopal episodes         absent           Speech   problems           absent             Swallowing   Problems      absent            Dizziness,  Light headedness           absent              Vertigo        absent             Generalized   Weakness    absent              focal  Weakness     absent             Tremors         absent              Sleep  Problems     absent             History  Of   Recent  Head  Injury     absent             History  Of   Recent  TIA     absent             History  Of   Recent    Stroke     absent             Neck  Pain   and   Neck muscle  Spasms  present               Radiating  down   And   Weakness           present            Lower back   Pain  And     Spasms  present              Radiating    Down   And   Weakness          present                H/OFALLS        absent               History  Of   Visual  Symptoms    absent                  Associated   Diplopia       absent                                               Also   Additional   Symptoms Present    As  Documented    In   The   detailed                  Review  Of  Systems   And    Please   Refer   To    Them for   Additional    Information. Any components  That are either  Unobtainable  Or  Limited  In   HPI, ROS  And/or PFSH   Are                   Due   ToPatient's  Medical  Problems,  Clinical  Condition   and/or lack of                                other    Alternate   resources. RECORDS   REVIEWED:    historical medical records       INFORMATION   REVIEWED:     MEDICAL   HISTORY,SURGICAL   HISTORY,     MEDICATIONS   LIST,   ALLERGIES AND  DRUG  INTOLERANCES,       FAMILY   HISTORY,  SOCIAL  HISTORY,      PROBLEM  LIST   FOR  PATIENT  CARE   COORDINATION      Past Medical History:   Diagnosis Date    Bipolar disorder [facility]     follows with Dr. Tommy Valentine Centrilobular emphysema ([facility]     [facility]    followed by Elias Torres (Infectious Disease at [address] [place] [address])    Pneumonia 06/11/2017         Past Surgical History:   Procedure Laterality Date    ANESTHESIA NERVE BLOCK Bilateral 1/12/2018    Bilat L2 L3 L4 L5 Diagnostic Medial BB performed by Frankey Decent, MD at [address] [place] [address]  01/05/2016    Normal colon. Hemorrhoids.  Dr Alison Oh  2015    full dental extraction    LUMBAR SPINE SURGERY Right 9/26/2017    Right L4 & L5 Transforaminal  performed by Frankey Decent, MD at [address] [place] [address] Dr Cifuentes 10/3/2017    Right L4 & L5 Transforaminal performed by Frankey Decent, MD at [address] [place] [address] Dr Cifuentes 1/8/2019    Right L4 L5 TRANSFORAMINAL performed by Frankey Decent, MD at [address] [place] [address] AA&/STRD LUMBAR PLEXUS CONT NFS CATH Bilateral 1/19/2018    Bilat L2 L3 L4 L5 Confirmatory Medial BB performed by Frankey Decent, MD at [address] [place] [address],  IMAGE GUIDE,EA ADDL LEVEL Right 1/25/2018    Right L2 L3 L4 L5 RADIOFREQUENCY  Darunavir-Cobicistat (PREZCOBIX) 800-150 MG TABS Take by mouth nightly      Nebulizers (COMPRESSOR/NEBULIZER) MISC As directed 1 each 0    Respiratory Therapy Supplies (NEBULIZER/TUBING/MOUTHPIECE) KIT 1 kit by Does not apply route daily 1 kit 0    albuterol (PROVENTIL) (2.5 MG/3ML) 0.083% nebulizer solution Take 2.5 mg by nebulization every 6 hours as needed for Wheezing      TURMERIC PO Take by mouth three times daily      Glucosamine HCl (GLUCOSAMINE PO) Take by mouth daily      vitamin D 1000 UNITS CAPS Take by mouth daily      GARLIC PO Take by mouth daily      Multiple Vitamins-Minerals (MULTIVITAMIN PO) Take by mouth daily      INVEGA 6 MG extended release tablet Take 6 mg by mouth daily      DESCOVY 200-25 MG TABS Take 1 tablet by mouth daily      MYTESI 125 MG TBEC Take 125 mg by mouth      SUMAtriptan (IMITREX) 100 MG tablet ONE   TABLET  TWICE  DAILY  FOR  MIGRAINE   AS  NEEDED (Patient not taking: Reported on 2020) 12 tablet 1     No current facility-administered medications for this visit.           Allergies   Allergen Reactions    Pcn [Penicillins] Rash    Sulfa Antibiotics Rash         Family History   Problem Relation Age of Onset    Cancer Mother         uterine s/p hysterectomy    Cancer Maternal Grandmother          Social History     Socioeconomic History    Marital status: Single     Spouse name: Not on file    Number of children: Not on file    Years of education: Not on file    Highest education level: Not on file   Occupational History    Not on file   Social Needs    Financial resource strain: Not on file    Food insecurity:     Worry: Not on file     Inability: Not on file    Transportation needs:     Medical: Not on file     Non-medical: Not on file   Tobacco Use    Smoking status: Former Smoker     Packs/day: 3.00     Years: 30.00     Pack years: 90.00     Last attempt to quit: 10/7/2014     Years since quittin.2    Smokeless tobacco: Never Used 06/11/2017    LABALBU 4.3 11/22/2019     Lab Results   Component Value Date    BUN 14 11/22/2019    CREATININE 0.82 11/22/2019     Lab Results   Component Value Date    CALCIUM 9.6 11/22/2019     Lab Results   Component Value Date    AST 17 11/22/2019    ALT 16 11/22/2019             Review of Systems   Constitutional: Negative for appetite change, chills, fatigue, fever, unexpected weight change and weight loss. HENT: Negative for congestion, dental problem, drooling, ear discharge, ear pain, facial swelling, hearing loss, mouth sores, nosebleeds, postnasal drip, rhinorrhea, sinus pressure, sore throat, tinnitus, trouble swallowing and voice change. Eyes: Positive for photophobia. Negative for blurred vision, pain, discharge, redness, itching and visual disturbance. Respiratory: Negative for apnea, cough, choking, chest tightness, shortness of breath and wheezing. Cardiovascular: Negative for chest pain, palpitations and leg swelling. Gastrointestinal: Positive for nausea. Negative for abdominal distention, abdominal pain, anorexia, blood in stool, constipation, diarrhea and vomiting. Endocrine: Negative for cold intolerance, heat intolerance, polydipsia, polyphagia and polyuria. Musculoskeletal: Positive for arthralgias, back pain and neck pain. Negative for gait problem, joint swelling, myalgias and neck stiffness. Skin: Negative for color change, pallor, rash and wound. Allergic/Immunologic: Negative for environmental allergies, food allergies and immunocompromised state. Neurological: Positive for headaches and loss of balance. Negative for dizziness, tingling, tremors, seizures, syncope, facial asymmetry, speech difficulty, weakness, light-headedness and numbness. Hematological: Negative for adenopathy. Does not bruise/bleed easily. Psychiatric/Behavioral: Positive for decreased concentration.  Negative for agitation, behavioral problems, confusion, dysphoric mood, hallucinations, self-injury, sleep disturbance and suicidal ideas. The patient is nervous/anxious. The patient does not have insomnia and is not hyperactive. OBJECTIVE:    Physical Exam  Constitutional:       Appearance: He is well-developed. HENT:      Head: Normocephalic and atraumatic. No raccoon eyes or Oviedo's sign. Right Ear: External ear normal.      Left Ear: External ear normal.      Nose: Nose normal.   Eyes:      Conjunctiva/sclera: Conjunctivae normal.      Pupils: Pupils are equal, round, and reactive to light. Neck:      Musculoskeletal: Normal range of motion and neck supple. Normal range of motion. No neck rigidity or muscular tenderness. Thyroid: No thyroid mass or thyromegaly. Vascular: No carotid bruit. Trachea: No tracheal deviation. Meningeal: Brudzinski's sign and Kernig's sign absent. Cardiovascular:      Rate and Rhythm: Normal rate and regular rhythm. Pulmonary:      Effort: Pulmonary effort is normal.   Musculoskeletal:         General: No tenderness. Skin:     General: Skin is warm. Coloration: Skin is not pale. Findings: No erythema or rash. Nails: There is no clubbing. Psychiatric:         Attention and Perception: He is attentive. Mood and Affect: Mood is anxious. Mood is not depressed. Affect is not labile, blunt or inappropriate. Speech: He is communicative. Speech is not rapid and pressured, delayed, slurred or tangential.         Behavior: Behavior is not agitated, slowed, aggressive, withdrawn, hyperactive or combative. Behavior is cooperative. Thought Content: Thought content is not paranoid or delusional. Thought content does not include homicidal or suicidal ideation. Thought content does not include homicidal or suicidal plan. Cognition and Memory: Cognition is impaired. Memory is impaired. He does not exhibit impaired remote memory. Judgment: Judgment is not impulsive or inappropriate. no acute intracranial hemorrhage, mass effect, or midline shift. There is satisfactory overall gray-white matter differentiation.  There is   chronic microvascular disease.  The ventricular structures are symmetric and   unremarkable.  The infratentorial structures including the cerebellopontine   angles and internal auditory canals are unremarkable. There is no abnormal   restricted diffusion. There is no abnormal blooming artifact on   susceptibility weighted imaging.  There is no abnormal postcontrast   enhancement.       ORBITS: The visualized portion of the orbits demonstrate no acute abnormality.       SINUSES: The visualized paranasal sinuses and mastoid air cells are well   aerated.       BONES/SOFT TISSUES: The bone marrow signal intensity appears normal. The soft   tissues demonstrate no acute abnormality.           Impression   Chronic microvascular disease without acute intracranial abnormality.  No   abnormal postcontrast enhancement. MRI OF THE CERVICAL SPINE WITHOUT CONTRAST 12/24/2019 10:07 am       TECHNIQUE:   Multiplanar multisequence MRI of the cervical spine was performed without the   administration of intravenous contrast.       COMPARISON:   None.       HISTORY:   ORDERING SYSTEM PROVIDED HISTORY: Neck pain   TECHNOLOGIST PROVIDED HISTORY:   neck pain   Reason for Exam: Neck pain and left shoulder pain. Migraines. Symptoms for   quite a while.    Acuity: Chronic   Type of Exam: Unknown   Additional signs and symptoms: Neck pain       FINDINGS:   BONES/ALIGNMENT: There is straightening of the cervical spine.  The vertebral   body heights are maintained.  There is age-appropriate bone marrow signal.   There is mild edema within the left C3-4 facets.  There is multilevel   degenerative disc disease with loss of disc signal.  There is disc space   narrowing primarily at the C6-7 level.  There is no significant   spondylolisthesis.       SPINAL CORD: The spinal cord is normal in caliber and ligamentum flavum hypertrophy.  The thecal   sac is not stenotic.  The S1 nerve roots are intact. Disc and/or osteophytes   result in moderate narrowing of the neural foramina bilaterally. The right   neural foramen is narrowed greater than the left.       There is no significant change in the findings from the prior study.           Impression   Disc and osteophytes result in narrowing of the neural foramina at several   levels as discussed above.  No significant stenosis of thecal sac in the   lumbar region. CT OF THE HEAD WITHOUT CONTRAST  6/11/2017 8:49 pm       TECHNIQUE:   CT of the head was performed without the administration of intravenous   contrast. Dose modulation, iterative reconstruction, and/or weight based   adjustment of the mA/kV was utilized to reduce the radiation dose to as low   as reasonably achievable.       COMPARISON:   None.       HISTORY:   ORDERING SYSTEM PROVIDED HISTORY: neuro symptoms/ HIV   TECHNOLOGIST PROVIDED HISTORY:   Ordering Physician Provided Reason for Exam: Passed out while coughing and   hit forehead on table today, previous loss of consciousness spells in the   last weeks.  Neuro symptoms, reaction to HIV medication.    Acuity: Acute   Type of Exam: Initial   Mechanism of Injury: passed out and fell   Relevant Medical/Surgical History: n/a       FINDINGS:   BRAIN/VENTRICLES: The ventricular system is mildly prominent in size and in   the midline.  No evidence of extra-axial fluid collection, hemorrhage or mass.       ORBITS: The visualized portion of the orbits demonstrate no acute abnormality.       SINUSES: Mild bilateral ethmoid sinus mucosal thickening.  Paranasal sinuses   visualized are otherwise clear.  Mastoid regions are poorly aerated likely a   developmental basis.       SOFT TISSUES/SKULL:  No acute abnormality of the visualized skull or soft   tissues.           Impression   No acute intracranial abnormality.         CT OF THE CHEST WITH CONTRAST 7/5/2017 Advancing   Disease,  Complex  Treatment  Regimen,  Multiple medications           and   Risk  Of   Side  Effects,  Difficulty  In  Medication  Management  And  Diagnostic  Challenges   In  View  Of  The  Associated   Co  Morbid  Conditions   And  Problems. *   BE  CAREFUL  WITH  ACTIVITIES   INCLUDING  DRIVING. *   AVOID   NECK  AND/ BACK  STRAINING  ACTIVITIES        *   ADEQUATE   FLUIDINTAKE   AND  ELECTROLYTE  BALANCE         * KEEP  DAIRY  OF   THE  NEUROLOGICAL  SYMPTOMS        RECORDING THE    DURATION  AND  FREQUENCY. *  AVOID    CONDITIONS  AND  FACTORS   THAT  MAKE                  NEUROLOGICAL  SYMPTOMS  WORSE.             *TO  MAINTAIN  REGULAR  SLEEP  WAKE  CYCLES. *   TO  HAVE  ADEQUATE  REST  AND   SLEEP    HOURS.            *    AVOID  ANY USAGE OF    TOBACCO,              EXCESSIVE  ALCOHOL  AND   ILLEGAL   SUBSTANCES        *  CONTINUE   MEDICATIONS    PRESCRIBED   BY NEUROLOGIST    AS    RECOMMENDED       *   Compliance   With  Medications   And  Instructions        * CURRENTLY    TOLERATING  THE  PRESCRIBED   MEDICATIONS. WITHOUT  ANY  SIGNIFICANT  SIDE  EFFECTS   &  GETTING BENEFIT.        *    MIGRAINE/ HEADACHE    DAIRY   WITH  MONITORING                       OF  DURATION  AND  FREQUENCY.             *    Antiplatelet  therapy    As   Recommended  Was   Discussed      *    Prophylactic  Use   Of     Vitamin   B   Complex,  Folic  Acid,    Vitamin  B12    Multivitamin,       Calcium  With  magnesium  And  Vit D    Supplementations   Over  The  Counter  Discussed                *  EVALUATIONS  AND  FOLLOW UP:                     *   OPTHALMOLOGY                                      * PAIN  MANAGEMENT                 *  HIV  CONSULTANTS                                *      CURRENTLY    PATIENT   FEELS  BETTER  WITH  HIS  MIGRAINES                                 AND   HEADACHES            AND  TOLERATING  THE  MEDICATIONS                                  FOR IN   1-2        MONTHS  TIME   FOR  FURTHER              FOLLOW UP.                       *   The  Neurological   Findings,  Possible  Diagnosis,  Differential diagnoses                    And  Options  For    Further   Investigations                   And  management   Are  Discussed  Comprehensively. Medications   And  Prescription   Risks  And  Side effects  Are   Also  Discussed. *  If   There is  Any  Significant  Worsening   Of  Current  Symptoms  And  Or                  If patient  Develops   Any additional  New  NeurologicalSymptoms                  Or  Significant  Concerns   Should  Call  911 or  Go  To  Emergency  Department  For  Further  Immediate  Evaluation. The   Above  Were  Reviewed  With  patient   and                       questions  Answered  In  Detail. More   Than  50% of face  To face Time   Was  Spent  On  Counseling                    And   Coordination  Of  Care   Of   Patient's  multiple   Neurological  Problems                         And   Comorbid  Medical   Conditions. Electronically signed by Roberto Mason MD    Board Certified in  Neurology &  In  [employer] [employer] [facility] [employer] ([employer])      DISCLAIMER:   Although every effort was made to ensure the accuracy of this  electronictranscription, some errors in transcription may have occurred. GENERAL PATIENT INSTRUCTIONS:      A Healthy Lifestyle: Care Instructions   Your Care Instructions   A healthy lifestyle can help you feel good, stay at ahealthy weight, and have plenty of energy for both work and play. A healthy lifestyle is something you can share with your whole family.  A healthy lifestyle also can lower your risk for serious health problems, such ashigh blood pressure, heart disease, and diabetes.    You can follow a few steps listed below to improve your health and the health of your family.  Follow-up careis a key part of your treatment and safety. Be sure to make and go to all appointments, and call your doctor if you are having problems. Its also a good idea to know your test results and keep a list of the medicines you take.  How can you care for yourself at home?  Do not eat too much sugar, fat, or fast foods. You can still have dessert and treats nowand then. The goal is moderation.  Start small to improve your eating habits. Pay attention to portion sizes, drink less juice and soda pop, and eat more fruits and vegetables.  Eat a healthy amount of food. A 3-ounce serving of meat, for example, is about the size of a deck of cards. Fill the rest of your plate with vegetables and whole grains.  Limit theamount of soda and sports drinks you have every day. Drink more water when you are thirsty.  Eat at least 5 servings of fruits and vegetables every day. It may seem like a lot, but it is not hard to reach this goal. Aserving or helping is 1 piece of fruit, 1 cup of vegetables, or 2 cups of leafy, raw vegetables. Have an apple or some carrot sticks as an afternoon snack instead of a candy bar. Try to have fruits and/or vegetables at everymeal.   Make exercise part of your daily routine. You may want to start with simple activities, such as walking, bicycling, or slow swimming. Try kay active 30 to 60 minutes every day. You do not need to do all 30 to 60 minutes all at once. For example, you can exercise 3 times a day for 10 or 20 minutes. Moderate exercise is safe for most people, but it is always agood idea to talk to your doctor before starting an exercise program.   Keep moving. Tony Miller the lawn, work in the garden, or [employer]. Take the stairs instead of the elevator at work.  If you smoke, quit. Peoplewho smoke have an increased risk for heart attack, stroke, cancer, and other lung illnesses.  Quitting is hard, but there are ways to boost your chance of quitting

## 2020-01-13 ENCOUNTER — OFFICE VISIT (OUTPATIENT)
Dept: PAIN MANAGEMENT | Age: 55
End: 2020-01-13
Payer: MEDICARE

## 2020-01-13 VITALS
SYSTOLIC BLOOD PRESSURE: 122 MMHG | WEIGHT: 207.6 LBS | BODY MASS INDEX: 29.72 KG/M2 | DIASTOLIC BLOOD PRESSURE: 80 MMHG | RESPIRATION RATE: 16 BRPM | HEIGHT: 70 IN

## 2020-01-13 PROCEDURE — G8428 CUR MEDS NOT DOCUMENT: HCPCS | Performed by: NURSE PRACTITIONER

## 2020-01-13 PROCEDURE — 3017F COLORECTAL CA SCREEN DOC REV: CPT | Performed by: NURSE PRACTITIONER

## 2020-01-13 PROCEDURE — 1036F TOBACCO NON-USER: CPT | Performed by: NURSE PRACTITIONER

## 2020-01-13 PROCEDURE — 99214 OFFICE O/P EST MOD 30 MIN: CPT | Performed by: NURSE PRACTITIONER

## 2020-01-13 PROCEDURE — G8484 FLU IMMUNIZE NO ADMIN: HCPCS | Performed by: NURSE PRACTITIONER

## 2020-01-13 PROCEDURE — 99213 OFFICE O/P EST LOW 20 MIN: CPT | Performed by: NURSE PRACTITIONER

## 2020-01-13 PROCEDURE — G8417 CALC BMI ABV UP PARAM F/U: HCPCS | Performed by: NURSE PRACTITIONER

## 2020-01-13 RX ORDER — DULOXETIN HYDROCHLORIDE 60 MG/1
60 CAPSULE, DELAYED RELEASE ORAL DAILY
Qty: 30 CAPSULE | Refills: 3 | Status: SHIPPED | OUTPATIENT
Start: 2020-01-13 | End: 2020-05-05

## 2020-01-13 RX ORDER — DULOXETIN HYDROCHLORIDE 30 MG/1
30 CAPSULE, DELAYED RELEASE ORAL DAILY
Qty: 7 CAPSULE | Refills: 0 | Status: SHIPPED | OUTPATIENT
Start: 2020-01-13 | End: 2020-02-11

## 2020-01-13 RX ORDER — GABAPENTIN 800 MG/1
800 TABLET ORAL 3 TIMES DAILY
Qty: 90 TABLET | Refills: 3 | Status: SHIPPED | OUTPATIENT
Start: 2020-01-13 | End: 2020-04-14 | Stop reason: SDUPTHER

## 2020-01-13 ASSESSMENT — ENCOUNTER SYMPTOMS
EYES NEGATIVE: 1
CONSTIPATION: 0
DIARRHEA: 0
BACK PAIN: 1
RESPIRATORY NEGATIVE: 1

## 2020-01-13 NOTE — PROGRESS NOTES
PO Take 1 tablet by mouth daily      Darunavir-Cobicistat (PREZCOBIX) 800-150 MG TABS Take by mouth nightly      albuterol (PROVENTIL) (2.5 MG/3ML) 0.083% nebulizer solution Take 2.5 mg by nebulization every 6 hours as needed for Wheezing      DESCOVY 200-25 MG TABS Take 1 tablet by mouth daily         Past Medical History:   Diagnosis Date    [facility]     follows with Dr. Dori Church Centrilobular emphysema ([facility]     [facility]    followed by Dina Greene (Infectious Disease at [address] [place] [address])    Pneumonia 06/11/2017       Past Surgical History:   Procedure Laterality Date    ANESTHESIA NERVE BLOCK Bilateral 1/12/2018    Bilat L2 L3 L4 L5 Diagnostic Medial BB performed by Arnav Whitehead MD at [address] [place] [address]  01/05/2016    Normal colon. Hemorrhoids.  Dr Addy Plasencia  2015    full dental extraction    LUMBAR SPINE SURGERY Right 9/26/2017    Right L4 & L5 Transforaminal  performed by Arnav Whitehead MD at [address] [place] [address] Dr Cifuentes 10/3/2017    Right L4 & L5 Transforaminal performed by Arnav Whitehead MD at [address] [place] [address] Dr Cifuentes 1/8/2019    Right L4 L5 TRANSFORAMINAL performed by Arnav Whitehead MD at [address] [place] [address] AA&/STRD LUMBAR PLEXUS CONT NFS CATH Bilateral 1/19/2018    Bilat L2 L3 L4 L5 Confirmatory Medial BB performed by Arnav Whitehead MD at [address] [place] [address] 1, W IMAGE GUIDE,EA ADDL LEVEL Right 1/25/2018    Right L2 L3 L4 L5 RADIOFREQUENCY performed by Arnav Whitehead MD at [address] [place] [address] 1, W IMAGE GUIDE,EA ADDL LEVEL Left 3/15/2018    Left L2 L3 L4 L5 RADIOFREQUENCY performed by Arnav Whitehead MD at [address] [place] [address] OR       Family History   Problem Relation Age of Onset    Cancer Mother         uterine s/p hysterectomy    Cancer Maternal Grandmother        Social History     Socioeconomic History    Marital status: Single     Spouse name: None    Number of children: None    Years of education: None    Highest education level: None   Occupational History    None   Social Needs    Financial resource strain: None    Food insecurity:     Worry: None     Inability: None    Transportation needs:     Medical: None     Non-medical: None   Tobacco Use    Smoking status: Former Smoker     Packs/day: 3.00     Years: 30.00     Pack years: 90.00     Last attempt to quit: 10/7/2014     Years since quittin.2    Smokeless tobacco: Never Used   Substance and Sexual Activity    Alcohol use: Yes     Comment: socially    Drug use: No    Sexual activity: None   Lifestyle    Physical activity:     Days per week: None     Minutes per session: None    Stress: None   Relationships    Social connections:     Talks on phone: None     Gets together: None     Attends Caodaism service: None     Active member of club or organization: None     Attends meetings of clubs or organizations: None     Relationship status: None    Intimate partner violence:     Fear of current or ex partner: None     Emotionally abused: None     Physically abused: None     Forced sexual activity: None   Other Topics Concern    None   Social History Narrative    None       Review of Systems   Constitutional: Positive for activity change and fatigue. HENT: Negative. Eyes: Negative. Respiratory: Negative. Cardiovascular: Negative. Gastrointestinal: Negative for constipation and diarrhea. Endocrine: Negative. Genitourinary: Negative for difficulty urinating. Musculoskeletal: Positive for arthralgias, back pain and myalgias. Skin: Negative. Psychiatric/Behavioral: Positive for sleep disturbance. Objective:   Physical Exam  Vitals signs reviewed. Constitutional:       General: He is not in acute distress. Appearance: He is well-developed. He is not ill-appearing, toxic-appearing or diaphoretic.    HENT:      Head: Normocephalic and atraumatic. Cardiovascular:      Rate and Rhythm: Normal rate. Pulmonary:      Effort: Pulmonary effort is normal. No accessory muscle usage or respiratory distress. Musculoskeletal:      Lumbar back: He exhibits pain. Comments: MRI OF THE LUMBAR SPINE WITHOUT CONTRAST, 6/11/2018 10:22 am     TECHNIQUE:  Multiplanar multisequence MRI of the lumbar spine was performed without the  administration of intravenous contrast.     COMPARISON:  None     HISTORY:  ORDERING SYSTEM PROVIDED HISTORY: Lumbar facet joint syndrome  TECHNOLOGIST PROVIDED HISTORY:  Reason for exam:->right lumbar radiculopathy  Ordering Physician Provided Reason for Exam:  Low back pain to the right  side, right leg pain. Symptoms for about one week. Acuity: Acute  Type of Exam: Initial  Additional signs and symptoms:  Lumbar facet syndrome; Right lumbar  radiculopathy     Initial evaluation.     FINDINGS:  BONES/ALIGNMENT: There is normal alignment of the spine. The vertebral body  heights are maintained.  The bone marrow signal appears unremarkable.  There  is degenerative disc disease with disc space narrowing and osteophytes at  L3-4, L4-5, and L5-S1.     SPINAL CORD: The conus terminates normally.     SOFT TISSUES: No paraspinal mass identified. Dorothe Ink is a cyst in the left  kidney that is not fully evaluated.  Ultrasound could better evaluate.     L1-L2:  The thecal sac and neural foramina are intact.     L2-L3:  There is mild facet and ligamentum flavum hypertrophy.  The thecal  sac is not stenotic.  Disc and/or osteophytes cause minimal narrowing of the  neural foramina bilaterally.     L3-L4: The thecal sac is not stenotic.   Disc and/or osteophytes result in  mild narrowing of the neural foramina bilaterally.   There is mild facet and  ligamentum flavum hypertrophy.     L4-L5:  There is moderate facet and ligamentum flavum hypertrophy.  The  thecal sac is not stenotic.  Disc and/or osteophytes result in moderate  narrowing of the neural foramina bilaterally. The left neural foramen is  narrowed greater than right.     L5-S1:  There is mild facet and ligamentum flavum hypertrophy.  The thecal  sac is not stenotic.  The S1 nerve roots are intact. Disc and/or osteophytes  result in moderate narrowing of the neural foramina bilaterally. The right  neural foramen is narrowed greater than the left.     There is no significant change in the findings from the prior study.        Impression  Disc and osteophytes result in narrowing of the neural foramina at several  levels as discussed above.  No significant stenosis of thecal sac in the  lumbar region. Skin:     General: Skin is warm and dry. Capillary Refill: Capillary refill takes less than 2 seconds. Coloration: Skin is not pale. Nails: There is no clubbing. Neurological:      Mental Status: He is alert and oriented to person, place, and time. GCS: GCS eye subscore is 4. GCS verbal subscore is 5. GCS motor subscore is 6. Cranial Nerves: No cranial nerve deficit. Psychiatric:         Mood and Affect: Mood is not depressed. Affect is not angry. Speech: He is communicative. Speech is not slurred. Behavior: Behavior normal. Behavior is not agitated, aggressive or withdrawn. Behavior is cooperative. Judgment: Judgment normal. Judgment is not impulsive or inappropriate. Assessment:      1. Neural foraminal stenosis of lumbar spine    2. Chronic bilateral low back pain with right-sided sciatica    3. Lumbar radicular pain          Plan:      Chronic pain diagnoses such as   1. Neural foraminal stenosis of lumbar spine    2. Chronic bilateral low back pain with right-sided sciatica    3. Lumbar radicular pain     controlled on current medication regime, wll continue current pain medications to improve quality of life and function.    Increase gabapentin 800mg tid  Add cymbalta 60mg daily   Follow up one month    FELICE Yeager

## 2020-01-16 RX ORDER — OMEPRAZOLE 40 MG/1
40 CAPSULE, DELAYED RELEASE ORAL DAILY
Qty: 30 CAPSULE | Refills: 3 | Status: SHIPPED | OUTPATIENT
Start: 2020-01-16 | End: 2020-05-12

## 2020-01-16 RX ORDER — RANITIDINE 300 MG/1
TABLET ORAL
Qty: 30 TABLET | Refills: 1 | OUTPATIENT
Start: 2020-01-16

## 2020-02-11 ENCOUNTER — OFFICE VISIT (OUTPATIENT)
Dept: PAIN MANAGEMENT | Age: 55
End: 2020-02-11
Payer: MEDICARE

## 2020-02-11 VITALS
SYSTOLIC BLOOD PRESSURE: 136 MMHG | RESPIRATION RATE: 16 BRPM | BODY MASS INDEX: 29.07 KG/M2 | HEART RATE: 82 BPM | WEIGHT: 207.67 LBS | HEIGHT: 71 IN | DIASTOLIC BLOOD PRESSURE: 82 MMHG

## 2020-02-11 PROCEDURE — 1036F TOBACCO NON-USER: CPT | Performed by: NURSE PRACTITIONER

## 2020-02-11 PROCEDURE — 3017F COLORECTAL CA SCREEN DOC REV: CPT | Performed by: NURSE PRACTITIONER

## 2020-02-11 PROCEDURE — G8417 CALC BMI ABV UP PARAM F/U: HCPCS | Performed by: NURSE PRACTITIONER

## 2020-02-11 PROCEDURE — G8428 CUR MEDS NOT DOCUMENT: HCPCS | Performed by: NURSE PRACTITIONER

## 2020-02-11 PROCEDURE — 99213 OFFICE O/P EST LOW 20 MIN: CPT | Performed by: NURSE PRACTITIONER

## 2020-02-11 PROCEDURE — G8484 FLU IMMUNIZE NO ADMIN: HCPCS | Performed by: NURSE PRACTITIONER

## 2020-02-11 PROCEDURE — 99214 OFFICE O/P EST MOD 30 MIN: CPT | Performed by: NURSE PRACTITIONER

## 2020-02-11 RX ORDER — DULOXETIN HYDROCHLORIDE 60 MG/1
120 CAPSULE, DELAYED RELEASE ORAL DAILY
Qty: 60 CAPSULE | Refills: 3 | Status: SHIPPED | OUTPATIENT
Start: 2020-02-11 | End: 2020-08-31

## 2020-02-11 ASSESSMENT — ENCOUNTER SYMPTOMS
RESPIRATORY NEGATIVE: 1
BACK PAIN: 1
DIARRHEA: 0
CONSTIPATION: 0
EYES NEGATIVE: 1

## 2020-02-11 NOTE — PROGRESS NOTES
Centrilobular emphysema ([facility]     HIV (human immunodeficiency virus infection) ([facility] 2001    followed by Uriel Winters (Infectious Disease at [address] [place] [address])    Pneumonia 06/11/2017       Past Surgical History:   Procedure Laterality Date    ANESTHESIA NERVE BLOCK Bilateral 1/12/2018    Bilat L2 L3 L4 L5 Diagnostic Medial BB performed by Magaly Hill MD at [address] [place] [address]  01/05/2016    Normal colon. Hemorrhoids.  Dr Ora Thomson  2015    full dental extraction    LUMBAR SPINE SURGERY Right 9/26/2017    Right L4 & L5 Transforaminal  performed by Magaly Hill MD at [address] [place] [address] Dr Cifuentes 10/3/2017    Right L4 & L5 Transforaminal performed by Magaly Hill MD at [address] [place] [address] Dr Cifuentes 1/8/2019    Right L4 L5 TRANSFORAMINAL performed by Magaly Hill MD at [address] [place] [address] AA&/STRD LUMBAR PLEXUS CONT NFS CATH Bilateral 1/19/2018    Bilat L2 L3 L4 L5 Confirmatory Medial BB performed by Magaly Hill MD at [address] [place] [address] 1, W IMAGE GUIDE,EA ADDL LEVEL Right 1/25/2018    Right L2 L3 L4 L5 RADIOFREQUENCY performed by Magaly Hill MD at [address] [place] [address] 1, W IMAGE [address] [facility] LEVEL Left 3/15/2018    Left L2 L3 L4 L5 RADIOFREQUENCY performed by Magaly Hill MD at [facility] History   Problem Relation Age of Onset    Cancer Mother         uterine s/p hysterectomy    Cancer Maternal Grandmother        Social History     Socioeconomic History    Marital status: Single     Spouse name: Not on file    Number of children: Not on file    Years of education: Not on file    Highest education level: Not on file   Occupational History    Not on file   Social Needs    Financial resource strain: Not on file    Food insecurity:     Worry: Not on file     Inability: Not on file    Transportation needs:     Medical: Not on file     Non-medical: Not on file intact. Disc and/or osteophytes  result in moderate narrowing of the neural foramina bilaterally. The right  neural foramen is narrowed greater than the left.     There is no significant change in the findings from the prior study.        Impression  Disc and osteophytes result in narrowing of the neural foramina at several  levels as discussed above.  No significant stenosis of thecal sac in the  lumbar region. Skin:     General: Skin is warm and dry. Capillary Refill: Capillary refill takes less than 2 seconds. Coloration: Skin is not pale. Nails: There is no clubbing. Neurological:      Mental Status: He is alert and oriented to person, place, and time. GCS: GCS eye subscore is 4. GCS verbal subscore is 5. GCS motor subscore is 6. Cranial Nerves: No cranial nerve deficit. Psychiatric:         Mood and Affect: Mood is not depressed. Affect is not angry. Speech: He is communicative. Speech is not slurred. Behavior: Behavior normal. Behavior is not agitated, aggressive or withdrawn. Behavior is cooperative. Judgment: Judgment normal. Judgment is not impulsive or inappropriate. Assessment:      1. Lumbar facet joint syndrome    2. Chronic bilateral low back pain with right-sided sciatica    3. Lumbar radicular pain    4. Neural foraminal stenosis of lumbar spine          Plan:      Chronic pain diagnoses such as   1. Lumbar facet joint syndrome    2. Chronic bilateral low back pain with right-sided sciatica    3. Lumbar radicular pain    4. Neural foraminal stenosis of lumbar spine     controlled on current medication regime, wll continue current pain medications to improve quality of life and function.    Will write for cymbalta 120mg daily   Follow up two months    FELICE Betancourt - CNP

## 2020-02-18 ENCOUNTER — TELEPHONE (OUTPATIENT)
Dept: SURGERY | Age: 55
End: 2020-02-18

## 2020-02-18 ENCOUNTER — OFFICE VISIT (OUTPATIENT)
Dept: SURGERY | Age: 55
End: 2020-02-18
Payer: MEDICARE

## 2020-02-18 VITALS
HEIGHT: 71 IN | TEMPERATURE: 97.1 F | BODY MASS INDEX: 29.31 KG/M2 | WEIGHT: 209.4 LBS | DIASTOLIC BLOOD PRESSURE: 82 MMHG | HEART RATE: 76 BPM | SYSTOLIC BLOOD PRESSURE: 132 MMHG

## 2020-02-18 PROCEDURE — G8417 CALC BMI ABV UP PARAM F/U: HCPCS | Performed by: SURGERY

## 2020-02-18 PROCEDURE — G8484 FLU IMMUNIZE NO ADMIN: HCPCS | Performed by: SURGERY

## 2020-02-18 PROCEDURE — G8427 DOCREV CUR MEDS BY ELIG CLIN: HCPCS | Performed by: SURGERY

## 2020-02-18 PROCEDURE — 99215 OFFICE O/P EST HI 40 MIN: CPT

## 2020-02-18 PROCEDURE — 99214 OFFICE O/P EST MOD 30 MIN: CPT | Performed by: SURGERY

## 2020-02-18 PROCEDURE — 3017F COLORECTAL CA SCREEN DOC REV: CPT | Performed by: SURGERY

## 2020-02-18 PROCEDURE — 1036F TOBACCO NON-USER: CPT | Performed by: SURGERY

## 2020-02-18 RX ORDER — GABAPENTIN 300 MG/1
CAPSULE ORAL
COMMUNITY
Start: 2020-02-11 | End: 2020-09-08

## 2020-02-18 RX ORDER — GABAPENTIN 800 MG/1
TABLET ORAL
COMMUNITY
Start: 2020-02-12 | End: 2021-08-17

## 2020-02-18 NOTE — TELEPHONE ENCOUNTER
[facility]    Pre-Operative Evaluation/Consultation    Name:  Rosine Skiff                                         Age:  47 y.o. MRN:  [id number]       :  1965   Date:  2020         Sex: male    There were no encounter diagnoses. Surgeon:  Dr. Esha Galloway  Procedure (Planned):  Umbilical hernia and left inguinal hernia robot assist  Date Scheduled surgery: 3/2/2020    Attending : No att. providers found    Primary Physician: Carly Eagle  Cardiologist: Dr.Mohammed Daily    Type of Anesthesia Requested: General    Patient Medical history: Allergies   Allergen Reactions    Pcn [Penicillins] Rash    Sulfa Antibiotics Rash     Patient Active Problem List   Diagnosis    HIV (human immunodeficiency virus infection) ([address]    IFG (impaired fasting glucose)    Lumbar radicular pain    Chronic bilateral low back pain with right-sided sciatica    Panniculitis involving lumbar region    Lumbar facet joint syndrome    Abnormal EKG    Centrilobular emphysema (HCC)    Muscle spasm    Lumbosacral spondylosis without myelopathy    Neural foraminal stenosis of lumbar spine    Bipolar disorder ([address]    Memory problem    Abnormal CT of the chest    Ex-smoker    Neck pain    Other complicated headache syndrome     Past Medical History:   Diagnosis Date    Bipolar disorder ([address]     follows with Dr. Lois Lozano    [facility]rilobular [facility]sema ([facility]     HIV (human immunodeficiency virus infection) ([address]     followed by Teja Chavez (Infectious Disease at [address] [place] [address])    Pneumonia 2017     Past Surgical History:   Procedure Laterality Date    ANESTHESIA NERVE BLOCK Bilateral 2018    Bilat L2 L3 L4 L5 Diagnostic Medial BB performed by Jacquie Arellano MD at [address] [place] [address]  2016    Normal colon. Hemorrhoids.  Dr Ellen Merlos      full dental extraction    LUMBAR SPINE SURGERY Right 2017    Right L4 & L5 Transforaminal  performed by José Room, MD at [address] [place] [address] Dr Cifuentes 10/3/2017    Right L4 & L5 Transforaminal performed by Arnav Whitehead MD at [address] [place] [address] Dr Cifuentes 2019    Right L4 L5 TRANSFORAMINAL performed by Arnav Whitehead MD at [address] [place] [address] AA&/STRD LUMBAR PLEXUS CONT NFS CATH Bilateral 2018    Bilat L2 L3 L4 L5 Confirmatory Medial BB performed by Arnav Whitehead MD at [facility] [address] 1, W IMAGE GUIDE,EA ADDL LEVEL Right 2018    Right L2 L3 L4 L5 RADIOFREQUENCY performed by Arnav Whitehead MD at [address] [place], [place] [address]way 1, W IMAGE GUIDE,EA ADDL LEVEL Left 3/15/2018    Left L2 L3 L4 L5 RADIOFREQUENCY performed by Arnav Whitehead MD at [facility] OR     Social History     Tobacco Use    Smoking status: Former Smoker     Packs/day: 3.00     Years: 30.00     Pack years: 90.00     Last attempt to quit: 10/7/2014     Years since quittin.3    Smokeless tobacco: Never Used   Substance Use Topics    Alcohol use: Yes     Comment: socially    Drug use: No     Medications:  Current Outpatient Medications   Medication Sig Dispense Refill    gabapentin (NEURONTIN) 800 MG tablet TK 1 T PO TID      gabapentin (NEURONTIN) 300 MG capsule TK 1 CAPLET BY MOUTH THREE TIMES DAILY      DULoxetine (CYMBALTA) 60 MG extended release capsule Take 2 capsules by mouth daily 60 capsule 3    omeprazole (PRILOSEC) 40 MG delayed release capsule Take 1 capsule by mouth daily 30 capsule 3    gabapentin (NEURONTIN) 800 MG tablet Take 1 tablet by mouth 3 times daily for 30 days.  90 tablet 3    DULoxetine (CYMBALTA) 60 MG extended release capsule Take 1 capsule by mouth daily (Patient not taking: Reported on 2020) 30 capsule 3    butalbital-acetaminophen-caffeine (FIORICET, ESGIC) -40 MG per tablet TAKE 1 OR 2 TABLETS BYMOUTH TWICE A DAY  AS  NEEDED  FOR  HEADACHE 60 tablet 1    ondansetron (ZOFRAN ODT) 4 MG disintegrating tablet Take 1 tablet by mouth every 8 hours as needed for Nausea or Vomiting 30 tablet 1    omeprazole (PRILOSEC) 40 MG delayed release capsule TAKE 1 CAPSULE BY MOUTH DAILY 90 capsule 0    naproxen (EC-NAPROSYN) 500 MG EC tablet Take 1 tablet by mouth 2 times daily (with meals) AS  NEEDED 60 tablet 3    fluticasone (FLONASE) 50 MCG/ACT nasal spray SHAKE LIQUID AND USE 1 SPRAY IN EACH NOSTRIL DAILY 48 g 0    baclofen (LIORESAL) 10 MG tablet TAKE 1 TABLET BY MOUTH THREE TIMES DAILY AS NEEDED FOR SPASMS 270 tablet 0    SYMTUZA [phone] MG TABS TK 1 T PO  QD  6    aspirin-acetaminophen-caffeine (EXCEDRIN MIGRAINE) 250-250-65 MG per tablet Take 1 tablet by mouth every 6 hours as needed for Headaches      Clotrimazole (MYCELEX) 10 MG LOZG lozenge Take 1 lozenge by mouth 5 times daily 70 lozenge 1    fenofibric acid (FIBRICOR) 135 MG CPDR capsule TAKE 1 CAPSULE BY MOUTH DAILY 90 capsule 0    LORazepam (ATIVAN) 1 MG tablet Take 1 mg by mouth every 6 hours as needed for Anxiety.       BLACK ELDERBERRY,BERRY-FLOWER, PO Take 1 tablet by mouth daily      Darunavir-Cobicistat (PREZCOBIX) 800-150 MG TABS Take by mouth nightly      Nebulizers (COMPRESSOR/NEBULIZER) MISC As directed 1 each 0    Respiratory Therapy Supplies (NEBULIZER/TUBING/MOUTHPIECE) KIT 1 kit by Does not apply route daily 1 kit 0    albuterol (PROVENTIL) (2.5 MG/3ML) 0.083% nebulizer solution Take 2.5 mg by nebulization every 6 hours as needed for Wheezing      TURMERIC PO Take by mouth three times daily      Glucosamine HCl (GLUCOSAMINE PO) Take by mouth daily      vitamin D 1000 UNITS CAPS Take by mouth daily      GARLIC PO Take by mouth daily      Multiple Vitamins-Minerals (MULTIVITAMIN PO) Take by mouth daily      INVEGA 6 MG extended release tablet Take 6 mg by mouth daily      DESCOVY 200-25 MG TABS Take 1 tablet by mouth daily      MYTESI 125 MG TBEC Take 125 mg by mouth       No current facility-administered medications for this visit.      Scheduled Meds:  Continuous Infusions:  PRN Meds:. Prior to Admission medications    Medication Sig Start Date End Date Taking? Authorizing Provider   gabapentin (NEURONTIN) 800 MG tablet TK 1 T PO TID 2/12/20   Historical Provider, MD   gabapentin (NEURONTIN) 300 MG capsule TK 1 CAPLET BY MOUTH THREE TIMES DAILY 2/11/20   Historical Provider, MD   DULoxetine (CYMBALTA) 60 MG extended release capsule Take 2 capsules by mouth daily 2/11/20   FELICE Betancourt CNP   omeprazole (PRILOSEC) 40 MG delayed release capsule Take 1 capsule by mouth daily 8/50/09   Hyun Barros MD   gabapentin (NEURONTIN) 800 MG tablet Take 1 tablet by mouth 3 times daily for 30 days.  1/13/20 2/12/20  FELICE Betancourt CNP   DULoxetine (CYMBALTA) 60 MG extended release capsule Take 1 capsule by mouth daily  Patient not taking: Reported on 2/18/2020 1/13/20   FELICE Betancourt CNP   butalbital-acetaminophen-caffeine (FIORICET, ESGIC) -52 MG per tablet TAKE 1 OR 2 TABLETS BYMOUTH TWICE A DAY  AS  NEEDED  FOR  HEADACHE 6/5/72   Hyun Barros MD   ondansetron (ZOFRAN ODT) 4 MG disintegrating tablet Take 1 tablet by mouth every 8 hours as needed for Nausea or Vomiting 2/2/53   Hyun Barros MD   omeprazole (PRILOSEC) 40 MG delayed release capsule TAKE 1 CAPSULE BY MOUTH DAILY 0/1/73   Hyun Barros MD   naproxen (EC-NAPROSYN) 500 MG EC tablet Take 1 tablet by mouth 2 times daily (with meals) AS  NEEDED 81/58/17   Hyun Barros MD   fluticasone (FLONASE) 50 MCG/ACT nasal spray SHAKE LIQUID AND USE 1 SPRAY IN [facility] NOSTRIL DAILY 11/4/19   Juma Rosa MD   baclofen (LIORESAL) 10 MG tablet TAKE 1 TABLET BY MOUTH THREE TIMES DAILY AS NEEDED FOR SPASMS 4/26/19   TAMMY Cross   SYMTUZA [phone] MG TABS TK 1 T PO  QD 3/27/19   Historical Provider, MD   aspirin-acetaminophen-caffeine (EXCEDRIN MIGRAINE) 490-661-69 MG per tablet Take 1 tablet by mouth every 6 hours as of an earlier encounter on 2/18/20: 5' 10.98\" (1.803 m). Weight as of an earlier encounter on 2/18/20: 209 lb 6.4 oz (95 kg). body mass index is unknown because there is no height or weight on file. Cardiac Clearance: None   Medical Clearance:None   Appointment for surgery Clearance scheduled for:None     Preoperative Testing: These are the current and completed labs:  CBC:   Lab Results   Component Value Date    WBC 5.4 11/22/2019    RBC 4.57 11/22/2019    HGB 14.8 11/22/2019    HCT 44.4 11/22/2019    MCV 97.1 11/22/2019    RDW 14.8 11/22/2019     11/22/2019     CMP:   Lab Results   Component Value Date     11/22/2019    K 3.9 11/22/2019     11/22/2019    CO2 28 11/22/2019    BUN 14 11/22/2019    CREATININE 0.82 11/22/2019    GFRAA >60 11/22/2019    LABGLOM >60 11/22/2019    GLUCOSE 115 11/22/2019    PROT 7.0 11/22/2019    CALCIUM 9.6 11/22/2019    BILITOT 0.21 11/22/2019    ALKPHOS 68 11/22/2019    AST 17 11/22/2019    ALT 16 11/22/2019     POC Tests: No results for input(s): POCGLU, POCNA, POCK, POCCL, POCBUN, POCHEMO, POCHCT in the last 72 hours.   Coags    Lab Results   Component Value Date    PROTIME 9.7 11/22/2019    INR 0.9 11/22/2019    APTT 24.0 92/78/6831     HCG (If Applicable) No results found for: PREGTESTUR, PREGSERUM, HCG, HCGQUANT   ABGs No results found for: PHART, PO2ART, EWP0EZY, VNB4SRZ, BEART, L3CKZONG   Type & Screen (If Applicable)  No results found for: Augustine Garibay    Additional ordered pre-operative testing:  []CBC    []ABG      [] BMP   []URINALYSIS   []CMP    []HCG   []COAGS PT/INR  []T&C  []LFTs   []TYPE AND SCREEN    [] EKG  [] Chest X-Ray  [] Other Radiology    [] Sent to Hospitalist None  [x] Sent to Anesthesia for your review: yes   [] Additional Orders: None     Comments:None   Requests: None    Signed: Archana Rodriguez LPN 1/26/6787 63:56 AM

## 2020-02-18 NOTE — TELEPHONE ENCOUNTER
CHART   REVIEWED. PLEASE  CONTACT     PATIENT'S    PCP      AND/     CARDIOLOGY       FOR     CARDIAC  RISK      ASSESSMENT. YOU   CAN    HOLD  NAPROSYN    FOR        7 - 10   DAYS  AS    NEEDED. NO  KNOWN  NEUROLOGICAL   CONTRA INDICATION    NOTED  CURRENTLY. THANK  YOU.

## 2020-02-18 NOTE — PROGRESS NOTES
[facility]  Progress Note    PATIENT NAME: [facility]     CLINIC VISIT DATE: 2/18/2020    SUBJECTIVE:  [facility] is a 47 y.o. male who presents to the clinic today for discussion regarding hernia repair, patient was initially seen August 2019 for findings of umbilical hernia, there was an incidental left inguinal hernia that was found on CT scan performed June 2019, patient reports that he now has worsening umbilical discomfort as well as worsening left groin pain, denies any symptoms of obstruction, he is able to perform his activities of daily living but the discomfort is becoming more persistent. OBJECTIVE:    /82   Pulse 76   Temp 97.1 °F (36.2 °C)   Ht 5' 10.98\" (1.803 m)   Wt 209 lb 6.4 oz (95 kg)   BMI 29.22 kg/m²     General Appearance:  awake, alert, no acute distress, well developed, well nourished   Skin:  Skin color, texture, turgor normal. No rashes or lesions. Lungs:  Normal chest expansion, unlabored breathing without accessory muscle use. No audible rales, rhonchi, or wheezing. Cardiovascular: S1S2. No evidence of JVD. No evidence of pulsatile masses in abdomen  Abdomen:  Soft, bulge superior and to the right of the umbilicus consistent with ventral hernia, no obvious bulge of the left inguinal area. No overlying skin changes  Musculoskeletal: No evidence of bony/muscular deformities, trauma, atrophy of either left/right upper/lower extremity. No evidence of digital clubbing or cyanosis. ASSESSMENT:   Diagnosis Orders   1. Left inguinal hernia     2. Ventral hernia without obstruction or gangrene         PLAN:  Schedule for robot-assisted laparoscopic ventral and left inguinal hernia repair. The risks include bleeding, infection, scarring, nerve pain, risk of atrophy to testicles, risk of orchiectomy, damage to surrounding tissues, recurrence of hernia, need for further surgery in the future.  The benefits, alternatives, complications and

## 2020-02-18 NOTE — TELEPHONE ENCOUNTER
Dayan[address]         Patient:Tariq Duckworth           :1965           Surgical/Procedure Planned: Left inguinal and umbilical hernia repair with robot assist    Date & Location: 3/2/2020 [facility]       Outpatient   Planned Length of OR: 2 hr    Sedation: general        Estimated Cardiac Risk for Non-Cardiac Surgery/Procedure     Low______ Moderate______ High______    Medication Instructions - Clarification needed by this date:     Naproxen  Hold ___ Days    Resume medications:     Lovenox indicated: _____Yes _____NO    Provider: Dr. Eliana Dixon of Provider Giving Orders for Medication holds:    _____________________________________________

## 2020-03-02 ENCOUNTER — HOSPITAL ENCOUNTER (OUTPATIENT)
Age: 55
Setting detail: OUTPATIENT SURGERY
Discharge: HOME | End: 2020-03-02
Attending: SURGERY | Admitting: SURGERY
Payer: MEDICARE

## 2020-03-02 ENCOUNTER — ANESTHESIA (OUTPATIENT)
Dept: OPERATING ROOM | Age: 55
End: 2020-03-02
Payer: MEDICARE

## 2020-03-02 ENCOUNTER — ANESTHESIA EVENT (OUTPATIENT)
Dept: OPERATING ROOM | Age: 55
End: 2020-03-02
Payer: MEDICARE

## 2020-03-02 VITALS
TEMPERATURE: 97.9 F | WEIGHT: 202.13 LBS | BODY MASS INDEX: 28.3 KG/M2 | RESPIRATION RATE: 18 BRPM | HEIGHT: 71 IN | HEART RATE: 95 BPM | DIASTOLIC BLOOD PRESSURE: 91 MMHG | OXYGEN SATURATION: 93 % | SYSTOLIC BLOOD PRESSURE: 132 MMHG

## 2020-03-02 VITALS
OXYGEN SATURATION: 89 % | DIASTOLIC BLOOD PRESSURE: 100 MMHG | RESPIRATION RATE: 15 BRPM | SYSTOLIC BLOOD PRESSURE: 177 MMHG | TEMPERATURE: 97.5 F

## 2020-03-02 PROCEDURE — 2709999900 HC NON-CHARGEABLE SUPPLY: Performed by: SURGERY

## 2020-03-02 PROCEDURE — 7100000011 HC PHASE II RECOVERY - ADDTL 15 MIN: Performed by: SURGERY

## 2020-03-02 PROCEDURE — 6370000000 HC RX 637 (ALT 250 FOR IP): Performed by: NURSE ANESTHETIST, CERTIFIED REGISTERED

## 2020-03-02 PROCEDURE — 3700000001 HC ADD 15 MINUTES (ANESTHESIA): Performed by: SURGERY

## 2020-03-02 PROCEDURE — 6360000002 HC RX W HCPCS: Performed by: NURSE ANESTHETIST, CERTIFIED REGISTERED

## 2020-03-02 PROCEDURE — 2580000003 HC RX 258: Performed by: SURGERY

## 2020-03-02 PROCEDURE — 2500000003 HC RX 250 WO HCPCS: Performed by: NURSE ANESTHETIST, CERTIFIED REGISTERED

## 2020-03-02 PROCEDURE — 3600000019 HC SURGERY ROBOT ADDTL 15MIN: Performed by: SURGERY

## 2020-03-02 PROCEDURE — 49650 LAP ING HERNIA REPAIR INIT: CPT | Performed by: SURGERY

## 2020-03-02 PROCEDURE — C1781 MESH (IMPLANTABLE): HCPCS | Performed by: SURGERY

## 2020-03-02 PROCEDURE — S2900 ROBOTIC SURGICAL SYSTEM: HCPCS | Performed by: SURGERY

## 2020-03-02 PROCEDURE — 2500000003 HC RX 250 WO HCPCS: Performed by: SURGERY

## 2020-03-02 PROCEDURE — 7100000001 HC PACU RECOVERY - ADDTL 15 MIN: Performed by: SURGERY

## 2020-03-02 PROCEDURE — 49653 PR LAP, VENTRAL HERNIA REPAIR,INCARCERATED: CPT | Performed by: SURGERY

## 2020-03-02 PROCEDURE — 3700000000 HC ANESTHESIA ATTENDED CARE: Performed by: SURGERY

## 2020-03-02 PROCEDURE — 7100000000 HC PACU RECOVERY - FIRST 15 MIN: Performed by: SURGERY

## 2020-03-02 PROCEDURE — 3600000009 HC SURGERY ROBOT BASE: Performed by: SURGERY

## 2020-03-02 PROCEDURE — 7100000010 HC PHASE II RECOVERY - FIRST 15 MIN: Performed by: SURGERY

## 2020-03-02 DEVICE — MESH HERN W10XL15CM POLY POLYLACTIC ACID 70% CLLGN 30% GLYC: Type: IMPLANTABLE DEVICE | Site: INGUINAL | Status: FUNCTIONAL

## 2020-03-02 RX ORDER — MORPHINE SULFATE 4 MG/ML
4 INJECTION, SOLUTION INTRAMUSCULAR; INTRAVENOUS
Status: DISCONTINUED | OUTPATIENT
Start: 2020-03-02 | End: 2020-03-02 | Stop reason: HOSPADM

## 2020-03-02 RX ORDER — FENTANYL CITRATE 50 UG/ML
INJECTION, SOLUTION INTRAMUSCULAR; INTRAVENOUS PRN
Status: DISCONTINUED | OUTPATIENT
Start: 2020-03-02 | End: 2020-03-02 | Stop reason: SDUPTHER

## 2020-03-02 RX ORDER — VECURONIUM BROMIDE 1 MG/ML
INJECTION, POWDER, LYOPHILIZED, FOR SOLUTION INTRAVENOUS PRN
Status: DISCONTINUED | OUTPATIENT
Start: 2020-03-02 | End: 2020-03-02 | Stop reason: SDUPTHER

## 2020-03-02 RX ORDER — DEXAMETHASONE SODIUM PHOSPHATE 10 MG/ML
INJECTION INTRAMUSCULAR; INTRAVENOUS PRN
Status: DISCONTINUED | OUTPATIENT
Start: 2020-03-02 | End: 2020-03-02 | Stop reason: SDUPTHER

## 2020-03-02 RX ORDER — KETOROLAC TROMETHAMINE 30 MG/ML
INJECTION, SOLUTION INTRAMUSCULAR; INTRAVENOUS PRN
Status: DISCONTINUED | OUTPATIENT
Start: 2020-03-02 | End: 2020-03-02 | Stop reason: SDUPTHER

## 2020-03-02 RX ORDER — ROCURONIUM BROMIDE 10 MG/ML
INJECTION, SOLUTION INTRAVENOUS PRN
Status: DISCONTINUED | OUTPATIENT
Start: 2020-03-02 | End: 2020-03-02 | Stop reason: SDUPTHER

## 2020-03-02 RX ORDER — MIDAZOLAM HYDROCHLORIDE 1 MG/ML
INJECTION INTRAMUSCULAR; INTRAVENOUS PRN
Status: DISCONTINUED | OUTPATIENT
Start: 2020-03-02 | End: 2020-03-02 | Stop reason: SDUPTHER

## 2020-03-02 RX ORDER — DIPHENHYDRAMINE HYDROCHLORIDE 50 MG/ML
INJECTION INTRAMUSCULAR; INTRAVENOUS PRN
Status: DISCONTINUED | OUTPATIENT
Start: 2020-03-02 | End: 2020-03-02 | Stop reason: SDUPTHER

## 2020-03-02 RX ORDER — OXYCODONE HYDROCHLORIDE AND ACETAMINOPHEN 5; 325 MG/1; MG/1
2 TABLET ORAL ONCE
Status: COMPLETED | OUTPATIENT
Start: 2020-03-02 | End: 2020-03-02

## 2020-03-02 RX ORDER — SODIUM CHLORIDE, SODIUM LACTATE, POTASSIUM CHLORIDE, CALCIUM CHLORIDE 600; 310; 30; 20 MG/100ML; MG/100ML; MG/100ML; MG/100ML
INJECTION, SOLUTION INTRAVENOUS CONTINUOUS
Status: DISCONTINUED | OUTPATIENT
Start: 2020-03-02 | End: 2020-03-02 | Stop reason: HOSPADM

## 2020-03-02 RX ORDER — LOPERAMIDE HYDROCHLORIDE 2 MG/1
2 CAPSULE ORAL 4 TIMES DAILY PRN
COMMUNITY

## 2020-03-02 RX ORDER — HYDROCODONE BITARTRATE AND ACETAMINOPHEN 5; 325 MG/1; MG/1
1 TABLET ORAL EVERY 6 HOURS PRN
Qty: 20 TABLET | Refills: 0 | Status: SHIPPED | OUTPATIENT
Start: 2020-03-02 | End: 2020-03-09

## 2020-03-02 RX ORDER — DOCUSATE SODIUM 100 MG/1
100 CAPSULE, LIQUID FILLED ORAL 2 TIMES DAILY PRN
Qty: 30 CAPSULE | Refills: 0 | Status: SHIPPED | OUTPATIENT
Start: 2020-03-02 | End: 2021-01-08 | Stop reason: ALTCHOICE

## 2020-03-02 RX ORDER — HYDROCODONE BITARTRATE AND ACETAMINOPHEN 5; 325 MG/1; MG/1
1 TABLET ORAL PRN
Status: DISCONTINUED | OUTPATIENT
Start: 2020-03-02 | End: 2020-03-02 | Stop reason: HOSPADM

## 2020-03-02 RX ORDER — ONDANSETRON 4 MG/1
4 TABLET, FILM COATED ORAL EVERY 6 HOURS PRN
Qty: 20 TABLET | Refills: 0 | Status: SHIPPED | OUTPATIENT
Start: 2020-03-02 | End: 2021-07-02

## 2020-03-02 RX ORDER — LIDOCAINE HYDROCHLORIDE 20 MG/ML
INJECTION, SOLUTION EPIDURAL; INFILTRATION; INTRACAUDAL; PERINEURAL PRN
Status: DISCONTINUED | OUTPATIENT
Start: 2020-03-02 | End: 2020-03-02 | Stop reason: SDUPTHER

## 2020-03-02 RX ORDER — ONDANSETRON 2 MG/ML
4 INJECTION INTRAMUSCULAR; INTRAVENOUS EVERY 4 HOURS PRN
Status: DISCONTINUED | OUTPATIENT
Start: 2020-03-02 | End: 2020-03-02 | Stop reason: HOSPADM

## 2020-03-02 RX ORDER — HYDROCODONE BITARTRATE AND ACETAMINOPHEN 5; 325 MG/1; MG/1
2 TABLET ORAL PRN
Status: DISCONTINUED | OUTPATIENT
Start: 2020-03-02 | End: 2020-03-02 | Stop reason: HOSPADM

## 2020-03-02 RX ORDER — ESMOLOL HYDROCHLORIDE 10 MG/ML
INJECTION INTRAVENOUS PRN
Status: DISCONTINUED | OUTPATIENT
Start: 2020-03-02 | End: 2020-03-02 | Stop reason: SDUPTHER

## 2020-03-02 RX ORDER — PROPOFOL 10 MG/ML
INJECTION, EMULSION INTRAVENOUS PRN
Status: DISCONTINUED | OUTPATIENT
Start: 2020-03-02 | End: 2020-03-02 | Stop reason: SDUPTHER

## 2020-03-02 RX ORDER — MORPHINE SULFATE 2 MG/ML
2 INJECTION, SOLUTION INTRAMUSCULAR; INTRAVENOUS EVERY 30 MIN PRN
Status: DISCONTINUED | OUTPATIENT
Start: 2020-03-02 | End: 2020-03-02 | Stop reason: HOSPADM

## 2020-03-02 RX ORDER — SODIUM CHLORIDE 0.9 % (FLUSH) 0.9 %
10 SYRINGE (ML) INJECTION PRN
Status: DISCONTINUED | OUTPATIENT
Start: 2020-03-02 | End: 2020-03-02 | Stop reason: HOSPADM

## 2020-03-02 RX ORDER — ONDANSETRON 2 MG/ML
INJECTION INTRAMUSCULAR; INTRAVENOUS PRN
Status: DISCONTINUED | OUTPATIENT
Start: 2020-03-02 | End: 2020-03-02 | Stop reason: SDUPTHER

## 2020-03-02 RX ORDER — GABAPENTIN 300 MG/1
600 CAPSULE ORAL ONCE
Status: COMPLETED | OUTPATIENT
Start: 2020-03-02 | End: 2020-03-02

## 2020-03-02 RX ORDER — FENTANYL CITRATE 50 UG/ML
25 INJECTION, SOLUTION INTRAMUSCULAR; INTRAVENOUS EVERY 30 MIN PRN
Status: DISCONTINUED | OUTPATIENT
Start: 2020-03-02 | End: 2020-03-02 | Stop reason: HOSPADM

## 2020-03-02 RX ORDER — PROMETHAZINE HYDROCHLORIDE 25 MG/ML
25 INJECTION, SOLUTION INTRAMUSCULAR; INTRAVENOUS EVERY 4 HOURS PRN
Status: DISCONTINUED | OUTPATIENT
Start: 2020-03-02 | End: 2020-03-02 | Stop reason: HOSPADM

## 2020-03-02 RX ORDER — CLINDAMYCIN PHOSPHATE 900 MG/50ML
900 INJECTION INTRAVENOUS
Status: COMPLETED | OUTPATIENT
Start: 2020-03-02 | End: 2020-03-02

## 2020-03-02 RX ORDER — DOXYCYCLINE HYCLATE 100 MG
100 TABLET ORAL 2 TIMES DAILY
Qty: 10 TABLET | Refills: 0 | Status: SHIPPED | OUTPATIENT
Start: 2020-03-02 | End: 2020-03-07

## 2020-03-02 RX ORDER — SODIUM CHLORIDE 0.9 % (FLUSH) 0.9 %
10 SYRINGE (ML) INJECTION EVERY 12 HOURS SCHEDULED
Status: DISCONTINUED | OUTPATIENT
Start: 2020-03-02 | End: 2020-03-02 | Stop reason: HOSPADM

## 2020-03-02 RX ORDER — FENTANYL CITRATE 50 UG/ML
50 INJECTION, SOLUTION INTRAMUSCULAR; INTRAVENOUS
Status: DISCONTINUED | OUTPATIENT
Start: 2020-03-02 | End: 2020-03-02 | Stop reason: HOSPADM

## 2020-03-02 RX ORDER — DIPHENHYDRAMINE HYDROCHLORIDE 50 MG/ML
12.5 INJECTION INTRAMUSCULAR; INTRAVENOUS
Status: DISCONTINUED | OUTPATIENT
Start: 2020-03-02 | End: 2020-03-02 | Stop reason: HOSPADM

## 2020-03-02 RX ADMIN — SODIUM CHLORIDE, POTASSIUM CHLORIDE, SODIUM LACTATE AND CALCIUM CHLORIDE: 600; 310; 30; 20 INJECTION, SOLUTION INTRAVENOUS at 09:18

## 2020-03-02 RX ADMIN — LIDOCAINE HYDROCHLORIDE 100 MG: 20 INJECTION, SOLUTION EPIDURAL; INFILTRATION; INTRACAUDAL; PERINEURAL at 09:28

## 2020-03-02 RX ADMIN — ROCURONIUM BROMIDE 50 MG: 10 INJECTION, SOLUTION INTRAVENOUS at 09:28

## 2020-03-02 RX ADMIN — SODIUM CHLORIDE, POTASSIUM CHLORIDE, SODIUM LACTATE AND CALCIUM CHLORIDE: 600; 310; 30; 20 INJECTION, SOLUTION INTRAVENOUS at 07:30

## 2020-03-02 RX ADMIN — SODIUM CHLORIDE, POTASSIUM CHLORIDE, SODIUM LACTATE AND CALCIUM CHLORIDE: 600; 310; 30; 20 INJECTION, SOLUTION INTRAVENOUS at 09:45

## 2020-03-02 RX ADMIN — ESMOLOL HYDROCHLORIDE 20 MG: 10 INJECTION, SOLUTION INTRAVENOUS at 10:20

## 2020-03-02 RX ADMIN — CLINDAMYCIN PHOSPHATE 900 MG: 18 INJECTION, SOLUTION INTRAVENOUS at 09:33

## 2020-03-02 RX ADMIN — VECURONIUM BROMIDE FOR INJECTION 3 MG: 1 INJECTION, POWDER, LYOPHILIZED, FOR SOLUTION INTRAVENOUS at 09:54

## 2020-03-02 RX ADMIN — PROPOFOL 180 MG: 10 INJECTION, EMULSION INTRAVENOUS at 09:28

## 2020-03-02 RX ADMIN — DIPHENHYDRAMINE HYDROCHLORIDE 25 MG: 50 INJECTION, SOLUTION INTRAMUSCULAR; INTRAVENOUS at 09:41

## 2020-03-02 RX ADMIN — ESMOLOL HYDROCHLORIDE 20 MG: 10 INJECTION, SOLUTION INTRAVENOUS at 11:41

## 2020-03-02 RX ADMIN — ONDANSETRON 4 MG: 2 INJECTION INTRAMUSCULAR; INTRAVENOUS at 09:41

## 2020-03-02 RX ADMIN — FENTANYL CITRATE 100 MCG: 50 INJECTION, SOLUTION INTRAMUSCULAR; INTRAVENOUS at 09:18

## 2020-03-02 RX ADMIN — OXYCODONE HYDROCHLORIDE AND ACETAMINOPHEN 2 TABLET: 5; 325 TABLET ORAL at 08:24

## 2020-03-02 RX ADMIN — DEXAMETHASONE SODIUM PHOSPHATE 10 MG: 10 INJECTION INTRAMUSCULAR; INTRAVENOUS at 09:41

## 2020-03-02 RX ADMIN — GABAPENTIN 600 MG: 300 CAPSULE ORAL at 08:25

## 2020-03-02 RX ADMIN — KETOROLAC TROMETHAMINE 30 MG: 30 INJECTION, SOLUTION INTRAMUSCULAR; INTRAVENOUS at 11:41

## 2020-03-02 RX ADMIN — VECURONIUM BROMIDE FOR INJECTION 3 MG: 1 INJECTION, POWDER, LYOPHILIZED, FOR SOLUTION INTRAVENOUS at 11:17

## 2020-03-02 RX ADMIN — MIDAZOLAM HYDROCHLORIDE 2 MG: 1 INJECTION, SOLUTION INTRAMUSCULAR; INTRAVENOUS at 09:18

## 2020-03-02 ASSESSMENT — PULMONARY FUNCTION TESTS
PIF_VALUE: 25
PIF_VALUE: 33
PIF_VALUE: 31
PIF_VALUE: 32
PIF_VALUE: 34
PIF_VALUE: 23
PIF_VALUE: 35
PIF_VALUE: 17
PIF_VALUE: 32
PIF_VALUE: 34
PIF_VALUE: 16
PIF_VALUE: 22
PIF_VALUE: 22
PIF_VALUE: 34
PIF_VALUE: 35
PIF_VALUE: 7
PIF_VALUE: 34
PIF_VALUE: 25
PIF_VALUE: 34
PIF_VALUE: 31
PIF_VALUE: 26
PIF_VALUE: 34
PIF_VALUE: 34
PIF_VALUE: 12
PIF_VALUE: 34
PIF_VALUE: 35
PIF_VALUE: 34
PIF_VALUE: 24
PIF_VALUE: 32
PIF_VALUE: 34
PIF_VALUE: 0
PIF_VALUE: 8
PIF_VALUE: 35
PIF_VALUE: 35
PIF_VALUE: 34
PIF_VALUE: 23
PIF_VALUE: 22
PIF_VALUE: 32
PIF_VALUE: 34
PIF_VALUE: 30
PIF_VALUE: 0
PIF_VALUE: 33
PIF_VALUE: 2
PIF_VALUE: 23
PIF_VALUE: 32
PIF_VALUE: 24
PIF_VALUE: 23
PIF_VALUE: 34
PIF_VALUE: 17
PIF_VALUE: 8
PIF_VALUE: 25
PIF_VALUE: 32
PIF_VALUE: 34
PIF_VALUE: 34
PIF_VALUE: 0
PIF_VALUE: 34
PIF_VALUE: 33
PIF_VALUE: 35
PIF_VALUE: 2
PIF_VALUE: 7
PIF_VALUE: 32
PIF_VALUE: 34
PIF_VALUE: 26
PIF_VALUE: 32
PIF_VALUE: 34
PIF_VALUE: 32
PIF_VALUE: 22
PIF_VALUE: 25
PIF_VALUE: 33
PIF_VALUE: 35
PIF_VALUE: 30
PIF_VALUE: 33
PIF_VALUE: 27
PIF_VALUE: 35
PIF_VALUE: 35
PIF_VALUE: 22
PIF_VALUE: 35
PIF_VALUE: 22
PIF_VALUE: 12
PIF_VALUE: 22
PIF_VALUE: 35
PIF_VALUE: 33
PIF_VALUE: 32
PIF_VALUE: 35
PIF_VALUE: 35
PIF_VALUE: 17
PIF_VALUE: 25
PIF_VALUE: 32
PIF_VALUE: 35
PIF_VALUE: 34
PIF_VALUE: 32
PIF_VALUE: 34
PIF_VALUE: 17
PIF_VALUE: 35
PIF_VALUE: 35
PIF_VALUE: 17
PIF_VALUE: 0
PIF_VALUE: 33
PIF_VALUE: 35
PIF_VALUE: 34
PIF_VALUE: 23
PIF_VALUE: 34
PIF_VALUE: 31
PIF_VALUE: 35
PIF_VALUE: 34
PIF_VALUE: 33
PIF_VALUE: 0
PIF_VALUE: 31
PIF_VALUE: 35
PIF_VALUE: 34
PIF_VALUE: 32
PIF_VALUE: 32
PIF_VALUE: 33
PIF_VALUE: 34
PIF_VALUE: 22
PIF_VALUE: 35
PIF_VALUE: 34
PIF_VALUE: 33
PIF_VALUE: 7
PIF_VALUE: 34
PIF_VALUE: 31
PIF_VALUE: 25
PIF_VALUE: 34
PIF_VALUE: 35
PIF_VALUE: 34
PIF_VALUE: 32
PIF_VALUE: 30
PIF_VALUE: 34
PIF_VALUE: 35
PIF_VALUE: 34
PIF_VALUE: 34
PIF_VALUE: 35
PIF_VALUE: 25
PIF_VALUE: 34
PIF_VALUE: 34
PIF_VALUE: 24
PIF_VALUE: 34
PIF_VALUE: 34
PIF_VALUE: 35
PIF_VALUE: 35
PIF_VALUE: 34
PIF_VALUE: 22
PIF_VALUE: 0
PIF_VALUE: 32
PIF_VALUE: 8
PIF_VALUE: 34
PIF_VALUE: 26
PIF_VALUE: 2
PIF_VALUE: 23
PIF_VALUE: 0
PIF_VALUE: 35
PIF_VALUE: 34
PIF_VALUE: 23
PIF_VALUE: 22

## 2020-03-02 ASSESSMENT — PAIN - FUNCTIONAL ASSESSMENT: PAIN_FUNCTIONAL_ASSESSMENT: 0-10

## 2020-03-02 ASSESSMENT — PAIN SCALES - GENERAL
PAINLEVEL_OUTOF10: 0
PAINLEVEL_OUTOF10: 2
PAINLEVEL_OUTOF10: 0

## 2020-03-02 ASSESSMENT — PAIN DESCRIPTION - ORIENTATION: ORIENTATION: LEFT

## 2020-03-02 ASSESSMENT — PAIN DESCRIPTION - LOCATION: LOCATION: ABDOMEN

## 2020-03-02 ASSESSMENT — PAIN DESCRIPTION - PAIN TYPE: TYPE: SURGICAL PAIN

## 2020-03-02 ASSESSMENT — PAIN DESCRIPTION - DESCRIPTORS: DESCRIPTORS: DISCOMFORT

## 2020-03-02 ASSESSMENT — COPD QUESTIONNAIRES: CAT_SEVERITY: NO INTERVAL CHANGE

## 2020-03-02 NOTE — ANESTHESIA PRE PROCEDURE
Department of Anesthesiology  Preprocedure Note       Name:  Kimberli Esquivel   Age:  47 y.o.  :  1965                                          MRN:  [id number]         Date:  3/2/2020      Surgeon: Nazanin Russ):  Grace Erickson DO    Procedure: Procedure(s): Lap assisted robotic Ventral hernia repair and  Left Inguinal hernia repair  Medications prior to admission:   Prior to Admission medications    Medication Sig Start Date End Date Taking? Authorizing Provider   loperamide (IMODIUM) 2 MG capsule Take 2 mg by mouth 4 times daily as needed for Diarrhea    Historical Provider, MD   gabapentin (NEURONTIN) 800 MG tablet TK 1 T PO TID 20   Historical Provider, MD   gabapentin (NEURONTIN) 300 MG capsule TK 1 CAPLET BY MOUTH THREE TIMES DAILY 20   Historical Provider, MD   DULoxetine (CYMBALTA) 60 MG extended release capsule Take 2 capsules by mouth daily 20   FELICE Juarez CNP   omeprazole (PRILOSEC) 40 MG delayed release capsule Take 1 capsule by mouth daily 83   Mahogany Cheng MD   gabapentin (NEURONTIN) 800 MG tablet Take 1 tablet by mouth 3 times daily for 30 days.  20  FELICE Juarez CNP   DULoxetine (CYMBALTA) 60 MG extended release capsule Take 1 capsule by mouth daily  Patient not taking: Reported on 2020   FELICE Juarez CNP   butalbital-acetaminophen-caffeine (FIORICET, ESGIC) -34 MG per tablet TAKE 1 OR 2 TABLETS BYMOUTH TWICE A DAY  AS  NEEDED  FOR  HEADACHE 71   Mahogany Cheng MD   ondansetron (ZOFRAN ODT) 4 MG disintegrating tablet Take 1 tablet by mouth every 8 hours as needed for Nausea or Vomiting 7/3/16   Mahogany Cheng MD   omeprazole (PRILOSEC) 40 MG delayed release capsule TAKE 1 CAPSULE BY MOUTH DAILY    Mahogany Cheng MD   naproxen (EC-NAPROSYN) 500 MG EC tablet Take 1 tablet by mouth 2 times daily (with meals) AS  NEEDED    Mahogany Cheng MD   fluticasone (FLONASE) 50 MCG/ACT nasal spray SHAKE LIQUID AND USE 1 SPRAY IN EACH NOSTRIL DAILY 11/4/19   Juma Rosa MD   baclofen (LIORESAL) 10 MG tablet TAKE 1 TABLET BY MOUTH THREE TIMES DAILY AS NEEDED FOR SPASMS 4/26/19   TAMMY Cross   SYMTUZA 031-251-269-10 MG TABS TK 1 T PO  QD 3/27/19   Historical Provider, MD   aspirin-acetaminophen-caffeine ([place]) 275-142-82 MG per tablet Take 1 tablet by mouth every 6 hours as needed for Headaches    Historical Provider, MD   Clotrimazole (MYCELEX) 10 MG LOZG lozenge Take 1 lozenge by mouth 5 times daily 3/8/19   TAMMY Cross   fenofibric acid (FIBRICOR) 135 MG CPDR capsule TAKE 1 CAPSULE BY MOUTH DAILY 12/13/18   Jorge Mccormick MD   LORazepam (ATIVAN) 1 MG tablet Take 1 mg by mouth every 6 hours as needed for Anxiety.     Historical Provider, MD   BLACK ELDERBERRY,BERRY-FLOWER, PO Take 1 tablet by mouth daily    Historical Provider, MD   Darunavir-Cobicistat (PREZCOBIX) 800-150 MG TABS Take by mouth nightly    Historical Provider, MD   Nebulizers (COMPRESSOR/NEBULIZER) MISC As directed 6/16/17   TAMMY Cross   Respiratory Therapy Supplies (NEBULIZER/TUBING/MOUTHPIECE) KIT 1 kit by Does not apply route daily 6/16/17   TAMMY Cross   albuterol (PROVENTIL) (2.5 MG/3ML) 0.083% nebulizer solution Take 2.5 mg by nebulization every 6 hours as needed for Wheezing    Historical Provider, MD   TURMERIC PO Take by mouth three times daily    Historical Provider, MD   Glucosamine HCl (GLUCOSAMINE PO) Take by mouth daily    Historical Provider, MD   vitamin D 1000 UNITS CAPS Take by mouth daily    Historical Provider, MD   GARLIC PO Take by mouth daily    Historical Provider, MD   Multiple Vitamins-Minerals (MULTIVITAMIN PO) Take by mouth daily    Historical Provider, MD   INVEGA 6 MG extended release tablet Take 6 mg by mouth daily 10/10/16   Historical Provider, MD   DESCOVY 200-25 MG TABS Take 1 tablet by mouth daily 10/27/16   Historical Provider, MD   MYTESI 125 ANESTHESIA NERVE BLOCK Bilateral 2018    Bilat L2 L3 L4 L5 Diagnostic Medial BB performed by Rochelle Salcedo MD at [address] [place] [address]  2016    Normal colon. Hemorrhoids. Dr Landen Salguero      full dental extraction    LUMBAR SPINE SURGERY Right 2017    Right L4 & L5 Transforaminal  performed by Rochelle Salcedo MD at [address] [facility] [facility] Right 10/3/2017    Right L4 & L5 Transforaminal performed by Rochelle Salcedo MD at [address] [facility] [facility] Right 2019    Right L4 L5 TRANSFORAMINAL performed by Rochelle Salcedo MD at [address] [place] [address] AA&/STRD LUMBAR PLEXUS CONT NFS CATH Bilateral 2018    Bilat L2 L3 L4 L5 Confirmatory Medial BB performed by Rochelle Salcedo MD at [address] [place] [address], W IMAGE [address] [facility] LEVEL Right 2018    Right L2 L3 L4 L5 RADIOFREQUENCY performed by Rochelle Salcedo MD at [address] [place] [address], W IMAGE [address] [facility] LEVEL Left 3/15/2018    Left L2 L3 L4 L5 RADIOFREQUENCY performed by Rochelle Salcedo MD at [facility] OR       Social History:    Social History     Tobacco Use    Smoking status: Former Smoker     Packs/day: 3.00     Years: 30.00     Pack years: 90.00     Last attempt to quit: 10/7/2014     Years since quittin.4    Smokeless tobacco: Never Used   Substance Use Topics    Alcohol use: Yes     Comment: socially                                Counseling given: Not Answered      Vital Signs (Current): There were no vitals filed for this visit.                                            BP Readings from Last 3 Encounters:   20 127/83   20 132/82   20 136/82       NPO Status:                                                                                 BMI:   Wt Readings from Last 3 Encounters:   20 202 lb 2 oz (91.7 kg)   20 209 lb 6.4 oz (95 kg)   20 207 lb 10.8 oz (94.2 kg)     There is no height or weight on file to calculate BMI.    CBC:   Lab Results   Component Value Date    WBC 5.4 11/22/2019    RBC 4.57 11/22/2019    HGB 14.8 11/22/2019    HCT 44.4 11/22/2019    MCV 97.1 11/22/2019    RDW 14.8 11/22/2019     11/22/2019       CMP:   Lab Results   Component Value Date     11/22/2019    K 3.9 11/22/2019     11/22/2019    CO2 28 11/22/2019    BUN 14 11/22/2019    CREATININE 0.82 11/22/2019    GFRAA >60 11/22/2019    LABGLOM >60 11/22/2019    GLUCOSE 115 11/22/2019    PROT 7.0 11/22/2019    CALCIUM 9.6 11/22/2019    BILITOT 0.21 11/22/2019    ALKPHOS 68 11/22/2019    AST 17 11/22/2019    ALT 16 11/22/2019       POC Tests: No results for input(s): POCGLU, POCNA, POCK, POCCL, POCBUN, POCHEMO, POCHCT in the last 72 hours. Coags:   Lab Results   Component Value Date    PROTIME 9.7 11/22/2019    INR 0.9 11/22/2019    APTT 24.0 11/22/2019       HCG (If Applicable): No results found for: PREGTESTUR, PREGSERUM, HCG, HCGQUANT     ABGs: No results found for: PHART, PO2ART, MXJ9JJZ, TKX5HTD, BEART, Y9UARHWW     Type & Screen (If Applicable):  No results found for: LABABO, LABRH    Anesthesia Evaluation    Airway: Mallampati: II  TM distance: >3 FB   Neck ROM: full  Mouth opening: > = 3 FB Dental:    (+) upper dentures and lower dentures      Pulmonary:normal exam    (+) pneumonia:  COPD: no interval change,                             Cardiovascular:Negative CV ROS          ECG reviewed      Echocardiogram reviewed                  Neuro/Psych:   (+) neuromuscular disease:, psychiatric history: stable with treatment            GI/Hepatic/Renal:   (+) GERD:,           Endo/Other: Negative Endo/Other ROS                    Abdominal:           Vascular:                                          Anesthesia Plan      MAC and general     ASA 3       Induction: intravenous. MIPS: Postoperative opioids intended and Prophylactic antiemetics administered. Anesthetic plan and risks discussed with patient.     Use of blood products discussed with patient whom consented to blood products.    Plan discussed with surgical team.                  FELICE Bauer - RUKHSANA   3/2/2020

## 2020-03-02 NOTE — OP NOTE
surrounding fascial tissue, hemostasis was excellent. Then both inguinal regions were inspected and the median umbilical ligament, medial umbilical ligaments, and lateral umbilical folds were identified. Indirect inguinal hernia noted on the L side, there is an indent in the peritoneum of the R side but no overt hernia at this time. The peritoneum was incised with scissor electrocautery along a line 4 cm above the superior edge of the hernia defect, extending from the medial umbilical ligament to the anterior superior iliac spine. The peritoneal flap was mobilized inferiorly using blunt dissection. Blunt dissection was down from the ASIS to the lateral edge of the internal ring. The inferior epigastric vessels were exposed and kept superior to the dissection planes at all times during the operation. Medial dissection was done until Jt's ligament was exposed. The cord structures and hernia sac were identified. Cord structures were preserved during the dissection and reduction of hernia sac. The cord structures were dissected free from the hernia sac circumferentially. There was a generous cord lipoma contained in the direct space that was reduced as well. Hemostasis was maintained. Once dissection was completed, a 15 cm x 9 cm piece Progrip mesh was rolled double scroll. It was placed within the peritoneum flap and centered over the deep inguinal ring cover direct and indirect hernia spaces. It was noted to lay flat, in good position and without crimpage. The transversalis plane had been entered during dissection, the defect was closed with running 3-0 Vlock suture. The peritoneum was closed with running 3-0 V lock suture. Permanent 0-V lock suture was then used to close the ventral hernia defect, the tail was trimmed close to the tissues. Robot was undocked without complications. The 8mm robotic ports were removed under direct visualization. No bleeding was noted.  Skin incisions were closed with

## 2020-03-03 NOTE — H&P
complications and procedure details were explained to the patient, all questions were answered.     Electronically signed by Grace Erickson DO on 2/18/2020 at 10:44 AM       [address]  History and Physical      Pt Name: Kimberli Esquivel  MRN: [id number]  [place]: 1965  Date of evaluation: 3/2/2020      I have examined the patient and reviewed the H&P/Consult completed above, and there are no changes to the patient or plans. To OR for inguinal/ventral hernia repair.     Electronically signed by Grace Erickson DO on 3/2/2020 at 8:55 AM
(PREZCOBIX) 800-150 MG TABS, Take by mouth nightly, Disp: , Rfl:     Nebulizers (COMPRESSOR/NEBULIZER) MISC, As directed, Disp: 1 each, Rfl: 0    Respiratory Therapy Supplies (NEBULIZER/TUBING/MOUTHPIECE) KIT, 1 kit by Does not apply route daily, Disp: 1 kit, Rfl: 0    albuterol (PROVENTIL) (2.5 MG/3ML) 0.083% nebulizer solution, Take 2.5 mg by nebulization every 6 hours as needed for Wheezing, Disp: , Rfl:     TURMERIC PO, Take by mouth three times daily, Disp: , Rfl:     Glucosamine HCl (GLUCOSAMINE PO), Take by mouth daily, Disp: , Rfl:     vitamin D 1000 UNITS CAPS, Take by mouth daily, Disp: , Rfl:     GARLIC PO, Take by mouth daily, Disp: , Rfl:     Multiple Vitamins-Minerals (MULTIVITAMIN PO), Take by mouth daily, Disp: , Rfl:     INVEGA 6 MG extended release tablet, Take 6 mg by mouth daily, Disp: , Rfl:     Vital Signs:  Vitals:    03/02/20 1347   BP:    Pulse:    Resp:    Temp:    SpO2: 93%       Physical Exam:  Vital signs and Nurse's note reviewed  Gen:  A&Ox3, NAD  HEENT: NCAT, PERRLA, EOMI, no scleral icterus, oral mucosa moist  Neck: Trachea midline without obvious masses or lesions  Chest: Symmetric rise with inhalation, no evidence of trauma  CVS: S1S2  Resp: Good bilateral air entry, no audible wheeze or rhonchi  Abd: soft, mildly tender abd wall just superior to umbilicus. Ext: No clubbing, cyanosis, edema, peripheral pulses 2+ Rad/Fem/DP/PT  CNS: Moves all extremities, no gross focal motor deficits  Skin: No erythema or ulcerations           Assessment:    Rosine Skiff is a 47 y.o. male with ventral hernia and LIH    Plan:    1.  To OR for hernia repair, all questions answered, written informed consent obtained      David Salas  3/3/2020

## 2020-03-10 ENCOUNTER — OFFICE VISIT (OUTPATIENT)
Dept: SURGERY | Age: 55
End: 2020-03-10
Payer: MEDICARE

## 2020-03-10 VITALS
DIASTOLIC BLOOD PRESSURE: 76 MMHG | WEIGHT: 211 LBS | SYSTOLIC BLOOD PRESSURE: 118 MMHG | BODY MASS INDEX: 29.54 KG/M2 | HEIGHT: 71 IN | TEMPERATURE: 97 F | HEART RATE: 84 BPM

## 2020-03-10 PROCEDURE — 99024 POSTOP FOLLOW-UP VISIT: CPT | Performed by: SURGERY

## 2020-03-10 PROCEDURE — 99211 OFF/OP EST MAY X REQ PHY/QHP: CPT

## 2020-03-11 NOTE — PROGRESS NOTES
[facility] General Surgery Clinic  Progress Note    PATIENT NAME: [facility] VISIT DATE: 3/10/2020    SUBJECTIVE:  Kiran Rodarte is a 47 y.o. male who presents to the clinic today for evaluation post LIH and ventral hernia repair, pt denies any issues. OBJECTIVE:    /76   Pulse 84   Temp 97 °F (36.1 °C)   Ht 5' 10.98\" (1.803 m)   Wt 211 lb (95.7 kg)   BMI 29.44 kg/m²     General Appearance:  awake, alert, no acute distress, well developed, well nourished   Skin:  Skin color, texture, turgor normal. No rashes or lesions. Lungs:  Normal chest expansion, unlabored breathing without accessory muscle use. No audible rales, rhonchi, or wheezing. Cardiovascular: S1S2. No evidence of JVD. No evidence of pulsatile masses in abdomen  Abdomen:  Soft, non-tender, no organomegaly, no masses. Port sites well healed without evidence of bleeding or infection. Musculoskeletal: No evidence of bony/muscular deformities, trauma, atrophy of either left/right upper/lower extremity. No evidence of digital clubbing or cyanosis. ASSESSMENT:   Diagnosis Orders   1. Status post inguinal hernia repair     2. Status post repair of ventral hernia         PLAN:  1. Lifting restriction to less than 20lbs for the next 5 weeks, unrestricted after this. Continue local hygiene with soap and water  2.  F/U prn    Electronically signed by Dario Tello DO on 3/11/2020 at 8:33 AM

## 2020-03-30 RX ORDER — BUTALBITAL, ACETAMINOPHEN AND CAFFEINE 50; 325; 40 MG/1; MG/1; MG/1
TABLET ORAL
Qty: 60 TABLET | Refills: 1 | Status: SHIPPED | OUTPATIENT
Start: 2020-03-30 | End: 2020-05-28 | Stop reason: SDUPTHER

## 2020-03-30 NOTE — TELEPHONE ENCOUNTER
Last Appt:  1/6/2020  Next Appt:   5/28/2020  Med verified in [facility] Completed on 1/6/2020

## 2020-04-14 ENCOUNTER — VIRTUAL VISIT (OUTPATIENT)
Dept: PAIN MANAGEMENT | Age: 55
End: 2020-04-14
Payer: MEDICARE

## 2020-04-14 RX ORDER — GABAPENTIN 800 MG/1
800 TABLET ORAL 3 TIMES DAILY
Qty: 90 TABLET | Refills: 5 | Status: SHIPPED | OUTPATIENT
Start: 2020-04-14 | End: 2020-10-20

## 2020-05-05 RX ORDER — DULOXETIN HYDROCHLORIDE 60 MG/1
60 CAPSULE, DELAYED RELEASE ORAL DAILY
Qty: 30 CAPSULE | Refills: 3 | Status: SHIPPED | OUTPATIENT
Start: 2020-05-05 | End: 2021-07-02

## 2020-05-12 RX ORDER — OMEPRAZOLE 40 MG/1
40 CAPSULE, DELAYED RELEASE ORAL DAILY
Qty: 90 CAPSULE | Refills: 1 | Status: SHIPPED | OUTPATIENT
Start: 2020-05-12 | End: 2020-09-25

## 2020-05-12 RX ORDER — DULOXETIN HYDROCHLORIDE 30 MG/1
30 CAPSULE, DELAYED RELEASE ORAL DAILY
Qty: 7 CAPSULE | Refills: 0 | Status: SHIPPED | OUTPATIENT
Start: 2020-05-12 | End: 2021-07-02

## 2020-05-14 RX ORDER — ETODOLAC 400 MG/1
TABLET, FILM COATED ORAL
Qty: 180 TABLET | Refills: 0 | OUTPATIENT
Start: 2020-05-14

## 2020-05-26 ENCOUNTER — VIRTUAL VISIT (OUTPATIENT)
Dept: CARDIOLOGY | Age: 55
End: 2020-05-26
Payer: MEDICARE

## 2020-05-26 PROCEDURE — 1036F TOBACCO NON-USER: CPT | Performed by: INTERNAL MEDICINE

## 2020-05-26 PROCEDURE — 99213 OFFICE O/P EST LOW 20 MIN: CPT | Performed by: INTERNAL MEDICINE

## 2020-05-26 PROCEDURE — G8428 CUR MEDS NOT DOCUMENT: HCPCS | Performed by: INTERNAL MEDICINE

## 2020-05-26 PROCEDURE — G8417 CALC BMI ABV UP PARAM F/U: HCPCS | Performed by: INTERNAL MEDICINE

## 2020-05-26 PROCEDURE — 3017F COLORECTAL CA SCREEN DOC REV: CPT | Performed by: INTERNAL MEDICINE

## 2020-05-26 ASSESSMENT — ENCOUNTER SYMPTOMS
ABDOMINAL PAIN: 0
SHORTNESS OF BREATH: 0
NAUSEA: 0
CHEST TIGHTNESS: 0
VOMITING: 0

## 2020-05-26 NOTE — PROGRESS NOTES
2020    TELEHEALTH EVALUATION -- Audio/Visual (During MZYAS-50 public health emergency)    HPI:    Aureliano Archer (:  1965) has requested an audio/video evaluation for the following concern(s): The Video did not work. This was a phone Call only. Denies exertional chest pain or SOB, no dizziness or syncope and palpitations. Review of Systems   Constitutional: Negative for chills, fatigue and fever. HENT: Negative for congestion and dental problem. Respiratory: Negative for chest tightness and shortness of breath. Cardiovascular: Negative for chest pain and palpitations. Gastrointestinal: Negative for abdominal pain, nausea and vomiting. Endocrine: Negative for cold intolerance and heat intolerance. Genitourinary: Negative for difficulty urinating and dysuria. Neurological: Negative for dizziness and syncope. Psychiatric/Behavioral: Negative for agitation and behavioral problems. Prior to Visit Medications    Medication Sig Taking? Authorizing Provider   DULoxetine (CYMBALTA) 30 MG extended release capsule TAKE 1 CAPSULE BY MOUTH DAILY FOR 7 DAYS  FELICE Phipps CNP   omeprazole (PRILOSEC) 40 MG delayed release capsule TAKE 1 CAPSULE BY MOUTH DAILY  Isac Dubon MD   DULoxetine (CYMBALTA) 60 MG extended release capsule TAKE 1 CAPSULE BY MOUTH DAILY  FELICE Andrews CNP   gabapentin (NEURONTIN) 800 MG tablet Take 1 tablet by mouth 3 times daily for 30 days.   FELICE Andrews CNP   butalbital-acetaminophen-caffeine (FIORICET, ESGIC) -40 MG per tablet TAKE 1 OR 2 TABLETS BY MOUTH TWICE DAILY AS NEEDED FOR HEADACHE  Isac Dubon MD   loperamide (IMODIUM) 2 MG capsule Take 2 mg by mouth 4 times daily as needed for Diarrhea  Historical Provider, MD   docusate sodium (COLACE) 100 MG capsule Take 1 capsule by mouth 2 times daily as needed for Constipation (use while taking narcotic pain meds)  Evaristo Camara DO   ondansetron (ZOFRAN) 4 MG tablet Take 1 tablet by mouth every 6 hours as needed for Nausea or Vomiting  Jayde Jameson,    gabapentin (NEURONTIN) 800 MG tablet TK 1 T PO TID  Historical Provider, MD   gabapentin (NEURONTIN) 300 MG capsule TK 1 CAPLET BY MOUTH THREE TIMES DAILY  Historical Provider, MD   DULoxetine (CYMBALTA) 60 MG extended release capsule Take 2 capsules by mouth daily  FELICE Negrete - CNP   ondansetron (ZOFRAN ODT) 4 MG disintegrating tablet Take 1 tablet by mouth every 8 hours as needed for Nausea or Vomiting  Mickey Strong MD   naproxen (EC-NAPROSYN) 500 MG EC tablet Take 1 tablet by mouth 2 times daily (with meals) AS  NEEDED  Mickey Strong MD   fluticasone (FLONASE) 50 MCG/ACT nasal spray SHAKE LIQUID AND USE 1 SPRAY IN EACH NOSTRIL DAILY  Cortez Toledo MD   baclofen (LIORESAL) 10 MG tablet TAKE 1 TABLET BY MOUTH THREE TIMES DAILY AS NEEDED FOR SPASMS  TAMMY Portillo   SYMTUZA [phone] MG TABS TK 1 T PO  QD  Historical Provider, MD   aspirin-acetaminophen-caffeine (EXCEDRIN MIGRAINE) [phone] MG per tablet Take 1 tablet by mouth every 6 hours as needed for Headaches  Historical Provider, MD   Clotrimazole (MYCELEX) 10 MG LOZG lozenge Take 1 lozenge by mouth 5 times daily  TAMMY Esocbar   fenofibric acid (FIBRICOR) 135 MG CPDR capsule TAKE 1 CAPSULE BY MOUTH DAILY  Mesfin King MD   LORazepam (ATIVAN) 1 MG tablet Take 1 mg by mouth every 6 hours as needed for Anxiety.   Historical Provider, MALCOM SOLITARIO, PO Take 1 tablet by mouth daily  Historical Provider, MD   Darunavir-Cobicistat (PREZCOBIX) 800-150 MG TABS Take by mouth nightly  Historical Provider, MD   Nebulizers (COMPRESSOR/NEBULIZER) MISC As directed  TAMMY Escobar   Respiratory Therapy Supplies (NEBULIZER/TUBING/MOUTHPIECE) KIT 1 kit by Does not apply route daily  TAMMY Escobar   albuterol (PROVENTIL) (2.5 MG/3ML) 0.083% nebulizer solution Take 2.5 mg by nebulization every 6 hours as needed for Wheezing  Historical Provider, MD   TURMERIC PO Take by mouth three times daily  Historical Provider, MD   Glucosamine HCl (GLUCOSAMINE PO) Take by mouth daily  Historical Provider, MD   vitamin D 1000 UNITS CAPS Take by mouth daily  Historical Provider, MD   GARLIC PO Take by mouth daily  Historical Provider, MD   Multiple Vitamins-Minerals (MULTIVITAMIN PO) Take by mouth daily  Historical Provider, MD   INVEGA 6 MG extended release tablet Take 6 mg by mouth daily  Historical Provider, MD   DESCOVY 200-25 MG TABS Take 1 tablet by mouth daily  Historical Provider, MD   MYTESI 125 MG TBEC Take 125 mg by mouth  Historical Provider, MD       Social History     Tobacco Use    Smoking status: Former Smoker     Packs/day: 3.00     Years: 30.00     Pack years: 90.00     Last attempt to quit: 10/7/2014     Years since quittin.6    Smokeless tobacco: Never Used   Substance Use Topics    Alcohol use: Yes     Comment: socially    Drug use: No        Allergies   Allergen Reactions    Pcn [Penicillins] Rash    Sulfa Antibiotics Rash       PHYSICAL EXAMINATION:  [ INSTRUCTIONS:  \"[x]\" Indicates a positive item  \"[]\" Indicates a negative item  -- DELETE ALL ITEMS NOT EXAMINED]  Vital Signs: (As obtained by patient/caregiver or practitioner observation)    Blood pressure-  Heart rate-    Respiratory rate-    Temperature-  Pulse oximetry-     Constitutional: [] Appears well-developed and well-nourished [] No apparent distress      [] Abnormal-   Mental status  [] Alert and awake  [] Oriented to person/place/time []Able to follow commands      Eyes:  EOM    []  Normal  [] Abnormal-  Sclera  []  Normal  [] Abnormal -         Discharge []  None visible  [] Abnormal -    HENT:   [] Normocephalic, atraumatic.   [] Abnormal   [] Mouth/Throat: Mucous membranes are moist.     External Ears [] Normal  [] Abnormal-     Neck: [] No visualized mass     Pulmonary/Chest: [] Respiratory effort normal.  [] No visualized signs of difficulty breathing or respiratory distress        [] Abnormal-      Musculoskeletal:   [] Normal gait with no signs of ataxia         [] Normal range of motion of neck        [] Abnormal-       Neurological:        [] No Facial Asymmetry (Cranial nerve 7 motor function) (limited exam to video visit)          [] No gaze palsy        [] Abnormal-         Skin:        [] No significant exanthematous lesions or discoloration noted on facial skin         [] Abnormal-            Psychiatric:       [] Normal Affect [] No Hallucinations        [] Abnormal-     Other pertinent observable physical exam findings-     ASSESSMENT/PLAN:  1. Echo 5/2018 EF 50%, No significant valvular disease. Cardiolite stress test Normal perfusion. EF 64%. 2. HPL. Followed by PCP. Bebo Yousif is a 47 y.o. male being evaluated by a Virtual Visit (video visit) encounter to address concerns as mentioned above. A caregiver was present when appropriate. Due to this being a TeleHealth encounter (During PHOTY-46 public health emergency), evaluation of the following organ systems was limited: Vitals/Constitutional/EENT/Resp/CV/GI//MS/Neuro/Skin/Heme-Lymph-Imm. Pursuant to the emergency declaration under the [address] [place] [address] authority and the [employer] Act, this Virtual Visit was conducted with patient's (and/or legal guardian's) consent, to reduce the patient's risk of exposure to COVID-19 and provide necessary medical care. The patient (and/or legal guardian) has also been advised to contact this office for worsening conditions or problems, and seek emergency medical treatment and/or call 911 if deemed necessary.      Patient identification was verified at the start of the visit: Yes    Total time spent on this encounter: Not billed by time    Services were provided through a video synchronous discussion virtually to substitute for in-person

## 2020-05-28 ENCOUNTER — OFFICE VISIT (OUTPATIENT)
Dept: NEUROLOGY | Age: 55
End: 2020-05-28
Payer: MEDICARE

## 2020-05-28 VITALS
DIASTOLIC BLOOD PRESSURE: 72 MMHG | BODY MASS INDEX: 29.4 KG/M2 | HEIGHT: 71 IN | SYSTOLIC BLOOD PRESSURE: 126 MMHG | RESPIRATION RATE: 16 BRPM | WEIGHT: 210 LBS | HEART RATE: 80 BPM

## 2020-05-28 PROBLEM — R51.9 INTRACTABLE HEADACHE: Status: ACTIVE | Noted: 2020-05-28

## 2020-05-28 PROCEDURE — G8926 SPIRO NO PERF OR DOC: HCPCS | Performed by: PSYCHIATRY & NEUROLOGY

## 2020-05-28 PROCEDURE — G8427 DOCREV CUR MEDS BY ELIG CLIN: HCPCS | Performed by: PSYCHIATRY & NEUROLOGY

## 2020-05-28 PROCEDURE — 3023F SPIROM DOC REV: CPT | Performed by: PSYCHIATRY & NEUROLOGY

## 2020-05-28 PROCEDURE — G8417 CALC BMI ABV UP PARAM F/U: HCPCS | Performed by: PSYCHIATRY & NEUROLOGY

## 2020-05-28 PROCEDURE — 1036F TOBACCO NON-USER: CPT | Performed by: PSYCHIATRY & NEUROLOGY

## 2020-05-28 PROCEDURE — 99215 OFFICE O/P EST HI 40 MIN: CPT | Performed by: PSYCHIATRY & NEUROLOGY

## 2020-05-28 PROCEDURE — 99214 OFFICE O/P EST MOD 30 MIN: CPT | Performed by: PSYCHIATRY & NEUROLOGY

## 2020-05-28 PROCEDURE — 3017F COLORECTAL CA SCREEN DOC REV: CPT | Performed by: PSYCHIATRY & NEUROLOGY

## 2020-05-28 RX ORDER — BUTALBITAL, ACETAMINOPHEN AND CAFFEINE 50; 325; 40 MG/1; MG/1; MG/1
TABLET ORAL
Qty: 60 TABLET | Refills: 1 | Status: SHIPPED | OUTPATIENT
Start: 2020-05-28 | End: 2020-09-23 | Stop reason: SDUPTHER

## 2020-05-28 RX ORDER — NAPROXEN 500 MG
500 TABLET, DELAYED RELEASE (ENTERIC COATED) ORAL 2 TIMES DAILY WITH MEALS
Qty: 60 TABLET | Refills: 3 | Status: SHIPPED | OUTPATIENT
Start: 2020-05-28 | End: 2020-09-22

## 2020-05-28 RX ORDER — ONDANSETRON 4 MG/1
4 TABLET, ORALLY DISINTEGRATING ORAL EVERY 8 HOURS PRN
Qty: 30 TABLET | Refills: 1 | Status: SHIPPED | OUTPATIENT
Start: 2020-05-28 | End: 2020-09-23 | Stop reason: SDUPTHER

## 2020-05-28 ASSESSMENT — ENCOUNTER SYMPTOMS
NAUSEA: 1
SINUS PRESSURE: 0
BLOOD IN STOOL: 0
BACK PAIN: 1
SCALP TENDERNESS: 0
VISUAL CHANGE: 0
VOICE CHANGE: 0
EYE DISCHARGE: 0
SORE THROAT: 0
EYE REDNESS: 0
PHOTOPHOBIA: 1
APNEA: 0
ABDOMINAL PAIN: 0
EYE WATERING: 0
WHEEZING: 0
RHINORRHEA: 0
COUGH: 0
CONSTIPATION: 0
SHORTNESS OF BREATH: 0
SWOLLEN GLANDS: 0
TROUBLE SWALLOWING: 0
EYE PAIN: 0
DIARRHEA: 0
ABDOMINAL DISTENTION: 0
FACIAL SWEATING: 0
FACIAL SWELLING: 0
EYE ITCHING: 0
CHOKING: 0
CHEST TIGHTNESS: 0
COLOR CHANGE: 0
BLURRED VISION: 0
VOMITING: 0

## 2020-05-28 NOTE — PATIENT INSTRUCTIONS
[facility] NEUROLOGY    Due to the complex nature of our neurological testing, It is the policy of the Neurology Department not to release the results of your testing over the phone. Once all testing is completed and we have all the results back, Dr. Wendy Quarles will then personally go over all the results with you and answer any questions that you may have during a follow up appointment. If you are unable to keep this appointment, please notify the [address] @ [phone], as soon as possible. Please bring your prescription bottles to all appointments. *   ADEQUATE   FLUID  INTAKE   AND  ELECTROLYTE  BALANCE           * KEEP  DAIRY  OF   THE  NEUROLOGICAL  SYMPTOMS          *  TO  MAINTAIN  REGULAR  SLEEP  WAKE  CYCLES. *   TO  HAVE  ADEQUATE  REST  AND   SLEEP    HOURS.        *    AVOID  USAGE OF   TOBACCO,  EXCESSIVE  ALCOHOL                AND   ILLEGAL   SUBSTANCES,  IF  ANY          *  Maintain   Healthy  Life Style    With   Periodic  Monitoring  Of      Any  Medical  Conditions  Including   Elevated  Blood  Pressure,  Lipid  Profile,     Blood  Sugar levels  And   Heart  Disease. *   Period   Screening  For  Cancers  Involving  Breast,  Colon,   lungs  And  Other  Organs  As  Applicable,  In consultation   With  Your  Primary Care Providers. *  If   There is  Any  Significant  Worsening   Of  Current  Symptoms  And  Or  If    Any additional  New  Neurological  Symptoms                 Or  Significant  Concerns   Should  Call  911 or  Go  To  Emergency  Department  For  Further  Immediate  Evaluation.

## 2020-05-28 NOTE — PROGRESS NOTES
[facility]  Neurology  [address] [address]  RXKLF:[phone]   Fax: [phone]    SUBJECTIVE:     PATIENT ID:  Cathryn Cash is a  RIGHT     HANDED 47 y.o. male. Migraine    This is a chronic problem. Episode onset: SINCE  TEENAGE   The problem has been waxing and waning. The pain is located in the bilateral region. The pain does not radiate. The pain quality is similar to prior headaches. The quality of the pain is described as aching, throbbing and pulsating. The pain is at a severity of 3/10. The pain is mild. Associated symptoms include back pain, a loss of balance, nausea, neck pain, phonophobia and photophobia. Pertinent negatives include no abdominal pain, abnormal behavior, anorexia, blurred vision, coughing, dizziness, drainage, ear pain, eye pain, eye redness, eye watering, facial sweating, fever, hearing loss, insomnia, muscle aches, numbness, rhinorrhea, scalp tenderness, seizures, sinus pressure, sore throat, swollen glands, tingling, tinnitus, visual change, vomiting, weakness or weight loss. The symptoms are aggravated by unknown. He has tried NSAIDs and Excedrin for the symptoms. The treatment provided moderate relief. His past medical history is significant for immunosuppression and migraine headaches. There is no history of cancer, cluster headaches, hypertension, migraines in the family, obesity, pseudotumor cerebri, recent head traumas, sinus disease or TMJ. Headache    This is a new problem. Episode onset: SINCE  OCTOBER 2019. The problem occurs constantly. The problem has been gradually worsening. The pain is located in the occipital region. The pain does not radiate. The pain quality is not similar to prior headaches. The quality of the pain is described as pulsating and throbbing. The pain is at a severity of 3/10. The pain is mild. Associated symptoms include back pain, a loss of balance, nausea, neck pain, phonophobia and photophobia. SPRAY IN EACH NOSTRIL DAILY 48 g 0    baclofen (LIORESAL) 10 MG tablet TAKE 1 TABLET BY MOUTH THREE TIMES DAILY AS NEEDED FOR SPASMS 270 tablet 0    SYMTUZA [id number] MG TABS TK 1 T PO  QD  6    aspirin-acetaminophen-caffeine (EXCEDRIN MIGRAINE) 250-250-65 MG per tablet Take 1 tablet by mouth every 6 hours as needed for Headaches      Clotrimazole (MYCELEX) 10 MG LOZG lozenge Take 1 lozenge by mouth 5 times daily 70 lozenge 1    fenofibric acid (FIBRICOR) 135 MG CPDR capsule TAKE 1 CAPSULE BY MOUTH DAILY 90 capsule 0    LORazepam (ATIVAN) 1 MG tablet Take 1 mg by mouth every 6 hours as needed for Anxiety.  BLACK ELDERBERRY,BERRY-FLOWER, PO Take 1 tablet by mouth daily      Darunavir-Cobicistat (PREZCOBIX) 800-150 MG TABS Take by mouth nightly      Nebulizers (COMPRESSOR/NEBULIZER) MISC As directed 1 each 0    Respiratory Therapy Supplies (NEBULIZER/TUBING/MOUTHPIECE) KIT 1 kit by Does not apply route daily 1 kit 0    albuterol (PROVENTIL) (2.5 MG/3ML) 0.083% nebulizer solution Take 2.5 mg by nebulization every 6 hours as needed for Wheezing      TURMERIC PO Take by mouth three times daily      Glucosamine HCl (GLUCOSAMINE PO) Take by mouth daily      vitamin D 1000 UNITS CAPS Take by mouth daily      GARLIC PO Take by mouth daily      Multiple Vitamins-Minerals (MULTIVITAMIN PO) Take by mouth daily      INVEGA 6 MG extended release tablet Take 6 mg by mouth daily      DESCOVY 200-25 MG TABS Take 1 tablet by mouth daily      MYTESI 125 MG TBEC Take 125 mg by mouth      DULoxetine (CYMBALTA) 30 MG extended release capsule TAKE 1 CAPSULE BY MOUTH DAILY FOR 7 DAYS 7 capsule 0    gabapentin (NEURONTIN) 800 MG tablet Take 1 tablet by mouth 3 times daily for 30 days. 90 tablet 5     No current facility-administered medications for this visit.           Allergies   Allergen Reactions    Pcn [Penicillins] Rash    Sulfa Antibiotics Rash         Family History   Problem Relation Age of Onset    Cancer Mother         uterine s/p hysterectomy    Cancer Maternal Grandmother          Social History     Socioeconomic History    Marital status: Single     Spouse name: Not on file    Number of children: Not on file    Years of education: Not on file    Highest education level: Not on file   Occupational History    Not on file   Social Needs    Financial resource strain: Not on file    Food insecurity     Worry: Not on file     Inability: Not on file    Transportation needs     Medical: Not on file     Non-medical: Not on file   Tobacco Use    Smoking status: Former Smoker     Packs/day: 3.00     Years: 30.00     Pack years: 90.00     Last attempt to quit: 10/7/2014     Years since quittin.6    Smokeless tobacco: Never Used   Substance and Sexual Activity    Alcohol use: Yes     Comment: socially    Drug use: No    Sexual activity: Not on file   Lifestyle    Physical activity     Days per week: Not on file     Minutes per session: Not on file    Stress: Not on file   Relationships    Social connections     Talks on phone: Not on file     Gets together: Not on file     Attends Shinto service: Not on file     Active member of club or organization: Not on file     Attends meetings of clubs or organizations: Not on file     Relationship status: Not on file    Intimate partner violence     Fear of current or ex partner: Not on file     Emotionally abused: Not on file     Physically abused: Not on file     Forced sexual activity: Not on file   Other Topics Concern    Not on file   Social History Narrative    Not on file       Vitals:    20 1024   BP: 126/72   Pulse: 80   Resp: 16         Wt Readings from Last 3 Encounters:   20 210 lb (95.3 kg)   03/10/20 211 lb (95.7 kg)   20 202 lb 2 oz (91.7 kg)         BP Readings from Last 3 Encounters:   20 126/72   03/10/20 118/76   20 (!) 132/91       Hematology and Coagulation  Lab Results   Component Value Date    WBC 5.4 11/22/2019    RBC 4.57 11/22/2019    HGB 14.8 11/22/2019    HCT 44.4 11/22/2019    MCV 97.1 11/22/2019    MCH 32.3 11/22/2019    MCHC 33.3 11/22/2019    RDW 14.8 11/22/2019     11/22/2019    MPV 7.1 11/22/2019       Chemistries  Lab Results   Component Value Date     11/22/2019    K 3.9 11/22/2019     11/22/2019    CO2 28 11/22/2019    BUN 14 11/22/2019    CREATININE 0.82 11/22/2019    CALCIUM 9.6 11/22/2019    PROT 7.0 11/22/2019    LABALBU 4.3 11/22/2019    BILITOT 0.21 11/22/2019    ALKPHOS 68 11/22/2019    AST 17 11/22/2019    ALT 16 11/22/2019     Lab Results   Component Value Date    ALKPHOS 68 11/22/2019    ALT 16 11/22/2019    AST 17 11/22/2019    PROT 7.0 11/22/2019    BILITOT 0.21 11/22/2019    BILIDIR <0.08 06/11/2017    LABALBU 4.3 11/22/2019     Lab Results   Component Value Date    BUN 14 11/22/2019    CREATININE 0.82 11/22/2019     Lab Results   Component Value Date    CALCIUM 9.6 11/22/2019     Lab Results   Component Value Date    AST 17 11/22/2019    ALT 16 11/22/2019             Review of Systems   Constitutional: Negative for appetite change, chills, fatigue, fever, unexpected weight change and weight loss. HENT: Negative for congestion, dental problem, drooling, ear discharge, ear pain, facial swelling, hearing loss, mouth sores, nosebleeds, postnasal drip, rhinorrhea, sinus pressure, sore throat, tinnitus, trouble swallowing and voice change. Eyes: Positive for photophobia. Negative for blurred vision, pain, discharge, redness, itching and visual disturbance. Respiratory: Negative for apnea, cough, choking, chest tightness, shortness of breath and wheezing. Cardiovascular: Negative for chest pain, palpitations and leg swelling. Gastrointestinal: Positive for nausea. Negative for abdominal distention, abdominal pain, anorexia, blood in stool, constipation, diarrhea and vomiting.    Endocrine: Negative for cold intolerance, heat intolerance, polydipsia, polyphagia and polyuria. Musculoskeletal: Positive for arthralgias, back pain and neck pain. Negative for gait problem, joint swelling, myalgias and neck stiffness. Skin: Negative for color change, pallor, rash and wound. Allergic/Immunologic: Negative for environmental allergies, food allergies and immunocompromised state. Neurological: Positive for headaches and loss of balance. Negative for dizziness, tingling, tremors, seizures, syncope, facial asymmetry, speech difficulty, weakness, light-headedness and numbness. Hematological: Negative for adenopathy. Does not bruise/bleed easily. Psychiatric/Behavioral: Positive for decreased concentration. Negative for agitation, behavioral problems, confusion, dysphoric mood, hallucinations, self-injury, sleep disturbance and suicidal ideas. The patient is nervous/anxious. The patient does not have insomnia and is not hyperactive. OBJECTIVE:    Physical Exam  Constitutional:       Appearance: He is well-developed. HENT:      Head: Normocephalic and atraumatic. No raccoon eyes or [place]o's sign. Right Ear: External ear normal.      Left Ear: External ear normal.      Nose: Nose normal.   Eyes:      Conjunctiva/sclera: Conjunctivae normal.      Pupils: Pupils are equal, round, and reactive to light. Neck:      Musculoskeletal: Normal range of motion and neck supple. Normal range of motion. No neck rigidity or muscular tenderness. Thyroid: No thyroid mass or thyromegaly. Vascular: No carotid bruit. Trachea: No tracheal deviation. Meningeal: Brudzinski's sign and Kernig's sign absent. Cardiovascular:      Rate and Rhythm: Normal rate and regular rhythm. Pulmonary:      Effort: Pulmonary effort is normal.   Musculoskeletal:         General: No tenderness. Skin:     General: Skin is warm. Coloration: Skin is not pale. Findings: No erythema or rash. Nails: There is no clubbing.      Psychiatric:         Attention movements         11 CN:   Normal  Shoulder  shrug and  strength         12 CN :   Normal  Tongue movements and  Tongue  In midline                        No tongue   Fasciculations or atrophy       C) MOTOR  EXAM:                 Strength  In upper  And  Lower   extremities   within   normal limits               No  Drift. No  Atrophy               Rapid   alternating  And  repetitions  Movements  within   normal limits               Muscle  Tone  In upper  And  Lower  Extremities  normal                No rigidity. No  Spasticity. Bradykinesia   absent               No  Asterixis. Sustention  Tremor , Resting   Tremor   absent                    No   other  Abnormal  Movements noted           D) SENSORY :               Light   touch, pinprick,   position  And  Vibration  within normal limits        E) REFLEXES:                   Deep  Tendon  Reflexes   normal                  No  pathological  Reflexes  Bilaterally.                                   F) COORDINATION  AND  GAIT :                                Station and  Gait  normal                              Romberg 's test negative                          Ataxia negative          ASSESSMENT:      Patient Active Problem List   Diagnosis    HIV (human immunodeficiency virus infection) ([facility] [address]    IFG (impaired fasting glucose)    Lumbar radicular pain    Chronic bilateral low back pain with right-sided sciatica    Panniculitis involving lumbar region    Lumbar facet joint syndrome    Abnormal EKG    Centrilobular emphysema (HCC)    Muscle spasm    Lumbosacral spondylosis without myelopathy    Neural foraminal stenosis of lumbar spine    Bipolar disorder ([address]    Memory problem    Abnormal CT of the chest    Ex-smoker    Neck pain    Other complicated headache syndrome    Ventral hernia without obstruction or gangrene    Left inguinal hernia    Intractable headache             MRI OF THE BRAIN WITHOUT AND WITH cervical spine was performed without the   administration of intravenous contrast.       COMPARISON:   None.       HISTORY:   ORDERING SYSTEM PROVIDED HISTORY: Neck pain   TECHNOLOGIST PROVIDED HISTORY:   neck pain   Reason for Exam: Neck pain and left shoulder pain. Migraines. Symptoms for   quite a while.    Acuity: Chronic   Type of Exam: Unknown   Additional signs and symptoms: Neck pain       FINDINGS:   BONES/ALIGNMENT: There is straightening of the cervical spine.  The vertebral   body heights are maintained.  There is age-appropriate bone marrow signal.   There is mild edema within the left C3-4 facets.  There is multilevel   degenerative disc disease with loss of disc signal.  There is disc space   narrowing primarily at the C6-7 level.  There is no significant   spondylolisthesis.       SPINAL CORD: The spinal cord is normal in caliber and signal.  The visualized   intracranial structures are unremarkable.       SOFT TISSUES: The posterior paraspinal soft tissues are unremarkable.  The   prevertebral soft tissues are unremarkable.       C2-C3: There is no significant disc protrusion, spinal canal stenosis or   neural foraminal narrowing.       C3-C4: There is a mild disc osteophyte complex eccentric to the left with   associated uncovertebral and facet hypertrophy.  There is canal stenosis   measuring 8 mm in AP dimension.  There is moderate to severe left foraminal   narrowing and no significant right foraminal narrowing.       C4-C5: There is a disc osteophyte complex with uncovertebral and facet   hypertrophy.  There is canal stenosis measuring 9 mm in AP dimension.  There   is moderate right foraminal narrowing and no significant left foraminal   narrowing.       C5-C6: There is a disc osteophyte complex with uncovertebral and facet   hypertrophy that is worse on the left.  There is no significant canal   stenosis or right foraminal narrowing.  There is severe left foraminal   narrowing.       C6-C7: rest of your plate with vegetables and whole grains.  Limit theamount of soda and sports drinks you have every day. Drink more water when you are thirsty.  Eat at least 5 servings of fruits and vegetables every day. It may seem like a lot, but it is not hard to reach this goal. Aserving or helping is 1 piece of fruit, 1 cup of vegetables, or 2 cups of leafy, raw vegetables. Have an apple or some carrot sticks as an afternoon snack instead of a candy bar. Try to have fruits and/or vegetables at everymeal.   Make exercise part of your daily routine. You may want to start with simple activities, such as walking, bicycling, or slow swimming. Try kay active 30 to 60 minutes every day. You do not need to do all 30 to 60 minutes all at once. For example, you can exercise 3 times a day for 10 or 20 minutes. Moderate exercise is safe for most people, but it is always agood idea to talk to your doctor before starting an exercise program.   Keep moving. Lavelle Nurr the lawn, work in the garden, or [employer]. Take the stairs instead of the elevator at work.  If you smoke, quit. Peoplewho smoke have an increased risk for heart attack, stroke, cancer, and other lung illnesses. Quitting is hard, but there are ways to boost your chance of quitting tobacco for good.  Use nicotine gum, patches, or lozenges.  Ask your doctor about stop-smoking programs and medicines.  Keep trying.  In addition to reducing your risk of diseases in the future, you will notice some benefits soon after you stop using tobacco. If you have shortness of breath or asthma symptoms, they will likely getbetter within a few weeks after you quit.  Limit how much alcohol you drink. Moderate amounts of alcohol (up to 2 drinks a day for men, 1drink a day for women) are okay. But drinking too much can lead to liver problems, high blood pressure, and other health problems.    health   If you have a family, there are many things you can do together to

## 2020-07-07 ENCOUNTER — OFFICE VISIT (OUTPATIENT)
Dept: PAIN MANAGEMENT | Age: 55
End: 2020-07-07
Payer: MEDICARE

## 2020-07-07 VITALS
BODY MASS INDEX: 29.4 KG/M2 | HEART RATE: 94 BPM | DIASTOLIC BLOOD PRESSURE: 80 MMHG | SYSTOLIC BLOOD PRESSURE: 142 MMHG | HEIGHT: 71 IN | WEIGHT: 210 LBS

## 2020-07-07 PROCEDURE — 99214 OFFICE O/P EST MOD 30 MIN: CPT | Performed by: NURSE PRACTITIONER

## 2020-07-07 PROCEDURE — 99214 OFFICE O/P EST MOD 30 MIN: CPT

## 2020-07-07 PROCEDURE — G8417 CALC BMI ABV UP PARAM F/U: HCPCS | Performed by: NURSE PRACTITIONER

## 2020-07-07 PROCEDURE — G8427 DOCREV CUR MEDS BY ELIG CLIN: HCPCS | Performed by: NURSE PRACTITIONER

## 2020-07-07 PROCEDURE — 3017F COLORECTAL CA SCREEN DOC REV: CPT | Performed by: NURSE PRACTITIONER

## 2020-07-07 PROCEDURE — 1036F TOBACCO NON-USER: CPT | Performed by: NURSE PRACTITIONER

## 2020-07-07 ASSESSMENT — ENCOUNTER SYMPTOMS
DIARRHEA: 0
RESPIRATORY NEGATIVE: 1
BACK PAIN: 1
CONSTIPATION: 0
EYES NEGATIVE: 1

## 2020-07-07 NOTE — PROGRESS NOTES
Subjective:      Patient ID: Antonieta Brewer is a 47 y.o. male. Chief Complaint   Patient presents with    Lower Back Pain     3 month. pt states no change in pain      Back Pain     Migraine    Associated symptoms include back pain. Other   Associated symptoms include arthralgias, fatigue and myalgias. Here today for routine pain clinic recheck. Current medication regime working well, no complaints, no complaints side effects    Pain Assessment  Location of Pain: Back  Location Modifiers: Left, Right, Inferior  Severity of Pain: 5  Quality of Pain: Aching  Duration of Pain: Persistent  Frequency of Pain: Constant  Aggravating Factors: Bending, Stretching, Standing, Squatting  Limiting Behavior: Yes  Relieving Factors: Ice, Rest, Other (Comment)(pain medication, donut coushion )  Result of Injury: No  Work-Related Injury: No  Are there other pain locations you wish to document?: Yes    Allergies   Allergen Reactions    Pcn [Penicillins] Rash    Sulfa Antibiotics Rash       Outpatient Medications Marked as Taking for the 7/7/20 encounter (Office Visit) with FELICE López CNP   Medication Sig Dispense Refill    ondansetron (ZOFRAN ODT) 4 MG disintegrating tablet Take 1 tablet by mouth every 8 hours as needed for Nausea or Vomiting 30 tablet 1    naproxen (EC-NAPROSYN) 500 MG EC tablet Take 1 tablet by mouth 2 times daily (with meals) AS  NEEDED 60 tablet 3    butalbital-acetaminophen-caffeine (FIORICET, ESGIC) -40 MG per tablet TAKE 1 OR 2 TABLETS BY MOUTH TWICE DAILY AS NEEDED FOR HEADACHE 60 tablet 1    Loratadine-Pseudoephedrine (CLARITIN-D 24 HOUR PO) Take by mouth daily as needed      omeprazole (PRILOSEC) 40 MG delayed release capsule TAKE 1 CAPSULE BY MOUTH DAILY 90 capsule 1    DULoxetine (CYMBALTA) 60 MG extended release capsule TAKE 1 CAPSULE BY MOUTH DAILY 30 capsule 3    gabapentin (NEURONTIN) 800 MG tablet Take 1 tablet by mouth 3 times daily for 30 days.  90 tablet 5    loperamide (IMODIUM) 2 MG capsule Take 2 mg by mouth 4 times daily as needed for Diarrhea      docusate sodium (COLACE) 100 MG capsule Take 1 capsule by mouth 2 times daily as needed for Constipation (use while taking narcotic pain meds) 30 capsule 0    ondansetron (ZOFRAN) 4 MG tablet Take 1 tablet by mouth every 6 hours as needed for Nausea or Vomiting 20 tablet 0    gabapentin (NEURONTIN) 800 MG tablet TK 1 T PO TID      gabapentin (NEURONTIN) 300 MG capsule TK 1 CAPLET BY MOUTH THREE TIMES DAILY      DULoxetine (CYMBALTA) 60 MG extended release capsule Take 2 capsules by mouth daily 60 capsule 3    fluticasone (FLONASE) 50 MCG/ACT nasal spray SHAKE LIQUID AND USE 1 SPRAY IN EACH NOSTRIL DAILY 48 g 0    baclofen (LIORESAL) 10 MG tablet TAKE 1 TABLET BY MOUTH THREE TIMES DAILY AS NEEDED FOR SPASMS 270 tablet 0    SYMTUZA [id number] MG TABS TK 1 T PO  QD  6    aspirin-acetaminophen-caffeine (EXCEDRIN MIGRAINE) 250-250-65 MG per tablet Take 1 tablet by mouth every 6 hours as needed for Headaches      Clotrimazole (MYCELEX) 10 MG LOZG lozenge Take 1 lozenge by mouth 5 times daily 70 lozenge 1    fenofibric acid (FIBRICOR) 135 MG CPDR capsule TAKE 1 CAPSULE BY MOUTH DAILY 90 capsule 0    LORazepam (ATIVAN) 1 MG tablet Take 1 mg by mouth every 6 hours as needed for Anxiety.       BLACK ELDERBERRY,BERRY-FLOWER, PO Take 1 tablet by mouth daily      Darunavir-Cobicistat (PREZCOBIX) 800-150 MG TABS Take by mouth nightly      Nebulizers (COMPRESSOR/NEBULIZER) MISC As directed 1 each 0    Respiratory Therapy Supplies (NEBULIZER/TUBING/MOUTHPIECE) KIT 1 kit by Does not apply route daily 1 kit 0    albuterol (PROVENTIL) (2.5 MG/3ML) 0.083% nebulizer solution Take 2.5 mg by nebulization every 6 hours as needed for Wheezing      TURMERIC PO Take by mouth three times daily      Glucosamine HCl (GLUCOSAMINE PO) Take by mouth daily      vitamin D 1000 UNITS CAPS Take by mouth daily      GARLIC PO Take by uterine s/p hysterectomy    Cancer Maternal Grandmother        Social History     Socioeconomic History    Marital status: Single     Spouse name: None    Number of children: None    Years of education: None    Highest education level: None   Occupational History    None   Social Needs    Financial resource strain: None    Food insecurity     Worry: None     Inability: None    Transportation needs     Medical: None     Non-medical: None   Tobacco Use    Smoking status: Former Smoker     Packs/day: 3.00     Years: 30.00     Pack years: 90.00     Last attempt to quit: 10/7/2014     Years since quittin.7    Smokeless tobacco: Never Used   Substance and Sexual Activity    Alcohol use: Yes     Comment: socially    Drug use: No    Sexual activity: None   Lifestyle    Physical activity     Days per week: None     Minutes per session: None    Stress: None   Relationships    Social connections     Talks on phone: None     Gets together: None     Attends Ep[employer] service: None     Active member of club or organization: None     Attends meetings of clubs or organizations: None     Relationship status: None    Intimate partner violence     Fear of current or ex partner: None     Emotionally abused: None     Physically abused: None     Forced sexual activity: None   Other Topics Concern    None   Social History Narrative    None       Review of Systems   Constitutional: Positive for activity change and fatigue. HENT: Negative. Eyes: Negative. Respiratory: Negative. Cardiovascular: Negative. Gastrointestinal: Negative for constipation and diarrhea. Endocrine: Negative. Genitourinary: Negative for difficulty urinating. Musculoskeletal: Positive for arthralgias, back pain and myalgias. Skin: Negative. Psychiatric/Behavioral: Positive for sleep disturbance. Objective:   Physical Exam  Vitals signs reviewed. Constitutional:       General: He is not in acute distress. ligamentum flavum hypertrophy.  The  thecal sac is not stenotic.  Disc and/or osteophytes result in moderate  narrowing of the neural foramina bilaterally. The left neural foramen is  narrowed greater than right.     L5-S1:  There is mild facet and ligamentum flavum hypertrophy.  The thecal  sac is not stenotic.  The S1 nerve roots are intact. Disc and/or osteophytes  result in moderate narrowing of the neural foramina bilaterally. The right  neural foramen is narrowed greater than the left.     There is no significant change in the findings from the prior study.        Impression  Disc and osteophytes result in narrowing of the neural foramina at several  levels as discussed above.  No significant stenosis of thecal sac in the  lumbar region. Skin:     General: Skin is warm and dry. Capillary Refill: Capillary refill takes less than 2 seconds. Coloration: Skin is not pale. Nails: There is no clubbing. Neurological:      Mental Status: He is alert and oriented to person, place, and time. GCS: GCS eye subscore is 4. GCS verbal subscore is 5. GCS motor subscore is 6. Cranial Nerves: No cranial nerve deficit. Psychiatric:         Mood and Affect: Mood is not depressed. Affect is not angry. Speech: He is communicative. Speech is not slurred. Behavior: Behavior normal. Behavior is not agitated, aggressive or withdrawn. Behavior is cooperative. Judgment: Judgment normal. Judgment is not impulsive or inappropriate. Assessment:      1. Chronic bilateral low back pain with right-sided sciatica    2. Lumbar radicular pain    3. Lumbar facet joint syndrome          Plan:      Chronic pain diagnoses such as   1. Chronic bilateral low back pain with right-sided sciatica    2. Lumbar radicular pain    3. Lumbar facet joint syndrome     controlled on current medication regime, wll continue current pain medications to improve quality of life and function. Follow up two months    FELICE Soto - CNP

## 2020-07-14 RX ORDER — FLUTICASONE PROPIONATE 50 MCG
SPRAY, SUSPENSION (ML) NASAL
Qty: 48 G | Refills: 0 | OUTPATIENT
Start: 2020-07-14

## 2020-08-31 RX ORDER — DULOXETIN HYDROCHLORIDE 60 MG/1
120 CAPSULE, DELAYED RELEASE ORAL DAILY
Qty: 60 CAPSULE | Refills: 3 | Status: SHIPPED | OUTPATIENT
Start: 2020-08-31 | End: 2021-02-04

## 2020-08-31 NOTE — TELEPHONE ENCOUNTER
Last Appt:  7/7/2020  Next Appt:   12/17/2020  Med verified in [address] [place] [address] is requesting refill for the patient

## 2020-09-08 RX ORDER — GABAPENTIN 300 MG/1
CAPSULE ORAL
Qty: 270 CAPSULE | Refills: 2 | Status: SHIPPED | OUTPATIENT
Start: 2020-09-08 | End: 2021-09-07 | Stop reason: DRUGHIGH

## 2020-09-08 NOTE — TELEPHONE ENCOUNTER
Last Appt:  7/7/2020  Next Appt:   12/17/2020  Med verified in [address] [place] [address] is requesting refill for the patient

## 2020-09-23 ENCOUNTER — OFFICE VISIT (OUTPATIENT)
Dept: NEUROLOGY | Age: 55
End: 2020-09-23
Payer: MEDICARE

## 2020-09-23 VITALS
TEMPERATURE: 97.2 F | BODY MASS INDEX: 29.82 KG/M2 | OXYGEN SATURATION: 97 % | HEIGHT: 71 IN | DIASTOLIC BLOOD PRESSURE: 84 MMHG | SYSTOLIC BLOOD PRESSURE: 122 MMHG | HEART RATE: 84 BPM | WEIGHT: 213 LBS

## 2020-09-23 PROBLEM — Z87.891 FORMER SMOKER: Status: ACTIVE | Noted: 2020-09-23

## 2020-09-23 PROCEDURE — G8417 CALC BMI ABV UP PARAM F/U: HCPCS | Performed by: PSYCHIATRY & NEUROLOGY

## 2020-09-23 PROCEDURE — 1036F TOBACCO NON-USER: CPT | Performed by: PSYCHIATRY & NEUROLOGY

## 2020-09-23 PROCEDURE — G8427 DOCREV CUR MEDS BY ELIG CLIN: HCPCS | Performed by: PSYCHIATRY & NEUROLOGY

## 2020-09-23 PROCEDURE — 3017F COLORECTAL CA SCREEN DOC REV: CPT | Performed by: PSYCHIATRY & NEUROLOGY

## 2020-09-23 PROCEDURE — 99214 OFFICE O/P EST MOD 30 MIN: CPT

## 2020-09-23 PROCEDURE — 99215 OFFICE O/P EST HI 40 MIN: CPT | Performed by: PSYCHIATRY & NEUROLOGY

## 2020-09-23 RX ORDER — SUMATRIPTAN 100 MG/1
100 TABLET, FILM COATED ORAL
Qty: 12 TABLET | Refills: 2 | Status: SHIPPED | OUTPATIENT
Start: 2020-09-23 | End: 2021-01-08 | Stop reason: SDUPTHER

## 2020-09-23 RX ORDER — BUTALBITAL, ACETAMINOPHEN AND CAFFEINE 50; 325; 40 MG/1; MG/1; MG/1
TABLET ORAL
Qty: 60 TABLET | Refills: 2 | Status: SHIPPED | OUTPATIENT
Start: 2020-09-23 | End: 2021-01-05

## 2020-09-23 RX ORDER — ONDANSETRON 4 MG/1
4 TABLET, ORALLY DISINTEGRATING ORAL EVERY 8 HOURS PRN
Qty: 30 TABLET | Refills: 2 | Status: SHIPPED | OUTPATIENT
Start: 2020-09-23 | End: 2021-01-08 | Stop reason: SDUPTHER

## 2020-09-23 ASSESSMENT — ENCOUNTER SYMPTOMS
CHEST TIGHTNESS: 0
TROUBLE SWALLOWING: 0
COUGH: 0
EYE ITCHING: 0
COLOR CHANGE: 0
PHOTOPHOBIA: 1
CONSTIPATION: 0
BLURRED VISION: 0
EYE PAIN: 0
BLOOD IN STOOL: 0
SCALP TENDERNESS: 0
BACK PAIN: 1
APNEA: 0
EYE WATERING: 0
VISUAL CHANGE: 0
SINUS PRESSURE: 0
SHORTNESS OF BREATH: 0
RHINORRHEA: 0
VOMITING: 0
EYE DISCHARGE: 0
NAUSEA: 1
DIARRHEA: 0
WHEEZING: 0
ABDOMINAL PAIN: 0
SORE THROAT: 0
ABDOMINAL DISTENTION: 0
SWOLLEN GLANDS: 0
VOICE CHANGE: 0
EYE REDNESS: 0
FACIAL SWEATING: 0
FACIAL SWELLING: 0
CHOKING: 0

## 2020-09-23 NOTE — PATIENT INSTRUCTIONS
[facility] NEUROLOGY    Due to the complex nature of our neurological testing, It is the policy of the Neurology Department not to release the results of your testing over the phone. Once all testing is completed and we have all the results back, Dr. Dick Rodríguez will then personally go over all the results with you and answer any questions that you may have during a follow up appointment. If you are unable to keep this appointment, please notify the [address] @ [phone], as soon as possible. Please bring your prescription bottles to all appointments. *   ADEQUATE   FLUID  INTAKE   AND  ELECTROLYTE  BALANCE           * KEEP  DAIRY  OF   THE  NEUROLOGICAL  SYMPTOMS          *  TO  MAINTAIN  REGULAR  SLEEP  WAKE  CYCLES. *   TO  HAVE  ADEQUATE  REST  AND   SLEEP    HOURS.        *    AVOID  USAGE OF   TOBACCO,  EXCESSIVE  ALCOHOL                AND   ILLEGAL   SUBSTANCES,  IF  ANY          *  Maintain   Healthy  Life Style    With   Periodic  Monitoring  Of      Any  Medical  Conditions  Including   Elevated  Blood  Pressure,  Lipid  Profile,     Blood  Sugar levels  And   Heart  Disease. *   Period   Screening  For  Cancers  Involving  Breast,  Colon,   lungs  And  Other  Organs  As  Applicable,  In consultation   With  Your  Primary Care Providers. *  If   There is  Any  Significant  Worsening   Of  Current  Symptoms  And  Or  If    Any additional  New  Neurological  Symptoms                 Or  Significant  Concerns   Should  Call  911 or  Go  To  Emergency  Department  For  Further  Immediate  Evaluation.

## 2020-09-23 NOTE — PROGRESS NOTES
[facility]  Neurology  [address] [address]  [facility][phone]   Fax: [phone]    SUBJECTIVE:     PATIENT ID:  Sasha Larry is a  RIGHT     HANDED 54 y.o. male. Headache    This is a new problem. Episode onset: SINCE  OCTOBER 2019. The problem occurs constantly. The problem has been gradually worsening. The pain is located in the occipital region. The pain does not radiate. The pain quality is not similar to prior headaches. The quality of the pain is described as pulsating and throbbing. The pain is at a severity of 3/10. The pain is mild. Associated symptoms include back pain, a loss of balance, nausea, neck pain, phonophobia and photophobia. Pertinent negatives include no abdominal pain, abnormal behavior, anorexia, blurred vision, coughing, dizziness, drainage, ear pain, eye pain, eye redness, eye watering, facial sweating, fever, hearing loss, insomnia, muscle aches, numbness, rhinorrhea, scalp tenderness, seizures, sinus pressure, sore throat, swollen glands, tingling, tinnitus, visual change, vomiting, weakness or weight loss. The symptoms are aggravated by unknown. He has tried ergotamines and NSAIDs for the symptoms. The treatment provided no relief. His past medical history is significant for immunosuppression and migraine headaches. There is no history of cancer, cluster headaches, hypertension, migraines in the family, obesity, pseudotumor cerebri, recent head traumas, sinus disease or TMJ. Migraine    This is a chronic problem. Episode onset: SINCE  TEENAGE   The problem has been waxing and waning. The pain is located in the bilateral region. The pain does not radiate. The pain quality is similar to prior headaches. The quality of the pain is described as aching, throbbing and pulsating. The pain is at a severity of 3/10. The pain is mild. Associated symptoms include back pain, a loss of balance, nausea, neck pain, phonophobia and photophobia. Pertinent negatives include no abdominal pain, abnormal behavior, anorexia, blurred vision, coughing, dizziness, drainage, ear pain, eye pain, eye redness, eye watering, facial sweating, fever, hearing loss, insomnia, muscle aches, numbness, rhinorrhea, scalp tenderness, seizures, sinus pressure, sore throat, swollen glands, tingling, tinnitus, visual change, vomiting, weakness or weight loss. The symptoms are aggravated by unknown. He has tried NSAIDs and Excedrin for the symptoms. The treatment provided moderate relief. His past medical history is significant for immunosuppression and migraine headaches. There is no history of cancer, cluster headaches, hypertension, migraines in the family, obesity, pseudotumor cerebri, recent head traumas, sinus disease or TMJ. History obtained from  The patient   AND  HIS  MALE  FRIEND       and other  available   medical  records   were  Also  reviewed. The  Duration,  Quality,  Severity,  Location,  Timing,  Context,  Modifying  Factors   Of   The   Chief   Complaint   And  Present  Illness       Was   Reviewed   In   Chronological   Manner. PATIENT'S  MAIN  CONCERNS INCLUDE :                       1)     H /O    CHRONIC   MIGRAINES    SINCE  TEENAGE                            2)     PREVIOUS     H/O   BAD  MIGRAINE       8   YEARS   AGO                           3)     H/O     HEADACHE   IN  [facility]   AREA        SINCE   October 2019                                 PATIENT  TRIED     EXCEDRIN  MIGRAINE,    ALEVE                                                WHICH  DID  NOT  HELP. 4)     H/O    NECK PAIN       WITH  RADIATION  TO     THE  LEFT    SIDE                                         WORSE     SINCE    6  MONTHS                              5)     NO    PRECIPITATING   FACTORS. NO   FEVER.      NO  TRAUMA                            6)     H/O CHRONIC    LUMBAR  PAIN   AND  RADICULOPATHY                                       BEING  FOLLOWED  BY  PAIN  MANAGEMENT                           -   ON  NEURONTIN,   BACLOFEN ,   CYMBALTA                                  7)       H/O   CHRONIC  ANXIETY,    BIPOLAR  DISORDER                                      -  ON  INVEGA, ,     ATIVAN     PRN                            8)     H/O     HIV  INFECTION       ASYMPTOMATIC                                    - ON  MEDICATION          FROM    [facility]  [facility]     PROVIDERS                        9)     NO   H/O   FALLING                           10)      H/O    CHRONIC    MILD     MEMORY  PROBLEMS                                         -    NEEDS  MONITORING                      11)   H/O   EX  SMOKER                            H/O   ABNORMAL   CT   CHEST     IN    2017                                          12)    HAD  NEURO  DIAGNOSTIC  EVALUATIONS  IN NOV./ DEC.  2019                              LABS  WORK  UP    DID  NOT    SHOW  ANY  SIGNIFICANT  ABNORMALITIES                             MRI  BRAIN      SHOWED  CHRONIC  CEREBRAL ISCHEMIA                                     MRI  CERVICAL SPINE       SHOWED    MULTI LEVEL  DISC  DISEASES                       13)    PATIENT'S     MIGRAINES   AND   HEADACHES                                      -       IMPROVED     AND      STABLE                                 AND  TOLERATING  THE  MEDICATIONS                                              14)      H/O     OCCIPITAL  HEADACHES     IN    SUMMER   2020    DUE  TO  HEAT                                       -     RESOLVED                             15)        PATIENT  DENIES  ANY  NEW  NEUROLOGICAL  CONCERNS                                 REQUEST     REFILLS  FOR  HIS  MEDICATIONS                          16)     H/O   INTOLERANCE      WITH  NAPROSYN      DUE  TO  GI  SIDE EFFECTS                              PATIENT   TO   AVOID     NSAID 17)        VARIOUS  RISK   FACTORS   WERE  REVIEWED   AND   DISCUSSED. PATIENT   HAS  MULTIPLE   MEDICAL, NEUROLOGICAL                        AND   MENTAL HEALTH   PROBLEMS . PATIENT'S   MANAGEMENT  IS  CHALLENGING.                                     PRECIPITATING  FACTORS: including  fever/infection, exertion/relaxation, position change, stress, weather change,                        medications/alcohol, time of day/darkness/light  Are  absent                                                              MODIFYING  FACTORS:  fever/infection, exertion/relaxation, position change, stress, weather change,                        medications/alcohol, time of day/darkness/light  Are  absent             Patient   Indicates   The  Presence   And  The  Absence  Of  The  Following    Associated  And   Additional  Neurological    Symptoms:                                Balance  And coordination   problems  absent           Gait problems     absent            Headaches      present              Migraines           present           Memory problemspresent              Confusion        absent            Paresthesia   numbness          absent           Seizures  And  Starring  Episodes           absent           Syncope,  Near  syncopal episodes         absent           Speech   problems           absent             Swallowing   Problems      absent            Dizziness,  Light headedness           absent              Vertigo        absent             Generalized   Weakness    absent              focal  Weakness     absent             Tremors         absent              Sleep  Problems     absent             History  Of   Recent  Head  Injury     absent             History  Of   Recent  TIA     absent             History  Of   Recent    Stroke     absent             Neck  Pain   and   Neck muscle  Spasms  present               Radiating  down   And   Weakness LUMBAR SPINE SURGERY Right 10/3/2017    Right L4 & L5 Transforaminal performed by Voncile Opitz, MD at [id number] [place] [address]  Right 1/8/2019    Right L4 L5 TRANSFORAMINAL performed by Voncile Opitz, MD at [address] [place] [address] AA&/STRD LUMBAR PLEXUS CONT NFS CATH Bilateral 1/19/2018    Bilat L2 L3 L4 L5 Confirmatory Medial BB performed by Voncile Opitz, MD at [address],EA ADDL LEVEL Right 1/25/2018    Right L2 L3 L4 L5 RADIOFREQUENCY performed by Voncile Opitz, MD at [address] [facility] LEVEL Left 3/15/2018    Left L2 L3 L4 L5 RADIOFREQUENCY performed by Voncile Opitz, MD at [address] [place] [address]         Current Outpatient Medications   Medication Sig Dispense Refill    butalbital-acetaminophen-caffeine (FIORICET, ESGIC) -40 MG per tablet TAKE 1 OR 2 TABLETS BY MOUTH TWICE DAILY AS NEEDED FOR HEADACHE 60 tablet 2    ondansetron (ZOFRAN ODT) 4 MG disintegrating tablet Take 1 tablet by mouth every 8 hours as needed for Nausea or Vomiting 30 tablet 2    SUMAtriptan (IMITREX) 100 MG tablet Take 1 tablet by mouth once as needed for Migraine (not  more  than   two maximum   per  day) 12 tablet 2    gabapentin (NEURONTIN) 300 MG capsule TAKE 1 CAPLET BY MOUTH THREE TIMES DAILY 270 capsule 2    DULoxetine (CYMBALTA) 60 MG extended release capsule TAKE 2 CAPSULES BY MOUTH DAILY 60 capsule 3    Loratadine-Pseudoephedrine (CLARITIN-D 24 HOUR PO) Take by mouth daily as needed      omeprazole (PRILOSEC) 40 MG delayed release capsule TAKE 1 CAPSULE BY MOUTH DAILY 90 capsule 1    DULoxetine (CYMBALTA) 60 MG extended release capsule TAKE 1 CAPSULE BY MOUTH DAILY 30 capsule 3    loperamide (IMODIUM) 2 MG capsule Take 2 mg by mouth 4 times daily as needed for Diarrhea      docusate sodium (COLACE) 100 MG capsule Take 1 capsule by mouth 2 times daily as needed for Constipation (use while taking narcotic pain meds) 30 capsule 0    ondansetron (ZOFRAN) 4 MG tablet Take 1 tablet by mouth every 6 hours as needed for Nausea or Vomiting 20 tablet 0    gabapentin (NEURONTIN) 800 MG tablet TK 1 T PO TID      fluticasone (FLONASE) 50 MCG/ACT nasal spray SHAKE LIQUID AND USE 1 SPRAY IN EACH NOSTRIL DAILY 48 g 0    baclofen (LIORESAL) 10 MG tablet TAKE 1 TABLET BY MOUTH THREE TIMES DAILY AS NEEDED FOR SPASMS 270 tablet 0    SYMTUZA 160-881-192-10 MG TABS TK 1 T PO  QD  6    aspirin-acetaminophen-caffeine (EXCEDRIN MIGRAINE) 250-250-65 MG per tablet Take 1 tablet by mouth every 6 hours as needed for Headaches      Clotrimazole (MYCELEX) 10 MG LOZG lozenge Take 1 lozenge by mouth 5 times daily 70 lozenge 1    fenofibric acid (FIBRICOR) 135 MG CPDR capsule TAKE 1 CAPSULE BY MOUTH DAILY 90 capsule 0    LORazepam (ATIVAN) 1 MG tablet Take 1 mg by mouth every 6 hours as needed for Anxiety.       BLACK ELDERBERRY,BERRY-FLOWER, PO Take 1 tablet by mouth daily      Darunavir-Cobicistat (PREZCOBIX) 800-150 MG TABS Take by mouth nightly      Nebulizers (COMPRESSOR/NEBULIZER) MISC As directed 1 each 0    Respiratory Therapy Supplies (NEBULIZER/TUBING/MOUTHPIECE) KIT 1 kit by Does not apply route daily 1 kit 0    albuterol (PROVENTIL) (2.5 MG/3ML) 0.083% nebulizer solution Take 2.5 mg by nebulization every 6 hours as needed for Wheezing      TURMERIC PO Take by mouth three times daily      Glucosamine HCl (GLUCOSAMINE PO) Take by mouth daily      vitamin D 1000 UNITS CAPS Take by mouth daily      GARLIC PO Take by mouth daily      Multiple Vitamins-Minerals (MULTIVITAMIN PO) Take by mouth daily      INVEGA 6 MG extended release tablet Take 6 mg by mouth daily      DESCOVY 200-25 MG TABS Take 1 tablet by mouth daily      MYTESI 125 MG TBEC Take 125 mg by mouth      DULoxetine (CYMBALTA) 30 MG extended release capsule TAKE 1 CAPSULE BY MOUTH DAILY FOR 7 DAYS 7 capsule 0    gabapentin (NEURONTIN) 800 MG tablet Take 1 tablet by mouth 3 times daily for 30 days. 90 tablet 5     No current facility-administered medications for this visit.           Allergies   Allergen Reactions    Pcn [Penicillins] Rash    Sulfa Antibiotics Rash         Family History   Problem Relation Age of Onset    Cancer Mother         uterine s/p hysterectomy    Cancer Maternal Grandmother          Social History     Socioeconomic History    Marital status: Single     Spouse name: Not on file    Number of children: Not on file    Years of education: Not on file    Highest education level: Not on file   Occupational History    Not on file   Social Needs    Financial resource strain: Not on file    Food insecurity     Worry: Not on file     Inability: Not on file    Transportation needs     Medical: Not on file     Non-medical: Not on file   Tobacco Use    Smoking status: Former Smoker     Packs/day: 3.00     Years: 30.00     Pack years: 90.00     Last attempt to quit: 10/7/2014     Years since quittin.9    Smokeless tobacco: Never Used   Substance and Sexual Activity    Alcohol use: Yes     Comment: socially    Drug use: No    Sexual activity: Not on file   Lifestyle    Physical activity     Days per week: Not on file     Minutes per session: Not on file    Stress: Not on file   Relationships    Social connections     Talks on phone: Not on file     Gets together: Not on file     Attends Bahai service: Not on file     Active member of club or organization: Not on file     Attends meetings of clubs or organizations: Not on file     Relationship status: Not on file    Intimate partner violence     Fear of current or ex partner: Not on file     Emotionally abused: Not on file     Physically abused: Not on file     Forced sexual activity: Not on file   Other Topics Concern    Not on file   Social History Narrative    Not on file       Vitals:    20 1127   BP: 122/84   Pulse: 84   Temp: 97.2 °F (36.2 °C)   SpO2: 97%         Wt Readings from Last 3 Encounters:   09/23/20 213 lb (96.6 kg)   07/07/20 210 lb (95.3 kg)   05/28/20 210 lb (95.3 kg)         BP Readings from Last 3 Encounters:   09/23/20 122/84   07/07/20 (!) 142/80   05/28/20 126/72       Hematology and Coagulation  Lab Results   Component Value Date    WBC 5.4 11/22/2019    RBC 4.57 11/22/2019    HGB 14.8 11/22/2019    HCT 44.4 11/22/2019    MCV 97.1 11/22/2019    MCH 32.3 11/22/2019    MCHC 33.3 11/22/2019    RDW 14.8 11/22/2019     11/22/2019    MPV 7.1 11/22/2019       Chemistries  Lab Results   Component Value Date     11/22/2019    K 3.9 11/22/2019     11/22/2019    CO2 28 11/22/2019    BUN 14 11/22/2019    CREATININE 0.82 11/22/2019    CALCIUM 9.6 11/22/2019    PROT 7.0 11/22/2019    LABALBU 4.3 11/22/2019    BILITOT 0.21 11/22/2019    ALKPHOS 68 11/22/2019    AST 17 11/22/2019    ALT 16 11/22/2019     Lab Results   Component Value Date    ALKPHOS 68 11/22/2019    ALT 16 11/22/2019    AST 17 11/22/2019    PROT 7.0 11/22/2019    BILITOT 0.21 11/22/2019    BILIDIR <0.08 06/11/2017    LABALBU 4.3 11/22/2019     Lab Results   Component Value Date    BUN 14 11/22/2019    CREATININE 0.82 11/22/2019     Lab Results   Component Value Date    CALCIUM 9.6 11/22/2019     Lab Results   Component Value Date    AST 17 11/22/2019    ALT 16 11/22/2019             Review of Systems   Constitutional: Negative for appetite change, chills, fatigue, fever, unexpected weight change and weight loss. HENT: Negative for congestion, dental problem, drooling, ear discharge, ear pain, facial swelling, hearing loss, mouth sores, nosebleeds, postnasal drip, rhinorrhea, sinus pressure, sore throat, tinnitus, trouble swallowing and voice change. Eyes: Positive for photophobia. Negative for blurred vision, pain, discharge, redness, itching and visual disturbance. Respiratory: Negative for apnea, cough, choking, chest tightness, shortness of breath and wheezing.     Cardiovascular: rhythm. Pulmonary:      Effort: Pulmonary effort is normal.   Musculoskeletal:         General: No tenderness. Skin:     General: Skin is warm. Coloration: Skin is not pale. Findings: No erythema or rash. Nails: There is no clubbing. Psychiatric:         Attention and Perception: He is attentive. Mood and Affect: Mood is anxious. Mood is not depressed. Affect is not labile, blunt or inappropriate. Speech: He is communicative. Speech is not rapid and pressured, delayed, slurred or tangential.         Behavior: Behavior is not agitated, slowed, aggressive, withdrawn, hyperactive or combative. Behavior is cooperative. Thought Content: Thought content is not paranoid or delusional. Thought content does not include homicidal or suicidal ideation. Thought content does not include homicidal or suicidal plan. Cognition and Memory: Cognition is impaired. Memory is impaired. He does not exhibit impaired remote memory. Judgment: Judgment is not impulsive or inappropriate. NEUROLOGICALEXAMINATION :       A) MENTAL STATUS:                   Alert and  oriented  To time, place  And  Person. No Aphasia. No  Dysarthria. Able   To  Follow    SIMPLE      commands   without   Any  Difficulty. No right  To left confusion. Normal  Speech  And language function. Insight and  Judgment ,Fund  Of  Knowledge   within normal limits                Recent  And  Remote memory    DECREASED                Attention &  Concentration are     DECREASED                                             B) CRANIAL NERVES :      CN : Visual  Acuity  And  Visual fields  within normal limits               Fundi  Could  Not  Be  Could  Not  Be  Evaluated. 3,4,6 CN : Both  Pupils are   Reactive and  Equal.  Movements  Are  Intact. No  Nystagmus. No  JOSIE.   No  Afferent Bipolar disorder ([facility] [address]    Memory problem    Abnormal CT of the chest    Ex-smoker    Neck pain    Other complicated headache syndrome    Ventral hernia without obstruction or gangrene    Left inguinal hernia    Intractable headache    Former smoker             MRI OF THE BRAIN WITHOUT AND WITH CONTRAST  12/24/2019 10:37 am       TECHNIQUE:   Multiplanar multisequence MRI of the head/brain was performed without and   with the administration of intravenous contrast.       COMPARISON:   CT brain performed 06/11/2017.       HISTORY:   ORDERING SYSTEM PROVIDED HISTORY: Memory problem   TECHNOLOGIST PROVIDED HISTORY:   migraines   Reason for Exam: Neck pain and left shoulder pain. Migraines. Symptoms for   quite a while. Acuity: Chronic   Type of Exam: Unknown   Additional signs and symptoms: Memory problem; Intractable migraine without   status migrainosus, unspecified migraine type.       FINDINGS:   INTRACRANIAL STRUCTURES/VENTRICLES:  The sellar and suprasellar structures,   optic chiasm, corpus callosum, pineal gland, tectum, and midline brainstem   structures are unremarkable.  The craniocervical junction is unremarkable. There is no acute intracranial hemorrhage, mass effect, or midline shift. There is satisfactory overall gray-white matter differentiation.  There is   chronic microvascular disease.  The ventricular structures are symmetric and   unremarkable.  The infratentorial structures including the cerebellopontine   angles and internal auditory canals are unremarkable. There is no abnormal   restricted diffusion.  There is no abnormal blooming artifact on   susceptibility weighted imaging.  There is no abnormal postcontrast   enhancement.       ORBITS: The visualized portion of the orbits demonstrate no acute abnormality.       SINUSES: The visualized paranasal sinuses and mastoid air cells are well   aerated.       BONES/SOFT TISSUES: The bone marrow signal intensity appears normal. The soft tissues demonstrate no acute abnormality.           Impression   Chronic microvascular disease without acute intracranial abnormality.  No   abnormal postcontrast enhancement. MRI OF THE CERVICAL SPINE WITHOUT CONTRAST 12/24/2019 10:07 am       TECHNIQUE:   Multiplanar multisequence MRI of the cervical spine was performed without the   administration of intravenous contrast.       COMPARISON:   None.       HISTORY:   ORDERING SYSTEM PROVIDED HISTORY: Neck pain   TECHNOLOGIST PROVIDED HISTORY:   neck pain   Reason for Exam: Neck pain and left shoulder pain. Migraines. Symptoms for   quite a while.    Acuity: Chronic   Type of Exam: Unknown   Additional signs and symptoms: Neck pain       FINDINGS:   BONES/ALIGNMENT: There is straightening of the cervical spine.  The vertebral   body heights are maintained.  There is age-appropriate bone marrow signal.   There is mild edema within the left C3-4 facets.  There is multilevel   degenerative disc disease with loss of disc signal.  There is disc space   narrowing primarily at the C6-7 level.  There is no significant   spondylolisthesis.       SPINAL CORD: The spinal cord is normal in caliber and signal.  The visualized   intracranial structures are unremarkable.       SOFT TISSUES: The posterior paraspinal soft tissues are unremarkable.  The   prevertebral soft tissues are unremarkable.       C2-C3: There is no significant disc protrusion, spinal canal stenosis or   neural foraminal narrowing.       C3-C4: There is a mild disc osteophyte complex eccentric to the left with   associated uncovertebral and facet hypertrophy.  There is canal stenosis   measuring 8 mm in AP dimension.  There is moderate to severe left foraminal   narrowing and no significant right foraminal narrowing.       C4-C5: There is a disc osteophyte complex with uncovertebral and facet   hypertrophy.  There is canal stenosis measuring 9 mm in AP dimension.  There   is moderate right foraminal narrowing and no significant left foraminal   narrowing.       C5-C6: There is a disc osteophyte complex with uncovertebral and facet   hypertrophy that is worse on the left.  There is no significant canal   stenosis or right foraminal narrowing.  There is severe left foraminal   narrowing.       C6-C7: There is a disc osteophyte complex with uncovertebral and facet   hypertrophy.  There is canal stenosis measuring 8 mm in AP dimension.  There   is severe bilateral foraminal narrowing.       C7-T1: There is a disc osteophyte complex with uncovertebral and facet   hypertrophy.  There is no significant canal stenosis or foraminal narrowing.           Impression   Multilevel degenerative disc disease with associated uncovertebral and facet   hypertrophy resulting in canal stenosis most pronounced at C3-4 and C6-7.       Moderate to severe left foraminal narrowing at C3-4, moderate right foraminal   narrowing at C4-5, severe left foraminal narrowing at C5-6, and severe   bilateral foraminal narrowing at C6-7.       Edema is demonstrated within the left C3-4 facets that is likely related to   degenerative facet disease although a degree of septic arthritis is a   consideration and correlation is needed. MRI OF THE LUMBAR SPINE WITHOUT CONTRAST, 6/11/2018 10:22 am       TECHNIQUE:   Multiplanar multisequence MRI of the lumbar spine was performed without the   administration of intravenous contrast.       COMPARISON:   None       HISTORY:   ORDERING SYSTEM PROVIDED HISTORY: Lumbar facet joint syndrome   TECHNOLOGIST PROVIDED HISTORY:   Reason for exam:->right lumbar radiculopathy   Ordering Physician Provided Reason for Exam:  Low back pain to the right   side, right leg pain. Symptoms for about one week.    Acuity: Acute   Type of Exam: Initial   Additional signs and symptoms:  Lumbar facet syndrome; Right lumbar   radiculopathy       Initial evaluation.       FINDINGS:   BONES/ALIGNMENT: There is normal alignment of the spine. The vertebral body   heights are maintained. The bone marrow signal appears unremarkable.  There   is degenerative disc disease with disc space narrowing and osteophytes at   L3-4, L4-5, and L5-S1.       SPINAL CORD: The conus terminates normally.       SOFT TISSUES: No paraspinal mass identified. Constancia Border is a cyst in the left   kidney that is not fully evaluated.  Ultrasound could better evaluate.       L1-L2:  The thecal sac and neural foramina are intact.       L2-L3:  There is mild facet and ligamentum flavum hypertrophy.  The thecal   sac is not stenotic.  Disc and/or osteophytes cause minimal narrowing of the   neural foramina bilaterally.       L3-L4: The thecal sac is not stenotic.   Disc and/or osteophytes result in   mild narrowing of the neural foramina bilaterally.   There is mild facet and   ligamentum flavum hypertrophy.       L4-L5:  There is moderate facet and ligamentum flavum hypertrophy.  The   thecal sac is not stenotic.  Disc and/or osteophytes result in moderate   narrowing of the neural foramina bilaterally. The left neural foramen is   narrowed greater than right.       L5-S1:  There is mild facet and ligamentum flavum hypertrophy.  The thecal   sac is not stenotic.  The S1 nerve roots are intact. Disc and/or osteophytes   result in moderate narrowing of the neural foramina bilaterally. The right   neural foramen is narrowed greater than the left.       There is no significant change in the findings from the prior study.           Impression   Disc and osteophytes result in narrowing of the neural foramina at several   levels as discussed above.  No significant stenosis of thecal sac in the   lumbar region.      CT OF THE HEAD WITHOUT CONTRAST  6/11/2017 8:49 pm       TECHNIQUE:   CT of the head was performed without the administration of intravenous   contrast. Dose modulation, iterative reconstruction, and/or weight based   adjustment of the mA/kV was utilized to reduce the radiation dose to as low   as reasonably achievable.       COMPARISON:   None.       HISTORY:   ORDERING SYSTEM PROVIDED HISTORY: neuro symptoms/ HIV   TECHNOLOGIST PROVIDED HISTORY:   Ordering Physician Provided Reason for Exam: Passed out while coughing and   hit forehead on table today, previous loss of consciousness spells in the   last weeks.  Neuro symptoms, reaction to HIV medication. Acuity: Acute   Type of Exam: Initial   Mechanism of Injury: passed out and fell   Relevant Medical/Surgical History: n/a       FINDINGS:   BRAIN/VENTRICLES: The ventricular system is mildly prominent in size and in   the midline.  No evidence of extra-axial fluid collection, hemorrhage or mass.       ORBITS: The visualized portion of the orbits demonstrate no acute abnormality.       SINUSES: Mild bilateral ethmoid sinus mucosal thickening.  Paranasal sinuses   visualized are otherwise clear.  Mastoid regions are poorly aerated likely a   developmental basis.       SOFT TISSUES/SKULL:  No acute abnormality of the visualized skull or soft   tissues.           Impression   No acute intracranial abnormality.         CT OF THE CHEST WITH CONTRAST 7/5/2017 2:06 pm       TECHNIQUE:   CT of the chest was performed with the administration of intravenous   contrast. Multiplanar reformatted images are provided for review. Dose   modulation, iterative reconstruction, and/or weight based adjustment of the   mA/kV was utilized to reduce the radiation dose to as low as reasonably   achievable.       COMPARISON:   Chest radiograph dated 06/27/2017       HISTORY:   ORDERING SYSTEM PROVIDED HISTORY: Pneumonia of right lower lobe due to   infectious organism   TECHNOLOGIST PROVIDED HISTORY:   Reason for exam:->pneumonia   Ordering Physician Provided Reason for Exam: Pneumonia of right lower lobe   due to infectious organism. Cough feeling 50 percent better x 2 weeks. Quit   smoking 3 years ago after smoking for 50 years.    Acuity: Acute   Type of Exam: Ongoing       FINDINGS:   The nodular opacity of concern in the right lower lobe on recent chest   radiograph corresponds to benign calcified granulomas in the inferior segment   of the right middle lobe.  Calcified pulmonary granulomas are also present in   the upper lobes bilaterally.       No pulmonary infiltrate or pleural effusion is present. Fani Lowell is   centrilobular emphysema with an upper lobe predominance and scattered   interstitial scarring.  No pleural effusion is present.       No osteolytic or osteoblastic lesion is present in the included portion of   the axial skeleton.       The heart and great vessels are normal in caliber and appearance.  No   pericardial effusion is present. Fani Lowell is no pathologic mediastinal or hilar   lymphadenopathy.  Calcified right infrahilar lymph nodes reflect residue from   prior granulomatous infection.           Impression   1. Benign pulmonary granulomas.       2. No evidence of pneumonia.       3. Centrilobular emphysema. VISITING DIAGNOSIS:      ICD-10-CM    1. Other complicated headache syndrome  G44.59 butalbital-acetaminophen-caffeine (FIORICET, ESGIC) -40 MG per tablet     ondansetron (ZOFRAN ODT) 4 MG disintegrating tablet   2. Chronic tension-type headache, intractable  G44.221 butalbital-acetaminophen-caffeine (FIORICET, ESGIC) -40 MG per tablet     ondansetron (ZOFRAN ODT) 4 MG disintegrating tablet   3. Bipolar affective disorder, currently manic, mild ([address]  F31.11    4. Former smoker  Z87.891    5. Lumbosacral spondylosis without myelopathy  M47.817    6. Asymptomatic HIV infection ([address]  Z21    7. Muscle spasm  M62.838    8. Neck pain  M54.2    9. Lumbar facet joint syndrome  M47.816    10. IFG (impaired fasting glucose)  R73.01    11. Neural foraminal stenosis of lumbar spine  M99.83    12. Lumbar radicular pain  M54.16    13.  Chronic bilateral low back pain with right-sided sciatica  M54.41     G89.29               CONCERNS *TO  MAINTAIN  REGULAR  SLEEP  WAKE  CYCLES. *   TO  HAVE  ADEQUATE  REST  AND   SLEEP    HOURS.            *    AVOID  ANY USAGE OF    TOBACCO,              EXCESSIVE  ALCOHOL  AND   ILLEGAL   SUBSTANCES        *  CONTINUE   MEDICATIONS    PRESCRIBED   BY NEUROLOGIST    AS    RECOMMENDED       *   Compliance   With  Medications   And  Instructions        * CURRENTLY    TOLERATING  THE  PRESCRIBED   MEDICATIONS. WITHOUT  ANY  SIGNIFICANT  SIDE  EFFECTS   &  GETTING BENEFIT.          *    MIGRAINE/ HEADACHE    DAIRY   WITH  MONITORING                       OF  DURATION  AND  FREQUENCY. *    Antiplatelet  therapy    As   Recommended  Was   Discussed      *    Prophylactic  Use   Of     Vitamin   B   Complex,  Folic  Acid,    Vitamin  B12    Multivitamin,       Calcium  With  magnesium  And  Vit D    Supplementations   Over  The  Counter  Discussed                    *  EVALUATIONS  AND  FOLLOW UP:                     *   OPTHALMOLOGY                                      * PAIN  MANAGEMENT                 *  HIV  CONSULTANTS                                *      CURRENTLY    PATIENT   FEELS  BETTER  WITH  HIS  MIGRAINES                                 AND   HEADACHES                                    AND  TOLERATING  THE  MEDICATIONS                                  FOR  THE  SAME. *      PATIENT  DENIES  ANY  NEW  NEUROLOGICAL  CONCERNS                             Controlled Substances Monitoring: Periodic Controlled Substance Monitoring: Possible medication side effects, risk of tolerance/dependence & alternative treatments discussed. , Assessed functional status.  Víctor Stewart MD)            Orders Placed This Encounter   Medications    butalbital-acetaminophen-caffeine (FIORICET, ESGIC) -40 MG per tablet     Sig: TAKE 1 OR 2 TABLETS BY MOUTH TWICE DAILY AS NEEDED FOR HEADACHE     Dispense:  60 tablet     Refill:  2    ondansetron (ZOFRAN ODT) 4 MG disintegrating tablet     Sig: Take 1 tablet by mouth every 8 hours as needed for Nausea or Vomiting     Dispense:  30 tablet     Refill:  2    SUMAtriptan (IMITREX) 100 MG tablet     Sig: Take 1 tablet by mouth once as needed for Migraine (not  more  than   two maximum   per  day)     Dispense:  12 tablet     Refill:  2                 *PATIENT   TO  FOLLOW  UP  WITH   PRIMARY  CARE         OTHER  CONSULTANTS  AS  BEFORE.           *TO  FOLLOW  WITH   MENTAL  HEALTH  PROFESSIONALS ,  INCLUDING            PSYCHOLOGICAL  COUNSELING   AND  PSYCHIATRIC  EVALUTIONS,                 *  Maintain   Healthy  Life Style    With   Periodic  Monitoring  Of      Any  Medical  Conditions  Including   Elevated  Blood  Pressure,  Lipid  Profile,     Blood  Sugar levels  AndHeart  Disease. *   Period   Screening  For  Cancers  Involving  Breast,  Colon,    lungs  And  Other  Organs  As  Applicable,  In consultation   With  Your  Primary Care Providers. *Second  Neurological  Opinion  And  Evaluations  In  [facility]  Setting  If  Patient  Is  Interested. * Please   Contact   Neurology  Clinic   Early   If   Are  Any  New  Neurological   And  Any neurological  Concerns. *  If  The  Patient remains  Neurologically  Stable   Return   To  [facility] Neurology Department   IN    3- 6            MONTHS  TIME   FOR  FURTHER              FOLLOW UP.                       *   The  Neurological   Findings,  Possible  Diagnosis,  Differential diagnoses                    And  Options  For    Further   Investigations                   And  management   Are  Discussed  Comprehensively. Medications   And  Prescription   Risks  And  Side effects  Are   Also  Discussed.                      *  If   There is  Any  Significant  Worsening   Of  Current  Symptoms  And  Or                  If patient  Develops   Any additional  New NeurologicalSymptoms                  Or  Significant  Concerns   Should  Call  911 or  Go  To  Emergency  Department  For  Further  Immediate  Evaluation. The   Above  Were  Reviewed  With  patient   and                       questions  Answered  In  Detail. More   Than  50% of face  To face Time   Was  Spent  On  Counseling                    And   Coordination  Of  Care   Of   Patient's  multiple   Neurological  Problems                         And   Comorbid  Medical   Conditions. Electronically signed by Clara Watt MD    Board Certified in  Neurology &  In  [facility] ([facility])      DISCLAIMER:   Although every effort was made to ensure the accuracy of this  electronictranscription, some errors in transcription may have occurred. GENERAL PATIENT INSTRUCTIONS:      A Healthy Lifestyle: Care Instructions   Your Care Instructions   A healthy lifestyle can help you feel good, stay at ahealthy weight, and have plenty of energy for both work and play. A healthy lifestyle is something you can share with your whole family.  A healthy lifestyle also can lower your risk for serious health problems, such ashigh blood pressure, heart disease, and diabetes.  You can follow a few steps listed below to improve your health and the health of your family.  Follow-up careis a key part of your treatment and safety. Be sure to make and go to all appointments, and call your doctor if you are having problems. Its also a good idea to know your test results and keep a list of the medicines you take.  How can you care for yourself at home?  Do not eat too much sugar, fat, or fast foods. You can still have dessert and treats nowand then. The goal is moderation.  Start small to improve your eating habits.  Pay attention to portion sizes, drink less juice and soda pop, and eat more fruits and vegetables.  Eat a healthy amount of food. A 3-ounce serving of meat, for example, is about the size of a deck of cards. Fill the rest of your plate with vegetables and whole grains.  Limit theamount of soda and sports drinks you have every day. Drink more water when you are thirsty.  Eat at least 5 servings of fruits and vegetables every day. It may seem like a lot, but it is not hard to reach this goal. Aserving or helping is 1 piece of fruit, 1 cup of vegetables, or 2 cups of leafy, raw vegetables. Have an apple or some carrot sticks as an afternoon snack instead of a candy bar. Try to have fruits and/or vegetables at everymeal.   Make exercise part of your daily routine. You may want to start with simple activities, such as walking, bicycling, or slow swimming. Try kay active 30 to 60 minutes every day. You do not need to do all 30 to 60 minutes all at once. For example, you can exercise 3 times a day for 10 or 20 minutes. Moderate exercise is safe for most people, but it is always agood idea to talk to your doctor before starting an exercise program.   Keep moving. [place] the lawn, work in the garden, or [employer] Take the stairs instead of the elevator at work.  If you smoke, quit. Peoplewho smoke have an increased risk for heart attack, stroke, cancer, and other lung illnesses. Quitting is hard, but there are ways to boost your chance of quitting tobacco for good.  Use nicotine gum, patches, or lozenges.  Ask your doctor about stop-smoking programs and medicines.  Keep trying.  In addition to reducing your risk of diseases in the future, you will notice some benefits soon after you stop using tobacco. If you have shortness of breath or asthma symptoms, they will likely getbetter within a few weeks after you quit.  Limit how much alcohol you drink. Moderate amounts of alcohol (up to 2 drinks a day for men, 1drink a day for women) are okay.  But drinking too much can lead to liver problems, high blood pressure, and other health problems.  health   If you have a family, there are many things you can do together to improve your health.  Eat meals together as a family as often as possible.  Eat healthy foods. This includes fruits, vegetables, lean meats and dairy, and whole grains.  Include your family in your fitness plan. Most peoplethink of activities such as jogging or tennis as the way to fitness, but there are many ways you and your family can be more active. Anything that makes you breathe hard and gets your heart pumping is exercise. Here are sometips:   Walk to do errands or to take your child to school or the bus.  Go for a family bike ride after dinner instead of watching TV.  Where can you learn more?  Go toHonestly Nowtps://[employer]. org and sign in to your [employer] account. Enter J953 in the Search HealthInformation box to learn more about \"A Healthy Lifestyle: Care Instructions. \"     If you do not have anaccount, please click on the \"Sign Up Now\" link.  Current as of: July 26, 2016   Content Version: 11.2   © 4782-3768 [employer]. Care instructions adapted under license by [facility] ([facility]). If you have questions about a medical condition or this instruction, always ask your healthcare professional. [employer] disclaims any warranty or liability for your use of this information.

## 2020-09-25 RX ORDER — OMEPRAZOLE 40 MG/1
40 CAPSULE, DELAYED RELEASE ORAL DAILY
Qty: 90 CAPSULE | Refills: 1 | Status: SHIPPED | OUTPATIENT
Start: 2020-09-25 | End: 2021-01-08 | Stop reason: SDUPTHER

## 2020-10-20 RX ORDER — GABAPENTIN 800 MG/1
800 TABLET ORAL 3 TIMES DAILY
Qty: 90 TABLET | Refills: 5 | Status: SHIPPED | OUTPATIENT
Start: 2020-10-20 | End: 2021-07-02

## 2020-11-04 RX ORDER — GABAPENTIN 600 MG/1
TABLET ORAL
Qty: 90 TABLET | Refills: 5 | Status: SHIPPED | OUTPATIENT
Start: 2020-11-04 | End: 2020-11-24 | Stop reason: SDUPTHER

## 2020-11-09 DIAGNOSIS — J30.9 CHRONIC ALLERGIC RHINITIS: ICD-10-CM

## 2020-11-09 RX ORDER — FLUTICASONE PROPIONATE 50 MCG
SPRAY, SUSPENSION (ML) NASAL
Qty: 48 G | Refills: 0 | OUTPATIENT
Start: 2020-11-09

## 2020-11-24 RX ORDER — GABAPENTIN 600 MG/1
TABLET ORAL
Qty: 90 TABLET | Refills: 2 | Status: SHIPPED | OUTPATIENT
Start: 2020-11-24 | End: 2021-03-19

## 2020-12-28 DIAGNOSIS — G44.59 OTHER COMPLICATED HEADACHE SYNDROME: ICD-10-CM

## 2020-12-28 DIAGNOSIS — G44.221 CHRONIC TENSION-TYPE HEADACHE, INTRACTABLE: ICD-10-CM

## 2021-01-04 NOTE — TELEPHONE ENCOUNTER
Last Appt:  9/23/2020  Next Appt:   1/8/2021  Med verified in [facility] 227 reviewed 01/04/2021

## 2021-01-05 RX ORDER — BUTALBITAL, ACETAMINOPHEN AND CAFFEINE 50; 325; 40 MG/1; MG/1; MG/1
TABLET ORAL
Qty: 60 TABLET | Refills: 2 | Status: SHIPPED | OUTPATIENT
Start: 2021-01-05 | End: 2021-03-10 | Stop reason: SDUPTHER

## 2021-01-08 ENCOUNTER — OFFICE VISIT (OUTPATIENT)
Dept: NEUROLOGY | Age: 56
End: 2021-01-08
Payer: MEDICARE

## 2021-01-08 VITALS
TEMPERATURE: 97.2 F | WEIGHT: 221.8 LBS | HEART RATE: 92 BPM | HEIGHT: 71 IN | DIASTOLIC BLOOD PRESSURE: 72 MMHG | SYSTOLIC BLOOD PRESSURE: 108 MMHG | OXYGEN SATURATION: 97 % | BODY MASS INDEX: 31.05 KG/M2

## 2021-01-08 DIAGNOSIS — G44.221 CHRONIC TENSION-TYPE HEADACHE, INTRACTABLE: ICD-10-CM

## 2021-01-08 DIAGNOSIS — Z21 ASYMPTOMATIC HIV INFECTION (HCC): ICD-10-CM

## 2021-01-08 DIAGNOSIS — G44.59 OTHER COMPLICATED HEADACHE SYNDROME: Primary | ICD-10-CM

## 2021-01-08 DIAGNOSIS — R41.3 MEMORY PROBLEM: ICD-10-CM

## 2021-01-08 DIAGNOSIS — R93.89 ABNORMAL CT OF THE CHEST: ICD-10-CM

## 2021-01-08 DIAGNOSIS — R51.9 INTRACTABLE HEADACHE, UNSPECIFIED CHRONICITY PATTERN, UNSPECIFIED HEADACHE TYPE: ICD-10-CM

## 2021-01-08 DIAGNOSIS — G89.29 CHRONIC INTRACTABLE HEADACHE, UNSPECIFIED HEADACHE TYPE: ICD-10-CM

## 2021-01-08 DIAGNOSIS — M54.2 NECK PAIN: ICD-10-CM

## 2021-01-08 DIAGNOSIS — F31.11 BIPOLAR AFFECTIVE DISORDER, CURRENTLY MANIC, MILD (HCC): ICD-10-CM

## 2021-01-08 DIAGNOSIS — Z87.891 EX-SMOKER: ICD-10-CM

## 2021-01-08 DIAGNOSIS — M47.817 LUMBOSACRAL SPONDYLOSIS WITHOUT MYELOPATHY: ICD-10-CM

## 2021-01-08 DIAGNOSIS — R73.01 IFG (IMPAIRED FASTING GLUCOSE): ICD-10-CM

## 2021-01-08 DIAGNOSIS — M54.41 CHRONIC BILATERAL LOW BACK PAIN WITH RIGHT-SIDED SCIATICA: ICD-10-CM

## 2021-01-08 DIAGNOSIS — G44.59 OTHER COMPLICATED HEADACHE SYNDROME: ICD-10-CM

## 2021-01-08 DIAGNOSIS — M47.816 LUMBAR FACET JOINT SYNDROME: ICD-10-CM

## 2021-01-08 DIAGNOSIS — R51.9 CHRONIC INTRACTABLE HEADACHE, UNSPECIFIED HEADACHE TYPE: ICD-10-CM

## 2021-01-08 DIAGNOSIS — G89.29 CHRONIC BILATERAL LOW BACK PAIN WITH RIGHT-SIDED SCIATICA: ICD-10-CM

## 2021-01-08 DIAGNOSIS — M62.838 MUSCLE SPASM: ICD-10-CM

## 2021-01-08 DIAGNOSIS — J43.2 CENTRILOBULAR EMPHYSEMA (HCC): ICD-10-CM

## 2021-01-08 PROCEDURE — G8926 SPIRO NO PERF OR DOC: HCPCS | Performed by: PSYCHIATRY & NEUROLOGY

## 2021-01-08 PROCEDURE — 3023F SPIROM DOC REV: CPT | Performed by: PSYCHIATRY & NEUROLOGY

## 2021-01-08 PROCEDURE — 1036F TOBACCO NON-USER: CPT | Performed by: PSYCHIATRY & NEUROLOGY

## 2021-01-08 PROCEDURE — G8484 FLU IMMUNIZE NO ADMIN: HCPCS | Performed by: PSYCHIATRY & NEUROLOGY

## 2021-01-08 PROCEDURE — 99214 OFFICE O/P EST MOD 30 MIN: CPT

## 2021-01-08 PROCEDURE — 3017F COLORECTAL CA SCREEN DOC REV: CPT | Performed by: PSYCHIATRY & NEUROLOGY

## 2021-01-08 PROCEDURE — 99214 OFFICE O/P EST MOD 30 MIN: CPT | Performed by: PSYCHIATRY & NEUROLOGY

## 2021-01-08 PROCEDURE — G8427 DOCREV CUR MEDS BY ELIG CLIN: HCPCS | Performed by: PSYCHIATRY & NEUROLOGY

## 2021-01-08 PROCEDURE — G8417 CALC BMI ABV UP PARAM F/U: HCPCS | Performed by: PSYCHIATRY & NEUROLOGY

## 2021-01-08 RX ORDER — SUMATRIPTAN 100 MG/1
100 TABLET, FILM COATED ORAL
Qty: 12 TABLET | Refills: 2 | Status: SHIPPED | OUTPATIENT
Start: 2021-01-08 | End: 2021-01-26

## 2021-01-08 RX ORDER — ONDANSETRON 4 MG/1
4 TABLET, ORALLY DISINTEGRATING ORAL EVERY 8 HOURS PRN
Qty: 30 TABLET | Refills: 2 | Status: SHIPPED | OUTPATIENT
Start: 2021-01-08 | End: 2021-04-01 | Stop reason: SDUPTHER

## 2021-01-08 RX ORDER — OMEPRAZOLE 40 MG/1
40 CAPSULE, DELAYED RELEASE ORAL DAILY
Qty: 90 CAPSULE | Refills: 1 | Status: SHIPPED | OUTPATIENT
Start: 2021-01-08 | End: 2021-04-01 | Stop reason: SDUPTHER

## 2021-01-08 ASSESSMENT — ENCOUNTER SYMPTOMS
SCALP TENDERNESS: 0
EYE DISCHARGE: 0
APNEA: 0
CHEST TIGHTNESS: 0
BACK PAIN: 1
VISUAL CHANGE: 0
SORE THROAT: 0
WHEEZING: 0
EYE ITCHING: 0
EYE WATERING: 0
VOMITING: 0
EYE REDNESS: 0
COUGH: 0
ABDOMINAL PAIN: 0
FACIAL SWEATING: 0
SINUS PRESSURE: 0
BLOOD IN STOOL: 0
SWOLLEN GLANDS: 0
SHORTNESS OF BREATH: 0
DIARRHEA: 0
CONSTIPATION: 0
TROUBLE SWALLOWING: 0
EYE PAIN: 0
COLOR CHANGE: 0
VOICE CHANGE: 0
BLURRED VISION: 0
NAUSEA: 1
ABDOMINAL DISTENTION: 0
FACIAL SWELLING: 0
CHOKING: 0
PHOTOPHOBIA: 1
RHINORRHEA: 0

## 2021-01-08 NOTE — PROGRESS NOTES
[facility]  Neurology  [address] [place] [address]  AYRII:[phone]   Fax: [phone]    SUBJECTIVE:     PATIENT ID:  Gabi Isidro is a  RIGHT     HANDED 54 y.o. male. Headache   This is a new problem. Episode onset: SINCE  OCTOBER 2019. The problem occurs constantly. The problem has been gradually worsening. The pain is located in the occipital region. The pain does not radiate. The pain quality is not similar to prior headaches. The quality of the pain is described as pulsating and throbbing. The pain is at a severity of 3/10. The pain is mild. Associated symptoms include back pain, a loss of balance, nausea, neck pain, phonophobia and photophobia. Pertinent negatives include no abdominal pain, abnormal behavior, anorexia, blurred vision, coughing, dizziness, drainage, ear pain, eye pain, eye redness, eye watering, facial sweating, fever, hearing loss, insomnia, muscle aches, numbness, rhinorrhea, scalp tenderness, seizures, sinus pressure, sore throat, swollen glands, tingling, tinnitus, visual change, vomiting, weakness or weight loss. The symptoms are aggravated by unknown. He has tried ergotamines and NSAIDs for the symptoms. The treatment provided no relief. His past medical history is significant for immunosuppression and migraine headaches. There is no history of cancer, cluster headaches, hypertension, migraines in the family, obesity, pseudotumor cerebri, recent head traumas, sinus disease or TMJ. Migraine   This is a chronic problem. Episode onset: SINCE  TEENAGE   The problem has been waxing and waning. The pain is located in the bilateral region. The pain does not radiate. The pain quality is similar to prior headaches. The quality of the pain is described as aching, throbbing and pulsating. The pain is at a severity of 3/10. The pain is mild. Associated symptoms include back pain, a loss of balance, nausea, neck pain, phonophobia and photophobia.  Pertinent negatives include no abdominal pain, abnormal behavior, anorexia, blurred vision, coughing, dizziness, drainage, ear pain, eye pain, eye redness, eye watering, facial sweating, fever, hearing loss, insomnia, muscle aches, numbness, rhinorrhea, scalp tenderness, seizures, sinus pressure, sore throat, swollen glands, tingling, tinnitus, visual change, vomiting, weakness or weight loss. The symptoms are aggravated by unknown. He has tried NSAIDs and Excedrin for the symptoms. The treatment provided moderate relief. His past medical history is significant for immunosuppression and migraine headaches. There is no history of cancer, cluster headaches, hypertension, migraines in the family, obesity, pseudotumor cerebri, recent head traumas, sinus disease or TMJ. History obtained from  The patient   AND  HIS  MALE  FRIEND       and other  available   medical  records   were  Also  reviewed. The  Duration,  Quality,  Severity,  Location,  Timing,  Context,  Modifying  Factors   Of   The   Chief   Complaint   And  Present  Illness       Was   Reviewed   In   Chronological   Manner. PATIENT'S  MAIN  CONCERNS INCLUDE :                       1)     H /O    CHRONIC   MIGRAINES    SINCE  TEENAGE                            2)     PREVIOUS     H/O   BAD  MIGRAINE       8   YEARS   AGO                           3)     H/O     HEADACHE   IN  [facility]   AREA        SINCE   October 2019                                 PATIENT  TRIED     EXCEDRIN  MIGRAINE,    ALEVE                                                WHICH  DID  NOT  HELP. 4)     H/O    NECK PAIN       WITH  RADIATION  TO     THE  LEFT    SIDE                                         WORSE     SINCE    6  MONTHS                              5)     NO    PRECIPITATING   FACTORS. NO   FEVER.      NO  TRAUMA                            6)     H/O    CHRONIC LUMBAR  PAIN   AND  RADICULOPATHY                                       BEING  FOLLOWED  BY  PAIN  MANAGEMENT                           -   ON  NEURONTIN,   BACLOFEN ,   CYMBALTA                                  7)       H/O   CHRONIC  ANXIETY,    BIPOLAR  DISORDER                                      -  ON  INVEGA, ,     ATIVAN     PRN                            8)     H/O     HIV  INFECTION       ASYMPTOMATIC                                    - ON  MEDICATION          FROM    [facility]     PROVIDERS                        9)     NO   H/O   FALLING                           10)      H/O    CHRONIC    MILD     MEMORY  PROBLEMS                                         -    NEEDS  MONITORING                      11)   H/O   EX  SMOKER                            H/O   ABNORMAL   CT   CHEST     IN    2017                              TO  FOLLOW  WITH   HIS  PCP                                         12)    HAD  NEURO  DIAGNOSTIC  EVALUATIONS  IN NOV./ DEC.  2019                              LABS  WORK  UP    DID  NOT    SHOW  ANY  SIGNIFICANT  ABNORMALITIES                             MRI  BRAIN      SHOWED  CHRONIC  CEREBRAL ISCHEMIA                                     MRI  CERVICAL SPINE       SHOWED    MULTI LEVEL  DISC  DISEASES                       13)    PATIENT'S     MIGRAINES   AND   HEADACHES                                      -       IMPROVED     AND      STABLE                                 AND  TOLERATING  THE  MEDICATIONS                                              14)      H/O     OCCIPITAL  HEADACHES     IN    SUMMER   2020    DUE  TO  HEAT                                       -     RESOLVED                            15)     H/O   INTOLERANCE      WITH  NAPROSYN      DUE  TO  GI  SIDE EFFECTS                              PATIENT   TO   AVOID     NSAID                                                 16)        PATIENT  DENIES  ANY  NEW  NEUROLOGICAL  CONCERNS REQUEST     REFILLS  FOR  HIS  MEDICATIONS                        17)        VARIOUS  RISK   FACTORS   WERE  REVIEWED   AND   DISCUSSED. PATIENT   HAS  MULTIPLE   MEDICAL, NEUROLOGICAL                        AND   MENTAL HEALTH   PROBLEMS . PATIENT'S   MANAGEMENT  IS  CHALLENGING.                                     PRECIPITATING  FACTORS: including  fever/infection, exertion/relaxation, position change, stress, weather change,                        medications/alcohol, time of day/darkness/light  Are  absent                                                              MODIFYING  FACTORS:  fever/infection, exertion/relaxation, position change, stress, weather change,                        medications/alcohol, time of day/darkness/light  Are  absent             Patient   Indicates   The  Presence   And  The  Absence  Of  The  Following    Associated  And   Additional  Neurological    Symptoms:                                Balance  And coordination   problems  absent           Gait problems     absent            Headaches      present              Migraines           present           Memory problemspresent              Confusion        absent            Paresthesia   numbness          absent           Seizures  And  Starring  Episodes           absent           Syncope,  Near  syncopal episodes         absent           Speech   problems           absent             Swallowing   Problems      absent            Dizziness,  Light headedness           absent              Vertigo        absent             Generalized   Weakness    absent              focal  Weakness     absent             Tremors         absent              Sleep  Problems     absent             History  Of   Recent  Head  Injury     absent             History  Of   Recent  TIA     absent             History  Of   Recent    Stroke     absent             Neck  Pain   and   Neck muscle  Spasms  present Radiating  down   And   Weakness           present            Lower back   Pain  And     Spasms  present              Radiating    Down   And   Weakness          present                H/OFALLS        absent               History  Of   Visual  Symptoms    absent                  Associated   Diplopia       absent                                               Also   Additional   Symptoms   Present    As  Documented    In   The   detailed                  Review  Of  Systems   And    Please   Refer   To    Them for   Additional    Information. Any components  That are either  Unobtainable  Or  Limited  In   HPI, ROS  And/or PFSH   Are                   Due   ToPatient's  Medical  Problems,  Clinical  Condition   and/or lack of                                other    Alternate   resources. RECORDS   REVIEWED:    historical medical records       INFORMATION   REVIEWED:     MEDICAL   HISTORY,SURGICAL   HISTORY,     MEDICATIONS   LIST,   ALLERGIES AND  DRUG  INTOLERANCES,       FAMILY   HISTORY,  SOCIAL  HISTORY,      PROBLEM  LIST   FOR  PATIENT  CARE   COORDINATION      Past Medical History:   Diagnosis Date    Bipolar disorder ([facility] [address]     follows with Dr. Antonia Santos Centrilobular emphysema ([facility] [address]     HIV (human immunodeficiency virus infection) ([facility] [address] 2001    followed by Maryellen Melara (Infectious Disease at [address] [place] [address])    Pneumonia 06/11/2017         Past Surgical History:   Procedure Laterality Date    ANESTHESIA NERVE BLOCK Bilateral 1/12/2018    Bilat L2 L3 L4 L5 Diagnostic Medial BB performed by Flaca Sutton MD at [address] [facility]  01/05/2016    Normal colon. Hemorrhoids.  Dr Sy Right  2015    full dental extraction    HERNIA REPAIR Left 3/2/2020    Laparoscopic Robotic Assisted Left Inguinal HERNIA REPAIR WITH MESH &VENTRAL  hernia repair performed by Lindy Paige DO at [address] [facility] Right 9/26/2017    Right L4 & L5 Transforaminal MIGRAINE) 250-250-65 MG per tablet Take 1 tablet by mouth every 6 hours as needed for Headaches      Clotrimazole (MYCELEX) 10 MG LOZG lozenge Take 1 lozenge by mouth 5 times daily 70 lozenge 1    fenofibric acid (FIBRICOR) 135 MG CPDR capsule TAKE 1 CAPSULE BY MOUTH DAILY 90 capsule 0    LORazepam (ATIVAN) 1 MG tablet Take 1 mg by mouth every 6 hours as needed for Anxiety.  BLACK ELDERBERRY,BERRY-FLOWER, PO Take 1 tablet by mouth daily      Darunavir-Cobicistat (PREZCOBIX) 800-150 MG TABS Take by mouth nightly      TURMERIC PO Take by mouth three times daily      Glucosamine HCl (GLUCOSAMINE PO) Take by mouth daily      vitamin D 1000 UNITS CAPS Take by mouth daily      Multiple Vitamins-Minerals (MULTIVITAMIN PO) Take by mouth daily      INVEGA 6 MG extended release tablet Take 6 mg by mouth daily      DESCOVY 200-25 MG TABS Take 1 tablet by mouth daily      MYTESI 125 MG TBEC Take 125 mg by mouth      gabapentin (NEURONTIN) 600 MG tablet TAKE 1 TABLET BY MOUTH THREE TIMES DAILY 90 tablet 2    gabapentin (NEURONTIN) 800 MG tablet Take 1 tablet by mouth 3 times daily for 30 days.  90 tablet 5    gabapentin (NEURONTIN) 300 MG capsule TAKE 1 CAPLET BY MOUTH THREE TIMES DAILY 270 capsule 2    DULoxetine (CYMBALTA) 60 MG extended release capsule TAKE 2 CAPSULES BY MOUTH DAILY (Patient not taking: Reported on 1/8/2021) 60 capsule 3    DULoxetine (CYMBALTA) 30 MG extended release capsule TAKE 1 CAPSULE BY MOUTH DAILY FOR 7 DAYS 7 capsule 0    ondansetron (ZOFRAN) 4 MG tablet Take 1 tablet by mouth every 6 hours as needed for Nausea or Vomiting (Patient not taking: Reported on 1/8/2021) 20 tablet 0    fluticasone (FLONASE) 50 MCG/ACT nasal spray SHAKE LIQUID AND USE 1 SPRAY IN EACH NOSTRIL DAILY (Patient not taking: Reported on 1/8/2021) 48 g 0    Nebulizers (COMPRESSOR/NEBULIZER) MISC As directed (Patient not taking: Reported on 1/8/2021) 1 each 0    Respiratory Therapy Supplies (NEBULIZER/TUBING/MOUTHPIECE) KIT 1 kit by Does not apply route daily (Patient not taking: Reported on 2021) 1 kit 0    albuterol (PROVENTIL) (2.5 MG/3ML) 0.083% nebulizer solution Take 2.5 mg by nebulization every 6 hours as needed for Wheezing      GARLIC PO Take by mouth daily       No current facility-administered medications for this visit.           Allergies   Allergen Reactions    Pcn [Penicillins] Rash    Sulfa Antibiotics Rash         Family History   Problem Relation Age of Onset    Cancer Mother         uterine s/p hysterectomy    Cancer Maternal Grandmother          Social History     Socioeconomic History    Marital status: Single     Spouse name: Not on file    Number of children: Not on file    Years of education: Not on file    Highest education level: Not on file   Occupational History    Not on file   Social Needs    Financial resource strain: Not on file    Food insecurity     Worry: Not on file     Inability: Not on file    Transportation needs     Medical: Not on file     Non-medical: Not on file   Tobacco Use    Smoking status: Former Smoker     Packs/day: 3.00     Years: 30.00     Pack years: 90.00     Quit date: 10/7/2014     Years since quittin.2    Smokeless tobacco: Never Used   Substance and Sexual Activity    Alcohol use: Yes     Comment: socially    Drug use: No    Sexual activity: Not on file   Lifestyle    Physical activity     Days per week: Not on file     Minutes per session: Not on file    Stress: Not on file   Relationships    Social connections     Talks on phone: Not on file     Gets together: Not on file     Attends Yazdanism service: Not on file     Active member of club or organization: Not on file     Attends meetings of clubs or organizations: Not on file     Relationship status: Not on file    Intimate partner violence     Fear of current or ex partner: Not on file     Emotionally abused: Not on file     Physically abused: Not on file Forced sexual activity: Not on file   Other Topics Concern    Not on file   Social History Narrative    Not on file       Vitals:    01/08/21 1104   BP: 108/72   Pulse: 92   Temp: 97.2 °F (36.2 °C)   SpO2: 97%         Wt Readings from Last 3 Encounters:   01/08/21 221 lb 12.8 oz (100.6 kg)   09/23/20 213 lb (96.6 kg)   07/07/20 210 lb (95.3 kg)         BP Readings from Last 3 Encounters:   01/08/21 108/72   09/23/20 122/84   07/07/20 (!) 142/80       Hematology and Coagulation  Lab Results   Component Value Date    WBC 5.4 11/22/2019    RBC 4.57 11/22/2019    HGB 14.8 11/22/2019    HCT 44.4 11/22/2019    MCV 97.1 11/22/2019    MCH 32.3 11/22/2019    MCHC 33.3 11/22/2019    RDW 14.8 11/22/2019     11/22/2019    MPV 7.1 11/22/2019       Chemistries  Lab Results   Component Value Date     11/22/2019    K 3.9 11/22/2019     11/22/2019    CO2 28 11/22/2019    BUN 14 11/22/2019    CREATININE 0.82 11/22/2019    CALCIUM 9.6 11/22/2019    PROT 7.0 11/22/2019    LABALBU 4.3 11/22/2019    BILITOT 0.21 11/22/2019    ALKPHOS 68 11/22/2019    AST 17 11/22/2019    ALT 16 11/22/2019     Lab Results   Component Value Date    ALKPHOS 68 11/22/2019    ALT 16 11/22/2019    AST 17 11/22/2019    PROT 7.0 11/22/2019    BILITOT 0.21 11/22/2019    BILIDIR <0.08 06/11/2017    LABALBU 4.3 11/22/2019     Lab Results   Component Value Date    BUN 14 11/22/2019    CREATININE 0.82 11/22/2019     Lab Results   Component Value Date    CALCIUM 9.6 11/22/2019     Lab Results   Component Value Date    AST 17 11/22/2019    ALT 16 11/22/2019             Review of Systems   Constitutional: Negative for appetite change, chills, fatigue, fever, unexpected weight change and weight loss. HENT: Negative for congestion, dental problem, drooling, ear discharge, ear pain, facial swelling, hearing loss, mouth sores, nosebleeds, postnasal drip, rhinorrhea, sinus pressure, sore throat, tinnitus, trouble swallowing and voice change.     Eyes: Positive for photophobia. Negative for blurred vision, pain, discharge, redness, itching and visual disturbance. Respiratory: Negative for apnea, cough, choking, chest tightness, shortness of breath and wheezing. Cardiovascular: Negative for chest pain, palpitations and leg swelling. Gastrointestinal: Positive for nausea. Negative for abdominal distention, abdominal pain, anorexia, blood in stool, constipation, diarrhea and vomiting. Endocrine: Negative for cold intolerance, heat intolerance, polydipsia, polyphagia and polyuria. Musculoskeletal: Positive for arthralgias, back pain and neck pain. Negative for gait problem, joint swelling, myalgias and neck stiffness. Skin: Negative for color change, pallor, rash and wound. Allergic/Immunologic: Negative for environmental allergies, food allergies and immunocompromised state. Neurological: Positive for headaches and loss of balance. Negative for dizziness, tingling, tremors, seizures, syncope, facial asymmetry, speech difficulty, weakness, light-headedness and numbness. Hematological: Negative for adenopathy. Does not bruise/bleed easily. Psychiatric/Behavioral: Positive for decreased concentration. Negative for agitation, behavioral problems, confusion, dysphoric mood, hallucinations, self-injury, sleep disturbance and suicidal ideas. The patient is nervous/anxious. The patient does not have insomnia and is not hyperactive. OBJECTIVE:    Physical Exam  Constitutional:       Appearance: He is well-developed. HENT:      Head: Normocephalic and atraumatic. No raccoon eyes or Oviedo's sign. Right Ear: External ear normal.      Left Ear: External ear normal.      Nose: Nose normal.   Eyes:      Conjunctiva/sclera: Conjunctivae normal.      Pupils: Pupils are equal, round, and reactive to light. Neck:      Musculoskeletal: Normal range of motion and neck supple. Normal range of motion. No neck rigidity or muscular tenderness. Thyroid: No thyroid mass or thyromegaly. Vascular: No carotid bruit. Trachea: No tracheal deviation. Meningeal: Brudzinski's sign and Kernig's sign absent. Cardiovascular:      Rate and Rhythm: Normal rate and regular rhythm. Pulmonary:      Effort: Pulmonary effort is normal.   Musculoskeletal:         General: No tenderness. Skin:     General: Skin is warm. Coloration: Skin is not pale. Findings: No erythema or rash. Nails: There is no clubbing. Psychiatric:         Attention and Perception: He is attentive. Mood and Affect: Mood is anxious. Mood is not depressed. Affect is not labile, blunt or inappropriate. Speech: He is communicative. Speech is not rapid and pressured, delayed, slurred or tangential.         Behavior: Behavior is not agitated, slowed, aggressive, withdrawn, hyperactive or combative. Behavior is cooperative. Thought Content: Thought content is not paranoid or delusional. Thought content does not include homicidal or suicidal ideation. Thought content does not include homicidal or suicidal plan. Cognition and Memory: Cognition is impaired. Memory is impaired. He does not exhibit impaired remote memory. Judgment: Judgment is not impulsive or inappropriate. NEUROLOGICALEXAMINATION :       A) MENTAL STATUS:                   Alert and  oriented  To time, place  And  Person. No Aphasia. No  Dysarthria. Able   To  Follow    SIMPLE      commands   without   Any  Difficulty. No right  To left confusion. Normal  Speech  And language function.                  Insight and  Judgment ,Fund  Of  Knowledge   within normal limits                Recent  And  Remote memory    DECREASED                Attention &  Concentration are     DECREASED                                             B) CRANIAL NERVES :      CN : Visual  Acuity  And  Visual fields within normal limits               Fundi  Could  Not  Be  Could  Not  Be  Evaluated. 3,4,6 CN : Both  Pupils are   Reactive and  Equal.  Movements  Are  Intact. No  Nystagmus. No  JOSIE. No  Afferent  Pupillary  Defect noted. 5 CN :  Normal  Facial sensations and Corneal  Reflexes           7 CN:  Normal  Facial  Symmetry  And  Strength. No facial  Weakness. 8 CN :  Hearing  Appears within normal limits          9, 10 CN: Normal   spontaneous, reflex   palate   movements         11 CN:   Normal  Shoulder  shrug and  strength         12 CN :   Normal  Tongue movements and  Tongue  In midline                        No tongue   Fasciculations or atrophy       C) MOTOR  EXAM:                 Strength  In upper  And  Lower   extremities   within   normal limits               No  Drift. No  Atrophy               Rapid   alternating  And  repetitions  Movements  within   normal limits               Muscle  Tone  In upper  And  Lower  Extremities  normal                No rigidity. No  Spasticity. Bradykinesia   absent               No  Asterixis. Sustention  Tremor , Resting   Tremor   absent                    No   other  Abnormal  Movements noted           D) SENSORY :               Light   touch, pinprick,   position  And  Vibration  within normal limits        E) REFLEXES:                   Deep  Tendon  Reflexes   normal                  No  pathological  Reflexes  Bilaterally.                                   F) COORDINATION  AND  GAIT :                                Station and  Gait  normal                              Romberg 's test negative                          Ataxia negative          ASSESSMENT:      Patient Active Problem List   Diagnosis    [facility]) [facility] [address]    IFG (impaired fasting glucose)    Lumbar radicular pain    Chronic bilateral low back pain with right-sided sciatica  Panniculitis involving lumbar region    Lumbar facet joint syndrome    Abnormal EKG    Centrilobular emphysema (HCC)    Muscle spasm    Lumbosacral spondylosis without myelopathy    Neural foraminal stenosis of lumbar spine    Bipolar disorder (HCC)    Memory problem    Abnormal CT of the chest    Ex-smoker    Neck pain    Other complicated headache syndrome    Ventral hernia without obstruction or gangrene    Left inguinal hernia    Intractable headache    Former smoker             MRI OF THE BRAIN WITHOUT AND WITH CONTRAST  12/24/2019 10:37 am       TECHNIQUE:   Multiplanar multisequence MRI of the head/brain was performed without and   with the administration of intravenous contrast.       COMPARISON:   CT brain performed 06/11/2017.       HISTORY:   ORDERING SYSTEM PROVIDED HISTORY: Memory problem   TECHNOLOGIST PROVIDED HISTORY:   migraines   Reason for Exam: Neck pain and left shoulder pain. Migraines. Symptoms for   quite a while. Acuity: Chronic   Type of Exam: Unknown   Additional signs and symptoms: Memory problem; Intractable migraine without   status migrainosus, unspecified migraine type.       FINDINGS:   INTRACRANIAL STRUCTURES/VENTRICLES:  The sellar and suprasellar structures,   optic chiasm, corpus callosum, pineal gland, tectum, and midline brainstem   structures are unremarkable.  The craniocervical junction is unremarkable. There is no acute intracranial hemorrhage, mass effect, or midline shift. There is satisfactory overall gray-white matter differentiation.  There is   chronic microvascular disease.  The ventricular structures are symmetric and   unremarkable.  The infratentorial structures including the cerebellopontine   angles and internal auditory canals are unremarkable. There is no abnormal   restricted diffusion.  There is no abnormal blooming artifact on   susceptibility weighted imaging.  There is no abnormal postcontrast   enhancement.       ORBITS: The narrowing and no significant right foraminal narrowing.       C4-C5: There is a disc osteophyte complex with uncovertebral and facet   hypertrophy.  There is canal stenosis measuring 9 mm in AP dimension.  There   is moderate right foraminal narrowing and no significant left foraminal   narrowing.       C5-C6: There is a disc osteophyte complex with uncovertebral and facet   hypertrophy that is worse on the left.  There is no significant canal   stenosis or right foraminal narrowing.  There is severe left foraminal   narrowing.       C6-C7: There is a disc osteophyte complex with uncovertebral and facet   hypertrophy.  There is canal stenosis measuring 8 mm in AP dimension.  There   is severe bilateral foraminal narrowing.       C7-T1: There is a disc osteophyte complex with uncovertebral and facet   hypertrophy.  There is no significant canal stenosis or foraminal narrowing.           Impression   Multilevel degenerative disc disease with associated uncovertebral and facet   hypertrophy resulting in canal stenosis most pronounced at C3-4 and C6-7.       Moderate to severe left foraminal narrowing at C3-4, moderate right foraminal   narrowing at C4-5, severe left foraminal narrowing at C5-6, and severe   bilateral foraminal narrowing at C6-7.       Edema is demonstrated within the left C3-4 facets that is likely related to   degenerative facet disease although a degree of septic arthritis is a   consideration and correlation is needed. MRI OF THE LUMBAR SPINE WITHOUT CONTRAST, 6/11/2018 10:22 am       TECHNIQUE:   Multiplanar multisequence MRI of the lumbar spine was performed without the   administration of intravenous contrast.       COMPARISON:   None       HISTORY:   ORDERING SYSTEM PROVIDED HISTORY: Lumbar facet joint syndrome   TECHNOLOGIST PROVIDED HISTORY:   Reason for exam:->right lumbar radiculopathy   Ordering Physician Provided Reason for Exam:  Low back pain to the right   side, right leg pain. Symptoms for about one week. Acuity: Acute   Type of Exam: Initial   Additional signs and symptoms:  Lumbar facet syndrome; Right lumbar   radiculopathy       Initial evaluation.       FINDINGS:   BONES/ALIGNMENT: There is normal alignment of the spine. The vertebral body   heights are maintained. The bone marrow signal appears unremarkable.  There   is degenerative disc disease with disc space narrowing and osteophytes at   L3-4, L4-5, and L5-S1.       SPINAL CORD: The conus terminates normally.       SOFT TISSUES: No paraspinal mass identified. Bretta Kennett is a cyst in the left   kidney that is not fully evaluated.  Ultrasound could better evaluate.       L1-L2:  The thecal sac and neural foramina are intact.       L2-L3:  There is mild facet and ligamentum flavum hypertrophy.  The thecal   sac is not stenotic.  Disc and/or osteophytes cause minimal narrowing of the   neural foramina bilaterally.       L3-L4: The thecal sac is not stenotic.   Disc and/or osteophytes result in   mild narrowing of the neural foramina bilaterally.   There is mild facet and   ligamentum flavum hypertrophy.       L4-L5:  There is moderate facet and ligamentum flavum hypertrophy.  The   thecal sac is not stenotic.  Disc and/or osteophytes result in moderate   narrowing of the neural foramina bilaterally. The left neural foramen is   narrowed greater than right.       L5-S1:  There is mild facet and ligamentum flavum hypertrophy.  The thecal   sac is not stenotic.  The S1 nerve roots are intact. Disc and/or osteophytes   result in moderate narrowing of the neural foramina bilaterally. The right   neural foramen is narrowed greater than the left.       There is no significant change in the findings from the prior study.           Impression   Disc and osteophytes result in narrowing of the neural foramina at several   levels as discussed above.  No significant stenosis of thecal sac in the   lumbar region.      CT OF THE HEAD WITHOUT CONTRAST  6/11/2017 8:49 pm       TECHNIQUE:   CT of the head was performed without the administration of intravenous   contrast. Dose modulation, iterative reconstruction, and/or weight based   adjustment of the mA/kV was utilized to reduce the radiation dose to as low   as reasonably achievable.       COMPARISON:   None.       HISTORY:   ORDERING SYSTEM PROVIDED HISTORY: neuro symptoms/ HIV   TECHNOLOGIST PROVIDED HISTORY:   Ordering Physician Provided Reason for Exam: Passed out while coughing and   hit forehead on table today, previous loss of consciousness spells in the   last weeks.  Neuro symptoms, reaction to HIV medication. Acuity: Acute   Type of Exam: Initial   Mechanism of Injury: passed out and fell   Relevant Medical/Surgical History: n/a       FINDINGS:   BRAIN/VENTRICLES: The ventricular system is mildly prominent in size and in   the midline.  No evidence of extra-axial fluid collection, hemorrhage or mass.       ORBITS: The visualized portion of the orbits demonstrate no acute abnormality.       SINUSES: Mild bilateral ethmoid sinus mucosal thickening.  Paranasal sinuses   visualized are otherwise clear.  Mastoid regions are poorly aerated likely a   developmental basis.       SOFT TISSUES/SKULL:  No acute abnormality of the visualized skull or soft   tissues.           Impression   No acute intracranial abnormality.         CT OF THE CHEST WITH CONTRAST 7/5/2017 2:06 pm       TECHNIQUE:   CT of the chest was performed with the administration of intravenous   contrast. Multiplanar reformatted images are provided for review.  Dose   modulation, iterative reconstruction, and/or weight based adjustment of the   mA/kV was utilized to reduce the radiation dose to as low as reasonably   achievable.       COMPARISON:   Chest radiograph dated 06/27/2017       HISTORY:   ORDERING SYSTEM PROVIDED HISTORY: Pneumonia of right lower lobe due to   infectious organism   TECHNOLOGIST PROVIDED HISTORY:   Reason for exam:->pneumonia   Ordering Physician Provided Reason for Exam: Pneumonia of right lower lobe   due to infectious organism. Cough feeling 50 percent better x 2 weeks. Quit   smoking 3 years ago after smoking for 50 years. Acuity: Acute   Type of Exam: Ongoing       FINDINGS:   The nodular opacity of concern in the right lower lobe on recent chest   radiograph corresponds to benign calcified granulomas in the inferior segment   of the right middle lobe.  Calcified pulmonary granulomas are also present in   the upper lobes bilaterally.       No pulmonary infiltrate or pleural effusion is present. Wyn Pamela is   centrilobular emphysema with an upper lobe predominance and scattered   interstitial scarring.  No pleural effusion is present.       No osteolytic or osteoblastic lesion is present in the included portion of   the axial skeleton.       The heart and great vessels are normal in caliber and appearance.  No   pericardial effusion is present. Wyn Pamela is no pathologic mediastinal or hilar   lymphadenopathy.  Calcified right infrahilar lymph nodes reflect residue from   prior granulomatous infection.           Impression   1. Benign pulmonary granulomas.       2. No evidence of pneumonia.       3. Centrilobular emphysema. VISITING DIAGNOSIS:      ICD-10-CM    1. Other complicated headache syndrome  G44.59 ondansetron (ZOFRAN ODT) 4 MG disintegrating tablet   2. Ex-smoker  Z87.891    3. Abnormal CT of the chest  R93.89    4. Lumbosacral spondylosis without myelopathy  M47.817    5. Asymptomatic HIV infection ([address]  Z21    6. Muscle spasm  M62.838    7. Neck pain  M54.2    8. Bipolar affective disorder, currently manic, mild ([address]  F31.11    9. Memory problem  R41.3    10. Centrilobular emphysema (HCC)  J43.2    11. IFG (impaired fasting glucose)  R73.01    12. Lumbar facet joint syndrome  M47.816    13. Chronic bilateral low back pain with right-sided sciatica  M54.41     G89.29    14.  Chronic intractable headache, unspecified headache type  R51.9     G89.29    15. Chronic tension-type headache, intractable  G44.221 ondansetron (ZOFRAN ODT) 4 MG disintegrating tablet              CONCERNS   &   INCREASED   RISK   FOR         * TIA,  CEREBRO  VASCULAR  ISCHEMIA,      *    COMPLICATED  MIGRAINES      *   TENSION  HEADACHES      *   COGNITIVE  &   MEMORY PROBLEMS                      VARIOUS  RISK   FACTORS   WERE  REVIEWED   AND   DISCUSSED. *  PATIENT   HAS  MULTIPLE   MEDICAL, NEUROLOGICAL                        AND   MENTAL HEALTH   PROBLEMS . PATIENT'S   MANAGEMENT  IS  CHALLENGING. PLAN:                         Mihaela Singh  Of  The  Diagnoses,  The  Management & Treatment  Options            AND    Care  plan  Were          Reviewed and   Discussed   With  patient. * Goals  And  Expectations  Of  The  Therapy  Discussed   And  Reviewed. *   Benefits   And   Side  Effect  Profile  Of  Medication/s   Were   Discussed                * Need   For  Further   Follow up For  The  Various  Problems Were  discussed. * Results  Of  The  Previous  Diagnostic tests were reviewed and  discussed                 Medical  Decision  Making  Was  Made  Based on the   Complexity  Of  Above  Mentioned  Diagnoses,    Data reviewed             And    Risk  Of  Significant   Co morbidities and   complicating   Factors. Medical  Decision  Was   High     Complexity   Due   To  The  Patient's  Multiple  Symptoms,      Complex  Treatment  Regimen,  Multiple medications           and   Risk  Of   Side  Effects,  Difficulty  In  Medication  Management  And  Diagnostic  Challenges       In  View  Of  The  Associated   Co  Morbid  Conditions   And  Problems. *   BE  CAREFUL  WITH  ACTIVITIES   INCLUDING  DRIVING.         *   AVOID   NECK  AND/ BACK  STRAINING  ACTIVITIES        *   ADEQUATE   FLUIDINTAKE   AND  ELECTROLYTE  BALANCE         * KEEP  DAIRY  OF   THE NEUROLOGICAL  SYMPTOMS        RECORDING THE    DURATION  AND  FREQUENCY. *  AVOID    CONDITIONS  AND  FACTORS   THAT  MAKE                  NEUROLOGICAL  SYMPTOMS  WORSE.             *TO  MAINTAIN  REGULAR  SLEEP  WAKE  CYCLES. *   TO  HAVE  ADEQUATE  REST  AND   SLEEP    HOURS.            *    AVOID  ANY USAGE OF    TOBACCO,              EXCESSIVE  ALCOHOL  AND   ILLEGAL   SUBSTANCES        *  CONTINUE   MEDICATIONS    PRESCRIBED   BY NEUROLOGIST    AS    RECOMMENDED       *   Compliance   With  Medications   And  Instructions        * CURRENTLY    TOLERATING  THE  PRESCRIBED   MEDICATIONS. WITHOUT  ANY  SIGNIFICANT  SIDE  EFFECTS   &  GETTING BENEFIT.          *    MIGRAINE/ HEADACHE    DAIRY   WITH  MONITORING                       OF  DURATION  AND  FREQUENCY. *    Antiplatelet  therapy    As   Recommended  Was   Discussed      *    Prophylactic  Use   Of     Vitamin   B   Complex,  Folic  Acid,    Vitamin  B12    Multivitamin,       Calcium  With  magnesium  And  Vit D    Supplementations   Over  The  Counter  Discussed                    *  EVALUATIONS  AND  FOLLOW UP:                     *   OPTHALMOLOGY                                      * PAIN  MANAGEMENT                 *  HIV  CONSULTANTS                                *      CURRENTLY    PATIENT   FEELS  BETTER  WITH  HIS  MIGRAINES                                 AND   HEADACHES                                    AND  TOLERATING  THE  MEDICATIONS                                  FOR  THE  SAME.                          *      PATIENT  DENIES  ANY  NEW  NEUROLOGICAL  CONCERNS                                   Orders Placed This Encounter   Medications    omeprazole (PRILOSEC) 40 MG delayed release capsule     Sig: Take 1 capsule by mouth daily     Dispense:  90 capsule     Refill:  1    ondansetron (ZOFRAN ODT) 4 MG disintegrating tablet     Sig: Take 1 tablet by mouth every 8 hours as needed for Nausea or Vomiting Dispense:  30 tablet     Refill:  2    SUMAtriptan (IMITREX) 100 MG tablet     Sig: Take 1 tablet by mouth once as needed for Migraine (not  more  than   two maximum   per  day)     Dispense:  12 tablet     Refill:  2                 *PATIENT   TO  FOLLOW  UP  WITH   PRIMARY  CARE         OTHER  CONSULTANTS  AS  BEFORE.           *TO  FOLLOW  WITH   MENTAL  HEALTH  PROFESSIONALS ,  INCLUDING            PSYCHOLOGICAL  COUNSELING   AND  PSYCHIATRIC  EVALUTIONS,                 *  Maintain   Healthy  Life Style    With   Periodic  Monitoring  Of      Any  Medical  Conditions  Including   Elevated  Blood  Pressure,  Lipid  Profile,     Blood  Sugar levels  AndHeart  Disease. *   Period   Screening  For  Cancers  Involving  Breast,  Colon,    lungs  And  Other  Organs  As  Applicable,  In consultation   With  Your  Primary Care Providers. *Second  Neurological  Opinion  And  Evaluations  In  [facility] AND [facility]  Setting  If  Patient  Is  Interested. * Please   Contact   Neurology  Clinic   Early   If   Are  Any  New  Neurological   And  Any neurological  Concerns. *  If  The  Patient remains  Neurologically  Stable   Return   To  [facility] Neurology Department   IN    3               MONTHS  TIME   FOR  FURTHER              FOLLOW UP.                       *   The  Neurological   Findings,  Possible  Diagnosis,  Differential diagnoses                    And  Options  For    Further   Investigations                   And  management   Are  Discussed  Comprehensively. Medications   And  Prescription   Risks  And  Side effects  Are   Also  Discussed.                      *  If   There is  Any  Significant  Worsening   Of  Current  Symptoms  And  Or                  If patient  Develops   Any additional  New  NeurologicalSymptoms                  Or  Significant  Concerns   Should  Call  911 or  Go  To  Emergency  Department For  Further  Immediate  Evaluation. The   Above  Were  Reviewed  With  patient   and                       questions  Answered  In  Detail. More   Than  50% of face  To face Time   Was  Spent  On  Counseling                    And   Coordination  Of  Care   Of   Patient's  multiple   Neurological  Problems                         And   Comorbid  Medical   Conditions. Electronically signed by Tato Rivera MD.,   [facility]      Board Certified in  Neurology &  In  [facility] of Psychiatry and Neurology ([facility])      DISCLAIMER:   Although every effort was made to ensure the accuracy of this  electronictranscription, some errors in transcription may have occurred. GENERAL PATIENT INSTRUCTIONS:      A Healthy Lifestyle: Care Instructions   Your Care Instructions   A healthy lifestyle can help you feel good, stay at ahealthy weight, and have plenty of energy for both work and play. A healthy lifestyle is something you can share with your whole family.  A healthy lifestyle also can lower your risk for serious health problems, such ashigh blood pressure, heart disease, and diabetes.  You can follow a few steps listed below to improve your health and the health of your family.  Follow-up careis a key part of your treatment and safety. Be sure to make and go to all appointments, and call your doctor if you are having problems. Its also a good idea to know your test results and keep a list of the medicines you take.  How can you care for yourself at home?  Do not eat too much sugar, fat, or fast foods. You can still have dessert and treats nowand then. The goal is moderation.  Start small to improve your eating habits. Pay attention to portion sizes, drink less juice and soda pop, and eat more fruits and vegetables.  Eat a healthy amount of food.  A 3-ounce serving of meat, for example, is about the size of a deck of aileen. Estevan Endo the rest of your plate with vegetables and whole grains.  Limit theamount of soda and sports drinks you have every day. Drink more water when you are thirsty.  Eat at least 5 servings of fruits and vegetables every day. It may seem like a lot, but it is not hard to reach this goal. Aserving or helping is 1 piece of fruit, 1 cup of vegetables, or 2 cups of leafy, raw vegetables. Have an apple or some carrot sticks as an afternoon snack instead of a candy bar. Try to have fruits and/or vegetables at everymeal.   Make exercise part of your daily routine. You may want to start with simple activities, such as walking, bicycling, or slow swimming. Try kay active 30 to 60 minutes every day. You do not need to do all 30 to 60 minutes all at once. For example, you can exercise 3 times a day for 10 or 20 minutes. Moderate exercise is safe for most people, but it is always agood idea to talk to your doctor before starting an exercise program.   Keep moving. Giuseppe Ac the lawn, work in the garden, or [employer]. Take the stairs instead of the elevator at work.  If you smoke, quit. Peoplewho smoke have an increased risk for heart attack, stroke, cancer, and other lung illnesses. Quitting is hard, but there are ways to boost your chance of quitting tobacco for good.  Use nicotine gum, patches, or lozenges.  Ask your doctor about stop-smoking programs and medicines.  Keep trying.  In addition to reducing your risk of diseases in the future, you will notice some benefits soon after you stop using tobacco. If you have shortness of breath or asthma symptoms, they will likely getbetter within a few weeks after you quit.  Limit how much alcohol you drink. Moderate amounts of alcohol (up to 2 drinks a day for men, 1drink a day for women) are okay. But drinking too much can lead to liver problems, high blood pressure, and other health problems.    health   If you have a family, there are many things you can do together to improve your health.  Eat meals together as a family as often as possible.  Eat healthy foods. This includes fruits, vegetables, lean meats and dairy, and whole grains.  Include your family in your fitness plan. Most peoplethink of activities such as jogging or tennis as the way to fitness, but there are many ways you and your family can be more active. Anything that makes you breathe hard and gets your heart pumping is exercise. Here are sometips:   Walk to do errands or to take your child to school or the bus.  Go for a family bike ride after dinner instead of watching TV.  Where can you learn more?  Go toSPark!tps://[email or web address]. org and sign in to your [employer] account. Enter M544 in the Search HealthInformation box to learn more about \"A Healthy Lifestyle: Care Instructions. \"     If you do not have anaccount, please click on the \"Sign Up Now\" link.  Current as of: July 26, 2016   Content Version: 11.2   © 5934-6111 [employer]. Care instructions adapted under license by [facility] ([facility]). If you have questions about a medical condition or this instruction, always ask your healthcare professional. [employer] disclaims any warranty or liability for your use of this information.

## 2021-01-20 ENCOUNTER — TELEMEDICINE (OUTPATIENT)
Dept: PAIN MANAGEMENT | Age: 56
End: 2021-01-20
Payer: MEDICARE

## 2021-01-20 DIAGNOSIS — M47.816 LUMBAR FACET JOINT SYNDROME: Primary | ICD-10-CM

## 2021-01-20 DIAGNOSIS — M54.16 LUMBAR RADICULAR PAIN: ICD-10-CM

## 2021-01-20 DIAGNOSIS — G89.29 CHRONIC BILATERAL LOW BACK PAIN WITH RIGHT-SIDED SCIATICA: ICD-10-CM

## 2021-01-20 DIAGNOSIS — M54.41 CHRONIC BILATERAL LOW BACK PAIN WITH RIGHT-SIDED SCIATICA: ICD-10-CM

## 2021-01-20 PROCEDURE — 99214 OFFICE O/P EST MOD 30 MIN: CPT | Performed by: NURSE PRACTITIONER

## 2021-01-20 PROCEDURE — 3017F COLORECTAL CA SCREEN DOC REV: CPT | Performed by: NURSE PRACTITIONER

## 2021-01-20 PROCEDURE — G8427 DOCREV CUR MEDS BY ELIG CLIN: HCPCS | Performed by: NURSE PRACTITIONER

## 2021-01-20 PROCEDURE — 99211 OFF/OP EST MAY X REQ PHY/QHP: CPT | Performed by: NURSE PRACTITIONER

## 2021-01-20 ASSESSMENT — ENCOUNTER SYMPTOMS
CONSTIPATION: 0
RESPIRATORY NEGATIVE: 1
EYES NEGATIVE: 1
DIARRHEA: 0
BACK PAIN: 1

## 2021-01-20 NOTE — PROGRESS NOTES
2021    TELEHEALTH EVALUATION -- Audio/Visual (During LEDRY-61 public health emergency)    HPI:    Cielo Nuñez (:  1965) has requested an audio/video evaluation for the following concern(s):    Current medication regime working well, no complaints, no complaints side effects    Review of Systems   Constitutional: Positive for activity change and fatigue. HENT: Negative. Eyes: Negative. Respiratory: Negative. Cardiovascular: Negative. Gastrointestinal: Negative for constipation and diarrhea. Endocrine: Negative. Genitourinary: Negative for difficulty urinating. Musculoskeletal: Positive for arthralgias, back pain and myalgias. Skin: Negative. Psychiatric/Behavioral: Positive for sleep disturbance. Prior to Visit Medications    Medication Sig Taking? Authorizing Provider   NAPROXEN 500 MG EC tablet TAKE 1 TABLET BY MOUTH TWICE DAILY WITH MEALS AS NEEDED  Willem Godoy MD   omeprazole (PRILOSEC) 40 MG delayed release capsule Take 1 capsule by mouth daily  Willem Godoy MD   ondansetron (ZOFRAN ODT) 4 MG disintegrating tablet Take 1 tablet by mouth every 8 hours as needed for Nausea or Vomiting  Willem Godoy MD   SUMAtriptan (IMITREX) 100 MG tablet Take 1 tablet by mouth once as needed for Migraine (not  more  than   two maximum   per  day)  Willem Godoy MD   butalbital-acetaminophen-caffeine (FIORICET, ESGIC) -40 MG per tablet TAKE 1 TO 2 TABLETS BY MOUTH TWICE DAILY AS NEEDED FOR HEADACHE  Willem Godoy MD   gabapentin (NEURONTIN) 600 MG tablet TAKE 1 TABLET BY MOUTH THREE TIMES DAILY  FELICE De Santiago CNP   gabapentin (NEURONTIN) 800 MG tablet Take 1 tablet by mouth 3 times daily for 30 days.   FELICE De Santiago CNP   gabapentin (NEURONTIN) 300 MG capsule TAKE 1 CAPLET BY MOUTH THREE TIMES DAILY  FELICE De Santiago CNP   DULoxetine (CYMBALTA) 60 MG extended release capsule TAKE 2 CAPSULES BY MOUTH DAILY Patient not taking: Reported on 1/8/2021  Rich Bradford MD   Loratadine-Pseudoephedrine (CLARITIN-D 24 HOUR PO) Take by mouth daily as needed  Historical Provider, MD   DULoxetine (CYMBALTA) 30 MG extended release capsule TAKE 1 CAPSULE BY MOUTH DAILY FOR 7 DAYS  FELICE Squires CNP   DULoxetine (CYMBALTA) 60 MG extended release capsule TAKE 1 CAPSULE BY MOUTH DAILY  FELICE Squires CNP   loperamide (IMODIUM) 2 MG capsule Take 2 mg by mouth 4 times daily as needed for Diarrhea  Historical Provider, MD   ondansetron (ZOFRAN) 4 MG tablet Take 1 tablet by mouth every 6 hours as needed for Nausea or Vomiting  Patient not taking: Reported on 1/8/2021  Anai Solano DO   gabapentin (NEURONTIN) 800 MG tablet Patient is taking 2 tablet BID  Historical Provider, MD   fluticasone (FLONASE) 50 MCG/ACT nasal spray SHAKE LIQUID AND USE 1 SPRAY IN EACH NOSTRIL DAILY  Patient not taking: Reported on 1/8/2021  Ty Trevino MD   baclofen (LIORESAL) 10 MG tablet TAKE 1 TABLET BY MOUTH THREE TIMES DAILY AS NEEDED FOR SPASMS  TAMMY Portillo   SYMTUZA [phone] MG TABS TK 1 T PO  QD  Historical Provider, MD   aspirin-acetaminophen-caffeine ([place]) [id number] MG per tablet Take 1 tablet by mouth every 6 hours as needed for Headaches  Historical Provider, MD   Clotrimazole (MYCELEX) 10 MG LOZG lozenge Take 1 lozenge by mouth 5 times daily  TAMMY Dietrich   fenofibric acid (FIBRICOR) 135 MG CPDR capsule TAKE 1 CAPSULE BY MOUTH DAILY  Michaelyn Seip, MD   LORazepam (ATIVAN) 1 MG tablet Take 1 mg by mouth every 6 hours as needed for Anxiety.   Historical Provider, MD   BLACK ELDERBERRY,BERRY-FLOWER, PO Take 1 tablet by mouth daily  Historical Provider, MD   Darunavir-Cobicistat (PREZCOBIX) 800-150 MG TABS Take by mouth nightly  Historical Provider, MD   Nebulizers (COMPRESSOR/NEBULIZER) MISC As directed  Patient not taking: Reported on 1/8/2021  TAMMY Dietrich Laparoscopic Robotic Assisted Left Inguinal HERNIA REPAIR WITH MESH &VENTRAL  hernia repair performed by Fabricio Mortensen DO at [address] [place] [address] Dr Cifuentes 2017    Right L4 & L5 Transforaminal  performed by Rai Gifford MD at [address] [place] [address] Dr Cifuentes 10/3/2017    Right L4 & L5 Transforaminal performed by Rai Gifford MD at [address] [place] [address] Dr Cifuentes 2019    Right L4 L5 TRANSFORAMINAL performed by Rai Gifford MD at [address] [place] [address] AA&/STRD LUMBAR PLEXUS CONT NFS CATH Bilateral 2018    Bilat L2 L3 L4 L5 Confirmatory Medial BB performed by Rai Gifford MD at [address] [place] [address], W IMAGE GUIDE,EA ADDL LEVEL Right 2018    Right L2 L3 L4 L5 RADIOFREQUENCY performed by Rai Gifford MD at [address] [place] [address], W IMAGE GUIDE,EA ADDL LEVEL Left 3/15/2018    Left L2 L3 L4 L5 RADIOFREQUENCY performed by Rai Gifford MD at [address] [place] [address]   ,   Social History     Tobacco Use    Smoking status: Former Smoker     Packs/day: 3.00     Years: 30.00     Pack years: 90.00     Quit date: 10/7/2014     Years since quittin.2    Smokeless tobacco: Never Used   Substance Use Topics    Alcohol use: Yes     Comment: socially    Drug use: No   ,   Family History   Problem Relation Age of Onset    Cancer Mother         uterine s/p hysterectomy    Cancer Maternal Grandmother    ,   Immunization History   Administered Date(s) Administered    Influenza Vaccine, unspecified formulation 2015, 2016, 2018    Influenza Virus Vaccine 2017, 2020    Pneumococcal Conjugate 13-valent (Metcmsz12) 10/26/2016    Pneumococcal Polysaccharide ([id number]) 2017    Tdap (Boostrix, Adacel) 2018    Zoster Recombinant (Shingrix) 2018, 2018   ,   Health Maintenance   Topic Date Due Neurological:        [x] No Facial Asymmetry (Cranial nerve 7 motor function) (limited exam to video visit)          [x] No gaze palsy        [] Abnormal-         Skin:        [x] No significant exanthematous lesions or discoloration noted on facial skin         [] Abnormal-            Psychiatric:       [x] Normal Affect [x] No Hallucinations        [] Abnormal-     Other pertinent observable physical exam findings-     ASSESSMENT/PLAN:   Diagnosis Orders   1. Lumbar facet joint syndrome     2. Chronic bilateral low back pain with right-sided sciatica     3. Lumbar radicular pain       Chronic pain diagnoses such as   1. Lumbar facet joint syndrome    2. Chronic bilateral low back pain with right-sided sciatica    3. Lumbar radicular pain     controlled on current medication regime, wll continue current pain medications to improve quality of life and function. No follow-ups on file. Loiuse Marlow is a 54 y.o. male being evaluated by a Virtual Visit (video visit) encounter to address concerns as mentioned above. A caregiver was present when appropriate. Due to this being a TeleHealth encounter (During [place] [address] public [facility] emergency), evaluation of the following organ systems was limited: Vitals/Constitutional/EENT/Resp/CV/GI//MS/Neuro/Skin/Heme-Lymph-Imm. Pursuant to the emergency declaration under the [address] [place] [address] authority and the [employer] Act, this Virtual Visit was conducted with patient's (and/or legal guardian's) consent, to reduce the patient's risk of exposure to COVID-19 and provide necessary medical care. The patient (and/or legal guardian) has also been advised to contact this office for worsening conditions or problems, and seek emergency medical treatment and/or call 911 if deemed necessary.      Patient identification was verified at the start of the visit: Yes Total time spent on this encounter: 11 minutes    Services were provided through a video synchronous discussion virtually to substitute for in-person clinic visit. Patient and provider were located at their individual homes. --FELICE Adamson CNP on 1/20/2021 at 2:40 PM    An electronic signature was used to authenticate this note.

## 2021-01-26 DIAGNOSIS — R51.9 CHRONIC INTRACTABLE HEADACHE, UNSPECIFIED HEADACHE TYPE: Primary | ICD-10-CM

## 2021-01-26 DIAGNOSIS — G89.29 CHRONIC INTRACTABLE HEADACHE, UNSPECIFIED HEADACHE TYPE: Primary | ICD-10-CM

## 2021-01-26 RX ORDER — SUMATRIPTAN 100 MG/1
TABLET, FILM COATED ORAL
Qty: 12 TABLET | Refills: 2 | Status: SHIPPED | OUTPATIENT
Start: 2021-01-26 | End: 2021-04-01 | Stop reason: SDUPTHER

## 2021-02-04 RX ORDER — DULOXETIN HYDROCHLORIDE 60 MG/1
120 CAPSULE, DELAYED RELEASE ORAL DAILY
Qty: 60 CAPSULE | Refills: 3 | Status: SHIPPED | OUTPATIENT
Start: 2021-02-04 | End: 2021-06-02

## 2021-03-10 DIAGNOSIS — G44.59 OTHER COMPLICATED HEADACHE SYNDROME: ICD-10-CM

## 2021-03-10 DIAGNOSIS — G44.221 CHRONIC TENSION-TYPE HEADACHE, INTRACTABLE: ICD-10-CM

## 2021-03-10 RX ORDER — BUTALBITAL, ACETAMINOPHEN AND CAFFEINE 50; 325; 40 MG/1; MG/1; MG/1
TABLET ORAL
Qty: 60 TABLET | Refills: 0 | Status: SHIPPED | OUTPATIENT
Start: 2021-03-10 | End: 2021-04-01 | Stop reason: SDUPTHER

## 2021-03-19 DIAGNOSIS — M47.816 LUMBAR FACET JOINT SYNDROME: ICD-10-CM

## 2021-03-19 RX ORDER — GABAPENTIN 600 MG/1
TABLET ORAL
Qty: 90 TABLET | Refills: 5 | Status: SHIPPED | OUTPATIENT
Start: 2021-03-19 | End: 2021-07-02

## 2021-04-01 ENCOUNTER — OFFICE VISIT (OUTPATIENT)
Dept: NEUROLOGY | Age: 56
End: 2021-04-01
Payer: MEDICARE

## 2021-04-01 VITALS
WEIGHT: 234 LBS | BODY MASS INDEX: 32.76 KG/M2 | SYSTOLIC BLOOD PRESSURE: 116 MMHG | HEIGHT: 71 IN | DIASTOLIC BLOOD PRESSURE: 80 MMHG | HEART RATE: 104 BPM | OXYGEN SATURATION: 97 %

## 2021-04-01 DIAGNOSIS — M54.16 LUMBAR RADICULAR PAIN: ICD-10-CM

## 2021-04-01 DIAGNOSIS — R41.3 MEMORY PROBLEM: ICD-10-CM

## 2021-04-01 DIAGNOSIS — Z87.891 FORMER SMOKER: ICD-10-CM

## 2021-04-01 DIAGNOSIS — G44.59 OTHER COMPLICATED HEADACHE SYNDROME: ICD-10-CM

## 2021-04-01 DIAGNOSIS — F31.9 BIPOLAR AFFECTIVE DISORDER, REMISSION STATUS UNSPECIFIED (HCC): ICD-10-CM

## 2021-04-01 DIAGNOSIS — J43.2 CENTRILOBULAR EMPHYSEMA (HCC): ICD-10-CM

## 2021-04-01 DIAGNOSIS — R51.9 CHRONIC INTRACTABLE HEADACHE, UNSPECIFIED HEADACHE TYPE: ICD-10-CM

## 2021-04-01 DIAGNOSIS — G89.29 CHRONIC BILATERAL LOW BACK PAIN WITH RIGHT-SIDED SCIATICA: ICD-10-CM

## 2021-04-01 DIAGNOSIS — Z87.891 EX-SMOKER: ICD-10-CM

## 2021-04-01 DIAGNOSIS — F31.10 BIPOLAR AFFECTIVE DISORDER, CURRENT EPISODE MANIC, CURRENT EPISODE SEVERITY UNSPECIFIED (HCC): ICD-10-CM

## 2021-04-01 DIAGNOSIS — G89.29 CHRONIC INTRACTABLE HEADACHE, UNSPECIFIED HEADACHE TYPE: ICD-10-CM

## 2021-04-01 DIAGNOSIS — M54.2 NECK PAIN: ICD-10-CM

## 2021-04-01 DIAGNOSIS — M54.41 CHRONIC BILATERAL LOW BACK PAIN WITH RIGHT-SIDED SCIATICA: ICD-10-CM

## 2021-04-01 DIAGNOSIS — M47.816 LUMBAR FACET JOINT SYNDROME: ICD-10-CM

## 2021-04-01 DIAGNOSIS — R73.01 IFG (IMPAIRED FASTING GLUCOSE): ICD-10-CM

## 2021-04-01 DIAGNOSIS — G44.211 INTRACTABLE EPISODIC TENSION-TYPE HEADACHE: Primary | ICD-10-CM

## 2021-04-01 DIAGNOSIS — M62.838 MUSCLE SPASM: ICD-10-CM

## 2021-04-01 DIAGNOSIS — M48.061 NEURAL FORAMINAL STENOSIS OF LUMBAR SPINE: ICD-10-CM

## 2021-04-01 DIAGNOSIS — R93.89 ABNORMAL CT OF THE CHEST: ICD-10-CM

## 2021-04-01 DIAGNOSIS — Z21 ASYMPTOMATIC HIV INFECTION (HCC): ICD-10-CM

## 2021-04-01 DIAGNOSIS — G44.221 CHRONIC TENSION-TYPE HEADACHE, INTRACTABLE: ICD-10-CM

## 2021-04-01 DIAGNOSIS — M47.817 LUMBOSACRAL SPONDYLOSIS WITHOUT MYELOPATHY: ICD-10-CM

## 2021-04-01 DIAGNOSIS — F31.11 BIPOLAR AFFECTIVE DISORDER, CURRENTLY MANIC, MILD (HCC): ICD-10-CM

## 2021-04-01 PROCEDURE — 1036F TOBACCO NON-USER: CPT | Performed by: PSYCHIATRY & NEUROLOGY

## 2021-04-01 PROCEDURE — 3017F COLORECTAL CA SCREEN DOC REV: CPT | Performed by: PSYCHIATRY & NEUROLOGY

## 2021-04-01 PROCEDURE — G8427 DOCREV CUR MEDS BY ELIG CLIN: HCPCS | Performed by: PSYCHIATRY & NEUROLOGY

## 2021-04-01 PROCEDURE — 99214 OFFICE O/P EST MOD 30 MIN: CPT | Performed by: PSYCHIATRY & NEUROLOGY

## 2021-04-01 PROCEDURE — G8417 CALC BMI ABV UP PARAM F/U: HCPCS | Performed by: PSYCHIATRY & NEUROLOGY

## 2021-04-01 PROCEDURE — G8926 SPIRO NO PERF OR DOC: HCPCS | Performed by: PSYCHIATRY & NEUROLOGY

## 2021-04-01 PROCEDURE — 3023F SPIROM DOC REV: CPT | Performed by: PSYCHIATRY & NEUROLOGY

## 2021-04-01 RX ORDER — OMEPRAZOLE 40 MG/1
40 CAPSULE, DELAYED RELEASE ORAL DAILY
Qty: 90 CAPSULE | Refills: 1 | Status: SHIPPED | OUTPATIENT
Start: 2021-04-01 | End: 2021-07-09 | Stop reason: SDUPTHER

## 2021-04-01 RX ORDER — BUTALBITAL, ACETAMINOPHEN AND CAFFEINE 50; 325; 40 MG/1; MG/1; MG/1
TABLET ORAL
Qty: 60 TABLET | Refills: 2 | Status: SHIPPED | OUTPATIENT
Start: 2021-04-01 | End: 2021-07-09 | Stop reason: SDUPTHER

## 2021-04-01 RX ORDER — SUMATRIPTAN 100 MG/1
TABLET, FILM COATED ORAL
Qty: 12 TABLET | Refills: 2 | Status: SHIPPED | OUTPATIENT
Start: 2021-04-01 | End: 2021-05-24

## 2021-04-01 RX ORDER — ONDANSETRON 4 MG/1
4 TABLET, ORALLY DISINTEGRATING ORAL EVERY 8 HOURS PRN
Qty: 30 TABLET | Refills: 2 | Status: SHIPPED | OUTPATIENT
Start: 2021-04-01 | End: 2021-07-09 | Stop reason: SDUPTHER

## 2021-04-01 ASSESSMENT — ENCOUNTER SYMPTOMS
CONSTIPATION: 0
COUGH: 0
EYE PAIN: 0
WHEEZING: 0
EYE WATERING: 0
BLOOD IN STOOL: 0
VOMITING: 0
FACIAL SWEATING: 0
EYE REDNESS: 0
RHINORRHEA: 0
EYE ITCHING: 0
FACIAL SWELLING: 0
SHORTNESS OF BREATH: 0
NAUSEA: 1
ABDOMINAL PAIN: 0
EYE DISCHARGE: 0
APNEA: 0
DIARRHEA: 0
SINUS PRESSURE: 0
CHEST TIGHTNESS: 0
TROUBLE SWALLOWING: 0
SORE THROAT: 0
SCALP TENDERNESS: 0
SWOLLEN GLANDS: 0
BACK PAIN: 1
PHOTOPHOBIA: 1
VOICE CHANGE: 0
BLURRED VISION: 0
COLOR CHANGE: 0
CHOKING: 0
ABDOMINAL DISTENTION: 0
VISUAL CHANGE: 0

## 2021-04-01 NOTE — PROGRESS NOTES
[facility], [place]  Neurology  [address] [address]  JVQEW:[phone]   Fax: [phone]      SUBJECTIVE:     PATIENT ID:  Lorna Mejia is a  RIGHT     HANDED 54 y.o. male. Headache   This is a new problem. Episode onset: SINCE  OCTOBER 2019. The problem occurs constantly. The problem has been gradually worsening. The pain is located in the occipital region. The pain does not radiate. The pain quality is not similar to prior headaches. The quality of the pain is described as pulsating and throbbing. The pain is at a severity of 3/10. The pain is mild. Associated symptoms include back pain, a loss of balance, nausea, neck pain, phonophobia and photophobia. Pertinent negatives include no abdominal pain, abnormal behavior, anorexia, blurred vision, coughing, dizziness, drainage, ear pain, eye pain, eye redness, eye watering, facial sweating, fever, hearing loss, insomnia, muscle aches, numbness, rhinorrhea, scalp tenderness, seizures, sinus pressure, sore throat, swollen glands, tingling, tinnitus, visual change, vomiting, weakness or weight loss. The symptoms are aggravated by unknown. He has tried ergotamines and NSAIDs for the symptoms. The treatment provided no relief. His past medical history is significant for immunosuppression and migraine headaches. There is no history of cancer, cluster headaches, hypertension, migraines in the family, obesity, pseudotumor cerebri, recent head traumas, sinus disease or TMJ. Migraine   This is a chronic problem. Episode onset: SINCE  TEENAGE   The problem has been waxing and waning. The pain is located in the bilateral region. The pain does not radiate. The pain quality is similar to prior headaches. The quality of the pain is described as aching, throbbing and pulsating. The pain is at a severity of 3/10. The pain is mild. Associated symptoms include back pain, a loss of balance, nausea, neck pain, phonophobia and photophobia. Pertinent negatives include no abdominal pain, abnormal behavior, anorexia, blurred vision, coughing, dizziness, drainage, ear pain, eye pain, eye redness, eye watering, facial sweating, fever, hearing loss, insomnia, muscle aches, numbness, rhinorrhea, scalp tenderness, seizures, sinus pressure, sore throat, swollen glands, tingling, tinnitus, visual change, vomiting, weakness or weight loss. The symptoms are aggravated by unknown. He has tried NSAIDs and Excedrin for the symptoms. The treatment provided moderate relief. His past medical history is significant for immunosuppression and migraine headaches. There is no history of cancer, cluster headaches, hypertension, migraines in the family, obesity, pseudotumor cerebri, recent head traumas, sinus disease or TMJ. History obtained from  The patient   AND  HIS  MALE  FRIEND       and other  available   medical  records   were  Also  reviewed. The  Duration,  Quality,  Severity,  Location,  Timing,  Context,  Modifying  Factors   Of   The   Chief   Complaint   And  Present  Illness       Was   Reviewed   In   Chronological   Manner. PATIENT'S  MAIN  CONCERNS INCLUDE :                       1)     H /O    CHRONIC   MIGRAINES    SINCE  TEENAGE                            2)     PREVIOUS     H/O   BAD  MIGRAINE       8   YEARS   AGO                           3)     H/O     HEADACHE   IN  [facility]   AREA        SINCE   October 2019                                 PATIENT  TRIED     EXCEDRIN  MIGRAINE,    ALEVE                                                WHICH  DID  NOT  HELP. 4)     H/O    NECK PAIN       WITH  RADIATION  TO     THE  LEFT    SIDE                                         WORSE     SINCE    6  MONTHS                              5)     NO    PRECIPITATING   FACTORS. NO   FEVER.      NO  TRAUMA                            6)     H/O CHRONIC    LUMBAR  PAIN   AND  RADICULOPATHY                                       BEING  FOLLOWED  BY  PAIN  MANAGEMENT                           -   ON  NEURONTIN,   BACLOFEN ,   CYMBALTA                                  7)       H/O   CHRONIC  ANXIETY,    BIPOLAR  DISORDER                                      -  ON  INVEGA, ,     ATIVAN     PRN                            8)     H/O     HIV  INFECTION       ASYMPTOMATIC                                    - ON  MEDICATION          FROM    Critical access hospital     PROVIDERS                        9)     NO   H/O   FALLING                           10)      H/O    CHRONIC    MILD     MEMORY  PROBLEMS                                         -    NEEDS  MONITORING                      11)   H/O   EX  SMOKER                            H/O   ABNORMAL   CT   CHEST     IN    2017                              TO  FOLLOW  WITH   HIS  PCP                                         12)    HAD  NEURO  DIAGNOSTIC  EVALUATIONS  IN NOV./ DEC.  2019                              LABS  WORK  UP    DID  NOT    SHOW  ANY  SIGNIFICANT  ABNORMALITIES                             MRI  BRAIN      SHOWED  CHRONIC  CEREBRAL ISCHEMIA                                     MRI  CERVICAL SPINE       SHOWED    MULTI LEVEL  DISC  DISEASES                       13)    PATIENT'S     MIGRAINES   AND   HEADACHES                                      -       IMPROVED     AND      STABLE                                 AND  TOLERATING  THE  MEDICATIONS                                                14)      H/O     OCCIPITAL  HEADACHES     IN    SUMMER   2020    DUE  TO  HEAT                                       -     RESOLVED                              15)     H/O   INTOLERANCE      WITH  NAPROSYN      DUE  TO  GI  SIDE EFFECTS                              PATIENT   TO   AVOID     NSAID                                                   16)        PATIENT  DENIES  ANY  NEW  NEUROLOGICAL  CONCERNS REQUESTS     REFILLS  FOR  HIS  MEDICATIONS                        17)        VARIOUS  RISK   FACTORS   WERE  REVIEWED   AND   DISCUSSED. PATIENT   HAS  MULTIPLE   MEDICAL, NEUROLOGICAL                        AND   MENTAL HEALTH   PROBLEMS . PATIENT'S   MANAGEMENT  IS  CHALLENGING.                                     PRECIPITATING  FACTORS: including  fever/infection, exertion/relaxation, position change, stress, weather change,                        medications/alcohol, time of day/darkness/light  Are  absent                                                              MODIFYING  FACTORS:  fever/infection, exertion/relaxation, position change, stress, weather change,                        medications/alcohol, time of day/darkness/light  Are  absent             Patient   Indicates   The  Presence   And  The  Absence  Of  The  Following    Associated  And   Additional  Neurological    Symptoms:                                Balance  And coordination   problems  absent           Gait problems     absent            Headaches      present              Migraines           present           Memory problemspresent              Confusion        absent            Paresthesia   numbness          absent           Seizures  And  Starring  Episodes           absent           Syncope,  Near  syncopal episodes         absent           Speech   problems           absent             Swallowing   Problems      absent            Dizziness,  Light headedness           absent              Vertigo        absent             Generalized   Weakness    absent              focal  Weakness     absent             Tremors         absent              Sleep  Problems     absent             History  Of   Recent  Head  Injury     absent             History  Of   Recent  TIA     absent             History  Of   Recent    Stroke     absent             Neck  Pain   and   Neck muscle  Spasms present               Radiating  down   And   Weakness           present            Lower back   Pain  And     Spasms  present              Radiating    Down   And   Weakness          present                H/OFALLS        absent               History  Of   Visual  Symptoms    absent                  Associated   Diplopia       absent                                               Also   Additional   Symptoms   Present    As  Documented    In   The   detailed                  Review  Of  Systems   And    Please   Refer   To    Them for   Additional    Information. Any components  That are either  Unobtainable  Or  Limited  In   HPI, ROS  And/or PFSH   Are                   Due   ToPatient's  Medical  Problems,  Clinical  Condition   and/or lack of                                other    Alternate   resources. RECORDS   REVIEWED:    historical medical records       INFORMATION   REVIEWED:     MEDICAL   HISTORY,SURGICAL   HISTORY,     MEDICATIONS   LIST,   ALLERGIES AND  DRUG  INTOLERANCES,       FAMILY   HISTORY,  SOCIAL  HISTORY,      PROBLEM  LIST   FOR  PATIENT  CARE   COORDINATION      Past Medical History:   Diagnosis Date    Bipolar disorder ([facility]     follows with Dr. Beatrice Rand Centrilobular emphysema ([facility]     HIV (human immunodeficiency virus infection) ([facility] 2001    followed by Prakash Arrieta (Infectious Disease at [address] [place] [address])    Pneumonia 06/11/2017         Past Surgical History:   Procedure Laterality Date    ANESTHESIA NERVE BLOCK Bilateral 1/12/2018    Bilat L2 L3 L4 L5 Diagnostic Medial BB performed by Ivonne Falcon MD at [address] [place] [address]  01/05/2016    Normal colon. Hemorrhoids.  Dr James Sheldon  2015    full dental extraction    HERNIA REPAIR Left 3/2/2020    Laparoscopic Robotic Assisted Left Inguinal HERNIA REPAIR WITH MESH &VENTRAL  hernia repair performed by Tyesha Dhillon DO at [address] [place] [address] Dr Cifuentes 9/26/2017    Right L4 & L5 Transforaminal  performed by Sabiha Bowman MD at [address] [place] [address] Right 10/3/2017    Right L4 & L5 Transforaminal performed by Sabiha Bowman MD at [address] [place] [address] Right 1/8/2019    Right L4 L5 TRANSFORAMINAL performed by Sabiha Bowman MD at [address] [place] [address] AA&/STRD LUMBAR PLEXUS CONT NFS CATH Bilateral 1/19/2018    Bilat L2 L3 L4 L5 Confirmatory Medial BB performed by Sabiha Bowman MD at [address] [place] [address], W IMAGE GUIDE,EA ADDL LEVEL Right 1/25/2018    Right L2 L3 L4 L5 RADIOFREQUENCY performed by Sabiha Bowman MD at [address] [place] [address] 1, W IMAGE GUIDE,EA ADDL LEVEL Left 3/15/2018    Left L2 L3 L4 L5 RADIOFREQUENCY performed by Sabiha Bowman MD at [address] [place] [address]         Current Outpatient Medications   Medication Sig Dispense Refill    gabapentin (NEURONTIN) 600 MG tablet TAKE 1 TABLET BY MOUTH THREE TIMES DAILY 90 tablet 5    butalbital-acetaminophen-caffeine (FIORICET, ESGIC) -40 MG per tablet TAKE 1 TO 2 TABLETS BY MOUTH TWICE DAILY AS NEEDED FOR HEADACHE 60 tablet 0    DULoxetine (CYMBALTA) 60 MG extended release capsule TAKE 2 CAPSULES BY MOUTH DAILY 60 capsule 3    SUMAtriptan (IMITREX) 100 MG tablet TAKE 1 TABLET BY MOUTH ONCE AS NEEDED FOR MIGRAINE.(NOT MORE THAN 2 TABLETS MAXIMUM PER DAY) 12 tablet 2    NAPROXEN 500 MG EC tablet TAKE 1 TABLET BY MOUTH TWICE DAILY WITH MEALS AS NEEDED 60 tablet 3    omeprazole (PRILOSEC) 40 MG delayed release capsule Take 1 capsule by mouth daily 90 capsule 1    ondansetron (ZOFRAN ODT) 4 MG disintegrating tablet Take 1 tablet by mouth every 8 hours as needed for Nausea or Vomiting 30 tablet 2    Loratadine-Pseudoephedrine (CLARITIN-D 24 HOUR PO) Take by mouth daily as needed      DULoxetine (CYMBALTA) 60 MG extended release capsule TAKE 1 CAPSULE BY MOUTH DAILY 30 capsule 3    loperamide (IMODIUM) 2 MG capsule Take 2 mg by mouth 4 times daily as needed for Diarrhea      gabapentin (NEURONTIN) 800 MG tablet Patient is taking 2 tablet BID      baclofen (LIORESAL) 10 MG tablet TAKE 1 TABLET BY MOUTH THREE TIMES DAILY AS NEEDED FOR SPASMS 270 tablet 0    SYMTUZA 017-108-293-10 MG TABS TK 1 T PO  QD  6    aspirin-acetaminophen-caffeine (EXCEDRIN MIGRAINE) 250-250-65 MG per tablet Take 1 tablet by mouth every 6 hours as needed for Headaches      Clotrimazole (MYCELEX) 10 MG LOZG lozenge Take 1 lozenge by mouth 5 times daily 70 lozenge 1    fenofibric acid (FIBRICOR) 135 MG CPDR capsule TAKE 1 CAPSULE BY MOUTH DAILY 90 capsule 0    LORazepam (ATIVAN) 1 MG tablet Take 1 mg by mouth every 6 hours as needed for Anxiety.  BLACK ELDERBERRY,BERRY-FLOWER, PO Take 1 tablet by mouth daily      Darunavir-Cobicistat (PREZCOBIX) 800-150 MG TABS Take by mouth nightly      albuterol (PROVENTIL) (2.5 MG/3ML) 0.083% nebulizer solution Take 2.5 mg by nebulization every 6 hours as needed for Wheezing      TURMERIC PO Take by mouth three times daily      Glucosamine HCl (GLUCOSAMINE PO) Take by mouth daily      vitamin D 1000 UNITS CAPS Take by mouth daily      GARLIC PO Take by mouth daily      Multiple Vitamins-Minerals (MULTIVITAMIN PO) Take by mouth daily      INVEGA 6 MG extended release tablet Take 6 mg by mouth daily      DESCOVY 200-25 MG TABS Take 1 tablet by mouth daily      MYTESI 125 MG TBEC Take 125 mg by mouth      gabapentin (NEURONTIN) 800 MG tablet Take 1 tablet by mouth 3 times daily for 30 days.  90 tablet 5    gabapentin (NEURONTIN) 300 MG capsule TAKE 1 CAPLET BY MOUTH THREE TIMES DAILY 270 capsule 2    DULoxetine (CYMBALTA) 30 MG extended release capsule TAKE 1 CAPSULE BY MOUTH DAILY FOR 7 DAYS 7 capsule 0    ondansetron (ZOFRAN) 4 MG tablet Take 1 tablet by mouth every 6 hours as needed for Nausea or Vomiting (Patient not taking: Reported on 1/8/2021) 20 tablet 0    fluticasone (FLONASE) 50 MCG/ACT nasal spray SHAKE LIQUID AND USE 1 on file     Physically abused: Not on file     Forced sexual activity: Not on file   Other Topics Concern    Not on file   Social History Narrative    Not on file       There were no vitals filed for this visit. Wt Readings from Last 3 Encounters:   04/01/21 234 lb (106.1 kg)   01/08/21 221 lb 12.8 oz (100.6 kg)   09/23/20 213 lb (96.6 kg)         BP Readings from Last 3 Encounters:   01/08/21 108/72   09/23/20 122/84   07/07/20 (!) 142/80       Hematology and Coagulation  Lab Results   Component Value Date    WBC 5.4 11/22/2019    RBC 4.57 11/22/2019    HGB 14.8 11/22/2019    HCT 44.4 11/22/2019    MCV 97.1 11/22/2019    MCH 32.3 11/22/2019    MCHC 33.3 11/22/2019    RDW 14.8 11/22/2019     11/22/2019    MPV 7.1 11/22/2019       Chemistries  Lab Results   Component Value Date     11/22/2019    K 3.9 11/22/2019     11/22/2019    CO2 28 11/22/2019    BUN 14 11/22/2019    CREATININE 0.82 11/22/2019    CALCIUM 9.6 11/22/2019    PROT 7.0 11/22/2019    LABALBU 4.3 11/22/2019    BILITOT 0.21 11/22/2019    ALKPHOS 68 11/22/2019    AST 17 11/22/2019    ALT 16 11/22/2019     Lab Results   Component Value Date    ALKPHOS 68 11/22/2019    ALT 16 11/22/2019    AST 17 11/22/2019    PROT 7.0 11/22/2019    BILITOT 0.21 11/22/2019    BILIDIR <0.08 06/11/2017    LABALBU 4.3 11/22/2019     Lab Results   Component Value Date    BUN 14 11/22/2019    CREATININE 0.82 11/22/2019     Lab Results   Component Value Date    CALCIUM 9.6 11/22/2019     Lab Results   Component Value Date    AST 17 11/22/2019    ALT 16 11/22/2019             Review of Systems   Constitutional: Negative for appetite change, chills, fatigue, fever, unexpected weight change and weight loss. HENT: Negative for congestion, dental problem, drooling, ear discharge, ear pain, facial swelling, hearing loss, mouth sores, nosebleeds, postnasal drip, rhinorrhea, sinus pressure, sore throat, tinnitus, trouble swallowing and voice change.     Eyes: Positive for photophobia. Negative for blurred vision, pain, discharge, redness, itching and visual disturbance. Respiratory: Negative for apnea, cough, choking, chest tightness, shortness of breath and wheezing. Cardiovascular: Negative for chest pain, palpitations and leg swelling. Gastrointestinal: Positive for nausea. Negative for abdominal distention, abdominal pain, anorexia, blood in stool, constipation, diarrhea and vomiting. Endocrine: Negative for cold intolerance, heat intolerance, polydipsia, polyphagia and polyuria. Musculoskeletal: Positive for arthralgias, back pain and neck pain. Negative for gait problem, joint swelling, myalgias and neck stiffness. Skin: Negative for color change, pallor, rash and wound. Allergic/Immunologic: Negative for environmental allergies, food allergies and immunocompromised state. Neurological: Positive for headaches and loss of balance. Negative for dizziness, tingling, tremors, seizures, syncope, facial asymmetry, speech difficulty, weakness, light-headedness and numbness. Hematological: Negative for adenopathy. Does not bruise/bleed easily. Psychiatric/Behavioral: Positive for decreased concentration. Negative for agitation, behavioral problems, confusion, dysphoric mood, hallucinations, self-injury, sleep disturbance and suicidal ideas. The patient is nervous/anxious. The patient does not have insomnia and is not hyperactive. OBJECTIVE:    Physical Exam  Constitutional:       Appearance: He is well-developed. HENT:      Head: Normocephalic and atraumatic. No raccoon eyes or Oviedo's sign. Right Ear: External ear normal.      Left Ear: External ear normal.      Nose: Nose normal.   Eyes:      Conjunctiva/sclera: Conjunctivae normal.      Pupils: Pupils are equal, round, and reactive to light. Neck:      Musculoskeletal: Normal range of motion and neck supple. Normal range of motion. No neck rigidity or muscular tenderness. Thyroid: No thyroid mass or thyromegaly. Vascular: No carotid bruit. Trachea: No tracheal deviation. Meningeal: Brudzinski's sign and Kernig's sign absent. Cardiovascular:      Rate and Rhythm: Normal rate and regular rhythm. Pulmonary:      Effort: Pulmonary effort is normal.   Musculoskeletal:         General: No tenderness. Skin:     General: Skin is warm. Coloration: Skin is not pale. Findings: No erythema or rash. Nails: There is no clubbing. Psychiatric:         Attention and Perception: He is attentive. Mood and Affect: Mood is anxious. Mood is not depressed. Affect is not labile, blunt or inappropriate. Speech: He is communicative. Speech is not rapid and pressured, delayed, slurred or tangential.         Behavior: Behavior is not agitated, slowed, aggressive, withdrawn, hyperactive or combative. Behavior is cooperative. Thought Content: Thought content is not paranoid or delusional. Thought content does not include homicidal or suicidal ideation. Thought content does not include homicidal or suicidal plan. Cognition and Memory: Cognition is impaired. Memory is impaired. He does not exhibit impaired remote memory. Judgment: Judgment is not impulsive or inappropriate. NEUROLOGICALEXAMINATION :       A) MENTAL STATUS:                   Alert and  oriented  To time, place  And  Person. No Aphasia. No  Dysarthria. Able   To  Follow    SIMPLE      commands   without   Any  Difficulty. No right  To left confusion. Normal  Speech  And language function.                  Insight and  Judgment ,Fund  Of  Knowledge   within normal limits                Recent  And  Remote memory    DECREASED                Attention &  Concentration are     DECREASED                                             B) CRANIAL NERVES :      CN : Visual  Acuity  And  Visual fields within normal limits               Fundi  Could  Not  Be  Could  Not  Be  Evaluated. 3,4,6 CN : Both  Pupils are   Reactive and  Equal.  Movements  Are  Intact. No  Nystagmus. No  JOSEI. No  Afferent  Pupillary  Defect noted. 5 CN :  Normal  Facial sensations and Corneal  Reflexes           7 CN:  Normal  Facial  Symmetry  And  Strength. No facial  Weakness. 8 CN :  Hearing  Appears within normal limits          9, 10 CN: Normal   spontaneous, reflex   palate   movements         11 CN:   Normal  Shoulder  shrug and  strength         12 CN :   Normal  Tongue movements and  Tongue  In midline                        No tongue   Fasciculations or atrophy       C) MOTOR  EXAM:                 Strength  In upper  And  Lower   extremities   within   normal limits               No  Drift. No  Atrophy               Rapid   alternating  And  repetitions  Movements  within   normal limits               Muscle  Tone  In upper  And  Lower  Extremities  normal                No rigidity. No  Spasticity. Bradykinesia   absent               No  Asterixis. Sustention  Tremor , Resting   Tremor   absent                    No   other  Abnormal  Movements noted           D) SENSORY :               Light   touch, pinprick,   position  And  Vibration  within normal limits        E) REFLEXES:                   Deep  Tendon  Reflexes   normal                  No  pathological  Reflexes  Bilaterally.                                   F) COORDINATION  AND  GAIT :                                Station and  Gait  normal                              Romberg 's test negative                          Ataxia negative          ASSESSMENT:      Patient Active Problem List   Diagnosis    HIV [facility] virus infection) ([facility]    IFG (impaired fasting glucose)    Lumbar radicular pain    Chronic bilateral low back pain with right-sided sciatica  Panniculitis involving lumbar region    Lumbar facet joint syndrome    Abnormal EKG    Centrilobular emphysema (HCC)    Muscle spasm    Lumbosacral spondylosis without myelopathy    Neural foraminal stenosis of lumbar spine    Bipolar disorder (HCC)    Memory problem    Abnormal CT of the chest    Ex-smoker    Neck pain    Other complicated headache syndrome    Ventral hernia without obstruction or gangrene    Left inguinal hernia    Intractable headache    Former smoker             MRI OF THE BRAIN WITHOUT AND WITH CONTRAST  12/24/2019 10:37 am       TECHNIQUE:   Multiplanar multisequence MRI of the head/brain was performed without and   with the administration of intravenous contrast.       COMPARISON:   CT brain performed 06/11/2017.       HISTORY:   ORDERING SYSTEM PROVIDED HISTORY: Memory problem   TECHNOLOGIST PROVIDED HISTORY:   migraines   Reason for Exam: Neck pain and left shoulder pain. Migraines. Symptoms for   quite a while. Acuity: Chronic   Type of Exam: Unknown   Additional signs and symptoms: Memory problem; Intractable migraine without   status migrainosus, unspecified migraine type.       FINDINGS:   INTRACRANIAL STRUCTURES/VENTRICLES:  The sellar and suprasellar structures,   optic chiasm, corpus callosum, pineal gland, tectum, and midline brainstem   structures are unremarkable.  The craniocervical junction is unremarkable. There is no acute intracranial hemorrhage, mass effect, or midline shift. There is satisfactory overall gray-white matter differentiation.  There is   chronic microvascular disease.  The ventricular structures are symmetric and   unremarkable.  The infratentorial structures including the cerebellopontine   angles and internal auditory canals are unremarkable. There is no abnormal   restricted diffusion.  There is no abnormal blooming artifact on   susceptibility weighted imaging.  There is no abnormal postcontrast   enhancement.       ORBITS: The narrowing and no significant right foraminal narrowing.       C4-C5: There is a disc osteophyte complex with uncovertebral and facet   hypertrophy.  There is canal stenosis measuring 9 mm in AP dimension.  There   is moderate right foraminal narrowing and no significant left foraminal   narrowing.       C5-C6: There is a disc osteophyte complex with uncovertebral and facet   hypertrophy that is worse on the left.  There is no significant canal   stenosis or right foraminal narrowing.  There is severe left foraminal   narrowing.       C6-C7: There is a disc osteophyte complex with uncovertebral and facet   hypertrophy.  There is canal stenosis measuring 8 mm in AP dimension.  There   is severe bilateral foraminal narrowing.       C7-T1: There is a disc osteophyte complex with uncovertebral and facet   hypertrophy.  There is no significant canal stenosis or foraminal narrowing.           Impression   Multilevel degenerative disc disease with associated uncovertebral and facet   hypertrophy resulting in canal stenosis most pronounced at C3-4 and C6-7.       Moderate to severe left foraminal narrowing at C3-4, moderate right foraminal   narrowing at C4-5, severe left foraminal narrowing at C5-6, and severe   bilateral foraminal narrowing at C6-7.       Edema is demonstrated within the left C3-4 facets that is likely related to   degenerative facet disease although a degree of septic arthritis is a   consideration and correlation is needed.          MRI OF THE LUMBAR SPINE WITHOUT CONTRAST, 6/11/2018 10:22 am       TECHNIQUE:   Multiplanar multisequence MRI of the lumbar spine was performed without the   administration of intravenous contrast.       COMPARISON:   None       HISTORY:   ORDERING SYSTEM PROVIDED HISTORY: Lumbar facet joint syndrome   TECHNOLOGIST PROVIDED HISTORY:   Reason for exam:->right lumbar radiculopathy   Ordering Physician Provided Reason for Exam:  Low back pain to the right   side, right leg pain. Symptoms for about one week. Acuity: Acute   Type of Exam: Initial   Additional signs and symptoms:  Lumbar facet syndrome; Right lumbar   radiculopathy       Initial evaluation.       FINDINGS:   BONES/ALIGNMENT: There is normal alignment of the spine. The vertebral body   heights are maintained. The bone marrow signal appears unremarkable.  There   is degenerative disc disease with disc space narrowing and osteophytes at   L3-4, L4-5, and L5-S1.       SPINAL CORD: The conus terminates normally.       SOFT TISSUES: No paraspinal mass identified. Lodema Ware is a cyst in the left   kidney that is not fully evaluated.  Ultrasound could better evaluate.       L1-L2:  The thecal sac and neural foramina are intact.       L2-L3:  There is mild facet and ligamentum flavum hypertrophy.  The thecal   sac is not stenotic.  Disc and/or osteophytes cause minimal narrowing of the   neural foramina bilaterally.       L3-L4: The thecal sac is not stenotic.   Disc and/or osteophytes result in   mild narrowing of the neural foramina bilaterally.   There is mild facet and   ligamentum flavum hypertrophy.       L4-L5:  There is moderate facet and ligamentum flavum hypertrophy.  The   thecal sac is not stenotic.  Disc and/or osteophytes result in moderate   narrowing of the neural foramina bilaterally. The left neural foramen is   narrowed greater than right.       L5-S1:  There is mild facet and ligamentum flavum hypertrophy.  The thecal   sac is not stenotic.  The S1 nerve roots are intact. Disc and/or osteophytes   result in moderate narrowing of the neural foramina bilaterally. The right   neural foramen is narrowed greater than the left.       There is no significant change in the findings from the prior study.           Impression   Disc and osteophytes result in narrowing of the neural foramina at several   levels as discussed above.  No significant stenosis of thecal sac in the   lumbar region.      CT OF THE HEAD WITHOUT CONTRAST  6/11/2017 8:49 pm       TECHNIQUE:   CT of the head was performed without the administration of intravenous   contrast. Dose modulation, iterative reconstruction, and/or weight based   adjustment of the mA/kV was utilized to reduce the radiation dose to as low   as reasonably achievable.       COMPARISON:   None.       HISTORY:   ORDERING SYSTEM PROVIDED HISTORY: neuro symptoms/ HIV   TECHNOLOGIST PROVIDED HISTORY:   Ordering Physician Provided Reason for Exam: Passed out while coughing and   hit forehead on table today, previous loss of consciousness spells in the   last weeks.  Neuro symptoms, reaction to HIV medication. Acuity: Acute   Type of Exam: Initial   Mechanism of Injury: passed out and fell   Relevant Medical/Surgical History: n/a       FINDINGS:   BRAIN/VENTRICLES: The ventricular system is mildly prominent in size and in   the midline.  No evidence of extra-axial fluid collection, hemorrhage or mass.       ORBITS: The visualized portion of the orbits demonstrate no acute abnormality.       SINUSES: Mild bilateral ethmoid sinus mucosal thickening.  Paranasal sinuses   visualized are otherwise clear.  Mastoid regions are poorly aerated likely a   developmental basis.       SOFT TISSUES/SKULL:  No acute abnormality of the visualized skull or soft   tissues.           Impression   No acute intracranial abnormality.         CT OF THE CHEST WITH CONTRAST 7/5/2017 2:06 pm       TECHNIQUE:   CT of the chest was performed with the administration of intravenous   contrast. Multiplanar reformatted images are provided for review.  Dose   modulation, iterative reconstruction, and/or weight based adjustment of the   mA/kV was utilized to reduce the radiation dose to as low as reasonably   achievable.       COMPARISON:   Chest radiograph dated 06/27/2017       HISTORY:   ORDERING SYSTEM PROVIDED HISTORY: Pneumonia of right lower lobe due to   infectious organism   TECHNOLOGIST PROVIDED HISTORY:   Reason for exam:->pneumonia   Ordering Physician Provided Reason for Exam: Pneumonia of right lower lobe   due to infectious organism. Cough feeling 50 percent better x 2 weeks. Quit   smoking 3 years ago after smoking for 50 years. Acuity: Acute   Type of Exam: Ongoing       FINDINGS:   The nodular opacity of concern in the right lower lobe on recent chest   radiograph corresponds to benign calcified granulomas in the inferior segment   of the right middle lobe.  Calcified pulmonary granulomas are also present in   the upper lobes bilaterally.       No pulmonary infiltrate or pleural effusion is present. Ceil Willmar is   centrilobular emphysema with an upper lobe predominance and scattered   interstitial scarring.  No pleural effusion is present.       No osteolytic or osteoblastic lesion is present in the included portion of   the axial skeleton.       The heart and great vessels are normal in caliber and appearance.  No   pericardial effusion is present. Ceil Cristel is no pathologic mediastinal or hilar   lymphadenopathy.  Calcified right infrahilar lymph nodes reflect residue from   prior granulomatous infection.           Impression   1. Benign pulmonary granulomas.       2. No evidence of pneumonia.       3. Centrilobular emphysema. VISITING DIAGNOSIS:      ICD-10-CM    1. Intractable episodic tension-type headache  G44.211    2. Abnormal CT of the chest  R93.89    3. Lumbar radicular pain  M54.16    4. Ex-smoker  Z87.891    5. Neural foraminal stenosis of lumbar spine  M99.73    6. Asymptomatic HIV infection ([address]  Z21    7. Bipolar affective disorder, current episode manic, current episode severity unspecified ([address]  F31.10    8. Former smoker  Z87.891    5. Bipolar affective disorder, remission status unspecified ([address]  F31.9    10. Chronic bilateral low back pain with right-sided sciatica  M54.41     G89.29    11. Memory problem  R41.3    12. Bipolar affective disorder, currently manic, mild ([address]  F31.11    13. Neck pain  M54.2    14. IFG (impaired fasting glucose)  R73.01    15. Centrilobular emphysema (HCC)  J43.2    16. Muscle spasm  M62.838    17. Other complicated headache syndrome  G44.59    18. Chronic tension-type headache, intractable  G44.221    19. Lumbosacral spondylosis without myelopathy  M47.817    20. Lumbar facet joint syndrome  M47.816               CONCERNS   &   INCREASED   RISK   FOR         * TIA,  CEREBRO  VASCULAR  ISCHEMIA,      *    COMPLICATED  MIGRAINES      *   TENSION  HEADACHES      *   COGNITIVE  &   MEMORY PROBLEMS                      VARIOUS  RISK   FACTORS   WERE  REVIEWED   AND   DISCUSSED. *  PATIENT   HAS  MULTIPLE   MEDICAL, NEUROLOGICAL                        AND   MENTAL HEALTH   PROBLEMS . PATIENT'S   MANAGEMENT  IS  CHALLENGING. PLAN:                         Antonietta Greene  Of  The  Diagnoses,  The  Management & Treatment  Options            AND    Care  plan  Were          Reviewed and   Discussed   With  patient. * Goals  And  Expectations  Of  The  Therapy  Discussed   And  Reviewed. *   Benefits   And   Side  Effect  Profile  Of  Medication/s   Were   Discussed                * Need   For  Further   Follow up For  The  Various  Problems Were  discussed. * Results  Of  The  Previous  Diagnostic tests were reviewed and  discussed                 Medical  Decision  Making  Was  Made  Based on the   Complexity  Of  Above  Mentioned  Diagnoses,    Data reviewed             And    Risk  Of  Significant   Co morbidities and   complicating   Factors. Medical  Decision  Was   High     Complexity   Due   To  The  Patient's  Multiple  Symptoms,      Complex  Treatment  Regimen,  Multiple medications           and   Risk  Of   Side  Effects,  Difficulty  In  Medication  Management  And  Diagnostic  Challenges       In  View  Of  The  Associated   Co  Morbid  Conditions   And  Problems.                 *   BE  CAREFUL  WITH ACTIVITIES   INCLUDING  DRIVING. *   AVOID   NECK  AND/ BACK  STRAINING  ACTIVITIES        *   ADEQUATE   FLUIDINTAKE   AND  ELECTROLYTE  BALANCE         * KEEP  DAIRY  OF   THE  NEUROLOGICAL  SYMPTOMS        RECORDING THE    DURATION  AND  FREQUENCY. *  AVOID    CONDITIONS  AND  FACTORS   THAT  MAKE                  NEUROLOGICAL  SYMPTOMS  WORSE.             *TO  MAINTAIN  REGULAR  SLEEP  WAKE  CYCLES. *   TO  HAVE  ADEQUATE  REST  AND   SLEEP    HOURS.            *    AVOID  ANY USAGE OF    TOBACCO,              EXCESSIVE  ALCOHOL  AND   ILLEGAL   SUBSTANCES        *  CONTINUE   MEDICATIONS    PRESCRIBED   BY NEUROLOGIST    AS    RECOMMENDED       *   Compliance   With  Medications   And  Instructions        * CURRENTLY    TOLERATING  THE  PRESCRIBED   MEDICATIONS. WITHOUT  ANY  SIGNIFICANT  SIDE  EFFECTS   &  GETTING BENEFIT.          *    MIGRAINE/ HEADACHE    DAIRY   WITH  MONITORING                       OF  DURATION  AND  FREQUENCY. *    Antiplatelet  therapy    As   Recommended  Was   Discussed      *    Prophylactic  Use   Of     Vitamin   B   Complex,  Folic  Acid,    Vitamin  B12    Multivitamin,       Calcium  With  magnesium  And  Vit D    Supplementations   Over  The  Counter  Discussed                    *  EVALUATIONS  AND  FOLLOW UP:                     *   OPTHALMOLOGY                                      * PAIN  MANAGEMENT                 *  HIV  CONSULTANTS                                *      CURRENTLY    PATIENT   FEELS  BETTER  WITH  HIS  MIGRAINES                                 AND   HEADACHES                                    AND  TOLERATING  THE  MEDICATIONS                                  FOR  THE  SAME.                          *      PATIENT  DENIES  ANY  NEW  NEUROLOGICAL  CONCERNS                             Controlled Substances Monitoring: Periodic Controlled Substance Monitoring: Possible medication side effects, risk of tolerance/dependence & alternative treatments discussed. , Assessed functional status. Karson Mayer MD)              Orders Placed This Encounter   Medications    butalbital-acetaminophen-caffeine (FIORICET, ESGIC) -40 MG per tablet     Sig: TAKE 1 TO 2 TABLETS BY MOUTH TWICE DAILY AS NEEDED FOR HEADACHE     Dispense:  60 tablet     Refill:  2    SUMAtriptan (IMITREX) 100 MG tablet     Sig: TAKE 1 TABLET BY MOUTH ONCE AS NEEDED FOR MIGRAINE.(NOT MORE THAN 2 TABLETS MAXIMUM PER DAY)     Dispense:  12 tablet     Refill:  2    omeprazole (PRILOSEC) 40 MG delayed release capsule     Sig: Take 1 capsule by mouth daily     Dispense:  90 capsule     Refill:  1    ondansetron (ZOFRAN ODT) 4 MG disintegrating tablet     Sig: Take 1 tablet by mouth every 8 hours as needed for Nausea or Vomiting     Dispense:  30 tablet     Refill:  2                 *PATIENT   TO  FOLLOW  UP  WITH   PRIMARY  CARE         OTHER  CONSULTANTS  AS  BEFORE.           *TO  FOLLOW  WITH   MENTAL  HEALTH  PROFESSIONALS ,  INCLUDING            PSYCHOLOGICAL  COUNSELING   AND  PSYCHIATRIC  EVALUTIONS,                 *  Maintain   Healthy  Life Style    With   Periodic  Monitoring  Of      Any  Medical  Conditions  Including   Elevated  Blood  Pressure,  Lipid  Profile,     Blood  Sugar levels  AndHeart  Disease. *   Period   Screening  For  Cancers  Involving  Breast,  Colon,    lungs  And  Other  Organs  As  Applicable,  In consultation   With  Your  Primary Care Providers. *Second  Neurological  Opinion  And  Evaluations  In  [facility]  Setting  If  Patient  Is  Interested. * Please   Contact   Neurology  Clinic   Early   If   Are  Any  New  Neurological   And  Any neurological  Concerns.                   *  If  The  Patient remains  Neurologically  Stable   Return   To  [facility] Neurology Department   IN          3         MONTHS  TIME   FOR  FURTHER              FOLLOW UP. *   The  Neurological   Findings,  Possible  Diagnosis,  Differential diagnoses                    And  Options  For    Further   Investigations                   And  management   Are  Discussed  Comprehensively. Medications   And  Prescription   Risks  And  Side effects  Are   Also  Discussed. *  If   There is  Any  Significant  Worsening   Of  Current  Symptoms  And  Or                  If patient  Develops   Any additional  New  NeurologicalSymptoms                  Or  Significant  Concerns   Should  Call  911 or  Go  To  Emergency  Department  For  Further  Immediate  Evaluation. The   Above  Were  Reviewed  With  patient   and                       questions  Answered  In  Detail. More   Than  50% of face  To face Time   Was  Spent  On  Counseling                    And   Coordination  Of  Care   Of   Patient's  multiple   Neurological  Problems                         And   Comorbid  Medical   Conditions. Electronically signed by Ilana Ortiz MD.,   [facility]      Board Certified in  Neurology &  In  [employer] and Neu[employer] ([employer])      DISCLAIMER:   Although every effort was made to ensure the accuracy of this  electronictranscription, some errors in transcription may have occurred. GENERAL PATIENT INSTRUCTIONS:      A Healthy Lifestyle: Care Instructions   Your Care Instructions   A healthy lifestyle can help you feel good, stay at ahealthy weight, and have plenty of energy for both work and play. A healthy lifestyle is something you can share with your whole family.  A healthy lifestyle also can lower your risk for serious health problems, such ashigh blood pressure, heart disease, and diabetes.  You can follow a few steps listed below to improve your health and the health of your family.    Follow-up careis a key part of your treatment and safety. Be sure to make and go to all appointments, and call your doctor if you are having problems. Its also a good idea to know your test results and keep a list of the medicines you take.  How can you care for yourself at home?  Do not eat too much sugar, fat, or fast foods. You can still have dessert and treats nowand then. The goal is moderation.  Start small to improve your eating habits. Pay attention to portion sizes, drink less juice and soda pop, and eat more fruits and vegetables.  Eat a healthy amount of food. A 3-ounce serving of meat, for example, is about the size of a deck of cards. Fill the rest of your plate with vegetables and whole grains.  Limit theamount of soda and sports drinks you have every day. Drink more water when you are thirsty.  Eat at least 5 servings of fruits and vegetables every day. It may seem like a lot, but it is not hard to reach this goal. Aserving or helping is 1 piece of fruit, 1 cup of vegetables, or 2 cups of leafy, raw vegetables. Have an apple or some carrot sticks as an afternoon snack instead of a candy bar. Try to have fruits and/or vegetables at everymeal.   Make exercise part of your daily routine. You may want to start with simple activities, such as walking, bicycling, or slow swimming. Try kay active 30 to 60 minutes every day. You do not need to do all 30 to 60 minutes all at once. For example, you can exercise 3 times a day for 10 or 20 minutes. Moderate exercise is safe for most people, but it is always agood idea to talk to your doctor before starting an exercise program.   Keep moving. Azar Shea the lawn, work in the garden, or [employer]. Take the stairs instead of the elevator at work.  If you smoke, quit. Peoplewho smoke have an increased risk for heart attack, stroke, cancer, and other lung illnesses. Quitting is hard, but there are ways to boost your chance of quitting tobacco for good.    Use nicotine gum, patches, or

## 2021-05-24 DIAGNOSIS — R51.9 CHRONIC INTRACTABLE HEADACHE, UNSPECIFIED HEADACHE TYPE: ICD-10-CM

## 2021-05-24 DIAGNOSIS — G89.29 CHRONIC INTRACTABLE HEADACHE, UNSPECIFIED HEADACHE TYPE: ICD-10-CM

## 2021-05-24 RX ORDER — SUMATRIPTAN 100 MG/1
TABLET, FILM COATED ORAL
Qty: 12 TABLET | Refills: 2 | Status: SHIPPED | OUTPATIENT
Start: 2021-05-24 | End: 2021-07-09 | Stop reason: SDUPTHER

## 2021-05-24 NOTE — TELEPHONE ENCOUNTER
Last Appt:  4/1/2021  Next Appt:   7/9/2021  Med verified in [facility] [address]

## 2021-06-02 RX ORDER — DULOXETIN HYDROCHLORIDE 60 MG/1
120 CAPSULE, DELAYED RELEASE ORAL DAILY
Qty: 60 CAPSULE | Refills: 3 | Status: SHIPPED | OUTPATIENT
Start: 2021-06-02 | End: 2021-07-02

## 2021-06-02 NOTE — TELEPHONE ENCOUNTER
Cymbalta requested.      Last Appt:  1/20/2021  Next Appt:   Visit date not found  Med verified in [address]

## 2021-06-15 ENCOUNTER — OFFICE VISIT (OUTPATIENT)
Dept: CARDIOLOGY | Age: 56
End: 2021-06-15
Payer: MEDICARE

## 2021-06-15 VITALS
HEIGHT: 71 IN | WEIGHT: 230 LBS | SYSTOLIC BLOOD PRESSURE: 133 MMHG | BODY MASS INDEX: 32.2 KG/M2 | DIASTOLIC BLOOD PRESSURE: 72 MMHG | HEART RATE: 78 BPM

## 2021-06-15 DIAGNOSIS — R94.31 ABNORMAL ECG: ICD-10-CM

## 2021-06-15 DIAGNOSIS — I45.10 RBBB: ICD-10-CM

## 2021-06-15 DIAGNOSIS — Z87.891 FORMER TOBACCO USE: ICD-10-CM

## 2021-06-15 DIAGNOSIS — E78.5 DYSLIPIDEMIA: Primary | ICD-10-CM

## 2021-06-15 DIAGNOSIS — I10 HYPERTENSION, ESSENTIAL: ICD-10-CM

## 2021-06-15 DIAGNOSIS — E66.09 CLASS 1 OBESITY DUE TO EXCESS CALORIES WITH SERIOUS COMORBIDITY IN ADULT, UNSPECIFIED BMI: ICD-10-CM

## 2021-06-15 DIAGNOSIS — E78.2 MIXED HYPERLIPIDEMIA: ICD-10-CM

## 2021-06-15 PROCEDURE — 99214 OFFICE O/P EST MOD 30 MIN: CPT | Performed by: INTERNAL MEDICINE

## 2021-06-15 PROCEDURE — G8427 DOCREV CUR MEDS BY ELIG CLIN: HCPCS | Performed by: INTERNAL MEDICINE

## 2021-06-15 PROCEDURE — 3017F COLORECTAL CA SCREEN DOC REV: CPT | Performed by: INTERNAL MEDICINE

## 2021-06-15 PROCEDURE — G8417 CALC BMI ABV UP PARAM F/U: HCPCS | Performed by: INTERNAL MEDICINE

## 2021-06-15 PROCEDURE — 93005 ELECTROCARDIOGRAM TRACING: CPT | Performed by: INTERNAL MEDICINE

## 2021-06-15 PROCEDURE — 1036F TOBACCO NON-USER: CPT | Performed by: INTERNAL MEDICINE

## 2021-06-15 PROCEDURE — 93010 ELECTROCARDIOGRAM REPORT: CPT | Performed by: INTERNAL MEDICINE

## 2021-06-15 NOTE — PROGRESS NOTES
Cardiology Consultation  [facility]      06/15/21      CC:  Htn, obesity, hlp, rbbb    HPI:  Colleen Pisano  is doing well from a cardiac standpoint. Good functional capacity with no significant change in functional capacity. No chest pain, no dyspnea, no PND, no syncope or pre-syncope, no orthopnea. No symptoms of CHF or angina/chest pain. Past Medical History:   Diagnosis Date    [facility]     follows with Dr. Juanis Butts Cent[facility]obular emphysema [facility])     HIV [facility]human immunodeficiency virus infection) ([facility] 2001    followed by Brijesh Mcdaniel (Infectious Disease at [address] [place] [address])    Pneumonia 06/11/2017       Past Surgical History:   Procedure Laterality Date    ANESTHESIA NERVE BLOCK Bilateral 1/12/2018    Bilat L2 L3 L4 L5 Diagnostic Medial BB performed by Sheri Deng MD at [address] [place] [address]  01/05/2016    Normal colon. Hemorrhoids.  Dr Libby Ceja  2015    full dental extraction    HERNIA REPAIR Left 3/2/2020    Laparoscopic Robotic Assisted Left Inguinal HERNIA REPAIR WITH MESH &VENTRAL  hernia repair performed by Leslie Cadena DO at [address] [place] [address] Right 9/26/2017    Right L4 & L5 Transforaminal  performed by Sheri Deng MD at [address] [place] [address] Right 10/3/2017    Right L4 & L5 Transforaminal performed by Sheri Deng MD at [address] [place] [address] Right 1/8/2019    Right L4 L5 TRANSFORAMINAL performed by Sheri Deng MD at [address] [place] [address] AA&/STRD LUMBAR PLEXUS CONT NFS CATH Bilateral 1/19/2018    Bilat L2 L3 L4 L5 Confirmatory Medial BB performed by Sheri Deng MD at [address] [place] [address] 1, W IMAGE 2858 [facility] LEVEL Right 1/25/2018    Right L2 L3 L4 L5 RADIOFREQUENCY performed by Sheri Deng MD at [address] [place] [address] 1, W IMAGE 2858 [facility] LEVEL Left 3/15/2018    Left L2 L3 L4 L5 RADIOFREQUENCY performed by Sheri Deng MD at [facility] OR       Family History   Problem Relation Age of Onset    Cancer Mother         uterine s/p hysterectomy    Cancer Maternal Grandmother        Social History     Socioeconomic History    Marital status: Single     Spouse name: None    Number of children: None    Years of education: None    Highest education level: None   Occupational History    None   Tobacco Use    Smoking status: Former Smoker     Packs/day: 3.00     Years: 30.00     Pack years: 90.00     Quit date: 10/7/2014     Years since quittin.6    Smokeless tobacco: Never Used   Vaping Use    Vaping Use: Never used   Substance and Sexual Activity    Alcohol use: Yes     Comment: socially    Drug use: No    Sexual activity: None   Other Topics Concern    None   Social History Narrative    None     Social Determinants of Health     Financial Resource Strain:     Difficulty of Paying Living Expenses:    Food Insecurity:     Worried About Running Out of Food in the Last Year:     Ran Out of Food in the Last Year:    Transportation Needs:     Lack of Transportation (Medical):  Lack of Transportation (Non-Medical):    Physical Activity:     Days of Exercise per Week:     Minutes of Exercise per Session:    Stress:     Feeling of Stress :    Social Connections:     Frequency of Communication with Friends and Family:     Frequency of Social Gatherings with Friends and Family:     Attends [employer] Services:     Active Member of Clubs or Organizations:     Attends Club or Organization Meetings:     Marital Status:    Intimate Partner Violence:     Fear of Current or Ex-Partner:     Emotionally Abused:     Physically Abused:     Sexually Abused:          Allergies   Allergen Reactions    Pcn [Penicillins] Rash    Sulfa Antibiotics Rash       Current Outpatient Medications   Medication Sig Dispense Refill    DULoxetine (CYMBALTA) 60 MG extended release capsule TAKE 2 CAPSULES BY MOUTH DAILY 60 capsule 3    SUMAtriptan (IMITREX) 100 MG tablet TAKE 1 TABLET BY MOUTH ONCE AS NEEDED FOR MIGRAINE(NOT MORE THAN TWO MAXIUMUM PER DAY) 12 tablet 2    omeprazole (PRILOSEC) 40 MG delayed release capsule Take 1 capsule by mouth daily 90 capsule 1    ondansetron (ZOFRAN ODT) 4 MG disintegrating tablet Take 1 tablet by mouth every 8 hours as needed for Nausea or Vomiting 30 tablet 2    Loratadine-Pseudoephedrine (CLARITIN-D 24 HOUR PO) Take by mouth daily as needed      DULoxetine (CYMBALTA) 60 MG extended release capsule TAKE 1 CAPSULE BY MOUTH DAILY 30 capsule 3    loperamide (IMODIUM) 2 MG capsule Take 2 mg by mouth 4 times daily as needed for Diarrhea      ondansetron (ZOFRAN) 4 MG tablet Take 1 tablet by mouth every 6 hours as needed for Nausea or Vomiting 20 tablet 0    gabapentin (NEURONTIN) 800 MG tablet Patient is taking 2 tablet BID      fluticasone (FLONASE) 50 MCG/ACT nasal spray SHAKE LIQUID AND USE 1 SPRAY IN EACH NOSTRIL DAILY 48 g 0    baclofen (LIORESAL) 10 MG tablet TAKE 1 TABLET BY MOUTH THREE TIMES DAILY AS NEEDED FOR SPASMS 270 tablet 0    SYMTUZA [phone] MG TABS TK 1 T PO  QD  6    aspirin-acetaminophen-caffeine (EXCEDRIN MIGRAINE) 250-250-65 MG per tablet Take 1 tablet by mouth every 6 hours as needed for Headaches      Clotrimazole (MYCELEX) 10 MG LOZG lozenge Take 1 lozenge by mouth 5 times daily 70 lozenge 1    fenofibric acid (FIBRICOR) 135 MG CPDR capsule TAKE 1 CAPSULE BY MOUTH DAILY 90 capsule 0    LORazepam (ATIVAN) 1 MG tablet Take 1 mg by mouth every 6 hours as needed for Anxiety.       BLACK ELDERBERRY,BERRY-FLOWER, PO Take 1 tablet by mouth daily      Darunavir-Cobicistat (PREZCOBIX) 800-150 MG TABS Take by mouth nightly      Nebulizers (COMPRESSOR/NEBULIZER) MISC As directed 1 each 0    Respiratory Therapy Supplies (NEBULIZER/TUBING/MOUTHPIECE) KIT 1 kit by Does not apply route daily 1 kit 0    albuterol (PROVENTIL) (2.5 MG/3ML) 0.083% nebulizer solution Take 2.5 mg by nebulization every 6 hours as needed for Wheezing      TURMERIC PO Take by mouth three times daily      Glucosamine HCl (GLUCOSAMINE PO) Take by mouth daily      vitamin D 1000 UNITS CAPS Take by mouth daily      GARLIC PO Take by mouth daily      Multiple Vitamins-Minerals (MULTIVITAMIN PO) Take by mouth daily      INVEGA 6 MG extended release tablet Take 6 mg by mouth daily      DESCOVY 200-25 MG TABS Take 1 tablet by mouth daily      MYTESI 125 MG TBEC Take 125 mg by mouth      butalbital-acetaminophen-caffeine (FIORICET, ESGIC) -40 MG per tablet TAKE 1 TO 2 TABLETS BY MOUTH TWICE DAILY AS NEEDED FOR HEADACHE (Patient not taking: Reported on 6/15/2021) 60 tablet 2    gabapentin (NEURONTIN) 600 MG tablet TAKE 1 TABLET BY MOUTH THREE TIMES DAILY 90 tablet 5    gabapentin (NEURONTIN) 800 MG tablet Take 1 tablet by mouth 3 times daily for 30 days. 90 tablet 5    gabapentin (NEURONTIN) 300 MG capsule TAKE 1 CAPLET BY MOUTH THREE TIMES DAILY 270 capsule 2    DULoxetine (CYMBALTA) 30 MG extended release capsule TAKE 1 CAPSULE BY MOUTH DAILY FOR 7 DAYS 7 capsule 0     No current facility-administered medications for this visit.         Patient Active Problem List   Diagnosis    HIV [facility] immunodeficiency virus infection) ([facility]    IFG (impaired fasting glucose)    Lumbar radicular pain    Chronic bilateral low back pain with right-sided sciatica    Panniculitis involving lumbar region    Lumbar facet joint syndrome    Abnormal EKG    Centrilobular emphysema ([facility])    Muscle spasm    Lumbosacral spondylosis without myelopathy    Neural foraminal stenosis of lumbar spine    Bipolar disorder ([facility]    Memory problem    Abnormal CT of the chest    Ex-smoker    Neck pain    Other complicated headache syndrome    Ventral hernia without obstruction or gangrene    Left inguinal hernia    Intractable headache    Former smoker           REVIEW OF SYSTEMS:    · Constitutional: there has been no unanticipated weight loss. There's been No change in energy level, No change in activity level. · Eyes: No visual changes or diplopia. No scleral icterus. · ENT: No Headaches, hearing loss or vertigo. No mouth sores or sore throat. · Cardiovascular: per hpi  · Respiratory: per hpi  · Gastrointestinal: No abdominal pain, appetite loss, blood in stools. No change in bowel or bladder habits. · Genitourinary: No dysuria, trouble voiding, or hematuria. · Musculoskeletal:  No gait disturbance, No weakness or joint complaints. · Integumentary: No rash or pruritis. · Neurological: No headache, diplopia, change in muscle strength, numbness or tingling. No change in gait, balance, coordination, mood, affect, memory, mentation, behavior. · Psychiatric: No new anxiety or depression. · Endocrine: No temperature intolerance. No excessive thirst, fluid intake, or urination. No tremor. · Hematologic/Lymphatic: No abnormal bruising or bleeding, blood clots or swollen lymph nodes. · Allergic/Immunologic: No nasal congestion or hives. Physical Exam:   Vitals: /72   Pulse 78   Ht 5' 11\" (1.803 m)   Wt 230 lb (104.3 kg)   BMI 32.08 kg/m²   General appearance: alert and cooperative with exam  HEENT: Head: Normocephalic, no lesions, without obvious abnormality. Eyes: PERRL, EOMI  Ears: Not obvious deformations or lack of hearing  Neck: no carotid bruit, no JVD  Lungs: clear to auscultation bilaterally  Heart: regular rate and rhythm, S1, S2 normal, no murmur, click, rub or gallop  Abdomen: soft, non-tender; bowel sounds normal; no masses,  no organomegaly  Extremities: extremities normal, atraumatic, no cyanosis or edema  Neurologic: Mental status: Alert, oriented, thought content appropriate  Skin: WNL for age and condition, no obvious rashes or leasions      EKG: Normal Sinus Rhythm with no ischemic changes.   Reviewed today's ECG along serial ECGs    LAST ECHO:    LAST STRESS:    LAST CATH:      Past Medical and Surgical History, Problem List, Allergies, Medications, Labs, Imaging, all reviewed extensively in EMR and with the patient. Assessment and Plan:      1. Echo 5/2018 EF 50%, No significant valvular disease. 2. Cardiolite stress test 2018 Normal perfusion. EF 64%. 3. HPL. Followed by PCP. 4. Abn ECG with I RBBB  5. Obesity - Chronic. Encouraged diet, exercise, and discussed weight loss extensively. 6. Former Tobacco abuse. Quit many years ago. Discussed extensively. Thank you for allowing me to participate in the care of this patient, please do not hesitate to call if you have any questions. Tc Viera, [id number] [facility]  [facility] [employer]  [phone]

## 2021-07-02 ENCOUNTER — OFFICE VISIT (OUTPATIENT)
Dept: PAIN MANAGEMENT | Age: 56
End: 2021-07-02
Payer: MEDICARE

## 2021-07-02 VITALS
BODY MASS INDEX: 31.64 KG/M2 | DIASTOLIC BLOOD PRESSURE: 84 MMHG | WEIGHT: 226 LBS | HEIGHT: 71 IN | SYSTOLIC BLOOD PRESSURE: 120 MMHG

## 2021-07-02 DIAGNOSIS — M47.816 LUMBAR FACET JOINT SYNDROME: ICD-10-CM

## 2021-07-02 DIAGNOSIS — M54.16 LUMBAR RADICULAR PAIN: Primary | ICD-10-CM

## 2021-07-02 DIAGNOSIS — G89.29 CHRONIC BILATERAL LOW BACK PAIN WITH RIGHT-SIDED SCIATICA: ICD-10-CM

## 2021-07-02 DIAGNOSIS — M48.061 NEURAL FORAMINAL STENOSIS OF LUMBAR SPINE: ICD-10-CM

## 2021-07-02 DIAGNOSIS — M54.41 CHRONIC BILATERAL LOW BACK PAIN WITH RIGHT-SIDED SCIATICA: ICD-10-CM

## 2021-07-02 PROCEDURE — 1036F TOBACCO NON-USER: CPT | Performed by: NURSE PRACTITIONER

## 2021-07-02 PROCEDURE — 99214 OFFICE O/P EST MOD 30 MIN: CPT | Performed by: NURSE PRACTITIONER

## 2021-07-02 PROCEDURE — G8417 CALC BMI ABV UP PARAM F/U: HCPCS | Performed by: NURSE PRACTITIONER

## 2021-07-02 PROCEDURE — 3017F COLORECTAL CA SCREEN DOC REV: CPT | Performed by: NURSE PRACTITIONER

## 2021-07-02 PROCEDURE — G8427 DOCREV CUR MEDS BY ELIG CLIN: HCPCS | Performed by: NURSE PRACTITIONER

## 2021-07-02 RX ORDER — NYSTATIN 100000 U/G
1 CREAM TOPICAL DAILY
COMMUNITY
Start: 2021-04-07 | End: 2022-03-30

## 2021-07-02 RX ORDER — NAPROXEN 500 MG/1
1 TABLET ORAL DAILY
COMMUNITY
Start: 2021-04-03 | End: 2022-03-30 | Stop reason: SINTOL

## 2021-07-02 RX ORDER — CHOLESTYRAMINE LIGHT 4 G/5.7G
1 POWDER, FOR SUSPENSION ORAL DAILY
COMMUNITY
Start: 2021-04-10 | End: 2021-10-15

## 2021-07-02 ASSESSMENT — ENCOUNTER SYMPTOMS
DIARRHEA: 0
BACK PAIN: 1
EYES NEGATIVE: 1
RESPIRATORY NEGATIVE: 1
CONSTIPATION: 0

## 2021-07-02 NOTE — PROGRESS NOTES
Subjective:      Patient ID: Gissell Alejo is a 54 y.o. male. Chief Complaint   Patient presents with    Follow-up     lower back pain     Back Pain    Migraine   Associated symptoms include back pain. Other  Associated symptoms include arthralgias, fatigue and myalgias. Here today for routine pain clinic recheck. Gabapentin seems to be working well in combination with dry needling, no complaints, no complaints side effects. Stopped cymbalta because it was impacting his mood    Pain Assessment  Location of Pain: Back  Location Modifiers: Inferior  Severity of Pain: 5  Quality of Pain: Aching  Duration of Pain: Persistent  Frequency of Pain: Constant  Aggravating Factors: Bending, Stretching, Straightening, Exercise, Kneeling, Squatting, Standing, Walking, Stairs  Limiting Behavior: Yes  Relieving Factors:  Other (Comment), Rest (medication)    Allergies   Allergen Reactions    Pcn [Penicillins] Rash    Sulfa Antibiotics Rash       Outpatient Medications Marked as Taking for the 7/2/21 encounter (Office Visit) with FELICE Morrison - CNP   Medication Sig Dispense Refill    cholestyramine light 4 g packet Take 1 packet by mouth daily      naproxen (EC NAPROSYN) 500 MG EC tablet Take 1 tablet by mouth daily      nystatin (MYCOSTATIN) 122176 UNIT/GM cream Apply 1 Dose topically daily      SUMAtriptan (IMITREX) 100 MG tablet TAKE 1 TABLET BY MOUTH ONCE AS NEEDED FOR MIGRAINE(NOT MORE THAN TWO MAXIUMUM PER DAY) 12 tablet 2    butalbital-acetaminophen-caffeine (FIORICET, ESGIC) -40 MG per tablet TAKE 1 TO 2 TABLETS BY MOUTH TWICE DAILY AS NEEDED FOR HEADACHE 60 tablet 2    omeprazole (PRILOSEC) 40 MG delayed release capsule Take 1 capsule by mouth daily 90 capsule 1    ondansetron (ZOFRAN ODT) 4 MG disintegrating tablet Take 1 tablet by mouth every 8 hours as needed for Nausea or Vomiting 30 tablet 2    gabapentin (NEURONTIN) 300 MG capsule TAKE 1 CAPLET BY MOUTH THREE TIMES DAILY 270 capsule 2    Loratadine-Pseudoephedrine (CLARITIN-D 24 HOUR PO) Take by mouth daily as needed      loperamide (IMODIUM) 2 MG capsule Take 2 mg by mouth 4 times daily as needed for Diarrhea      gabapentin (NEURONTIN) 800 MG tablet Patient is taking 2 tablet BID      fluticasone (FLONASE) 50 MCG/ACT nasal spray SHAKE LIQUID AND USE 1 SPRAY IN EACH NOSTRIL DAILY 48 g 0    baclofen (LIORESAL) 10 MG tablet TAKE 1 TABLET BY MOUTH THREE TIMES DAILY AS NEEDED FOR SPASMS 270 tablet 0    SYMTUZA [phone] MG TABS TK 1 T PO  QD  6    aspirin-acetaminophen-caffeine (EXCEDRIN MIGRAINE) 250-250-65 MG per tablet Take 1 tablet by mouth every 6 hours as needed for Headaches      Clotrimazole (MYCELEX) 10 MG LOZG lozenge Take 1 lozenge by mouth 5 times daily 70 lozenge 1    fenofibric acid (FIBRICOR) 135 MG CPDR capsule TAKE 1 CAPSULE BY MOUTH DAILY 90 capsule 0    LORazepam (ATIVAN) 1 MG tablet Take 1 mg by mouth every 6 hours as needed for Anxiety.       BLACK ELDERBERRY,BERRY-FLOWER, PO Take 1 tablet by mouth daily      Darunavir-Cobicistat (PREZCOBIX) 800-150 MG TABS Take by mouth nightly      Nebulizers (COMPRESSOR/NEBULIZER) MISC As directed 1 each 0    Respiratory Therapy Supplies (NEBULIZER/TUBING/MOUTHPIECE) KIT 1 kit by Does not apply route daily 1 kit 0    albuterol (PROVENTIL) (2.5 MG/3ML) 0.083% nebulizer solution Take 2.5 mg by nebulization every 6 hours as needed for Wheezing      TURMERIC PO Take by mouth three times daily      Glucosamine HCl (GLUCOSAMINE PO) Take by mouth daily      vitamin D 1000 UNITS CAPS Take by mouth daily      Multiple Vitamins-Minerals (MULTIVITAMIN PO) Take by mouth daily      INVEGA 6 MG extended release tablet Take 6 mg by mouth daily      DESCOVY 200-25 MG TABS Take 1 tablet by mouth daily      MYTESI 125 MG TBEC Take 125 mg by mouth         Past Medical History:   Diagnosis Date    [facility]     follows with Dr. Peri Valdes Centrilobular emphysema ([facility]     HIV (human immunodeficiency virus infection) ([facility] 2001    followed by Ana Thakkar (Infectious Disease at [address] [place] [address])    Pneumonia 06/11/2017       Past Surgical History:   Procedure Laterality Date    ANESTHESIA NERVE BLOCK Bilateral 1/12/2018    Bilat L2 L3 L4 L5 Diagnostic Medial BB performed by Kaylee Messina MD at [address] [place] [address]  01/05/2016    Normal colon. Hemorrhoids.  Dr Gabriela Hill  2015    full dental extraction    HERNIA REPAIR Left 3/2/2020    Laparoscopic Robotic Assisted Left Inguinal HERNIA REPAIR WITH MESH &VENTRAL  hernia repair performed by Destiny Joseph DO at [place] [address] Right 9/26/2017    Right L4 & L5 Transforaminal  performed by Kaylee Messina MD at [place] [address] Right 10/3/2017    Right L4 & L5 Transforaminal performed by Kaylee Messina MD at [place] [address] Right 1/8/2019    Right L4 L5 TRANSFORAMINAL performed by Kaylee Messina MD at [address] [place] [address] AA&/STRD LUMBAR PLEXUS CONT NFS CATH Bilateral 1/19/2018    Bilat L2 L3 L4 L5 Confirmatory Medial BB performed by Kaylee Messina MD at [facility] 373, W IMAGE GUIDE,EA ADDL LEVEL Right 1/25/2018    Right L2 L3 L4 L5 RADIOFREQUENCY performed by Kaylee Messina MD at [facility] 373, W IMAGE [address] [facility] [address] LEVEL Left 3/15/2018    Left L2 L3 L4 L5 RADIOFREQUENCY performed by Kaylee Messina MD at [facility] OR       Family History   Problem Relation Age of Onset    Cancer Mother         uterine s/p hysterectomy    Cancer Maternal Grandmother        Social History     Socioeconomic History    Marital status: Single     Spouse name: None    Number of children: None    Years of education: None    Highest education level: None   Occupational History    None   Tobacco Use    Smoking status: Former Smoker     Packs/day: 3.00     Years: 30.00     Pack years: 90.00 Quit date: 10/7/2014     Years since quittin.7    Smokeless tobacco: Never Used   Vaping Use    Vaping Use: Never used   Substance and Sexual Activity    Alcohol use: Yes     Comment: socially    Drug use: No    Sexual activity: None   Other Topics Concern    None   Social History Narrative    None     Social Determinants of Health     Financial Resource Strain:     Difficulty of Paying Living Expenses:    Food Insecurity:     Worried About Running Out of Food in the Last Year:     Ran Out of Food in the Last Year:    Transportation Needs:     Lack of Transportation (Medical):  Lack of Transportation (Non-Medical):    Physical Activity:     Days of Exercise per Week:     Minutes of Exercise per Session:    Stress:     Feeling of Stress :    Social Connections:     Frequency of Communication with Friends and Family:     Frequency of Social Gatherings with Friends and Family:     Attends [employer] Services:     Active Member of Clubs or Organizations:     Attends Club or Organization Meetings:     Marital Status:    Intimate Partner Violence:     Fear of Current or Ex-Partner:     Emotionally Abused:     Physically Abused:     Sexually Abused:        Review of Systems   Constitutional: Positive for activity change and fatigue. HENT: Negative. Eyes: Negative. Respiratory: Negative. Cardiovascular: Negative. Gastrointestinal: Negative for constipation and diarrhea. Endocrine: Negative. Genitourinary: Negative for difficulty urinating. Musculoskeletal: Positive for arthralgias, back pain and myalgias. Skin: Negative. Psychiatric/Behavioral: Positive for sleep disturbance. Objective:   Physical Exam  Vitals reviewed. Constitutional:       General: He is not in acute distress. Appearance: He is well-developed. He is not ill-appearing, toxic-appearing or diaphoretic. HENT:      Head: Normocephalic and atraumatic.    Cardiovascular:      Rate and Rhythm: Normal rate. Pulmonary:      Effort: Pulmonary effort is normal. No accessory muscle usage or respiratory distress. Musculoskeletal:      Comments: MRI OF THE LUMBAR SPINE WITHOUT CONTRAST, 6/11/2018 10:22 am     TECHNIQUE:  Multiplanar multisequence MRI of the lumbar spine was performed without the  administration of intravenous contrast.     COMPARISON:  None     HISTORY:  ORDERING SYSTEM PROVIDED HISTORY: Lumbar facet joint syndrome  TECHNOLOGIST PROVIDED HISTORY:  Reason for exam:->right lumbar radiculopathy  Ordering Physician Provided Reason for Exam:  Low back pain to the right  side, right leg pain. Symptoms for about one week. Acuity: Acute  Type of Exam: Initial  Additional signs and symptoms:  Lumbar facet syndrome; Right lumbar  radiculopathy     Initial evaluation.     FINDINGS:  BONES/ALIGNMENT: There is normal alignment of the spine. The vertebral body  heights are maintained. The bone marrow signal appears unremarkable.  There  is degenerative disc disease with disc space narrowing and osteophytes at  L3-4, L4-5, and L5-S1.     SPINAL CORD: The conus terminates normally.     SOFT TISSUES: No paraspinal mass identified. Cristy Atkinson is a cyst in the left  kidney that is not fully evaluated.  Ultrasound could better evaluate.     L1-L2:  The thecal sac and neural foramina are intact.     L2-L3:  There is mild facet and ligamentum flavum hypertrophy.  The thecal  sac is not stenotic.  Disc and/or osteophytes cause minimal narrowing of the  neural foramina bilaterally.     L3-L4: The thecal sac is not stenotic.   Disc and/or osteophytes result in  mild narrowing of the neural foramina bilaterally.   There is mild facet and  ligamentum flavum hypertrophy.     L4-L5:  There is moderate facet and ligamentum flavum hypertrophy.  The  thecal sac is not stenotic.  Disc and/or osteophytes result in moderate  narrowing of the neural foramina bilaterally.  The left neural foramen is  narrowed greater than

## 2021-07-09 ENCOUNTER — OFFICE VISIT (OUTPATIENT)
Dept: NEUROLOGY | Age: 56
End: 2021-07-09
Payer: MEDICARE

## 2021-07-09 VITALS
WEIGHT: 226 LBS | HEART RATE: 77 BPM | BODY MASS INDEX: 31.64 KG/M2 | SYSTOLIC BLOOD PRESSURE: 116 MMHG | DIASTOLIC BLOOD PRESSURE: 78 MMHG | HEIGHT: 71 IN | OXYGEN SATURATION: 96 %

## 2021-07-09 DIAGNOSIS — F31.10 BIPOLAR AFFECTIVE DISORDER, CURRENT EPISODE MANIC, CURRENT EPISODE SEVERITY UNSPECIFIED (HCC): Primary | ICD-10-CM

## 2021-07-09 DIAGNOSIS — J43.2 CENTRILOBULAR EMPHYSEMA (HCC): ICD-10-CM

## 2021-07-09 DIAGNOSIS — G44.221 CHRONIC TENSION-TYPE HEADACHE, INTRACTABLE: ICD-10-CM

## 2021-07-09 DIAGNOSIS — G89.29 CHRONIC BILATERAL LOW BACK PAIN WITH RIGHT-SIDED SCIATICA: ICD-10-CM

## 2021-07-09 DIAGNOSIS — Z87.891 FORMER SMOKER: ICD-10-CM

## 2021-07-09 DIAGNOSIS — R73.01 IFG (IMPAIRED FASTING GLUCOSE): ICD-10-CM

## 2021-07-09 DIAGNOSIS — G44.59 OTHER COMPLICATED HEADACHE SYNDROME: ICD-10-CM

## 2021-07-09 DIAGNOSIS — M62.838 MUSCLE SPASM: ICD-10-CM

## 2021-07-09 DIAGNOSIS — M47.817 LUMBOSACRAL SPONDYLOSIS WITHOUT MYELOPATHY: ICD-10-CM

## 2021-07-09 DIAGNOSIS — M54.2 NECK PAIN: ICD-10-CM

## 2021-07-09 DIAGNOSIS — R51.9 CHRONIC INTRACTABLE HEADACHE, UNSPECIFIED HEADACHE TYPE: ICD-10-CM

## 2021-07-09 DIAGNOSIS — M54.16 LUMBAR RADICULAR PAIN: ICD-10-CM

## 2021-07-09 DIAGNOSIS — R41.3 MEMORY PROBLEM: ICD-10-CM

## 2021-07-09 DIAGNOSIS — G89.29 CHRONIC INTRACTABLE HEADACHE, UNSPECIFIED HEADACHE TYPE: ICD-10-CM

## 2021-07-09 DIAGNOSIS — M54.41 CHRONIC BILATERAL LOW BACK PAIN WITH RIGHT-SIDED SCIATICA: ICD-10-CM

## 2021-07-09 PROCEDURE — 99214 OFFICE O/P EST MOD 30 MIN: CPT | Performed by: PSYCHIATRY & NEUROLOGY

## 2021-07-09 PROCEDURE — G8427 DOCREV CUR MEDS BY ELIG CLIN: HCPCS | Performed by: PSYCHIATRY & NEUROLOGY

## 2021-07-09 PROCEDURE — G8926 SPIRO NO PERF OR DOC: HCPCS | Performed by: PSYCHIATRY & NEUROLOGY

## 2021-07-09 PROCEDURE — 3017F COLORECTAL CA SCREEN DOC REV: CPT | Performed by: PSYCHIATRY & NEUROLOGY

## 2021-07-09 PROCEDURE — 3023F SPIROM DOC REV: CPT | Performed by: PSYCHIATRY & NEUROLOGY

## 2021-07-09 PROCEDURE — G8417 CALC BMI ABV UP PARAM F/U: HCPCS | Performed by: PSYCHIATRY & NEUROLOGY

## 2021-07-09 PROCEDURE — 1036F TOBACCO NON-USER: CPT | Performed by: PSYCHIATRY & NEUROLOGY

## 2021-07-09 RX ORDER — TRAMADOL HYDROCHLORIDE 50 MG/1
1 TABLET ORAL PRN
COMMUNITY
Start: 2021-04-07

## 2021-07-09 RX ORDER — BUTALBITAL, ACETAMINOPHEN AND CAFFEINE 50; 325; 40 MG/1; MG/1; MG/1
TABLET ORAL
Qty: 60 TABLET | Refills: 2 | Status: SHIPPED | OUTPATIENT
Start: 2021-07-09 | End: 2021-10-15 | Stop reason: SDUPTHER

## 2021-07-09 RX ORDER — OMEPRAZOLE 40 MG/1
40 CAPSULE, DELAYED RELEASE ORAL DAILY
Qty: 90 CAPSULE | Refills: 1 | Status: SHIPPED | OUTPATIENT
Start: 2021-07-09 | End: 2022-07-20 | Stop reason: SDUPTHER

## 2021-07-09 RX ORDER — SUMATRIPTAN 100 MG/1
TABLET, FILM COATED ORAL
Qty: 12 TABLET | Refills: 2 | Status: SHIPPED | OUTPATIENT
Start: 2021-07-09 | End: 2021-10-15 | Stop reason: SDUPTHER

## 2021-07-09 RX ORDER — ONDANSETRON 4 MG/1
4 TABLET, ORALLY DISINTEGRATING ORAL EVERY 8 HOURS PRN
Qty: 30 TABLET | Refills: 2 | Status: SHIPPED | OUTPATIENT
Start: 2021-07-09 | End: 2022-07-20 | Stop reason: SDUPTHER

## 2021-07-09 RX ORDER — TOPIRAMATE 50 MG/1
TABLET, FILM COATED ORAL
COMMUNITY
Start: 2021-06-19 | End: 2021-07-09 | Stop reason: SDUPTHER

## 2021-07-09 RX ORDER — TOPIRAMATE 50 MG/1
50 TABLET, FILM COATED ORAL 2 TIMES DAILY
Qty: 60 TABLET | Refills: 2 | Status: SHIPPED | OUTPATIENT
Start: 2021-07-09 | End: 2021-10-15 | Stop reason: SDUPTHER

## 2021-07-09 ASSESSMENT — ENCOUNTER SYMPTOMS
WHEEZING: 0
SORE THROAT: 0
EYE ITCHING: 0
FACIAL SWEATING: 0
EYE DISCHARGE: 0
SCALP TENDERNESS: 0
CONSTIPATION: 0
BLURRED VISION: 0
SWOLLEN GLANDS: 0
SHORTNESS OF BREATH: 0
ABDOMINAL DISTENTION: 0
DIARRHEA: 0
VISUAL CHANGE: 0
BACK PAIN: 1
ABDOMINAL PAIN: 0
PHOTOPHOBIA: 1
RHINORRHEA: 0
TROUBLE SWALLOWING: 0
NAUSEA: 1
EYE WATERING: 0
FACIAL SWELLING: 0
EYE PAIN: 0
COLOR CHANGE: 0
CHEST TIGHTNESS: 0
CHOKING: 0
VOICE CHANGE: 0
BLOOD IN STOOL: 0
VOMITING: 0
COUGH: 0
SINUS PRESSURE: 0
EYE REDNESS: 0
APNEA: 0

## 2021-07-09 NOTE — PROGRESS NOTES
[facility]  Neurology  [address]KX:[phone]   Fax: [phone]      SUBJECTIVE:     PATIENT ID:  Vanessa Last is a  RIGHT     HANDED 54 y.o. male. Headache   This is a new problem. Episode onset: SINCE  OCTOBER 2019. The problem occurs constantly. The problem has been gradually worsening. The pain is located in the occipital region. The pain does not radiate. The pain quality is not similar to prior headaches. The quality of the pain is described as pulsating and throbbing. The pain is at a severity of 3/10. The pain is mild. Associated symptoms include back pain, a loss of balance, nausea, neck pain, phonophobia and photophobia. Pertinent negatives include no abdominal pain, abnormal behavior, anorexia, blurred vision, coughing, dizziness, drainage, ear pain, eye pain, eye redness, eye watering, facial sweating, fever, hearing loss, insomnia, muscle aches, numbness, rhinorrhea, scalp tenderness, seizures, sinus pressure, sore throat, swollen glands, tingling, tinnitus, visual change, vomiting, weakness or weight loss. The symptoms are aggravated by unknown. He has tried ergotamines and NSAIDs for the symptoms. The treatment provided no relief. His past medical history is significant for immunosuppression and migraine headaches. There is no history of cancer, cluster headaches, hypertension, migraines in the family, obesity, pseudotumor cerebri, recent head traumas, sinus disease or TMJ. Migraine   This is a chronic problem. Episode onset: SINCE  TEENAGE   The problem has been waxing and waning. The pain is located in the bilateral region. The pain does not radiate. The pain quality is similar to prior headaches. The quality of the pain is described as aching, throbbing and pulsating. The pain is at a severity of 3/10. The pain is mild. Associated symptoms include back pain, a loss of balance, nausea, neck pain, phonophobia and photophobia. Pertinent negatives include no abdominal pain, abnormal behavior, anorexia, blurred vision, coughing, dizziness, drainage, ear pain, eye pain, eye redness, eye watering, facial sweating, fever, hearing loss, insomnia, muscle aches, numbness, rhinorrhea, scalp tenderness, seizures, sinus pressure, sore throat, swollen glands, tingling, tinnitus, visual change, vomiting, weakness or weight loss. The symptoms are aggravated by unknown. He has tried NSAIDs and Excedrin for the symptoms. The treatment provided moderate relief. His past medical history is significant for immunosuppression and migraine headaches. There is no history of cancer, cluster headaches, hypertension, migraines in the family, obesity, pseudotumor cerebri, recent head traumas, sinus disease or TMJ. History obtained from  The patient   AND  HIS  MALE  FRIEND       and other  available   medical  records   were  Also  reviewed. The  Duration,  Quality,  Severity,  Location,  Timing,  Context,  Modifying  Factors   Of   The   Chief   Complaint   And  Present  Illness       Was   Reviewed   In   Chronological   Manner. PATIENT'S  MAIN  CONCERNS INCLUDE :                       1)     H /O    CHRONIC   MIGRAINES    SINCE  TEENAGE                            2)     PREVIOUS     H/O   BAD  MIGRAINE       8   YEARS   AGO                             3)     H/O     HEADACHE   IN  [facility]   AREA        SINCE   October 2019                                 PATIENT  TRIED     EXCEDRIN  MIGRAINE,    ALEVE                                                WHICH  DID  NOT  HELP. 4)     H/O    MILD     NECK PAIN       WITH  RADIATION  TO     THE  LEFT    SIDE                                         WORSE     SINCE  2020                              5)     NO    PRECIPITATING   FACTORS. NO   FEVER.      NO  TRAUMA                            6) H/O    CHRONIC    LUMBAR  PAIN   AND  RADICULOPATHY                                       BEING  FOLLOWED  BY  PAIN  MANAGEMENT                           -   ON  NEURONTIN,   BACLOFEN ,   CYMBALTA                                  7)       H/O   CHRONIC  ANXIETY,    BIPOLAR  DISORDER                                      -  ON  INVEGA, ,     ATIVAN     PRN                            8)     H/O     HIV  INFECTION       ASYMPTOMATIC                                    - ON  MEDICATION          FROM    [facility]     PROVIDERS                        9)     NO   H/O   FALLING                           10)      H/O    CHRONIC    MILD     MEMORY  PROBLEMS                                         -    NEEDS  MONITORING                        11)   H/O   EX  SMOKER                            H/O   ABNORMAL   CT   CHEST     IN    2017                              TO  FOLLOW  WITH   HIS  PCP                                         12)    HAD  NEURO  DIAGNOSTIC  EVALUATIONS  IN NOV./ DEC.  2019                              LABS  WORK  UP    DID  NOT    SHOW  ANY  SIGNIFICANT  ABNORMALITIES                             MRI  BRAIN      SHOWED  CHRONIC  CEREBRAL ISCHEMIA                                     MRI  CERVICAL SPINE       SHOWED    MULTI LEVEL  DISC  DISEASES                       13)    PATIENT'S     MIGRAINES   AND   HEADACHES                                      -       IMPROVED     AND      STABLE                                 AND  TOLERATING  THE  MEDICATIONS                                                14)      H/O     OCCIPITAL  HEADACHES     IN    SUMMER   2020    DUE  TO  HEAT                                       -     RESOLVED                                15)     H/O   INTOLERANCE      WITH  NAPROSYN      DUE  TO  GI  SIDE EFFECTS                              PATIENT   TO   AVOID     NSAID                          16)          PATIENT       ON      TOPAMAX,  IMITREX,    FIORICET     AS   NEEDED WITH  BETTER  CONTROL  OF      HIS   MIGRAINES                                                17)        PATIENT  DENIES  ANY  NEW  NEUROLOGICAL  CONCERNS                                 REQUESTS     REFILLS  FOR  HIS  MEDICATIONS                        18)        VARIOUS  RISK   FACTORS   WERE  REVIEWED   AND   DISCUSSED. PATIENT   HAS  MULTIPLE   MEDICAL, NEUROLOGICAL                        AND   MENTAL HEALTH   PROBLEMS . PATIENT'S   MANAGEMENT  IS  CHALLENGING.                                     PRECIPITATING  FACTORS: including  fever/infection, exertion/relaxation, position change, stress, weather change,                        medications/alcohol, time of day/darkness/light  Are  absent                                                              MODIFYING  FACTORS:  fever/infection, exertion/relaxation, position change, stress, weather change,                        medications/alcohol, time of day/darkness/light  Are  absent             Patient   Indicates   The  Presence   And  The  Absence  Of  The  Following    Associated  And   Additional  Neurological    Symptoms:                                Balance  And coordination   problems  absent           Gait problems     absent            Headaches      present              Migraines           present           Memory problemspresent              Confusion        absent            Paresthesia   numbness          absent           Seizures  And  Starring  Episodes           absent           Syncope,  Near  syncopal episodes         absent           Speech   problems           absent             Swallowing   Problems      absent            Dizziness,  Light headedness           absent              Vertigo        absent             Generalized   Weakness    absent              focal  Weakness     absent             Tremors         absent              Sleep  Problems     absent             History Of   Recent  Head  Injury     absent             History  Of   Recent  TIA     absent             History  Of   Recent    Stroke     absent             Neck  Pain   and   Neck muscle  Spasms  present               Radiating  down   And   Weakness           present            Lower back   Pain  And     Spasms  present              Radiating    Down   And   Weakness          present                H/OFALLS        absent               History  Of   Visual  Symptoms    absent                  Associated   Diplopia       absent                                               Also   Additional   Symptoms   Present    As  Documented    In   The   detailed                  Review  Of  Systems   And    Please   Refer   To    Them for   Additional    Information. Any components  That are either  Unobtainable  Or  Limited  In   HPI, ROS  And/or PFSH   Are                   Due   ToPatient's  Medical  Problems,  Clinical  Condition   and/or lack of                                other    Alternate   resources. RECORDS   REVIEWED:    historical medical records       INFORMATION   REVIEWED:     MEDICAL   HISTORY,SURGICAL   HISTORY,     MEDICATIONS   LIST,   ALLERGIES AND  DRUG  INTOLERANCES,       FAMILY   HISTORY,  SOCIAL  HISTORY,      PROBLEM  LIST   FOR  PATIENT  CARE   COORDINATION      Past Medical History:   Diagnosis Date    Bipolar disorder ([facility] [address]     follows with Dr. Jaz Lennon Centrilobular emphysema ([facility] [address]     HIV (human immunodeficiency virus infection) ([facility] [address] 2001    followed by Ajith Toure (Infectious Disease at [address] [place] [address])    Pneumonia 06/11/2017         Past Surgical History:   Procedure Laterality Date    ANESTHESIA NERVE BLOCK Bilateral 1/12/2018    Bilat L2 L3 L4 L5 Diagnostic Medial BB performed by Virgie Joe MD at [address] [place] [address], [address]  01/05/2016    Normal colon. Hemorrhoids.  Dr Carri Cabral  2015    full dental extraction    HERNIA REPAIR Left 3/2/2020    Laparoscopic Robotic Assisted Left Inguinal HERNIA REPAIR WITH MESH &VENTRAL  hernia repair performed by William Guillory DO at [address] [place] [address] Dr Cifuentes 9/26/2017    Right L4 & L5 Transforaminal  performed by Zakia Burns MD at [address] [place] [address] Dr Cifuentes 10/3/2017    Right L4 & L5 Transforaminal performed by Zakia Burns MD at [address] [place] [address] Dr Cifuentes 1/8/2019    Right L4 L5 TRANSFORAMINAL performed by Zakia Burns MD at [address] [place] [address] AA&/STRD LUMBAR PLEXUS CONT NFS CATH Bilateral 1/19/2018    Bilat L2 L3 L4 L5 Confirmatory Medial BB performed by Zakia Burns MD at [address] [place] [address] 1, W IMAGE [address] [facility] LEVEL Right 1/25/2018    Right L2 L3 L4 L5 RADIOFREQUENCY performed by Zakia Burns MD at [address] [place], [place] [address]way [address] [address] [facility] [facility] L[facility] Left 3/15/2018    Left L2 L3 L4 L5 RADIOFREQUENCY performed by Zakia Burns MD at [address] [place] [address]         Current Outpatient Medications   Medication Sig Dispense Refill    traMADol (ULTRAM) 50 MG tablet Take 1 tablet by mouth as needed.       topiramate (TOPAMAX) 50 MG tablet Take 1 tablet by mouth 2 times daily 60 tablet 2    SUMAtriptan (IMITREX) 100 MG tablet TAKE 1 TABLET BY MOUTH ONCE AS NEEDED FOR MIGRAINE(NOT MORE THAN TWO MAXIUMUM PER DAY) 12 tablet 2    butalbital-acetaminophen-caffeine (FIORICET, ESGIC) -40 MG per tablet TAKE 1 TO 2 TABLETS BY MOUTH TWICE DAILY AS NEEDED FOR HEADACHE 60 tablet 2    omeprazole (PRILOSEC) 40 MG delayed release capsule Take 1 capsule by mouth daily 90 capsule 1    ondansetron (ZOFRAN ODT) 4 MG disintegrating tablet Take 1 tablet by mouth every 8 hours as needed for Nausea or Vomiting 30 tablet 2    cholestyramine light 4 g packet Take 1 packet by mouth daily      nystatin (MYCOSTATIN) 941375 UNIT/GM cream Apply 1 Dose topically daily      gabapentin (NEURONTIN) 300 MG capsule TAKE 1 CAPLET BY MOUTH THREE TIMES DAILY 270 capsule 2    Loratadine-Pseudoephedrine (CLARITIN-D 24 HOUR PO) Take by mouth daily as needed      loperamide (IMODIUM) 2 MG capsule Take 2 mg by mouth 4 times daily as needed for Diarrhea      gabapentin (NEURONTIN) 800 MG tablet Patient is taking 2 tablet BID      fluticasone (FLONASE) 50 MCG/ACT nasal spray SHAKE LIQUID AND USE 1 SPRAY IN EACH NOSTRIL DAILY 48 g 0    baclofen (LIORESAL) 10 MG tablet TAKE 1 TABLET BY MOUTH THREE TIMES DAILY AS NEEDED FOR SPASMS 270 tablet 0    SYMTUZA [id number] MG TABS TK 1 T PO  QD  6    Clotrimazole (MYCELEX) 10 MG LOZG lozenge Take 1 lozenge by mouth 5 times daily 70 lozenge 1    fenofibric acid (FIBRICOR) 135 MG CPDR capsule TAKE 1 CAPSULE BY MOUTH DAILY 90 capsule 0    LORazepam (ATIVAN) 1 MG tablet Take 1 mg by mouth every 6 hours as needed for Anxiety.       BLACK ELDERBERRY,BERRY-FLOWER, PO Take 1 tablet by mouth daily      Darunavir-Cobicistat (PREZCOBIX) 800-150 MG TABS Take by mouth nightly      Nebulizers (COMPRESSOR/NEBULIZER) MISC As directed 1 each 0    Respiratory Therapy Supplies (NEBULIZER/TUBING/MOUTHPIECE) KIT 1 kit by Does not apply route daily 1 kit 0    TURMERIC PO Take by mouth three times daily      Glucosamine HCl (GLUCOSAMINE PO) Take by mouth daily      vitamin D 1000 UNITS CAPS Take by mouth daily      Multiple Vitamins-Minerals (MULTIVITAMIN PO) Take by mouth daily      INVEGA 6 MG extended release tablet Take 6 mg by mouth daily      DESCOVY 200-25 MG TABS Take 1 tablet by mouth daily      MYTESI 125 MG TBEC Take 125 mg by mouth      naproxen (EC NAPROSYN) 500 MG EC tablet Take 1 tablet by mouth daily      aspirin-acetaminophen-caffeine (EXCEDRIN MIGRAINE) 250-250-65 MG per tablet Take 1 tablet by mouth every 6 hours as needed for Headaches (Patient not taking: Reported on 7/9/2021)      albuterol (PROVENTIL) (2.5 MG/3ML) 0.083% nebulizer solution Take 2.5 mg by nebulization every 6 hours as needed for Wheezing (Patient not taking: Reported on 2021)       No current facility-administered medications for this visit. Allergies   Allergen Reactions    Pcn [Penicillins] Rash    Sulfa Antibiotics Rash         Family History   Problem Relation Age of Onset    Cancer Mother         uterine s/p hysterectomy    Cancer Maternal Grandmother          Social History     Socioeconomic History    Marital status: Single     Spouse name: Not on file    Number of children: Not on file    Years of education: Not on file    Highest education level: Not on file   Occupational History    Not on file   Tobacco Use    Smoking status: Former Smoker     Packs/day: 3.00     Years: 30.00     Pack years: 90.00     Quit date: 10/7/2014     Years since quittin.7    Smokeless tobacco: Never Used   Vaping Use    Vaping Use: Never used   Substance and Sexual Activity    Alcohol use: Yes     Comment: socially    Drug use: No    Sexual activity: Not on file   Other Topics Concern    Not on file   Social History Narrative    Not on file     Social Determinants of Health     Financial Resource Strain:     Difficulty of Paying Living Expenses:    Food Insecurity:     Worried About Running Out of Food in the Last Year:     [address] in the Last Year:    Transportation Needs:     Lack of Transportation (Medical):      Lack of Transportation (Non-Medical):    Physical Activity:     Days of Exercise per Week:     Minutes of Exercise per Session:    Stress:     Feeling of Stress :    Social Connections:     Frequency of Communication with Friends and Family:     Frequency of Social Gatherings with Friends and Family:     Attends Islam Services:     Active Member of Clubs or Organizations:     Attends Club or Organization Meetings:     Marital Status:    Intimate Partner Violence:     Fear of Current or Ex-Partner:     Emotionally Abused:     Physically Abused:     Sexually Abused: Vitals:    07/09/21 0930   BP: 116/78   Pulse: 77   SpO2: 96%         Wt Readings from Last 3 Encounters:   07/09/21 226 lb (102.5 kg)   07/02/21 226 lb (102.5 kg)   06/15/21 230 lb (104.3 kg)         BP Readings from Last 3 Encounters:   07/09/21 116/78   07/02/21 120/84   06/15/21 133/72       Hematology and Coagulation  Lab Results   Component Value Date    WBC 5.4 11/22/2019    RBC 4.57 11/22/2019    HGB 14.8 11/22/2019    HCT 44.4 11/22/2019    MCV 97.1 11/22/2019    MCH 32.3 11/22/2019    MCHC 33.3 11/22/2019    RDW 14.8 11/22/2019     11/22/2019    MPV 7.1 11/22/2019       Chemistries  Lab Results   Component Value Date     11/22/2019    K 3.9 11/22/2019     11/22/2019    CO2 28 11/22/2019    BUN 14 11/22/2019    CREATININE 0.82 11/22/2019    CALCIUM 9.6 11/22/2019    PROT 7.0 11/22/2019    LABALBU 4.3 11/22/2019    BILITOT 0.21 11/22/2019    ALKPHOS 68 11/22/2019    AST 17 11/22/2019    ALT 16 11/22/2019     Lab Results   Component Value Date    ALKPHOS 68 11/22/2019    ALT 16 11/22/2019    AST 17 11/22/2019    PROT 7.0 11/22/2019    BILITOT 0.21 11/22/2019    BILIDIR <0.08 06/11/2017    LABALBU 4.3 11/22/2019     Lab Results   Component Value Date    BUN 14 11/22/2019    CREATININE 0.82 11/22/2019     Lab Results   Component Value Date    CALCIUM 9.6 11/22/2019     Lab Results   Component Value Date    AST 17 11/22/2019    ALT 16 11/22/2019             Review of Systems   Constitutional: Negative for appetite change, chills, fatigue, fever, unexpected weight change and weight loss. HENT: Negative for congestion, dental problem, drooling, ear discharge, ear pain, facial swelling, hearing loss, mouth sores, nosebleeds, postnasal drip, rhinorrhea, sinus pressure, sore throat, tinnitus, trouble swallowing and voice change. Eyes: Positive for photophobia. Negative for blurred vision, pain, discharge, redness, itching and visual disturbance.    Respiratory: Negative for apnea, cough, choking, chest tightness, shortness of breath and wheezing. Cardiovascular: Negative for chest pain, palpitations and leg swelling. Gastrointestinal: Positive for nausea. Negative for abdominal distention, abdominal pain, anorexia, blood in stool, constipation, diarrhea and vomiting. Endocrine: Negative for cold intolerance, heat intolerance, polydipsia, polyphagia and polyuria. Musculoskeletal: Positive for arthralgias, back pain and neck pain. Negative for gait problem, joint swelling, myalgias and neck stiffness. Skin: Negative for color change, pallor, rash and wound. Allergic/Immunologic: Negative for environmental allergies, food allergies and immunocompromised state. Neurological: Positive for headaches and loss of balance. Negative for dizziness, tingling, tremors, seizures, syncope, facial asymmetry, speech difficulty, weakness, light-headedness and numbness. Hematological: Negative for adenopathy. Does not bruise/bleed easily. Psychiatric/Behavioral: Positive for decreased concentration. Negative for agitation, behavioral problems, confusion, dysphoric mood, hallucinations, self-injury, sleep disturbance and suicidal ideas. The patient is nervous/anxious. The patient does not have insomnia and is not hyperactive. OBJECTIVE:    Physical Exam  Constitutional:       Appearance: He is well-developed. HENT:      Head: Normocephalic and atraumatic. No raccoon eyes or Oviedo's sign. Right Ear: External ear normal.      Left Ear: External ear normal.      Nose: Nose normal.   Eyes:      Conjunctiva/sclera: Conjunctivae normal.      Pupils: Pupils are equal, round, and reactive to light. Neck:      Thyroid: No thyroid mass or thyromegaly. Vascular: No carotid bruit. Trachea: No tracheal deviation. Meningeal: Brudzinski's sign and Kernig's sign absent. Cardiovascular:      Rate and Rhythm: Normal rate and regular rhythm.    Pulmonary:      Effort: Pulmonary effort is normal.   Musculoskeletal:         General: No tenderness. Cervical back: Normal range of motion and neck supple. No rigidity. No muscular tenderness. Normal range of motion. Skin:     General: Skin is warm. Coloration: Skin is not pale. Findings: No erythema or rash. Nails: There is no clubbing. Psychiatric:         Attention and Perception: He is attentive. Mood and Affect: Mood is anxious. Mood is not depressed. Affect is not labile, blunt or inappropriate. Speech: He is communicative. Speech is not rapid and pressured, delayed, slurred or tangential.         Behavior: Behavior is not agitated, slowed, aggressive, withdrawn, hyperactive or combative. Behavior is cooperative. Thought Content: Thought content is not paranoid or delusional. Thought content does not include homicidal or suicidal ideation. Thought content does not include homicidal or suicidal plan. Cognition and Memory: Cognition is impaired. Memory is impaired. He does not exhibit impaired remote memory. Judgment: Judgment is not impulsive or inappropriate. NEUROLOGICALEXAMINATION :       A) MENTAL STATUS:                   Alert and  oriented  To time, place  And  Person. No Aphasia. No  Dysarthria. Able   To  Follow    SIMPLE      commands   without   Any  Difficulty. No right  To left confusion. Normal  Speech  And language function. Insight and  Judgment ,Fund  Of  Knowledge   within normal limits                Recent  And  Remote memory    DECREASED                Attention &  Concentration are     DECREASED                                             B) CRANIAL NERVES :      CN : Visual  Acuity  And  Visual fields  within normal limits               Fundi  Could  Not  Be  Could  Not  Be  Evaluated. 3,4,6 CN : Both  Pupils are   Reactive and  Equal.  Movements  Are  Intact. without myelopathy    Neural foraminal stenosis of lumbar spine    Bipolar disorder ([facility] [address]    Memory problem    Abnormal CT of the chest    Ex-smoker    Neck pain    Other complicated headache syndrome    Ventral hernia without obstruction or gangrene    Left inguinal hernia    Intractable headache    Former smoker             MRI OF THE BRAIN WITHOUT AND WITH CONTRAST  12/24/2019 10:37 am       TECHNIQUE:   Multiplanar multisequence MRI of the head/brain was performed without and   with the administration of intravenous contrast.       COMPARISON:   CT brain performed 06/11/2017.       HISTORY:   ORDERING SYSTEM PROVIDED HISTORY: Memory problem   TECHNOLOGIST PROVIDED HISTORY:   migraines   Reason for Exam: Neck pain and left shoulder pain. Migraines. Symptoms for   quite a while. Acuity: Chronic   Type of Exam: Unknown   Additional signs and symptoms: Memory problem; Intractable migraine without   status migrainosus, unspecified migraine type.       FINDINGS:   INTRACRANIAL STRUCTURES/VENTRICLES:  The sellar and suprasellar structures,   optic chiasm, corpus callosum, pineal gland, tectum, and midline brainstem   structures are unremarkable.  The craniocervical junction is unremarkable. There is no acute intracranial hemorrhage, mass effect, or midline shift. There is satisfactory overall gray-white matter differentiation.  There is   chronic microvascular disease.  The ventricular structures are symmetric and   unremarkable.  The infratentorial structures including the cerebellopontine   angles and internal auditory canals are unremarkable. There is no abnormal   restricted diffusion.  There is no abnormal blooming artifact on   susceptibility weighted imaging.  There is no abnormal postcontrast   enhancement.       ORBITS: The visualized portion of the orbits demonstrate no acute abnormality.       SINUSES: The visualized paranasal sinuses and mastoid air cells are well   aerated.       BONES/SOFT TISSUES: The bone marrow signal intensity appears normal. The soft   tissues demonstrate no acute abnormality.           Impression   Chronic microvascular disease without acute intracranial abnormality.  No   abnormal postcontrast enhancement. MRI OF THE CERVICAL SPINE WITHOUT CONTRAST 12/24/2019 10:07 am       TECHNIQUE:   Multiplanar multisequence MRI of the cervical spine was performed without the   administration of intravenous contrast.       COMPARISON:   None.       HISTORY:   ORDERING SYSTEM PROVIDED HISTORY: Neck pain   TECHNOLOGIST PROVIDED HISTORY:   neck pain   Reason for Exam: Neck pain and left shoulder pain. Migraines. Symptoms for   quite a while.    Acuity: Chronic   Type of Exam: Unknown   Additional signs and symptoms: Neck pain       FINDINGS:   BONES/ALIGNMENT: There is straightening of the cervical spine.  The vertebral   body heights are maintained.  There is age-appropriate bone marrow signal.   There is mild edema within the left C3-4 facets.  There is multilevel   degenerative disc disease with loss of disc signal.  There is disc space   narrowing primarily at the C6-7 level.  There is no significant   spondylolisthesis.       SPINAL CORD: The spinal cord is normal in caliber and signal.  The visualized   intracranial structures are unremarkable.       SOFT TISSUES: The posterior paraspinal soft tissues are unremarkable.  The   prevertebral soft tissues are unremarkable.       C2-C3: There is no significant disc protrusion, spinal canal stenosis or   neural foraminal narrowing.       C3-C4: There is a mild disc osteophyte complex eccentric to the left with   associated uncovertebral and facet hypertrophy.  There is canal stenosis   measuring 8 mm in AP dimension.  There is moderate to severe left foraminal   narrowing and no significant right foraminal narrowing.       C4-C5: There is a disc osteophyte complex with uncovertebral and facet   hypertrophy.  There is canal stenosis measuring 9 mm in AP dimension.  There   is moderate right foraminal narrowing and no significant left foraminal   narrowing.       C5-C6: There is a disc osteophyte complex with uncovertebral and facet   hypertrophy that is worse on the left.  There is no significant canal   stenosis or right foraminal narrowing.  There is severe left foraminal   narrowing.       C6-C7: There is a disc osteophyte complex with uncovertebral and facet   hypertrophy.  There is canal stenosis measuring 8 mm in AP dimension.  There   is severe bilateral foraminal narrowing.       C7-T1: There is a disc osteophyte complex with uncovertebral and facet   hypertrophy.  There is no significant canal stenosis or foraminal narrowing.           Impression   Multilevel degenerative disc disease with associated uncovertebral and facet   hypertrophy resulting in canal stenosis most pronounced at C3-4 and C6-7.       Moderate to severe left foraminal narrowing at C3-4, moderate right foraminal   narrowing at C4-5, severe left foraminal narrowing at C5-6, and severe   bilateral foraminal narrowing at C6-7.       Edema is demonstrated within the left C3-4 facets that is likely related to   degenerative facet disease although a degree of septic arthritis is a   consideration and correlation is needed. MRI OF THE LUMBAR SPINE WITHOUT CONTRAST, 6/11/2018 10:22 am       TECHNIQUE:   Multiplanar multisequence MRI of the lumbar spine was performed without the   administration of intravenous contrast.       COMPARISON:   None       HISTORY:   ORDERING SYSTEM PROVIDED HISTORY: Lumbar facet joint syndrome   TECHNOLOGIST PROVIDED HISTORY:   Reason for exam:->right lumbar radiculopathy   Ordering Physician Provided Reason for Exam:  Low back pain to the right   side, right leg pain. Symptoms for about one week.    Acuity: Acute   Type of Exam: Initial   Additional signs and symptoms:  Lumbar facet syndrome; Right lumbar   radiculopathy       Initial evaluation.       FINDINGS:   BONES/ALIGNMENT: There is normal alignment of the spine. The vertebral body   heights are maintained. The bone marrow signal appears unremarkable.  There   is degenerative disc disease with disc space narrowing and osteophytes at   L3-4, L4-5, and L5-S1.       SPINAL CORD: The conus terminates normally.       SOFT TISSUES: No paraspinal mass identified. [place] is a cyst in the left   kidney that is not fully evaluated.  Ultrasound could better evaluate.       L1-L2:  The thecal sac and neural foramina are intact.       L2-L3:  There is mild facet and ligamentum flavum hypertrophy.  The thecal   sac is not stenotic.  Disc and/or osteophytes cause minimal narrowing of the   neural foramina bilaterally.       L3-L4: The thecal sac is not stenotic.   Disc and/or osteophytes result in   mild narrowing of the neural foramina bilaterally.   There is mild facet and   ligamentum flavum hypertrophy.       L4-L5:  There is moderate facet and ligamentum flavum hypertrophy.  The   thecal sac is not stenotic.  Disc and/or osteophytes result in moderate   narrowing of the neural foramina bilaterally. The left neural foramen is   narrowed greater than right.       L5-S1:  There is mild facet and ligamentum flavum hypertrophy.  The thecal   sac is not stenotic.  The S1 nerve roots are intact. Disc and/or osteophytes   result in moderate narrowing of the neural foramina bilaterally. The right   neural foramen is narrowed greater than the left.       There is no significant change in the findings from the prior study.           Impression   Disc and osteophytes result in narrowing of the neural foramina at several   levels as discussed above.  No significant stenosis of thecal sac in the   lumbar region.      CT OF THE HEAD WITHOUT CONTRAST  6/11/2017 8:49 pm       TECHNIQUE:   CT of the head was performed without the administration of intravenous   contrast. Dose modulation, iterative reconstruction, and/or weight based   adjustment of the mA/kV was utilized to reduce the radiation dose to as low   as reasonably achievable.       COMPARISON:   None.       HISTORY:   ORDERING SYSTEM PROVIDED HISTORY: neuro symptoms/ HIV   TECHNOLOGIST PROVIDED HISTORY:   Ordering Physician Provided Reason for Exam: Passed out while coughing and   hit forehead on table today, previous loss of consciousness spells in the   last weeks.  Neuro symptoms, reaction to HIV medication. Acuity: Acute   Type of Exam: Initial   Mechanism of Injury: passed out and fell   Relevant Medical/Surgical History: n/a       FINDINGS:   BRAIN/VENTRICLES: The ventricular system is mildly prominent in size and in   the midline.  No evidence of extra-axial fluid collection, hemorrhage or mass.       ORBITS: The visualized portion of the orbits demonstrate no acute abnormality.       SINUSES: Mild bilateral ethmoid sinus mucosal thickening.  Paranasal sinuses   visualized are otherwise clear.  Mastoid regions are poorly aerated likely a   developmental basis.       SOFT TISSUES/SKULL:  No acute abnormality of the visualized skull or soft   tissues.           Impression   No acute intracranial abnormality.         CT OF THE CHEST WITH CONTRAST 7/5/2017 2:06 pm       TECHNIQUE:   CT of the chest was performed with the administration of intravenous   contrast. Multiplanar reformatted images are provided for review. Dose   modulation, iterative reconstruction, and/or weight based adjustment of the   mA/kV was utilized to reduce the radiation dose to as low as reasonably   achievable.       COMPARISON:   Chest radiograph dated 06/27/2017       HISTORY:   ORDERING SYSTEM PROVIDED HISTORY: Pneumonia of right lower lobe due to   infectious organism   TECHNOLOGIST PROVIDED HISTORY:   Reason for exam:->pneumonia   Ordering Physician Provided Reason for Exam: Pneumonia of right lower lobe   due to infectious organism. Cough feeling 50 percent better x 2 weeks. Quit smoking 3 years ago after smoking for 50 years. Acuity: Acute   Type of Exam: Ongoing       FINDINGS:   The nodular opacity of concern in the right lower lobe on recent chest   radiograph corresponds to benign calcified granulomas in the inferior segment   of the right middle lobe.  Calcified pulmonary granulomas are also present in   the upper lobes bilaterally.       No pulmonary infiltrate or pleural effusion is present. I[place] Barnard is   centrilobular emphysema with an upper lobe predominance and scattered   interstitial scarring.  No pleural effusion is present.       No osteolytic or osteoblastic lesion is present in the included portion of   the axial skeleton.       The heart and great vessels are normal in caliber and appearance.  No   pericardial effusion is present. Iban Barnard is no pathologic mediastinal or hilar   lymphadenopathy.  Calcified right infrahilar lymph nodes reflect residue from   prior granulomatous infection.           Impression   1. Benign pulmonary granulomas.       2. No evidence of pneumonia.       3. Centrilobular emphysema. VISITING DIAGNOSIS:      ICD-10-CM    1. Bipolar affective disorder, current episode manic, current episode severity unspecified ([address]  F31.10    2. Memory problem  R41.3    3. Lumbar radicular pain  M54.16    4. Chronic bilateral low back pain with right-sided sciatica  M54.41     G89.29    5. Muscle spasm  M62.838    6. Lumbosacral spondylosis without myelopathy  M47.817    7. Centrilobular emphysema ([address]  J43.2    8. Former smoker  Z87.891    5. IFG (impaired fasting glucose)  R73.01    10. Neck pain  M54.2    11. Other complicated headache syndrome  G44.59 butalbital-acetaminophen-caffeine (FIORICET, ESGIC) -40 MG per tablet     ondansetron (ZOFRAN ODT) 4 MG disintegrating tablet   12. Chronic intractable headache, unspecified headache type  R51.9 SUMAtriptan (IMITREX) 100 MG tablet    G89.29    13.  Chronic tension-type headache, intractable  G44.221 butalbital-acetaminophen-caffeine (FIORICET, ESGIC) -40 MG per tablet     ondansetron (ZOFRAN ODT) 4 MG disintegrating tablet              CONCERNS   &   INCREASED   RISK   FOR         * TIA,  CEREBRO  VASCULAR  ISCHEMIA,      *    COMPLICATED  MIGRAINES      *   TENSION  HEADACHES      *   COGNITIVE  &   MEMORY PROBLEMS                      VARIOUS  RISK   FACTORS   WERE  REVIEWED   AND   DISCUSSED. *  PATIENT   HAS  MULTIPLE   MEDICAL, NEUROLOGICAL                        AND   MENTAL HEALTH   PROBLEMS . PATIENT'S   MANAGEMENT  IS  CHALLENGING. PLAN:                         Catalina Henderson  Of  The  Diagnoses,  The  Management & Treatment  Options            AND    Care  plan  Were          Reviewed and   Discussed   With  patient. * Goals  And  Expectations  Of  The  Therapy  Discussed   And  Reviewed. *   Benefits   And   Side  Effect  Profile  Of  Medication/s   Were   Discussed                * Need   For  Further   Follow up For  The  Various  Problems Were  discussed. * Results  Of  The  Previous  Diagnostic tests were reviewed and  discussed                 Medical  Decision  Making  Was  Made  Based on the   Complexity  Of  Above  Mentioned  Diagnoses,    Data reviewed             And    Risk  Of  Significant   Co morbidities and   complicating   Factors. Medical  Decision  Was   High     Complexity   Due   To  The  Patient's  Multiple  Symptoms,      Complex  Treatment  Regimen,  Multiple medications           and   Risk  Of   Side  Effects,  Difficulty  In  Medication  Management  And  Diagnostic  Challenges       In  View  Of  The  Associated   Co  Morbid  Conditions   And  Problems. *   BE  CAREFUL  WITH  ACTIVITIES   INCLUDING  DRIVING.         *   AVOID   NECK  AND/ BACK  STRAINING  ACTIVITIES        *   ADEQUATE   FLUIDINTAKE   AND  ELECTROLYTE  BALANCE         * KEEP  DAIRY  OF   THE  NEUROLOGICAL  SYMPTOMS RECORDING THE    DURATION  AND  FREQUENCY. *  AVOID    CONDITIONS  AND  FACTORS   THAT  MAKE                  NEUROLOGICAL  SYMPTOMS  WORSE.             *TO  MAINTAIN  REGULAR  SLEEP  WAKE  CYCLES. *   TO  HAVE  ADEQUATE  REST  AND   SLEEP    HOURS.            *    AVOID  ANY USAGE OF    TOBACCO,              EXCESSIVE  ALCOHOL  AND   ILLEGAL   SUBSTANCES        *  CONTINUE   MEDICATIONS    PRESCRIBED   BY NEUROLOGIST    AS    RECOMMENDED       *   Compliance   With  Medications   And  Instructions        * CURRENTLY    TOLERATING  THE  PRESCRIBED   MEDICATIONS. WITHOUT  ANY  SIGNIFICANT  SIDE  EFFECTS   &  GETTING BENEFIT.          *    MIGRAINE/ HEADACHE    DAIRY   WITH  MONITORING                       OF  DURATION  AND  FREQUENCY. *    Antiplatelet  therapy    As   Recommended  Was   Discussed      *    Prophylactic  Use   Of     Vitamin   B   Complex,  Folic  Acid,    Vitamin  B12    Multivitamin,       Calcium  With  magnesium  And  Vit D    Supplementations   Over  The  Counter  Discussed                    *  EVALUATIONS  AND  FOLLOW UP:                     *   OPTHALMOLOGY                                      * PAIN  MANAGEMENT                 *  HIV  CONSULTANTS                                *      CURRENTLY    PATIENT   FEELS  BETTER  WITH  HIS  MIGRAINES                                 AND   HEADACHES                                    AND  TOLERATING  THE  MEDICATIONS                                  FOR  THE  SAME. *      PATIENT  DENIES  ANY  NEW  NEUROLOGICAL  CONCERNS                             Controlled Substances Monitoring: Periodic Controlled Substance Monitoring: Possible medication side effects, risk of tolerance/dependence & alternative treatments discussed. , Assessed functional status.  Dawson Rehman MD)              Orders Placed This Encounter   Medications    topiramate (TOPAMAX) 50 MG tablet     Sig: Take 1 tablet by mouth 2 times daily     Dispense:  60 tablet     Refill:  2    SUMAtriptan (IMITREX) 100 MG tablet     Sig: TAKE 1 TABLET BY MOUTH ONCE AS NEEDED FOR MIGRAINE(NOT MORE THAN TWO MAXIUMUM PER DAY)     Dispense:  12 tablet     Refill:  2    butalbital-acetaminophen-caffeine (FIORICET, ESGIC) -40 MG per tablet     Sig: TAKE 1 TO 2 TABLETS BY MOUTH TWICE DAILY AS NEEDED FOR HEADACHE     Dispense:  60 tablet     Refill:  2    omeprazole (PRILOSEC) 40 MG delayed release capsule     Sig: Take 1 capsule by mouth daily     Dispense:  90 capsule     Refill:  1    ondansetron (ZOFRAN ODT) 4 MG disintegrating tablet     Sig: Take 1 tablet by mouth every 8 hours as needed for Nausea or Vomiting     Dispense:  30 tablet     Refill:  2                 *PATIENT   TO  FOLLOW  UP  WITH   PRIMARY  CARE         OTHER  CONSULTANTS  AS  BEFORE.           *TO  FOLLOW  WITH   MENTAL  HEALTH  PROFESSIONALS ,  INCLUDING            PSYCHOLOGICAL  COUNSELING   AND  PSYCHIATRIC  EVALUTIONS,                 *  Maintain   Healthy  Life Style    With   Periodic  Monitoring  Of      Any  Medical  Conditions  Including   Elevated  Blood  Pressure,  Lipid  Profile,     Blood  Sugar levels  AndHeart  Disease. *   Period   Screening  For  Cancers  Involving  Breast,  Colon,    lungs  And  Other  Organs  As  Applicable,  In consultation   With  Your  Primary Care Providers. *Second  Neurological  Opinion  And  Evaluations  In  [facility]  Setting  If  Patient  Is  Interested. * Please   Contact   Neurology  Clinic   Early   If   Are  Any  New  Neurological   And  Any neurological  Concerns.                   *  If  The  Patient remains  Neurologically  Stable   Return   To  [facility] Neurology Department   IN          3         MONTHS  TIME   FOR  FURTHER              FOLLOW UP.                       *   The  Neurological   Findings,  Possible  Diagnosis,  Differential diagnoses And  Options  For    Further   Investigations                   And  management   Are  Discussed  Comprehensively. Medications   And  Prescription   Risks  And  Side effects  Are   Also  Discussed. *  If   There is  Any  Significant  Worsening   Of  Current  Symptoms  And  Or                  If patient  Develops   Any additional  New  NeurologicalSymptoms                  Or  Significant  Concerns   Should  Call  911 or  Go  To  Emergency  Department  For  Further  Immediate  Evaluation. The   Above  Were  Reviewed  With  patient   and                       questions  Answered  In  Detail. More   Than  50% of face  To face Time   Was  Spent  On  Counseling                    And   Coordination  Of  Care   Of   Patient's  multiple   Neurological  Problems                         And   Comorbid  Medical   Conditions. Electronically signed by Colletta Stabs, MD.,   [facility]      Board Certified in  Neurology &  In  [facility] of Psychiatry and Neurology (ABPN)      DISCLAIMER:   Although every effort was made to ensure the accuracy of this  electronictranscription,   some errors in transcription may have occurred. GENERAL PATIENT INSTRUCTIONS:      A Healthy Lifestyle: Care Instructions   Your Care Instructions   A healthy lifestyle can help you feel good, stay at ahealthy weight, and have plenty of energy for both work and play. A healthy lifestyle is something you can share with your whole family.  A healthy lifestyle also can lower your risk for serious health problems, such ashigh blood pressure, heart disease, and diabetes.  You can follow a few steps listed below to improve your health and the health of your family.  Follow-up careis a key part of your treatment and safety.  Be sure to make and go to all appointments, and call your doctor if you are having problems. Its also a good idea to know your test results and keep a list of the medicines you take.  How can you care for yourself at home?  Do not eat too much sugar, fat, or fast foods. You can still have dessert and treats nowand then. The goal is moderation.  Start small to improve your eating habits. Pay attention to portion sizes, drink less juice and soda pop, and eat more fruits and vegetables.  Eat a healthy amount of food. A 3-ounce serving of meat, for example, is about the size of a deck of cards. Fill the rest of your plate with vegetables and whole grains.  Limit theamount of soda and sports drinks you have every day. Drink more water when you are thirsty.  Eat at least 5 servings of fruits and vegetables every day. It may seem like a lot, but it is not hard to reach this goal. Aserving or helping is 1 piece of fruit, 1 cup of vegetables, or 2 cups of leafy, raw vegetables. Have an apple or some carrot sticks as an afternoon snack instead of a candy bar. Try to have fruits and/or vegetables at everymeal.   Make exercise part of your daily routine. You may want to start with simple activities, such as walking, bicycling, or slow swimming. Try kay active 30 to 60 minutes every day. You do not need to do all 30 to 60 minutes all at once. For example, you can exercise 3 times a day for 10 or 20 minutes. Moderate exercise is safe for most people, but it is always agood idea to talk to your doctor before starting an exercise program.   Keep moving. Tabitha Laws the lawn, work in the garden, or [employer]. Take the stairs instead of the elevator at work.  If you smoke, quit. Peoplewho smoke have an increased risk for heart attack, stroke, cancer, and other lung illnesses. Quitting is hard, but there are ways to boost your chance of quitting tobacco for good.  Use nicotine gum, patches, or lozenges.  Ask your doctor about stop-smoking programs and medicines.  Keep trying.    In addition to reducing your risk of diseases in the future, you will notice some benefits soon after you stop using tobacco. If you have shortness of breath or asthma symptoms, they will likely getbetter within a few weeks after you quit.  Limit how much alcohol you drink. Moderate amounts of alcohol (up to 2 drinks a day for men, 1drink a day for women) are okay. But drinking too much can lead to liver problems, high blood pressure, and other health problems.  health   If you have a family, there are many things you can do together to improve your health.  Eat meals together as a family as often as possible.  Eat healthy foods. This includes fruits, vegetables, lean meats and dairy, and whole grains.  Include your family in your fitness plan. Most peoplethink of activities such as jogging or tennis as the way to fitness, but there are many ways you and your family can be more active. Anything that makes you breathe hard and gets your heart pumping is exercise. Here are sometips:   Walk to do errands or to take your child to school or the bus.  Go for a family bike ride after dinner instead of watching TV.  Where can you learn more?  Go toEveryday [email or web address]. org and sign in to your [employer] account. Enter Y564 in the Search HealthInformation box to learn more about \"A Healthy Lifestyle: Care Instructions. \"     If you do not have anaccount, please click on the \"Sign Up Now\" link.  Current as of: July 26, 2016   Content Version: 11.2   © 1610-5576 [employer]. Care instructions adapted under license by [facility] ([facility]). If you have questions about a medical condition or this instruction, always ask your healthcare professional. [employer] disclaims any warranty or liability for your use of this information.

## 2021-07-09 NOTE — PATIENT INSTRUCTIONS
Olimpia Whigham *   ADEQUATE   FLUID  INTAKE   AND  ELECTROLYTE  BALANCE           * KEEP  DAIRY  OF   THE  NEUROLOGICAL  SYMPTOMS          *  TO  MAINTAIN  REGULAR  SLEEP  WAKE  CYCLES. *   TO  HAVE  ADEQUATE  REST  AND   SLEEP    HOURS.        *    AVOID  USAGE OF   TOBACCO,  EXCESSIVE  ALCOHOL                AND   ILLEGAL   SUBSTANCES,  IF  ANY          *  Maintain   Healthy  Life Style    With   Periodic  Monitoring  Of      Any  Medical  Conditions  Including   Elevated  Blood  Pressure,  Lipid  Profile,     Blood  Sugar levels  And   Heart  Disease. *   Period   Screening  For  Cancers  Involving  Breast,  Colon,   lungs  And  Other  Organs  As  Applicable,  In consultation   With  Your  Primary Care Providers. *  If   There is  Any  Significant  Worsening   Of  Current  Symptoms  And  Or  If    Any additional  New  Neurological  Symptoms                 Or  Significant  Concerns   Should  Call  911 or  Go  To  Emergency  Department  For  Further  Immediate  Evaluation.

## 2021-08-16 RX ORDER — PROPRANOLOL HCL 60 MG
CAPSULE, EXTENDED RELEASE 24HR ORAL
COMMUNITY
Start: 2021-08-15

## 2021-08-16 RX ORDER — PITAVASTATIN CALCIUM 4.18 MG/1
TABLET, FILM COATED ORAL
COMMUNITY
Start: 2021-08-11

## 2021-08-16 RX ORDER — GABAPENTIN 600 MG/1
TABLET ORAL
COMMUNITY
Start: 2021-08-09 | End: 2021-08-17

## 2021-08-16 RX ORDER — CYCLOBENZAPRINE HCL 5 MG
TABLET ORAL
COMMUNITY
Start: 2021-07-28

## 2021-08-16 RX ORDER — DULOXETIN HYDROCHLORIDE 60 MG/1
CAPSULE, DELAYED RELEASE ORAL
COMMUNITY
Start: 2021-07-23 | End: 2021-08-31

## 2021-08-16 RX ORDER — HYDROXYZINE HYDROCHLORIDE 10 MG/1
10 TABLET, FILM COATED ORAL 2 TIMES DAILY PRN
COMMUNITY
Start: 2021-07-19

## 2021-08-17 ENCOUNTER — INITIAL CONSULT (OUTPATIENT)
Dept: SURGERY | Age: 56
End: 2021-08-17
Payer: MEDICARE

## 2021-08-17 ENCOUNTER — HOSPITAL ENCOUNTER (OUTPATIENT)
Dept: LAB | Age: 56
Discharge: HOME | End: 2021-08-17
Payer: MEDICARE

## 2021-08-17 ENCOUNTER — HOSPITAL ENCOUNTER (OUTPATIENT)
Age: 56
Setting detail: SPECIMEN
Discharge: HOME | End: 2021-08-17
Payer: MEDICARE

## 2021-08-17 VITALS
OXYGEN SATURATION: 93 % | HEART RATE: 92 BPM | BODY MASS INDEX: 31.75 KG/M2 | DIASTOLIC BLOOD PRESSURE: 74 MMHG | SYSTOLIC BLOOD PRESSURE: 130 MMHG | HEIGHT: 71 IN | WEIGHT: 226.8 LBS | TEMPERATURE: 97.2 F

## 2021-08-17 DIAGNOSIS — R19.7 DIARRHEA, UNSPECIFIED TYPE: Primary | ICD-10-CM

## 2021-08-17 DIAGNOSIS — R19.7 DIARRHEA, UNSPECIFIED TYPE: ICD-10-CM

## 2021-08-17 LAB
ALBUMIN SERPL-MCNC: 4.1 G/DL (ref 3.5–5.2)
ALBUMIN/GLOBULIN RATIO: 1.5 (ref 1–2.5)
ALP BLD-CCNC: 102 U/L (ref 40–129)
ALT SERPL-CCNC: 20 U/L (ref 5–41)
ANION GAP SERPL CALCULATED.3IONS-SCNC: 9 MMOL/L (ref 9–17)
AST SERPL-CCNC: 21 U/L
BILIRUB SERPL-MCNC: 0.14 MG/DL (ref 0.3–1.2)
BUN BLDV-MCNC: 14 MG/DL (ref 6–20)
BUN/CREAT BLD: 15 (ref 9–20)
CALCIUM SERPL-MCNC: 9.1 MG/DL (ref 8.6–10.4)
CHLORIDE BLD-SCNC: 105 MMOL/L (ref 98–107)
CO2: 24 MMOL/L (ref 20–31)
CREAT SERPL-MCNC: 0.93 MG/DL (ref 0.7–1.2)
GFR AFRICAN AMERICAN: >60 ML/MIN
GFR NON-AFRICAN AMERICAN: >60 ML/MIN
GFR SERPL CREATININE-BSD FRML MDRD: ABNORMAL ML/MIN/{1.73_M2}
GFR SERPL CREATININE-BSD FRML MDRD: ABNORMAL ML/MIN/{1.73_M2}
GLUCOSE BLD-MCNC: 125 MG/DL (ref 70–99)
HCT VFR BLD CALC: 42.8 % (ref 40.7–50.3)
HEMOGLOBIN: 14 G/DL (ref 13–17)
MCH RBC QN AUTO: 31.8 PG (ref 25.2–33.5)
MCHC RBC AUTO-ENTMCNC: 32.7 G/DL (ref 25.2–33.5)
MCV RBC AUTO: 97.3 FL (ref 82.6–102.9)
NRBC AUTOMATED: 0 PER 100 WBC
PDW BLD-RTO: 12.7 % (ref 11.8–14.4)
PLATELET # BLD: 250 K/UL (ref 138–453)
PMV BLD AUTO: 9.2 FL (ref 8.1–13.5)
POTASSIUM SERPL-SCNC: 4.2 MMOL/L (ref 3.7–5.3)
RBC # BLD: 4.4 M/UL (ref 4.21–5.77)
SODIUM BLD-SCNC: 138 MMOL/L (ref 135–144)
TOTAL PROTEIN: 6.8 G/DL (ref 6.4–8.3)
WBC # BLD: 9.4 K/UL (ref 3.5–11.3)

## 2021-08-17 PROCEDURE — 87209 SMEAR COMPLEX STAIN: CPT

## 2021-08-17 PROCEDURE — 87210 SMEAR WET MOUNT SALINE/INK: CPT

## 2021-08-17 PROCEDURE — G8427 DOCREV CUR MEDS BY ELIG CLIN: HCPCS | Performed by: SURGERY

## 2021-08-17 PROCEDURE — G8417 CALC BMI ABV UP PARAM F/U: HCPCS | Performed by: SURGERY

## 2021-08-17 PROCEDURE — 80053 COMPREHEN METABOLIC PANEL: CPT

## 2021-08-17 PROCEDURE — 87329 GIARDIA AG IA: CPT

## 2021-08-17 PROCEDURE — 36415 COLL VENOUS BLD VENIPUNCTURE: CPT

## 2021-08-17 PROCEDURE — 87328 CRYPTOSPORIDIUM AG IA: CPT

## 2021-08-17 PROCEDURE — 1036F TOBACCO NON-USER: CPT | Performed by: SURGERY

## 2021-08-17 PROCEDURE — 99213 OFFICE O/P EST LOW 20 MIN: CPT | Performed by: SURGERY

## 2021-08-17 PROCEDURE — 82705 FATS/LIPIDS FECES QUAL: CPT

## 2021-08-17 PROCEDURE — 99214 OFFICE O/P EST MOD 30 MIN: CPT | Performed by: SURGERY

## 2021-08-17 PROCEDURE — 87015 SPECIMEN INFECT AGNT CONCNTJ: CPT

## 2021-08-17 PROCEDURE — 87506 IADNA-DNA/RNA PROBE TQ 6-11: CPT

## 2021-08-17 PROCEDURE — 83630 LACTOFERRIN FECAL (QUAL): CPT

## 2021-08-17 PROCEDURE — 85027 COMPLETE CBC AUTOMATED: CPT

## 2021-08-17 PROCEDURE — 3017F COLORECTAL CA SCREEN DOC REV: CPT | Performed by: SURGERY

## 2021-08-17 PROCEDURE — 83520 IMMUNOASSAY QUANT NOS NONAB: CPT

## 2021-08-17 NOTE — PROGRESS NOTES
Subjective   Pat Sanz is a 64 y.o. male who presents today for evaluation of diarrhea. Patient reports for approximately the past 1 year he has been experiencing diarrhea. On further questioning he states that he typically has 1-2 bowel movements per day. However he does report that often times bowel movements are loose. During this time he states that when he does have urge to have bowel movement usually it and urge where he feels like he has to get to the bathroom immediately. However he is also reporting that sometimes he will have the urge to have bowel movements but cannot actually pass stool. Most stools are loose but does occasionally report soft formed stools. The patient has been using Imodium for almost a year now and states that initially he was using this once twice daily with good results but is now up to 3-4 times daily most days. He has been seen by his PCP for this and it does not seem that there is been any testing done but he was started on cholestyramine. He states initially this did give him some relief but it began to become ineffective after taking it for a while. The patient reports that his symptoms seem to begin after he was taken off of one of his HIV medications. He follows with infectious disease in [facility] and states that year ago he was taken off of the medication that he was told was to help with diarrhea from HIV. Soon after he began to have the symptoms. For reasons unknown this medication was stopped and has not been restarted. She had colonoscopy approximately 4 years ago for screening and review of results shows that there was no abnormal findings in the colon and only diagnosis was internal hemorrhoids. Denies any significant blood per rectum. No other changes in bowel movements. Denies any family history of intestinal problems, colon cancer or inflammatory bowel disease.     Past Medical History:   Diagnosis Date    Bipolar disorder ([facility] [address]     follows with Dr. Sarah Steeleil Centrilobular emphysema [facility])     HIV (human immunodeficiency virus infection) ([facility] [address] 2001    followed by Ana Thakkar (Infectious Disease at [address] [place] [address])    Pneumonia 06/11/2017       Past Surgical History:   Procedure Laterality Date    ANESTHESIA NERVE BLOCK Bilateral 1/12/2018    Bilat L2 L3 L4 L5 Diagnostic Medial BB performed by Kaylee Messina MD at [address] [place] [address]  01/05/2016    Normal colon. Hemorrhoids.  Dr Gabriela Hill  2015    full dental extraction    HERNIA REPAIR Left 3/2/2020    Laparoscopic Robotic Assisted Left Inguinal HERNIA REPAIR WITH MESH &VENTRAL  hernia repair performed by Destiny Joseph DO at [address] [place] [address] Dr Cifuentes 9/26/2017    Right L4 & L5 Transforaminal  performed by Kaylee Messina MD at [address] [place] [address] Dr Cifuentes 10/3/2017    Right L4 & L5 Transforaminal performed by Kaylee Messina MD at [address] [place] [address] Dr Cifuentes 1/8/2019    Right L4 L5 TRANSFORAMINAL performed by Kaylee Messina MD at [address] [place] [address] AA&/STRD LUMBAR PLEXUS CONT NFS CATH Bilateral 1/19/2018    Bilat L2 L3 L4 L5 Confirmatory Medial BB performed by Kaylee Messina MD at [address] [place] [address] 1, W IMAGE GUIDE,EA ADDL LEVEL Right 1/25/2018    Right L2 L3 L4 L5 RADIOFREQUENCY performed by Kaylee Messina MD at [address] [place] [address] 1, W IMAGE GUIDE,EA ADDL LEVEL Left 3/15/2018    Left L2 L3 L4 L5 RADIOFREQUENCY performed by Kaylee Messina MD at [address] [place] [address]       Current Outpatient Medications   Medication Sig Dispense Refill    hydrOXYzine (ATARAX) 10 MG tablet Take 10 mg by mouth 2 times daily as needed      cyclobenzaprine (FLEXERIL) 5 MG tablet       DULoxetine (CYMBALTA) 60 MG extended release capsule TAKE 2 CAPSULES BY MOUTH DAILY      LIVALO 4 MG TABS tablet TAKE 1 TABLET BY MOUTH DAILY      propranolol (INDERAL LA) 60 MG extended release capsule       butalbital-acetaminophen-caffeine (FIORICET, ESGIC) -40 MG per tablet TAKE 1 TO 2 TABLETS BY MOUTH TWICE DAILY AS NEEDED FOR HEADACHE 60 tablet 2    cholestyramine light 4 g packet Take 1 packet by mouth daily      nystatin (MYCOSTATIN) 425243 UNIT/GM cream Apply 1 Dose topically daily      Loratadine-Pseudoephedrine (CLARITIN-D 24 HOUR PO) Take by mouth daily as needed      loperamide (IMODIUM) 2 MG capsule Take 2 mg by mouth 4 times daily as needed for Diarrhea      fluticasone (FLONASE) 50 MCG/ACT nasal spray SHAKE LIQUID AND USE 1 SPRAY IN EACH NOSTRIL DAILY 48 g 0    SYMTUZA [phone] MG TABS TK 1 T PO  QD  6    aspirin-acetaminophen-caffeine (EXCEDRIN MIGRAINE) 250-250-65 MG per tablet Take 1 tablet by mouth every 6 hours as needed for Headaches       Clotrimazole (MYCELEX) 10 MG LOZG lozenge Take 1 lozenge by mouth 5 times daily 70 lozenge 1    fenofibric acid (FIBRICOR) 135 MG CPDR capsule TAKE 1 CAPSULE BY MOUTH DAILY 90 capsule 0    LORazepam (ATIVAN) 1 MG tablet Take 1 mg by mouth every 6 hours as needed for Anxiety.  Darunavir-Cobicistat (PREZCOBIX) 800-150 MG TABS Take by mouth nightly      albuterol (PROVENTIL) (2.5 MG/3ML) 0.083% nebulizer solution Take 2.5 mg by nebulization every 6 hours as needed for Wheezing       TURMERIC PO Take by mouth three times daily      Glucosamine HCl (GLUCOSAMINE PO) Take by mouth daily      Multiple Vitamins-Minerals (MULTIVITAMIN PO) Take by mouth daily      INVEGA 6 MG extended release tablet Take 6 mg by mouth daily      DESCOVY 200-25 MG TABS Take 1 tablet by mouth daily      MYTESI 125 MG TBEC Take 125 mg by mouth      traMADol (ULTRAM) 50 MG tablet Take 1 tablet by mouth as needed.  (Patient not taking: Reported on 8/17/2021)      topiramate (TOPAMAX) 50 MG tablet Take 1 tablet by mouth 2 times daily (Patient not taking: Reported on 8/17/2021) 60 tablet 2    SUMAtriptan (IMITREX) 100 MG tablet TAKE 1 TABLET BY MOUTH ONCE AS NEEDED FOR MIGRAINE(NOT MORE THAN TWO MAXIUMUM PER DAY) (Patient not taking: Reported on 2021) 12 tablet 2    omeprazole (PRILOSEC) 40 MG delayed release capsule Take 1 capsule by mouth daily (Patient not taking: Reported on 2021) 90 capsule 1    ondansetron (ZOFRAN ODT) 4 MG disintegrating tablet Take 1 tablet by mouth every 8 hours as needed for Nausea or Vomiting (Patient not taking: Reported on 2021) 30 tablet 2    naproxen (EC NAPROSYN) 500 MG EC tablet Take 1 tablet by mouth daily (Patient not taking: Reported on 2021)      gabapentin (NEURONTIN) 300 MG capsule TAKE 1 CAPLET BY MOUTH THREE TIMES DAILY 270 capsule 2    baclofen (LIORESAL) 10 MG tablet TAKE 1 TABLET BY MOUTH THREE TIMES DAILY AS NEEDED FOR SPASMS (Patient not taking: Reported on 2021) 270 tablet 0    Nebulizers (COMPRESSOR/NEBULIZER) MISC As directed (Patient not taking: Reported on 2021) 1 each 0    Respiratory Therapy Supplies (NEBULIZER/TUBING/MOUTHPIECE) KIT 1 kit by Does not apply route daily (Patient not taking: Reported on 2021) 1 kit 0     No current facility-administered medications for this visit. Allergies   Allergen Reactions    Pcn [Penicillins] Rash    Sulfa Antibiotics Rash       Family History   Problem Relation Age of Onset    Cancer Mother         uterine s/p hysterectomy    Cancer Maternal Grandmother        Social History     Socioeconomic History    Marital status: Single     Spouse name: Not on file    Number of children: Not on file    Years of education: Not on file    Highest education level: Not on file   Occupational History    Not on file   Tobacco Use    Smoking status: Former Smoker     Packs/day: 3.00     Years: 30.00     Pack years: 90.00     Quit date: 10/7/2014     Years since quittin.8    Smokeless tobacco: Never Used   Vaping Use    Vaping Use: Never used   Substance and Sexual Activity    Alcohol use: Yes     Comment: socially    Drug use:  No  Sexual activity: Not on file   Other Topics Concern    Not on file   Social History Narrative    Not on file     Social Determinants of Health     Financial Resource Strain:     Difficulty of Paying Living Expenses:    Food Insecurity:     Worried About Running Out of Food in the Last Year:     [address] in the Last Year:    Transportation Needs:     Lack of Transportation (Medical):  Lack of Transportation (Non-Medical):    Physical Activity:     Days of Exercise per Week:     Minutes of Exercise per Session:    Stress:     Feeling of Stress :    Social Connections:     Frequency of Communication with Friends and Family:     Frequency of Social Gatherings with Friends and Family:     Attends Holiness Services:     Active Member of Clubs or Organizations:     Attends Club or Organization Meetings:     Marital Status:    Intimate Partner Violence:     Fear of Current or Ex-Partner:     Emotionally Abused:     Physically Abused:     Sexually Abused:        ROS:   Review of Systems - General ROS: negative  Psychological ROS: negative  Ophthalmic ROS: negative  ENT ROS: negative  Respiratory ROS: no cough, shortness of breath, or wheezing  Cardiovascular ROS: no chest pain or dyspnea on exertion  Gastrointestinal ROS: no abdominal pain, change in bowel habits, or black or bloody stools  Genito-Urinary ROS: no dysuria, trouble voiding, or hematuria  Musculoskeletal ROS: negative  Dermatological ROS: negative      Objective   Vitals:    08/17/21 0919   BP: 130/74   Pulse: 92   Temp: 97.2 °F (36.2 °C)   SpO2: 93%     General:in no apparent distress and well developed and well nourished  Eyes: No gross abnormalities.   Ears, Nose, Throat: hearing grossly normal bilaterally  Neck: neck supple and non tender without mass  Lungs: clear to auscultation without wheezes or rales   Heart: S1S2, no mumurs, RRR  Abdomen: soft, nontender, no HSM, no guarding, no rebound, no masses  Extremity: negative  Neuro: CN II-XII grossly intact      Assessment     1. Chronic diarrhea for past 1 year  2. HIV      Plan     1. At this point patient's diarrhea is classified as chronic. No clear causes identified. However as the patient was on a medication that he was told was for diarrhea related to his HIV status it may be that this is related to his his HIV. However as he does continue to have problems going to go ahead and start work-up with stool studies and lab work to evaluate for any other possible sources of his ongoing diarrhea. Stool studies entered and blood work will be obtained. Will review results and make additional recommendations based on those results.     Electronically signed by Lamont Lilly DO on 8/17/2021 at 9:43 AM      (Please note that portions of this note were completed with a voice recognition program.  Efforts were made to edit the dictations but occasionally words are mis-transcribed.)

## 2021-08-18 ENCOUNTER — HOSPITAL ENCOUNTER (OUTPATIENT)
Dept: INTERVENTIONAL RADIOLOGY/VASCULAR | Age: 56
Discharge: HOME | End: 2021-08-20
Payer: MEDICARE

## 2021-08-18 DIAGNOSIS — R19.7 DIARRHEA, UNSPECIFIED TYPE: ICD-10-CM

## 2021-08-18 DIAGNOSIS — N28.1 SIMPLE RENAL CYST: ICD-10-CM

## 2021-08-18 LAB
Lab: NORMAL
MICRO OVA & PARASITES: NORMAL
SPECIMEN DESCRIPTION: NORMAL

## 2021-08-18 PROCEDURE — 76770 US EXAM ABDO BACK WALL COMP: CPT

## 2021-08-19 LAB
DIRECT EXAM: NORMAL
DIRECT EXAM: NORMAL
LACTOFERRIN, QUAL: NORMAL
Lab: NORMAL
SPECIMEN DESCRIPTION: NORMAL

## 2021-08-20 LAB
CAMPYLOBACTER PCR: NORMAL
E COLI ENTEROTOXIGENIC PCR: NORMAL
FECAL PANCREATIC ELASTASE-1: 139 UG/G
PLESIOMONAS SHIGELLOIDES PCR: NORMAL
SALMONELLA PCR: NORMAL
SHIGATOXIN GENE PCR: NORMAL
SHIGELLA SP PCR: NORMAL
SPECIMEN DESCRIPTION: NORMAL
VIBRIO PCR: NORMAL
YERSINIA ENTEROCOLITICA PCR: NORMAL

## 2021-08-21 LAB
FAT QUALITATIVE SPLIT STOOL: NORMAL
FECAL NEUTRAL FAT: NORMAL

## 2021-08-26 DIAGNOSIS — K86.89 PANCREATIC INSUFFICIENCY: Primary | ICD-10-CM

## 2021-08-31 RX ORDER — DULOXETIN HYDROCHLORIDE 60 MG/1
CAPSULE, DELAYED RELEASE ORAL
Qty: 60 CAPSULE | Refills: 11 | OUTPATIENT
Start: 2021-08-31 | End: 2022-08-01

## 2021-09-07 RX ORDER — GABAPENTIN 800 MG/1
800 TABLET ORAL 3 TIMES DAILY
Qty: 90 TABLET | Refills: 5 | Status: SHIPPED | OUTPATIENT
Start: 2021-09-07 | End: 2022-03-21

## 2021-09-07 NOTE — TELEPHONE ENCOUNTER
Gabapentin 800mg requested. The patient last received 300mg. Lm for him to call back, we need to find out the strength and directions for his gabapentin.      Last Appt:  7/2/2021  Next Appt:   Visit date not found  Med verified in [address] [place] [address] Rd

## 2021-09-10 DIAGNOSIS — M47.816 LUMBAR FACET JOINT SYNDROME: ICD-10-CM

## 2021-09-10 RX ORDER — GABAPENTIN 600 MG/1
TABLET ORAL
Qty: 90 TABLET | Refills: 5 | OUTPATIENT
Start: 2021-09-10 | End: 2021-10-15

## 2021-09-14 ENCOUNTER — OFFICE VISIT (OUTPATIENT)
Dept: OPTOMETRY | Age: 56
End: 2021-09-14
Payer: COMMERCIAL

## 2021-09-14 DIAGNOSIS — H52.203 HYPEROPIA OF BOTH EYES WITH ASTIGMATISM AND PRESBYOPIA: Primary | ICD-10-CM

## 2021-09-14 DIAGNOSIS — H52.03 HYPEROPIA OF BOTH EYES WITH ASTIGMATISM AND PRESBYOPIA: Primary | ICD-10-CM

## 2021-09-14 DIAGNOSIS — H52.4 HYPEROPIA OF BOTH EYES WITH ASTIGMATISM AND PRESBYOPIA: Primary | ICD-10-CM

## 2021-09-14 PROCEDURE — 92004 COMPRE OPH EXAM NEW PT 1/>: CPT | Performed by: OPTOMETRIST

## 2021-09-14 ASSESSMENT — REFRACTION_MANIFEST
OD_SPHERE: +0.50
OS_CYLINDER: DS
OD_CYLINDER: -0.50
OD_AXIS: 102
OD_ADD: +2.50
OS_ADD: +2.50
OS_SPHERE: +0.50

## 2021-09-14 ASSESSMENT — ENCOUNTER SYMPTOMS
ALLERGIC/IMMUNOLOGIC NEGATIVE: 0
RESPIRATORY NEGATIVE: 0
GASTROINTESTINAL NEGATIVE: 0
EYES NEGATIVE: 0

## 2021-09-14 ASSESSMENT — KERATOMETRY
OD_K1POWER_DIOPTERS: 42.50
OD_AXISANGLE_DEGREES: 079
OS_AXISANGLE_DEGREES: 094
OS_K2POWER_DIOPTERS: 43.25
OD_K2POWER_DIOPTERS: 43.00
OD_AXISANGLE2_DEGREES: 169
OS_K1POWER_DIOPTERS: 42.75
METHOD_AUTO_MANUAL: AUTOMATED
OS_AXISANGLE2_DEGREES: 004

## 2021-09-14 ASSESSMENT — TONOMETRY
IOP_METHOD: NON-CONTACT AIR PUFF
OD_IOP_MMHG: 19
OS_IOP_MMHG: 18

## 2021-09-14 ASSESSMENT — SLIT LAMP EXAM - LIDS
COMMENTS: NORMAL
COMMENTS: NORMAL

## 2021-09-14 ASSESSMENT — VISUAL ACUITY
OS_SC: 20/25
OD_SC: 20/20
OD_SC: 20/70 OU
METHOD: SNELLEN - LINEAR

## 2021-09-14 ASSESSMENT — REFRACTION_WEARINGRX
SPECS_TYPE: BIFOCAL
OD_SPHERE: NOT WITH

## 2021-09-14 NOTE — PROGRESS NOTES
Jeff Palomino presents today for   Chief Complaint   Patient presents with    Vision Exam    Blurred Vision   . HPI     Blurred Vision     In both eyes. Vision is blurred. Context:  distance vision and reading. Comments     Last Vision Exam: 2019 Dr. Srikanth Cid   Last Ophthalmology Exam: n/a  Last Filled Glasses Rx: 2019  Insurance: Rocky Simmonds  Update: Glasses  Distance and reading are getting more blurry  Only wears glasses to read  Needs new glasses; Mostly wears the glasses to read but has a bifocal;  With a line                Current Outpatient Medications   Medication Sig Dispense Refill    gabapentin (NEURONTIN) 600 MG tablet TAKE 1 TABLET BY MOUTH THREE TIMES DAILY 90 tablet 5    gabapentin (NEURONTIN) 800 MG tablet Take 1 tablet by mouth 3 times daily for 30 days.  90 tablet 5    DULoxetine (CYMBALTA) 60 MG extended release capsule TAKE 2 CAPSULES BY MOUTH DAILY 60 capsule 11    hydrOXYzine (ATARAX) 10 MG tablet Take 10 mg by mouth 2 times daily as needed      cyclobenzaprine (FLEXERIL) 5 MG tablet       LIVALO 4 MG TABS tablet TAKE 1 TABLET BY MOUTH DAILY      propranolol (INDERAL LA) 60 MG extended release capsule       butalbital-acetaminophen-caffeine (FIORICET, ESGIC) -40 MG per tablet TAKE 1 TO 2 TABLETS BY MOUTH TWICE DAILY AS NEEDED FOR HEADACHE 60 tablet 2    cholestyramine light 4 g packet Take 1 packet by mouth daily      nystatin (MYCOSTATIN) [id number] UNIT/GM cream Apply 1 Dose topically daily      Loratadine-Pseudoephedrine (CLARITIN-D 24 HOUR PO) Take by mouth daily as needed      loperamide (IMODIUM) 2 MG capsule Take 2 mg by mouth 4 times daily as needed for Diarrhea      fluticasone (FLONASE) 50 MCG/ACT nasal spray SHAKE LIQUID AND USE 1 SPRAY IN EACH NOSTRIL DAILY 48 g 0    SYMTUZA [phone] MG TABS TK 1 T PO  QD  6    aspirin-acetaminophen-caffeine (EXCEDRIN MIGRAINE) 250-250-65 MG per tablet Take 1 tablet by mouth every 6 hours as needed for Headaches  Clotrimazole (MYCELEX) 10 MG LOZG lozenge Take 1 lozenge by mouth 5 times daily 70 lozenge 1    fenofibric acid (FIBRICOR) 135 MG CPDR capsule TAKE 1 CAPSULE BY MOUTH DAILY 90 capsule 0    LORazepam (ATIVAN) 1 MG tablet Take 1 mg by mouth every 6 hours as needed for Anxiety.  Darunavir-Cobicistat (PREZCOBIX) 800-150 MG TABS Take by mouth nightly      albuterol (PROVENTIL) (2.5 MG/3ML) 0.083% nebulizer solution Take 2.5 mg by nebulization every 6 hours as needed for Wheezing       TURMERIC PO Take by mouth three times daily      Glucosamine HCl (GLUCOSAMINE PO) Take by mouth daily      Multiple Vitamins-Minerals (MULTIVITAMIN PO) Take by mouth daily      INVEGA 6 MG extended release tablet Take 6 mg by mouth daily      DESCOVY 200-25 MG TABS Take 1 tablet by mouth daily      MYTESI 125 MG TBEC Take 125 mg by mouth      traMADol (ULTRAM) 50 MG tablet Take 1 tablet by mouth as needed.  (Patient not taking: Reported on 8/17/2021)      topiramate (TOPAMAX) 50 MG tablet Take 1 tablet by mouth 2 times daily (Patient not taking: Reported on 8/17/2021) 60 tablet 2    SUMAtriptan (IMITREX) 100 MG tablet TAKE 1 TABLET BY MOUTH ONCE AS NEEDED FOR MIGRAINE(NOT MORE THAN TWO MAXIUMUM PER DAY) (Patient not taking: Reported on 8/17/2021) 12 tablet 2    omeprazole (PRILOSEC) 40 MG delayed release capsule Take 1 capsule by mouth daily (Patient not taking: Reported on 8/17/2021) 90 capsule 1    ondansetron (ZOFRAN ODT) 4 MG disintegrating tablet Take 1 tablet by mouth every 8 hours as needed for Nausea or Vomiting (Patient not taking: Reported on 8/17/2021) 30 tablet 2    naproxen (EC NAPROSYN) 500 MG EC tablet Take 1 tablet by mouth daily (Patient not taking: Reported on 8/17/2021)      baclofen (LIORESAL) 10 MG tablet TAKE 1 TABLET BY MOUTH THREE TIMES DAILY AS NEEDED FOR SPASMS (Patient not taking: Reported on 8/17/2021) 270 tablet 0    Nebulizers (COMPRESSOR/NEBULIZER) MISC As directed (Patient not taking: Reported on 2021) 1 each 0    Respiratory Therapy Supplies (NEBULIZER/TUBING/MOUTHPIECE) KIT 1 kit by Does not apply route daily (Patient not taking: Reported on 2021) 1 kit 0     No current facility-administered medications for this visit. Family History   Problem Relation Age of Onset   [place] Cancer Mother         uterine s/p hysterectomy    Cancer Maternal Grandmother     Cataracts Neg Hx     Diabetes Neg Hx     Glaucoma Neg Hx      Social History     Socioeconomic History    Marital status: Single     Spouse name: None    Number of children: None    Years of education: None    Highest education level: None   Occupational History    None   Tobacco Use    Smoking status: Former Smoker     Packs/day: 3.00     Years: 30.00     Pack years: 90.00     Quit date: 10/7/2014     Years since quittin.9    Smokeless tobacco: Never Used   Vaping Use    Vaping Use: Never used   Substance and Sexual Activity    Alcohol use: Yes     Comment: socially    Drug use: No    Sexual activity: None   Other Topics Concern    None   Social History Narrative    None     Social Determinants of Health     Financial Resource Strain:     Difficulty of Paying Living Expenses:    Food Insecurity:     Worried About Running Out of Food in the Last Year:     Ran Out of Food in the Last Year:    Transportation Needs:     Lack of Transportation (Medical):      Lack of Transportation (Non-Medical):    Physical Activity:     Days of Exercise per Week:     Minutes of Exercise per Session:    Stress:     Feeling of Stress :    Social Connections:     Frequency of Communication with Friends and Family:     Frequency of Social Gatherings with Friends and Family:     Attends [employer] Services:     Active Member of Clubs or Organizations:     Attends Club or Organization Meetings:     Marital Status:    Intimate Partner Violence:     Fear of Current or Ex-Partner:     Emotionally Abused:     Physically Abused:     Sexually Abused:      Past Medical History:   Diagnosis Date    Bipolar disorder ([facility] [address]     follows with Dr. Jorge Cardoso emphysema ([facility] [address]     HIV (human immunodeficiency virus infection) ([facility] [address]     followed by Niesha Tucker (Infectious Disease at [address] [place] [address])    Pneumonia 2017       ROS     Negative for: Constitutional, Gastrointestinal, Neurological, Skin, Genitourinary, Musculoskeletal, HENT, Endocrine, Cardiovascular, Eyes, Respiratory, Psychiatric, Allergic/Imm, Heme/Lymph          Main Ophthalmology Exam     External Exam       Right Left    External Normal Normal          Slit Lamp Exam       Right Left    Lids/Lashes Normal Normal    Conjunctiva/Sclera White and quiet White and quiet    Cornea Clear Clear    Anterior Chamber Deep and quiet Deep and quiet    Iris Round and reactive Round and reactive    Lens Trace Nuclear sclerosis Trace Nuclear sclerosis    Vitreous Normal Normal          Fundus Exam       Right Left    Disc Normal Normal    C/D Ratio 0.2 0.2    Macula Normal Normal    Vessels Normal Normal                   Tonometry     Tonometry (Non-contact air puff, 11:30 AM)       Right Left    Pressure 19 18   IOP.2             17.7  CH:  9.7          10.2  WS: 8.6          5.2                  Not recorded         Not recorded          Visual Acuity (Snellen - Linear)       Right Left    Dist sc 20/20 20/25    Near sc 20/70 OU           Pupils     Pupils       Pupils    Right PERRL    Left PERRL              Neuro/Psych     Neuro/Psych     Oriented x3: Yes    Mood/Affect: Normal              Keratometry     Keratometry (Automated)       K1 Axis K2 Axis    Right 42.50 169 43.00 079    Left 42.75 004 43.25 094                  Ophthalmology Exam     Wearing Rx       Sphere    Right not with     Left     Age: 2years     Type: Bifocal              Manifest Refraction     Manifest Refraction       Sphere Cylinder Axis Dist VA Add    Right +0.50 -0.50 102 20/20 +2.50    Left +0.50 ds  20/20 +2.50          Manifest Refraction #2 (Auto)       Sphere Cylinder Axis Dist VA Add    Right +0.75 -0.50 102      Left +0.75 -0.25 070                 Final Rx       Sphere Cylinder Axis Add    Right +0.50 -0.50 102 +2.50    Left +0.50 ds  +2.50    Type: Bifocal    Expiration Date: 9/15/2023            No orders of the defined types were placed in this encounter. IMPRESSION:  1. Hyperopia of both eyes with astigmatism and presbyopia        PLAN:    1.  New glasses recommended       Patient Instructions   New glasses recommended      Return in about 1 year (around 9/14/2022) for complete eye exam.

## 2021-10-13 ENCOUNTER — TELEPHONE (OUTPATIENT)
Dept: GASTROENTEROLOGY | Age: 56
End: 2021-10-13

## 2021-10-15 ENCOUNTER — OFFICE VISIT (OUTPATIENT)
Dept: NEUROLOGY | Age: 56
End: 2021-10-15
Payer: MEDICARE

## 2021-10-15 VITALS
SYSTOLIC BLOOD PRESSURE: 122 MMHG | BODY MASS INDEX: 31.22 KG/M2 | DIASTOLIC BLOOD PRESSURE: 80 MMHG | HEIGHT: 71 IN | HEART RATE: 88 BPM | OXYGEN SATURATION: 93 % | WEIGHT: 223 LBS

## 2021-10-15 DIAGNOSIS — R73.01 IFG (IMPAIRED FASTING GLUCOSE): ICD-10-CM

## 2021-10-15 DIAGNOSIS — Z87.891 EX-SMOKER: ICD-10-CM

## 2021-10-15 DIAGNOSIS — R41.3 MEMORY PROBLEM: ICD-10-CM

## 2021-10-15 DIAGNOSIS — M54.41 CHRONIC BILATERAL LOW BACK PAIN WITH RIGHT-SIDED SCIATICA: ICD-10-CM

## 2021-10-15 DIAGNOSIS — G44.221 CHRONIC TENSION-TYPE HEADACHE, INTRACTABLE: ICD-10-CM

## 2021-10-15 DIAGNOSIS — Z87.891 FORMER SMOKER: ICD-10-CM

## 2021-10-15 DIAGNOSIS — B20 HIV INFECTION, UNSPECIFIED SYMPTOM STATUS (HCC): ICD-10-CM

## 2021-10-15 DIAGNOSIS — F31.0 BIPOLAR AFFECTIVE DISORDER, CURRENT EPISODE HYPOMANIC (HCC): ICD-10-CM

## 2021-10-15 DIAGNOSIS — F31.10 BIPOLAR AFFECTIVE DISORDER, CURRENT EPISODE MANIC, CURRENT EPISODE SEVERITY UNSPECIFIED (HCC): ICD-10-CM

## 2021-10-15 DIAGNOSIS — G89.29 CHRONIC BILATERAL LOW BACK PAIN WITH RIGHT-SIDED SCIATICA: ICD-10-CM

## 2021-10-15 DIAGNOSIS — M54.16 LUMBAR RADICULAR PAIN: ICD-10-CM

## 2021-10-15 DIAGNOSIS — M62.838 MUSCLE SPASM: ICD-10-CM

## 2021-10-15 DIAGNOSIS — M47.817 LUMBOSACRAL SPONDYLOSIS WITHOUT MYELOPATHY: ICD-10-CM

## 2021-10-15 DIAGNOSIS — G44.59 OTHER COMPLICATED HEADACHE SYNDROME: ICD-10-CM

## 2021-10-15 DIAGNOSIS — G89.29 CHRONIC INTRACTABLE HEADACHE, UNSPECIFIED HEADACHE TYPE: Primary | ICD-10-CM

## 2021-10-15 DIAGNOSIS — R51.9 CHRONIC INTRACTABLE HEADACHE, UNSPECIFIED HEADACHE TYPE: Primary | ICD-10-CM

## 2021-10-15 DIAGNOSIS — M54.2 NECK PAIN: ICD-10-CM

## 2021-10-15 DIAGNOSIS — J43.2 CENTRILOBULAR EMPHYSEMA (HCC): ICD-10-CM

## 2021-10-15 PROCEDURE — 99214 OFFICE O/P EST MOD 30 MIN: CPT | Performed by: PSYCHIATRY & NEUROLOGY

## 2021-10-15 PROCEDURE — G8926 SPIRO NO PERF OR DOC: HCPCS | Performed by: PSYCHIATRY & NEUROLOGY

## 2021-10-15 PROCEDURE — G8417 CALC BMI ABV UP PARAM F/U: HCPCS | Performed by: PSYCHIATRY & NEUROLOGY

## 2021-10-15 PROCEDURE — 1036F TOBACCO NON-USER: CPT | Performed by: PSYCHIATRY & NEUROLOGY

## 2021-10-15 PROCEDURE — G8484 FLU IMMUNIZE NO ADMIN: HCPCS | Performed by: PSYCHIATRY & NEUROLOGY

## 2021-10-15 PROCEDURE — 3017F COLORECTAL CA SCREEN DOC REV: CPT | Performed by: PSYCHIATRY & NEUROLOGY

## 2021-10-15 PROCEDURE — G8427 DOCREV CUR MEDS BY ELIG CLIN: HCPCS | Performed by: PSYCHIATRY & NEUROLOGY

## 2021-10-15 PROCEDURE — 3023F SPIROM DOC REV: CPT | Performed by: PSYCHIATRY & NEUROLOGY

## 2021-10-15 RX ORDER — ROPINIROLE 0.25 MG/1
0.25 TABLET, FILM COATED ORAL NIGHTLY
COMMUNITY
Start: 2021-10-12 | End: 2021-10-15 | Stop reason: SDUPTHER

## 2021-10-15 RX ORDER — ROPINIROLE 0.25 MG/1
0.25 TABLET, FILM COATED ORAL NIGHTLY
Qty: 90 TABLET | Refills: 1 | Status: SHIPPED | OUTPATIENT
Start: 2021-10-15 | End: 2022-03-30 | Stop reason: DRUGHIGH

## 2021-10-15 RX ORDER — BUTALBITAL, ACETAMINOPHEN AND CAFFEINE 50; 325; 40 MG/1; MG/1; MG/1
TABLET ORAL
Qty: 60 TABLET | Refills: 2 | Status: SHIPPED | OUTPATIENT
Start: 2021-10-15 | End: 2022-01-21

## 2021-10-15 RX ORDER — TOPIRAMATE 50 MG/1
50 TABLET, FILM COATED ORAL 2 TIMES DAILY
Qty: 60 TABLET | Refills: 2 | Status: SHIPPED | OUTPATIENT
Start: 2021-10-15 | End: 2022-01-21 | Stop reason: SDUPTHER

## 2021-10-15 RX ORDER — SUMATRIPTAN 100 MG/1
TABLET, FILM COATED ORAL
Qty: 12 TABLET | Refills: 2 | Status: SHIPPED | OUTPATIENT
Start: 2021-10-15 | End: 2022-01-21 | Stop reason: SDUPTHER

## 2021-10-15 ASSESSMENT — ENCOUNTER SYMPTOMS
CHOKING: 0
EYE DISCHARGE: 0
WHEEZING: 0
VOICE CHANGE: 0
EYE ITCHING: 0
VISUAL CHANGE: 0
FACIAL SWEATING: 0
BACK PAIN: 1
RHINORRHEA: 0
SORE THROAT: 0
ABDOMINAL DISTENTION: 0
NAUSEA: 1
SHORTNESS OF BREATH: 0
CHEST TIGHTNESS: 0
FACIAL SWELLING: 0
SCALP TENDERNESS: 0
COLOR CHANGE: 0
COUGH: 0
TROUBLE SWALLOWING: 0
BLURRED VISION: 0
EYE WATERING: 0
APNEA: 0
CONSTIPATION: 0
ABDOMINAL PAIN: 0
PHOTOPHOBIA: 1
DIARRHEA: 0
SWOLLEN GLANDS: 0
SINUS PRESSURE: 0
EYE PAIN: 0
EYE REDNESS: 0
VOMITING: 0
BLOOD IN STOOL: 0

## 2021-10-15 NOTE — PROGRESS NOTES
[facility]  Neurology  [address] [place] [address]  FNGDQ:[phone]   Fax: [phone]      SUBJECTIVE:     PATIENT ID:  Talya Orantes is a  RIGHT     HANDED 64 y.o. male. Headache   This is a new problem. Episode onset: SINCE  OCTOBER 2019. The problem occurs constantly. The problem has been gradually worsening. The pain is located in the occipital region. The pain does not radiate. The pain quality is not similar to prior headaches. The quality of the pain is described as pulsating and throbbing. The pain is at a severity of 3/10. The pain is mild. Associated symptoms include back pain, a loss of balance, nausea, neck pain, phonophobia and photophobia. Pertinent negatives include no abdominal pain, abnormal behavior, anorexia, blurred vision, coughing, dizziness, drainage, ear pain, eye pain, eye redness, eye watering, facial sweating, fever, hearing loss, insomnia, muscle aches, numbness, rhinorrhea, scalp tenderness, seizures, sinus pressure, sore throat, swollen glands, tingling, tinnitus, visual change, vomiting, weakness or weight loss. The symptoms are aggravated by unknown. He has tried ergotamines and NSAIDs for the symptoms. The treatment provided no relief. His past medical history is significant for immunosuppression and migraine headaches. There is no history of cancer, cluster headaches, hypertension, migraines in the family, obesity, pseudotumor cerebri, recent head traumas, sinus disease or TMJ. Migraine   This is a chronic problem. Episode onset: SINCE  TEENAGE   The problem has been waxing and waning. The pain is located in the bilateral region. The pain does not radiate. The pain quality is similar to prior headaches. The quality of the pain is described as aching, throbbing and pulsating. The pain is at a severity of 3/10. The pain is mild. Associated symptoms include back pain, a loss of balance, nausea, neck pain, phonophobia and photophobia. Pertinent negatives include no abdominal pain, abnormal behavior, anorexia, blurred vision, coughing, dizziness, drainage, ear pain, eye pain, eye redness, eye watering, facial sweating, fever, hearing loss, insomnia, muscle aches, numbness, rhinorrhea, scalp tenderness, seizures, sinus pressure, sore throat, swollen glands, tingling, tinnitus, visual change, vomiting, weakness or weight loss. The symptoms are aggravated by unknown. He has tried NSAIDs and Excedrin for the symptoms. The treatment provided moderate relief. His past medical history is significant for immunosuppression and migraine headaches. There is no history of cancer, cluster headaches, hypertension, migraines in the family, obesity, pseudotumor cerebri, recent head traumas, sinus disease or TMJ. History obtained from  The patient   AND  HIS  MALE  FRIEND       and other  available   medical  records   were  Also  reviewed. The  Duration,  Quality,  Severity,  Location,  Timing,  Context,  Modifying  Factors   Of   The   Chief   Complaint   And  Present  Illness       Was   Reviewed   In   Chronological   Manner. PATIENT'S  MAIN  CONCERNS INCLUDE :                       1)     H /O    CHRONIC   MIGRAINES    SINCE  TEENAGE                            2)     PREVIOUS     H/O   BAD  MIGRAINE       8   YEARS   AGO                             3)     H/O     HEADACHE   IN  [facility]   AREA        SINCE   October 2019                                           PATIENT  TRIED     EXCEDRIN  MIGRAINE,    ALEVE                                                WHICH  DID  NOT  HELP. 4)     H/O    MILD     NECK PAIN       WITH  RADIATION  TO     THE  LEFT    SIDE                                         WORSE     SINCE  2020                              5)     NO    PRECIPITATING   FACTORS. NO   FEVER.      NO  TRAUMA 6)     H/O    CHRONIC    LUMBAR  PAIN   AND  RADICULOPATHY                                       BEING  FOLLOWED  BY  PAIN  MANAGEMENT                           -   ON  NEURONTIN,   BACLOFEN ,   CYMBALTA                                  7)       H/O   CHRONIC  ANXIETY,    BIPOLAR  DISORDER                                      -  ON  INVEGA, ,     ATIVAN     PRN                                     TO  FOLLOW  WITH     MENTAL  HEALTH  PROFESSIONALS                           8)     H/O     HIV  INFECTION       ASYMPTOMATIC                                    - ON  MEDICATION          FROM    [facility]     PROVIDERS                              9)      H/O    CHRONIC    MILD     MEMORY  PROBLEMS                                         -    NEEDS  MONITORING                        10)   H/O   EX  SMOKER                            H/O   ABNORMAL   CT   CHEST     IN    2017                              TO  FOLLOW  WITH   HIS  PCP                                         11)    HAD  NEURO  DIAGNOSTIC  EVALUATIONS  IN NOV./ DEC.  2019                              LABS  WORK  UP    DID  NOT    SHOW  ANY  SIGNIFICANT  ABNORMALITIES                             MRI  BRAIN      SHOWED  CHRONIC  CEREBRAL ISCHEMIA                                     MRI  CERVICAL SPINE       SHOWED    MULTI LEVEL  DISC  DISEASES                       12)    PATIENT'S     MIGRAINES   AND   HEADACHES                                      -       IMPROVED     AND      STABLE                                 AND  TOLERATING  THE  MEDICATIONS                                                13)      H/O     OCCIPITAL  HEADACHES     IN    SUMMER   2020    DUE  TO  HEAT                                       -     RESOLVED                                14)     H/O   INTOLERANCE      WITH  NAPROSYN                                         DUE  TO  GI  SIDE EFFECTS                              PATIENT   TO   AVOID     NSAID                          15) PATIENT       ON      TOPAMAX,  IMITREX,    FIORICET     AS   NEEDED                                    WITH  BETTER  CONTROL  OF      HIS   MIGRAINES                                                16)        PATIENT  DENIES  ANY  NEW  NEUROLOGICAL  CONCERNS                                 REQUESTS     REFILLS  FOR  HIS  MEDICATIONS                        17)        VARIOUS  RISK   FACTORS   WERE  REVIEWED   AND   DISCUSSED. PATIENT   HAS  MULTIPLE   MEDICAL, NEUROLOGICAL                        AND   MENTAL HEALTH   PROBLEMS . PATIENT'S   MANAGEMENT  IS  CHALLENGING.                                     PRECIPITATING  FACTORS: including  fever/infection, exertion/relaxation, position change, stress, weather change,                        medications/alcohol, time of day/darkness/light  Are  absent                                                              MODIFYING  FACTORS:  fever/infection, exertion/relaxation, position change, stress, weather change,                        medications/alcohol, time of day/darkness/light  Are  absent             Patient   Indicates   The  Presence   And  The  Absence  Of  The  Following    Associated  And   Additional  Neurological    Symptoms:                                Balance  And coordination   problems  absent           Gait problems     absent            Headaches      present              Migraines           present           Memory problemspresent              Confusion        absent            Paresthesia   numbness          absent           Seizures  And  Starring  Episodes           absent           Syncope,  Near  syncopal episodes         absent           Speech   problems           absent             Swallowing   Problems      absent            Dizziness,  Light headedness           absent              Vertigo        absent             Generalized   Weakness    absent              focal  Weakness     absent Tremors         absent              Sleep  Problems     absent             History  Of   Recent  Head  Injury     absent             History  Of   Recent  TIA     absent             History  Of   Recent    Stroke     absent             Neck  Pain   and   Neck muscle  Spasms  present               Radiating  down   And   Weakness           present            Lower back   Pain  And     Spasms  present              Radiating    Down   And   Weakness          present                H/OFALLS        absent               History  Of   Visual  Symptoms    absent                  Associated   Diplopia       absent                                               Also   Additional   Symptoms   Present    As  Documented    In   The   detailed                  Review  Of  Systems   And    Please   Refer   To    Them for   Additional    Information. Any components  That are either  Unobtainable  Or  Limited  In   HPI, ROS  And/or PFSH   Are                   Due   ToPatient's  Medical  Problems,  Clinical  Condition   and/or lack of                                other    Alternate   resources. RECORDS   REVIEWED:    historical medical records       INFORMATION   REVIEWED:     MEDICAL   HISTORY,SURGICAL   HISTORY,     MEDICATIONS   LIST,   ALLERGIES AND  DRUG  INTOLERANCES,       FAMILY   HISTORY,  SOCIAL  HISTORY,      PROBLEM  LIST   FOR  PATIENT  CARE   COORDINATION      Past Medical History:   Diagnosis Date    Bipolar disorder [facility] [address]     follows with Dr. Cristy Daniels Centrilobular emphysema ([facility] [address]     HIV (human immunodeficiency virus infection) ([facility] [address] 2001    followed by Marla Aguirre (Infectious Disease at [address] [place] [address])    Pneumonia 06/11/2017         Past Surgical History:   Procedure Laterality Date    ANESTHESIA NERVE BLOCK Bilateral 1/12/2018    Bilat L2 L3 L4 L5 Diagnostic Medial BB performed by Jenifer Gupta MD at [address] [place] [address]  01/05/2016    Normal colon. Hemorrhoids.   [address]  2015    full dental extraction    HERNIA REPAIR Left 3/2/2020    Laparoscopic Robotic Assisted Left Inguinal HERNIA REPAIR WITH MESH &VENTRAL  hernia repair performed by Dian Vargas DO at [place] [address] Right 9/26/2017    Right L4 & L5 Transforaminal  performed by Marques Plata MD at [place] [address] Right 10/3/2017    Right L4 & L5 Transforaminal performed by Marques Plata MD at [place] [address] Right 1/8/2019    Right L4 L5 TRANSFORAMINAL performed by Marques Plata MD at [address] [place] [address] AA&/STRD LUMBAR PLEXUS CONT NFS CATH Bilateral 1/19/2018    Bilat L2 L3 L4 L5 Confirmatory Medial BB performed by Marques Plata MD at [address] [place] [address], W IMAGE GUIDE,EA ADDL LEVEL Right 1/25/2018    Right L2 L3 L4 L5 RADIOFREQUENCY performed by Marques Plata MD at [address] [place] [address] 1, W IMAGE GUIDE,EA ADDL LEVEL Left 3/15/2018    Left L2 L3 L4 L5 RADIOFREQUENCY performed by Marques Plata MD at [facility]         Current Outpatient Medications   Medication Sig Dispense Refill    rOPINIRole (REQUIP) 0.25 MG tablet Take 1 tablet by mouth nightly 90 tablet 1    topiramate (TOPAMAX) 50 MG tablet Take 1 tablet by mouth 2 times daily 60 tablet 2    SUMAtriptan (IMITREX) 100 MG tablet TAKE 1 TABLET BY MOUTH ONCE AS NEEDED FOR MIGRAINE(NOT MORE THAN TWO MAXIUMUM PER DAY) 12 tablet 2    butalbital-acetaminophen-caffeine (FIORICET, ESGIC) -40 MG per tablet TAKE 1 TO 2 TABLETS BY MOUTH TWICE DAILY AS NEEDED FOR HEADACHE 60 tablet 2    gabapentin (NEURONTIN) 800 MG tablet Take 1 tablet by mouth 3 times daily for 30 days.  90 tablet 5    DULoxetine (CYMBALTA) 60 MG extended release capsule TAKE 2 CAPSULES BY MOUTH DAILY 60 capsule 11    hydrOXYzine (ATARAX) 10 MG tablet Take 10 mg by mouth 2 times daily as needed      cyclobenzaprine (FLEXERIL) 5 MG tablet       LIVALO 4 MG TABS tablet TAKE 1 TABLET BY MOUTH DAILY      propranolol (INDERAL LA) 60 MG extended release capsule       traMADol (ULTRAM) 50 MG tablet Take 1 tablet by mouth as needed.  omeprazole (PRILOSEC) 40 MG delayed release capsule Take 1 capsule by mouth daily 90 capsule 1    ondansetron (ZOFRAN ODT) 4 MG disintegrating tablet Take 1 tablet by mouth every 8 hours as needed for Nausea or Vomiting 30 tablet 2    nystatin (MYCOSTATIN) 876897 UNIT/GM cream Apply 1 Dose topically daily      Loratadine-Pseudoephedrine (CLARITIN-D 24 HOUR PO) Take by mouth daily as needed      loperamide (IMODIUM) 2 MG capsule Take 2 mg by mouth 4 times daily as needed for Diarrhea      fluticasone (FLONASE) 50 MCG/ACT nasal spray SHAKE LIQUID AND USE 1 SPRAY IN EACH NOSTRIL DAILY 48 g 0    SYMTUZA [id number] MG TABS TK 1 T PO  QD  6    aspirin-acetaminophen-caffeine (EXCEDRIN MIGRAINE) 250-250-65 MG per tablet Take 1 tablet by mouth every 6 hours as needed for Headaches       Clotrimazole (MYCELEX) 10 MG LOZG lozenge Take 1 lozenge by mouth 5 times daily 70 lozenge 1    fenofibric acid (FIBRICOR) 135 MG CPDR capsule TAKE 1 CAPSULE BY MOUTH DAILY 90 capsule 0    LORazepam (ATIVAN) 1 MG tablet Take 1 mg by mouth every 6 hours as needed for Anxiety.       Darunavir-Cobicistat (PREZCOBIX) 800-150 MG TABS Take by mouth nightly      Nebulizers (COMPRESSOR/NEBULIZER) MISC As directed 1 each 0    Respiratory Therapy Supplies (NEBULIZER/TUBING/MOUTHPIECE) KIT 1 kit by Does not apply route daily 1 kit 0    albuterol (PROVENTIL) (2.5 MG/3ML) 0.083% nebulizer solution Take 2.5 mg by nebulization every 6 hours as needed for Wheezing       TURMERIC PO Take by mouth three times daily      Glucosamine HCl (GLUCOSAMINE PO) Take by mouth daily      Multiple Vitamins-Minerals (MULTIVITAMIN PO) Take by mouth daily      INVEGA 6 MG extended release tablet Take 6 mg by mouth daily      DESCOVY 200-25 MG TABS Take 1 tablet by mouth daily      MYTESI 125 MG TBEC Take 125 mg by mouth      naproxen (EC NAPROSYN) 500 MG EC tablet Take 1 tablet by mouth daily        No current facility-administered medications for this visit. Allergies   Allergen Reactions    Pcn [Penicillins] Rash    Sulfa Antibiotics Rash         Family History   Problem Relation Age of Onset    Cancer Mother         uterine s/p hysterectomy    Cancer Maternal Grandmother     Cataracts Neg Hx     Diabetes Neg Hx     Glaucoma Neg Hx          Social History     Socioeconomic History    Marital status: Single     Spouse name: Not on file    Number of children: Not on file    Years of education: Not on file    Highest education level: Not on file   Occupational History    Not on file   Tobacco Use    Smoking status: Former Smoker     Packs/day: 3.00     Years: 30.00     Pack years: 90.00     Quit date: 10/7/2014     Years since quittin.0    Smokeless tobacco: Never Used   Vaping Use    Vaping Use: Never used   Substance and Sexual Activity    Alcohol use: Yes     Comment: socially    Drug use: No    Sexual activity: Not on file   Other Topics Concern    Not on file   Social History Narrative    Not on file     Social Determinants of Health     Financial Resource Strain:     Difficulty of Paying Living Expenses:    Food Insecurity:     Worried About Running Out of Food in the Last Year:     Ran Out of Food in the Last Year:    Transportation Needs:     Lack of Transportation (Medical):      Lack of Transportation (Non-Medical):    Physical Activity:     Days of Exercise per Week:     Minutes of Exercise per Session:    Stress:     Feeling of Stress :    Social Connections:     Frequency of Communication with Friends and Family:     Frequency of Social Gatherings with Friends and Family:     Attends Islam Services:     Active Member of Clubs or Organizations:     Attends Club or Organization Meetings:     Marital Status:    Intimate Partner Violence:     Fear of Current or Ex-Partner:     Emotionally Abused:     Physically Abused:     Sexually Abused:        Vitals:    10/15/21 0926   BP: 122/80   Pulse: 88   SpO2: 93%         Wt Readings from Last 3 Encounters:   10/15/21 223 lb (101.2 kg)   08/17/21 226 lb 12.8 oz (102.9 kg)   07/09/21 226 lb (102.5 kg)         BP Readings from Last 3 Encounters:   10/15/21 122/80   08/17/21 130/74   07/09/21 116/78       Hematology and Coagulation  Lab Results   Component Value Date    WBC 9.4 08/17/2021    RBC 4.40 08/17/2021    HGB 14.0 08/17/2021    HCT 42.8 08/17/2021    MCV 97.3 08/17/2021    MCH 31.8 08/17/2021    MCHC 32.7 08/17/2021    RDW 12.7 08/17/2021     08/17/2021    MPV 9.2 08/17/2021       Chemistries  Lab Results   Component Value Date     08/17/2021    K 4.2 08/17/2021     08/17/2021    CO2 24 08/17/2021    BUN 14 08/17/2021    CREATININE 0.93 08/17/2021    CALCIUM 9.1 08/17/2021    PROT 6.8 08/17/2021    LABALBU 4.1 08/17/2021    BILITOT 0.14 08/17/2021    ALKPHOS 102 08/17/2021    AST 21 08/17/2021    ALT 20 08/17/2021     Lab Results   Component Value Date    ALKPHOS 102 08/17/2021    ALT 20 08/17/2021    AST 21 08/17/2021    PROT 6.8 08/17/2021    BILITOT 0.14 08/17/2021    BILIDIR <0.08 06/11/2017    LABALBU 4.1 08/17/2021     Lab Results   Component Value Date    BUN 14 08/17/2021    CREATININE 0.93 08/17/2021     Lab Results   Component Value Date    CALCIUM 9.1 08/17/2021     Lab Results   Component Value Date    AST 21 08/17/2021    ALT 20 08/17/2021             Review of Systems   Constitutional: Negative for appetite change, chills, fatigue, fever, unexpected weight change and weight loss. HENT: Negative for congestion, dental problem, drooling, ear discharge, ear pain, facial swelling, hearing loss, mouth sores, nosebleeds, postnasal drip, rhinorrhea, sinus pressure, sore throat, tinnitus, trouble swallowing and voice change.     Eyes: Positive for photophobia. Negative for blurred vision, pain, discharge, redness, itching and visual disturbance. Respiratory: Negative for apnea, cough, choking, chest tightness, shortness of breath and wheezing. Cardiovascular: Negative for chest pain, palpitations and leg swelling. Gastrointestinal: Positive for nausea. Negative for abdominal distention, abdominal pain, anorexia, blood in stool, constipation, diarrhea and vomiting. Endocrine: Negative for cold intolerance, heat intolerance, polydipsia, polyphagia and polyuria. Musculoskeletal: Positive for arthralgias, back pain and neck pain. Negative for gait problem, joint swelling, myalgias and neck stiffness. Skin: Negative for color change, pallor, rash and wound. Allergic/Immunologic: Negative for environmental allergies, food allergies and immunocompromised state. Neurological: Positive for headaches and loss of balance. Negative for dizziness, tingling, tremors, seizures, syncope, facial asymmetry, speech difficulty, weakness, light-headedness and numbness. Hematological: Negative for adenopathy. Does not bruise/bleed easily. Psychiatric/Behavioral: Positive for decreased concentration. Negative for agitation, behavioral problems, confusion, dysphoric mood, hallucinations, self-injury, sleep disturbance and suicidal ideas. The patient is nervous/anxious. The patient does not have insomnia and is not hyperactive. OBJECTIVE:    Physical Exam  Constitutional:       Appearance: He is well-developed. HENT:      Head: Normocephalic and atraumatic. No raccoon eyes or Oviedo's sign. Right Ear: External ear normal.      Left Ear: External ear normal.      Nose: Nose normal.   Eyes:      Conjunctiva/sclera: Conjunctivae normal.      Pupils: Pupils are equal, round, and reactive to light. Neck:      Thyroid: No thyroid mass or thyromegaly. Vascular: No carotid bruit. Trachea: No tracheal deviation.       Meningeal: Brudzinski's sign and Kernig's sign absent. Cardiovascular:      Rate and Rhythm: Normal rate and regular rhythm. Pulmonary:      Effort: Pulmonary effort is normal.   Musculoskeletal:         General: No tenderness. Cervical back: Normal range of motion and neck supple. No rigidity. No muscular tenderness. Normal range of motion. Skin:     General: Skin is warm. Coloration: Skin is not pale. Findings: No erythema or rash. Nails: There is no clubbing. Psychiatric:         Attention and Perception: He is attentive. Mood and Affect: Mood is anxious. Mood is not depressed. Affect is not labile, blunt or inappropriate. Speech: He is communicative. Speech is not rapid and pressured, delayed, slurred or tangential.         Behavior: Behavior is not agitated, slowed, aggressive, withdrawn, hyperactive or combative. Behavior is cooperative. Thought Content: Thought content is not paranoid or delusional. Thought content does not include homicidal or suicidal ideation. Thought content does not include homicidal or suicidal plan. Cognition and Memory: Cognition is impaired. Memory is impaired. He does not exhibit impaired remote memory. Judgment: Judgment is not impulsive or inappropriate. NEUROLOGICALEXAMINATION :       A) MENTAL STATUS:                   Alert and  oriented  To time, place  And  Person. No Aphasia. No  Dysarthria. Able   To  Follow    SIMPLE      commands   without   Any  Difficulty. No right  To left confusion. Normal  Speech  And language function.                  Insight and  Judgment ,Fund  Of  Knowledge   within normal limits                Recent  And  Remote memory    DECREASED                Attention &  Concentration are     DECREASED                                             B) CRANIAL NERVES :      CN : Visual  Acuity  And  Visual fields  within normal limits Fundi  Could  Not  Be  Could  Not  Be  Evaluated. 3,4,6 CN : Both  Pupils are   Reactive and  Equal.  Movements  Are  Intact. No  Nystagmus. No  JOSIE. No  Afferent  Pupillary  Defect noted. 5 CN :  Normal  Facial sensations and Corneal  Reflexes           7 CN:  Normal  Facial  Symmetry  And  Strength. No facial  Weakness. 8 CN :  Hearing  Appears within normal limits          9, 10 CN: Normal   spontaneous, reflex   palate   movements         11 CN:   Normal  Shoulder  shrug and  strength         12 CN :   Normal  Tongue movements and  Tongue  In midline                        No tongue   Fasciculations or atrophy       C) MOTOR  EXAM:                 Strength  In upper  And  Lower   extremities   within   normal limits               No  Drift. No  Atrophy               Rapid   alternating  And  repetitions  Movements  within   normal limits               Muscle  Tone  In upper  And  Lower  Extremities  normal                No rigidity. No  Spasticity. Bradykinesia   absent               No  Asterixis. Sustention  Tremor , Resting   Tremor   absent                    No   other  Abnormal  Movements noted           D) SENSORY :               Light   touch, pinprick,   position  And  Vibration  within normal limits        E) REFLEXES:                   Deep  Tendon  Reflexes   normal                  No  pathological  Reflexes  Bilaterally.                                   F) COORDINATION  AND  GAIT :                                Station and  Gait  normal                              Romberg 's test negative                          Ataxia negative          ASSESSMENT:      Patient Active Problem List   Diagnosis    HIV [facility]human [facility] virus infection) [facility] [address]    IFG (impaired fasting glucose)    Lumbar radicular pain    Chronic bilateral low back pain with right-sided sciatica    Panniculitis involving lumbar region    Lumbar facet joint syndrome    Abnormal EKG    Centrilobular emphysema (HCC)    Muscle spasm    Lumbosacral spondylosis without myelopathy    Neural foraminal stenosis of lumbar spine    Bipolar disorder (HCC)    Memory problem    Abnormal CT of the chest    Ex-smoker    Neck pain    Other complicated headache syndrome    Ventral hernia without obstruction or gangrene    Left inguinal hernia    Intractable headache    Former smoker             MRI OF THE BRAIN WITHOUT AND WITH CONTRAST  12/24/2019 10:37 am       TECHNIQUE:   Multiplanar multisequence MRI of the head/brain was performed without and   with the administration of intravenous contrast.       COMPARISON:   CT brain performed 06/11/2017.       HISTORY:   ORDERING SYSTEM PROVIDED HISTORY: Memory problem   TECHNOLOGIST PROVIDED HISTORY:   migraines   Reason for Exam: Neck pain and left shoulder pain. Migraines. Symptoms for   quite a while. Acuity: Chronic   Type of Exam: Unknown   Additional signs and symptoms: Memory problem; Intractable migraine without   status migrainosus, unspecified migraine type.       FINDINGS:   INTRACRANIAL STRUCTURES/VENTRICLES:  The sellar and suprasellar structures,   optic chiasm, corpus callosum, pineal gland, tectum, and midline brainstem   structures are unremarkable.  The craniocervical junction is unremarkable. There is no acute intracranial hemorrhage, mass effect, or midline shift. There is satisfactory overall gray-white matter differentiation.  There is   chronic microvascular disease.  The ventricular structures are symmetric and   unremarkable.  The infratentorial structures including the cerebellopontine   angles and internal auditory canals are unremarkable. There is no abnormal   restricted diffusion.  There is no abnormal blooming artifact on   susceptibility weighted imaging.  There is no abnormal postcontrast   enhancement.       ORBITS: The visualized portion of the orbits demonstrate no acute abnormality.       SINUSES: The visualized paranasal sinuses and mastoid air cells are well   aerated.       BONES/SOFT TISSUES: The bone marrow signal intensity appears normal. The soft   tissues demonstrate no acute abnormality.           Impression   Chronic microvascular disease without acute intracranial abnormality.  No   abnormal postcontrast enhancement. MRI OF THE CERVICAL SPINE WITHOUT CONTRAST 12/24/2019 10:07 am       TECHNIQUE:   Multiplanar multisequence MRI of the cervical spine was performed without the   administration of intravenous contrast.       COMPARISON:   None.       HISTORY:   ORDERING SYSTEM PROVIDED HISTORY: Neck pain   TECHNOLOGIST PROVIDED HISTORY:   neck pain   Reason for Exam: Neck pain and left shoulder pain. Migraines. Symptoms for   quite a while.    Acuity: Chronic   Type of Exam: Unknown   Additional signs and symptoms: Neck pain       FINDINGS:   BONES/ALIGNMENT: There is straightening of the cervical spine.  The vertebral   body heights are maintained.  There is age-appropriate bone marrow signal.   There is mild edema within the left C3-4 facets.  There is multilevel   degenerative disc disease with loss of disc signal.  There is disc space   narrowing primarily at the C6-7 level.  There is no significant   spondylolisthesis.       SPINAL CORD: The spinal cord is normal in caliber and signal.  The visualized   intracranial structures are unremarkable.       SOFT TISSUES: The posterior paraspinal soft tissues are unremarkable.  The   prevertebral soft tissues are unremarkable.       C2-C3: There is no significant disc protrusion, spinal canal stenosis or   neural foraminal narrowing.       C3-C4: There is a mild disc osteophyte complex eccentric to the left with   associated uncovertebral and facet hypertrophy.  There is canal stenosis   measuring 8 mm in AP dimension.  There is moderate to severe left foraminal   narrowing and no significant right foraminal narrowing.       C4-C5: There is a disc osteophyte complex with uncovertebral and facet   hypertrophy.  There is canal stenosis measuring 9 mm in AP dimension.  There   is moderate right foraminal narrowing and no significant left foraminal   narrowing.       C5-C6: There is a disc osteophyte complex with uncovertebral and facet   hypertrophy that is worse on the left.  There is no significant canal   stenosis or right foraminal narrowing.  There is severe left foraminal   narrowing.       C6-C7: There is a disc osteophyte complex with uncovertebral and facet   hypertrophy.  There is canal stenosis measuring 8 mm in AP dimension.  There   is severe bilateral foraminal narrowing.       C7-T1: There is a disc osteophyte complex with uncovertebral and facet   hypertrophy.  There is no significant canal stenosis or foraminal narrowing.           Impression   Multilevel degenerative disc disease with associated uncovertebral and facet   hypertrophy resulting in canal stenosis most pronounced at C3-4 and C6-7.       Moderate to severe left foraminal narrowing at C3-4, moderate right foraminal   narrowing at C4-5, severe left foraminal narrowing at C5-6, and severe   bilateral foraminal narrowing at C6-7.       Edema is demonstrated within the left C3-4 facets that is likely related to   degenerative facet disease although a degree of septic arthritis is a   consideration and correlation is needed. MRI OF THE LUMBAR SPINE WITHOUT CONTRAST, 6/11/2018 10:22 am       TECHNIQUE:   Multiplanar multisequence MRI of the lumbar spine was performed without the   administration of intravenous contrast.       COMPARISON:   None       HISTORY:   ORDERING SYSTEM PROVIDED HISTORY: Lumbar facet joint syndrome   TECHNOLOGIST PROVIDED HISTORY:   Reason for exam:->right lumbar radiculopathy   Ordering Physician Provided Reason for Exam:  Low back pain to the right   side, right leg pain. Symptoms for about one week. Acuity: Acute   Type of Exam: Initial   Additional signs and symptoms:  Lumbar facet syndrome; Right lumbar   radiculopathy       Initial evaluation.       FINDINGS:   BONES/ALIGNMENT: There is normal alignment of the spine. The vertebral body   heights are maintained. The bone marrow signal appears unremarkable.  There   is degenerative disc disease with disc space narrowing and osteophytes at   L3-4, L4-5, and L5-S1.       SPINAL CORD: The conus terminates normally.       SOFT TISSUES: No paraspinal mass identified. Lamona Evie is a cyst in the left   kidney that is not fully evaluated.  Ultrasound could better evaluate.       L1-L2:  The thecal sac and neural foramina are intact.       L2-L3:  There is mild facet and ligamentum flavum hypertrophy.  The thecal   sac is not stenotic.  Disc and/or osteophytes cause minimal narrowing of the   neural foramina bilaterally.       L3-L4: The thecal sac is not stenotic.   Disc and/or osteophytes result in   mild narrowing of the neural foramina bilaterally.   There is mild facet and   ligamentum flavum hypertrophy.       L4-L5:  There is moderate facet and ligamentum flavum hypertrophy.  The   thecal sac is not stenotic.  Disc and/or osteophytes result in moderate   narrowing of the neural foramina bilaterally. The left neural foramen is   narrowed greater than right.       L5-S1:  There is mild facet and ligamentum flavum hypertrophy.  The thecal   sac is not stenotic.  The S1 nerve roots are intact. Disc and/or osteophytes   result in moderate narrowing of the neural foramina bilaterally. The right   neural foramen is narrowed greater than the left.       There is no significant change in the findings from the prior study.           Impression   Disc and osteophytes result in narrowing of the neural foramina at several   levels as discussed above.  No significant stenosis of thecal sac in the   lumbar region.      CT OF THE HEAD WITHOUT CONTRAST  6/11/2017 8:49 pm     TECHNIQUE:   CT of the head was performed without the administration of intravenous   contrast. Dose modulation, iterative reconstruction, and/or weight based   adjustment of the mA/kV was utilized to reduce the radiation dose to as low   as reasonably achievable.       COMPARISON:   None.       HISTORY:   ORDERING SYSTEM PROVIDED HISTORY: neuro symptoms/ HIV   TECHNOLOGIST PROVIDED HISTORY:   Ordering Physician Provided Reason for Exam: Passed out while coughing and   hit forehead on table today, previous loss of consciousness spells in the   last weeks.  Neuro symptoms, reaction to HIV medication. Acuity: Acute   Type of Exam: Initial   Mechanism of Injury: passed out and fell   Relevant Medical/Surgical History: n/a       FINDINGS:   BRAIN/VENTRICLES: The ventricular system is mildly prominent in size and in   the midline.  No evidence of extra-axial fluid collection, hemorrhage or mass.       ORBITS: The visualized portion of the orbits demonstrate no acute abnormality.       SINUSES: Mild bilateral ethmoid sinus mucosal thickening.  Paranasal sinuses   visualized are otherwise clear.  Mastoid regions are poorly aerated likely a   developmental basis.       SOFT TISSUES/SKULL:  No acute abnormality of the visualized skull or soft   tissues.           Impression   No acute intracranial abnormality.         CT OF THE CHEST WITH CONTRAST 7/5/2017 2:06 pm       TECHNIQUE:   CT of the chest was performed with the administration of intravenous   contrast. Multiplanar reformatted images are provided for review.  Dose   modulation, iterative reconstruction, and/or weight based adjustment of the   mA/kV was utilized to reduce the radiation dose to as low as reasonably   achievable.       COMPARISON:   Chest radiograph dated 06/27/2017       HISTORY:   ORDERING SYSTEM PROVIDED HISTORY: Pneumonia of right lower lobe due to   infectious organism   TECHNOLOGIST PROVIDED HISTORY:   Reason for exam:->pneumonia   Ordering Physician Provided Reason for Exam: Pneumonia of right lower lobe   due to infectious organism. Cough feeling 50 percent better x 2 weeks. Quit   smoking 3 years ago after smoking for 50 years. Acuity: Acute   Type of Exam: Ongoing       FINDINGS:   The nodular opacity of concern in the right lower lobe on recent chest   radiograph corresponds to benign calcified granulomas in the inferior segment   of the right middle lobe.  Calcified pulmonary granulomas are also present in   the upper lobes bilaterally.       No pulmonary infiltrate or pleural effusion is present. Juanpablo Rower is   centrilobular emphysema with an upper lobe predominance and scattered   interstitial scarring.  No pleural effusion is present.       No osteolytic or osteoblastic lesion is present in the included portion of   the axial skeleton.       The heart and great vessels are normal in caliber and appearance.  No   pericardial effusion is present. Juanpablo Rower is no pathologic mediastinal or hilar   lymphadenopathy.  Calcified right infrahilar lymph nodes reflect residue from   prior granulomatous infection.           Impression   1. Benign pulmonary granulomas.       2. No evidence of pneumonia.       3. Centrilobular emphysema. VISITING DIAGNOSIS:      ICD-10-CM    1. Chronic intractable headache, unspecified headache type  R51.9 SUMAtriptan (IMITREX) 100 MG tablet    G89.29    2. Former smoker  Z87.891    3. Memory problem  R41.3    4. Lumbosacral spondylosis without myelopathy  M47.817    5. HIV infection, unspecified symptom status ([facility] [address]  B20    6. Ex-smoker  Z87.891    7. Lumbar radicular pain  M54.16    8. Chronic bilateral low back pain with right-sided sciatica  M54.41     G89.29    9. Bipolar affective disorder, current episode hypomanic ([facility] [address]  F31.0    10. IFG (impaired fasting glucose)  R73.01    11. Muscle spasm  M62.838    12. Neck pain  M54.2    13. Centrilobular emphysema (HCC)  J43.2    14.  Bipolar affective disorder, current episode manic, current episode severity unspecified ([facility] [address]  F31.10    15. Chronic tension-type headache, intractable  G44.221 butalbital-acetaminophen-caffeine (FIORICET, ESGIC) -40 MG per tablet   16. Other complicated headache syndrome  G44.59 butalbital-acetaminophen-caffeine (FIORICET, ESGIC) -40 MG per tablet              CONCERNS   &   INCREASED   RISK   FOR         * TIA,  CEREBRO  VASCULAR  ISCHEMIA,      *    COMPLICATED  MIGRAINES      *   TENSION  HEADACHES      *   COGNITIVE  &   MEMORY PROBLEMS                      VARIOUS  RISK   FACTORS   WERE  REVIEWED   AND   DISCUSSED. *  PATIENT   HAS  MULTIPLE   MEDICAL, NEUROLOGICAL                        AND   MENTAL HEALTH   PROBLEMS . PATIENT'S   MANAGEMENT  IS  CHALLENGING. PLAN:                         All [place]  Of  The  Diagnoses,  The  Management & Treatment  Options            AND    Care  plan  Were          Reviewed and   Discussed   With  patient. * Goals  And  Expectations  Of  The  Therapy  Discussed   And  Reviewed. *   Benefits   And   Side  Effect  Profile  Of  Medication/s   Were   Discussed                * Need   For  Further   Follow up For  The  Various  Problems Were  discussed. * Results  Of  The  Previous  Diagnostic tests were reviewed and  discussed                 Medical  Decision  Making  Was  Made  Based on the   Complexity  Of  Above  Mentioned  Diagnoses,    Data reviewed             And    Risk  Of  Significant   Co morbidities and   complicating   Factors. Medical  Decision  Was   High     Complexity   Due   To  The  Patient's  Multiple  Symptoms,      Complex  Treatment  Regimen,  Multiple medications           and   Risk  Of   Side  Effects,  Difficulty  In  Medication  Management  And  Diagnostic  Challenges       In  View  Of  The  Associated   Co  Morbid  Conditions   And  Problems.                 *   BE  CAREFUL  WITH  ACTIVITIES   INCLUDING DRIVING. *   AVOID   NECK  AND/ BACK  STRAINING  ACTIVITIES        *   ADEQUATE   FLUIDINTAKE   AND  ELECTROLYTE  BALANCE         * KEEP  DAIRY  OF   THE  NEUROLOGICAL  SYMPTOMS        RECORDING THE    DURATION  AND  FREQUENCY. *  AVOID    CONDITIONS  AND  FACTORS   THAT  MAKE                  NEUROLOGICAL  SYMPTOMS  WORSE.             *TO  MAINTAIN  REGULAR  SLEEP  WAKE  CYCLES. *   TO  HAVE  ADEQUATE  REST  AND   SLEEP    HOURS.            *    AVOID  ANY USAGE OF    TOBACCO,              EXCESSIVE  ALCOHOL  AND   ILLEGAL   SUBSTANCES        *  CONTINUE   MEDICATIONS    PRESCRIBED   BY NEUROLOGIST    AS    RECOMMENDED       *   Compliance   With  Medications   And  Instructions        * CURRENTLY    TOLERATING  THE  PRESCRIBED   MEDICATIONS. WITHOUT  ANY  SIGNIFICANT  SIDE  EFFECTS   &  GETTING BENEFIT.          *    MIGRAINE/ HEADACHE    DAIRY   WITH  MONITORING                       OF  DURATION  AND  FREQUENCY. *    Antiplatelet  therapy    As   Recommended  Was   Discussed      *    Prophylactic  Use   Of     Vitamin   B   Complex,  Folic  Acid,    Vitamin  B12    Multivitamin,       Calcium  With  magnesium  And  Vit D    Supplementations   Over  The  Counter  Discussed                    *  EVALUATIONS  AND  FOLLOW UP:                     *   OPTHALMOLOGY                                      * PAIN  MANAGEMENT                 *  HIV  CONSULTANTS                                *      CURRENTLY    PATIENT   FEELS  BETTER  WITH  HIS  MIGRAINES                                 AND   HEADACHES                                    AND  TOLERATING  THE  MEDICATIONS                                  FOR  THE  SAME.                          *      PATIENT  DENIES  ANY  NEW  NEUROLOGICAL  CONCERNS                             Controlled Substances Monitoring: Periodic Controlled Substance Monitoring: Possible medication side effects, risk of tolerance/dependence & alternative treatments discussed. , Assessed functional status. Hiram Nguyen MD)              Orders Placed This Encounter   Medications    rOPINIRole (REQUIP) 0.25 MG tablet     Sig: Take 1 tablet by mouth nightly     Dispense:  90 tablet     Refill:  1    topiramate (TOPAMAX) 50 MG tablet     Sig: Take 1 tablet by mouth 2 times daily     Dispense:  60 tablet     Refill:  2    SUMAtriptan (IMITREX) 100 MG tablet     Sig: TAKE 1 TABLET BY MOUTH ONCE AS NEEDED FOR MIGRAINE(NOT MORE THAN TWO MAXIUMUM PER DAY)     Dispense:  12 tablet     Refill:  2    butalbital-acetaminophen-caffeine (FIORICET, ESGIC) -40 MG per tablet     Sig: TAKE 1 TO 2 TABLETS BY MOUTH TWICE DAILY AS NEEDED FOR HEADACHE     Dispense:  60 tablet     Refill:  2                 *PATIENT   TO  FOLLOW  UP  WITH   PRIMARY  CARE         OTHER  CONSULTANTS  AS  BEFORE.           *TO  FOLLOW  WITH   MENTAL  HEALTH  PROFESSIONALS ,  INCLUDING            PSYCHOLOGICAL  COUNSELING   AND  PSYCHIATRIC  EVALUTIONS,                 *  Maintain   Healthy  Life Style    With   Periodic  Monitoring  Of      Any  Medical  Conditions  Including   Elevated  Blood  Pressure,  Lipid  Profile,     Blood  Sugar levels  AndHeart  Disease. *   Period   Screening  For  Cancers  Involving  Breast,  Colon,    lungs  And  Other  Organs  As  Applicable,  In consultation   With  Your  Primary Care Providers. *Second  Neurological  Opinion  And  Evaluations  In  [facility]  Setting  If  Patient  Is  Interested. * Please   Contact   Neurology  Clinic   Early   If   Are  Any  New  Neurological   And  Any neurological  Concerns.                   *  If  The  Patient remains  Neurologically  Stable   Return   To  [facility] Neurology Department   IN          3         MONTHS  TIME   FOR  FURTHER              FOLLOW UP.                       *   The  Neurological   Findings,  Possible  Diagnosis,  Differential diagnoses And  Options  For    Further   Investigations                   And  management   Are  Discussed  Comprehensively. Medications   And  Prescription   Risks  And  Side effects  Are   Also  Discussed. *  If   There is  Any  Significant  Worsening   Of  Current  Symptoms  And  Or                  If patient  Develops   Any additional  New  NeurologicalSymptoms                  Or  Significant  Concerns   Should  Call  911 or  Go  To  Emergency  Department                          For  Further  Immediate  Evaluation. The   Above  Were  Reviewed  With  patient   and                       questions  Answered  In  Detail. More   Than  50% of face  To face Time   Was  Spent  On  Counseling                    And   Coordination  Of  Care   Of   Patient's  multiple   Neurological  Problems                         And   Comorbid  Medical   Conditions. Electronically signed by Agapito Kussmaul, MD.,   [facility]      Board Certified in  Neurology &  In  [address] of [employer]iat[employer] and Neurology (ABPN)      DISCLAIMER:   Although every effort was made to ensure the accuracy of this  electronictranscription,   some errors in transcription may have occurred. GENERAL PATIENT INSTRUCTIONS:      A Healthy Lifestyle: Care Instructions   Your Care Instructions   A healthy lifestyle can help you feel good, stay at ahealthy weight, and have plenty of energy for both work and play. A healthy lifestyle is something you can share with your whole family.  A healthy lifestyle also can lower your risk for serious health problems, such ashigh blood pressure, heart disease, and diabetes.  You can follow a few steps listed below to improve your health and the health of your family.  Follow-up careis a key part of your treatment and safety.  Be sure to make and go to all appointments, and call your doctor if you are having problems. Its also a good idea to know your test results and keep a list of the medicines you take.  How can you care for yourself at home?  Do not eat too much sugar, fat, or fast foods. You can still have dessert and treats nowand then. The goal is moderation.  Start small to improve your eating habits. Pay attention to portion sizes, drink less juice and soda pop, and eat more fruits and vegetables.  Eat a healthy amount of food. A 3-ounce serving of meat, for example, is about the size of a deck of cards. Fill the rest of your plate with vegetables and whole grains.  Limit theamount of soda and sports drinks you have every day. Drink more water when you are thirsty.  Eat at least 5 servings of fruits and vegetables every day. It may seem like a lot, but it is not hard to reach this goal. Aserving or helping is 1 piece of fruit, 1 cup of vegetables, or 2 cups of leafy, raw vegetables. Have an apple or some carrot sticks as an afternoon snack instead of a candy bar. Try to have fruits and/or vegetables at everymeal.   Make exercise part of your daily routine. You may want to start with simple activities, such as walking, bicycling, or slow swimming. Try kay active 30 to 60 minutes every day. You do not need to do all 30 to 60 minutes all at once. For example, you can exercise 3 times a day for 10 or 20 minutes. Moderate exercise is safe for most people, but it is always agood idea to talk to your doctor before starting an exercise program.   Keep moving. Mary Ann Burch the lawn, work in the garden, or triptap. Take the stairs instead of the elevator at work.  If you smoke, quit. Peoplewho smoke have an increased risk for heart attack, stroke, cancer, and other lung illnesses. Quitting is hard, but there are ways to boost your chance of quitting tobacco for good.  Use nicotine gum, patches, or lozenges.  Ask your doctor about stop-smoking programs and medicines.    Keep trying.  In addition to reducing your risk of diseases in the future, you will notice some benefits soon after you stop using tobacco. If you have shortness of breath or asthma symptoms, they will likely getbetter within a few weeks after you quit.  Limit how much alcohol you drink. Moderate amounts of alcohol (up to 2 drinks a day for men, 1drink a day for women) are okay. But drinking too much can lead to liver problems, high blood pressure, and other health problems.  health   If you have a family, there are many things you can do together to improve your health.  Eat meals together as a family as often as possible.  Eat healthy foods. This includes fruits, vegetables, lean meats and dairy, and whole grains.  Include your family in your fitness plan. Most peoplethink of activities such as jogging or tennis as the way to fitness, but there are many ways you and your family can be more active. Anything that makes you breathe hard and gets your heart pumping is exercise. Here are sometips:   Walk to do errands or to take your child to school or the bus.  Go for a family bike ride after dinner instead of watching TV.  Where can you learn more?  Go toV[employer] To Be Chictps://"[employer] org and sign in to your [employer] account. Enter U211 in the Search HealthInformation box to learn more about \"A Healthy Lifestyle: Care Instructions. \"     If you do not have anaccount, please click on the \"Sign Up Now\" link.  Current as of: July 26, 2016   Content Version: 11.2   © 3786-8937 [employer]. Care instructions adapted under license by [facility] ([facility]). If you have questions about a medical condition or this instruction, always ask your healthcare professional. [employer] disclaims any warranty or liability for your use of this information.

## 2022-01-19 ENCOUNTER — TELEPHONE (OUTPATIENT)
Dept: ONCOLOGY | Age: 57
End: 2022-01-19

## 2022-01-19 NOTE — LETTER
1/26/2022        Sheri Pendleton  [address] [address]    Dear Sheri Pendleton: Your healthcare provider has ordered a low dose CT scan of the chest for lung cancer screening. You will find enclosed, information about CT lung screening. Please review the statement of understanding, you will be asked to sign a copy of this at the time of your CT scan    If you have not already been contacted to make the appointment for your scan, please call our scheduling department at [phone]    Keep in mind that CT lung screening does not take the place of smoking cessation. If you are a current smoker, you will find enclosed smoking cessation resources. Please do not hesitate to contact me if you have any questions or concerns.     [address] [facility],      [facility] Lung Screening Program  [phone]

## 2022-01-20 DIAGNOSIS — G44.221 CHRONIC TENSION-TYPE HEADACHE, INTRACTABLE: ICD-10-CM

## 2022-01-20 DIAGNOSIS — G44.59 OTHER COMPLICATED HEADACHE SYNDROME: ICD-10-CM

## 2022-01-21 ENCOUNTER — OFFICE VISIT (OUTPATIENT)
Dept: NEUROLOGY | Age: 57
End: 2022-01-21
Payer: MEDICARE

## 2022-01-21 VITALS
OXYGEN SATURATION: 95 % | DIASTOLIC BLOOD PRESSURE: 88 MMHG | SYSTOLIC BLOOD PRESSURE: 110 MMHG | HEIGHT: 71 IN | HEART RATE: 98 BPM | BODY MASS INDEX: 30.98 KG/M2 | WEIGHT: 221.25 LBS

## 2022-01-21 DIAGNOSIS — F31.0 BIPOLAR AFFECTIVE DISORDER, CURRENT EPISODE HYPOMANIC (HCC): ICD-10-CM

## 2022-01-21 DIAGNOSIS — F31.11 BIPOLAR AFFECTIVE DISORDER, CURRENTLY MANIC, MILD (HCC): ICD-10-CM

## 2022-01-21 DIAGNOSIS — R73.01 IFG (IMPAIRED FASTING GLUCOSE): ICD-10-CM

## 2022-01-21 DIAGNOSIS — G44.59 OTHER COMPLICATED HEADACHE SYNDROME: Primary | ICD-10-CM

## 2022-01-21 DIAGNOSIS — R41.3 MEMORY PROBLEM: ICD-10-CM

## 2022-01-21 DIAGNOSIS — Z21 ASYMPTOMATIC HIV INFECTION (HCC): ICD-10-CM

## 2022-01-21 DIAGNOSIS — M54.16 LUMBAR RADICULAR PAIN: ICD-10-CM

## 2022-01-21 DIAGNOSIS — M48.061 NEURAL FORAMINAL STENOSIS OF LUMBAR SPINE: ICD-10-CM

## 2022-01-21 DIAGNOSIS — M62.838 MUSCLE SPASM: ICD-10-CM

## 2022-01-21 DIAGNOSIS — G44.221 CHRONIC TENSION-TYPE HEADACHE, INTRACTABLE: ICD-10-CM

## 2022-01-21 PROCEDURE — 1036F TOBACCO NON-USER: CPT | Performed by: PSYCHIATRY & NEUROLOGY

## 2022-01-21 PROCEDURE — 3017F COLORECTAL CA SCREEN DOC REV: CPT | Performed by: PSYCHIATRY & NEUROLOGY

## 2022-01-21 PROCEDURE — 99214 OFFICE O/P EST MOD 30 MIN: CPT | Performed by: PSYCHIATRY & NEUROLOGY

## 2022-01-21 PROCEDURE — G8427 DOCREV CUR MEDS BY ELIG CLIN: HCPCS | Performed by: PSYCHIATRY & NEUROLOGY

## 2022-01-21 PROCEDURE — G8417 CALC BMI ABV UP PARAM F/U: HCPCS | Performed by: PSYCHIATRY & NEUROLOGY

## 2022-01-21 PROCEDURE — G8484 FLU IMMUNIZE NO ADMIN: HCPCS | Performed by: PSYCHIATRY & NEUROLOGY

## 2022-01-21 RX ORDER — TOPIRAMATE 50 MG/1
50 TABLET, FILM COATED ORAL 2 TIMES DAILY
Qty: 60 TABLET | Refills: 2 | Status: SHIPPED | OUTPATIENT
Start: 2022-01-21 | End: 2022-03-30 | Stop reason: SDUPTHER

## 2022-01-21 RX ORDER — FAMOTIDINE 20 MG/1
20 TABLET, FILM COATED ORAL NIGHTLY
COMMUNITY
Start: 2021-12-21

## 2022-01-21 RX ORDER — BUTALBITAL, ACETAMINOPHEN AND CAFFEINE 50; 325; 40 MG/1; MG/1; MG/1
TABLET ORAL
Qty: 60 TABLET | Refills: 2 | Status: SHIPPED | OUTPATIENT
Start: 2022-01-21 | End: 2022-03-30 | Stop reason: SDUPTHER

## 2022-01-21 RX ORDER — SUMATRIPTAN 100 MG/1
TABLET, FILM COATED ORAL
Qty: 12 TABLET | Refills: 2 | Status: SHIPPED | OUTPATIENT
Start: 2022-01-21 | End: 2022-02-07

## 2022-01-21 ASSESSMENT — ENCOUNTER SYMPTOMS
SHORTNESS OF BREATH: 0
CONSTIPATION: 0
COLOR CHANGE: 0
FACIAL SWEATING: 0
TROUBLE SWALLOWING: 0
SINUS PRESSURE: 0
WHEEZING: 0
APNEA: 0
ABDOMINAL PAIN: 0
EYE DISCHARGE: 0
SCALP TENDERNESS: 0
ABDOMINAL DISTENTION: 0
RHINORRHEA: 0
EYE ITCHING: 0
VISUAL CHANGE: 0
EYE PAIN: 0
FACIAL SWELLING: 0
COUGH: 0
SORE THROAT: 0
CHOKING: 0
DIARRHEA: 0
BLURRED VISION: 0
SWOLLEN GLANDS: 0
CHEST TIGHTNESS: 0
PHOTOPHOBIA: 1
VOICE CHANGE: 0
BLOOD IN STOOL: 0
NAUSEA: 1
BACK PAIN: 1
EYE WATERING: 0
VOMITING: 0
EYE REDNESS: 0

## 2022-01-21 NOTE — PROGRESS NOTES
[facility]  Neurology  [address] [address]  SIXBO:[phone]   Fax: [phone]      SUBJECTIVE:     PATIENT ID:  Sabra Mahmood is a  RIGHT     HANDED 64 y.o. male. Headache   This is a new problem. Episode onset: SINCE  OCTOBER 2019. The problem occurs constantly. The problem has been gradually worsening. The pain is located in the occipital region. The pain does not radiate. The pain quality is not similar to prior headaches. The quality of the pain is described as pulsating and throbbing. The pain is at a severity of 3/10. The pain is mild. Associated symptoms include back pain, a loss of balance, nausea, neck pain, phonophobia and photophobia. Pertinent negatives include no abdominal pain, abnormal behavior, anorexia, blurred vision, coughing, dizziness, drainage, ear pain, eye pain, eye redness, eye watering, facial sweating, fever, hearing loss, insomnia, muscle aches, numbness, rhinorrhea, scalp tenderness, seizures, sinus pressure, sore throat, swollen glands, tingling, tinnitus, visual change, vomiting, weakness or weight loss. The symptoms are aggravated by unknown. He has tried ergotamines and NSAIDs for the symptoms. The treatment provided no relief. His past medical history is significant for immunosuppression and migraine headaches. There is no history of cancer, cluster headaches, hypertension, migraines in the family, obesity, pseudotumor cerebri, recent head traumas, sinus disease or TMJ. Migraine   This is a chronic problem. Episode onset: SINCE  TEENAGE   The problem has been waxing and waning. The pain is located in the bilateral region. The pain does not radiate. The pain quality is similar to prior headaches. The quality of the pain is described as aching, throbbing and pulsating. The pain is at a severity of 3/10. The pain is mild. Associated symptoms include back pain, a loss of balance, nausea, neck pain, phonophobia and photophobia. Pertinent negatives include no abdominal pain, abnormal behavior, anorexia, blurred vision, coughing, dizziness, drainage, ear pain, eye pain, eye redness, eye watering, facial sweating, fever, hearing loss, insomnia, muscle aches, numbness, rhinorrhea, scalp tenderness, seizures, sinus pressure, sore throat, swollen glands, tingling, tinnitus, visual change, vomiting, weakness or weight loss. The symptoms are aggravated by unknown. He has tried NSAIDs and Excedrin for the symptoms. The treatment provided moderate relief. His past medical history is significant for immunosuppression and migraine headaches. There is no history of cancer, cluster headaches, hypertension, migraines in the family, obesity, pseudotumor cerebri, recent head traumas, sinus disease or TMJ. History obtained from  The patient   AND  HIS  MALE  FRIEND       and other  available   medical  records   were  Also  reviewed. The  Duration,  Quality,  Severity,  Location,  Timing,  Context,  Modifying  Factors   Of   The   Chief   Complaint   And  Present  Illness       Was   Reviewed   In   Chronological   Manner. PATIENT'S  MAIN  CONCERNS INCLUDE :                       1)     H /O    CHRONIC   MIGRAINES    SINCE  TEENAGE                            2)     PREVIOUS     H/O   BAD  MIGRAINE       8   YEARS   AGO                             3)     H/O     HEADACHE   IN  [facility]   AREA        SINCE   October 2019                                           PATIENT  TRIED     EXCEDRIN  MIGRAINE,    ALEVE                                                WHICH  DID  NOT  HELP. 4)     H/O    MILD     NECK PAIN       WITH  RADIATION  TO     THE  LEFT    SIDE                                         WORSE     SINCE  2020                                               -   IMPROVED                           5)     NO    PRECIPITATING   FACTORS. NO   FEVER.      NO  TRAUMA                            6)     H/O    CHRONIC    LUMBAR  PAIN   AND  RADICULOPATHY                                       BEING  FOLLOWED  BY  PAIN  MANAGEMENT                                    -   ON  NEURONTIN,     CYMBALTA                                  7)       H/O   CHRONIC  ANXIETY,    BIPOLAR  DISORDER                                      -  ON  INVEGA, ,     ATIVAN     PRN                                     TO  FOLLOW  WITH     MENTAL  HEALTH  PROFESSIONALS                           8)     H/O     HIV  INFECTION       ASYMPTOMATIC                                    - ON  MEDICATION          FROM    Critical access hospital     PROVIDERS                              9)      H/O    CHRONIC    MILD     MEMORY  PROBLEMS                                         -    NEEDS  MONITORING                        10)   H/O   EX  SMOKER                            H/O   ABNORMAL   CT   CHEST     IN    2017                              TO  FOLLOW  WITH   HIS  PCP                                         11)    HAD  NEURO  DIAGNOSTIC  EVALUATIONS  IN NOV./ DEC.  2019                              LABS  WORK  UP    DID  NOT    SHOW  ANY  SIGNIFICANT  ABNORMALITIES                             MRI  BRAIN      SHOWED  CHRONIC  CEREBRAL ISCHEMIA                                     MRI  CERVICAL SPINE       SHOWED    MULTI LEVEL  DISC  DISEASES                       12)    PATIENT'S     MIGRAINES   AND   HEADACHES                                      -       IMPROVED     AND      STABLE                                 AND  TOLERATING  THE  MEDICATIONS                                                13)      H/O     OCCIPITAL  HEADACHES     IN    SUMMER   2020    DUE  TO  HEAT                                       -     RESOLVED                                14)     H/O   INTOLERANCE      WITH  NAPROSYN                                         DUE  TO  GI  SIDE EFFECTS                              PATIENT   TO AVOID     NSAID                          15)          PATIENT       ON      TOPAMAX,  IMITREX,    FIORICET     AS   NEEDED                                    WITH  BETTER  CONTROL  OF      HIS   MIGRAINES                                                16)        PATIENT  DENIES  ANY  NEW  NEUROLOGICAL  CONCERNS                                 REQUESTS     REFILLS  FOR  HIS  MEDICATIONS                     17)      H/O      RESTLESS  LEG   SYNDROME                                                   -   IMPROVED       ON     REQUIP                                                    18)        VARIOUS  RISK   FACTORS   WERE  REVIEWED   AND   DISCUSSED. PATIENT   HAS  MULTIPLE   MEDICAL, NEUROLOGICAL                        AND   MENTAL HEALTH   PROBLEMS . PATIENT'S   MANAGEMENT  IS  CHALLENGING.                                     PRECIPITATING  FACTORS: including  fever/infection, exertion/relaxation, position change, stress, weather change,                        medications/alcohol, time of day/darkness/light  Are  absent                                                              MODIFYING  FACTORS:  fever/infection, exertion/relaxation, position change, stress, weather change,                        medications/alcohol, time of day/darkness/light  Are  absent             Patient   Indicates   The  Presence   And  The  Absence  Of  The  Following    Associated  And   Additional  Neurological    Symptoms:                                Balance  And coordination   problems  absent           Gait problems     absent            Headaches      present              Migraines           present           Memory problemspresent              Confusion        absent            Paresthesia   numbness          absent           Seizures  And  Starring  Episodes           absent           Syncope,  Near  syncopal episodes         absent           Speech   problems           absent Swallowing   Problems      absent            Dizziness,  Light headedness           absent              Vertigo        absent             Generalized   Weakness    absent              focal  Weakness     absent             Tremors         absent              Sleep  Problems     absent             History  Of   Recent  Head  Injury     absent             History  Of   Recent  TIA     absent             History  Of   Recent    Stroke     absent             Neck  Pain   and   Neck muscle  Spasms  present               Radiating  down   And   Weakness           present            Lower back   Pain  And     Spasms  present              Radiating    Down   And   Weakness          present                H/OFALLS        absent               History  Of   Visual  Symptoms    absent                  Associated   Diplopia       absent                                               Also   Additional   Symptoms   Present    As  Documented    In   The   detailed                  Review  Of  Systems   And    Please   Refer   To    Them for   Additional    Information. Any components  That are either  Unobtainable  Or  Limited  In   HPI, ROS  And/or PFSH   Are                   Due   ToPatient's  Medical  Problems,  Clinical  Condition   and/or lack of                                other    Alternate   resources.           RECORDS   REVIEWED:    historical medical records       INFORMATION   REVIEWED:     MEDICAL   HISTORY,SURGICAL   HISTORY,     MEDICATIONS   LIST,   ALLERGIES AND  DRUG  INTOLERANCES,       FAMILY   HISTORY,  SOCIAL  HISTORY,      PROBLEM  LIST   FOR  PATIENT  CARE   COORDINATION      Past Medical History:   Diagnosis Date    Bipolar disorder ([facility]     follows with Dr. Cori Mena Centrilobular emphysema ([facility]     HIV (human immunodeficiency virus infection) ([facility] 2001    followed by Patrice Michel (Infectious Disease at [address] [place] [address])    Pneumonia 06/11/2017         Past Surgical History:   Procedure Laterality Date    ANESTHESIA NERVE BLOCK Bilateral 1/12/2018    Bilat L2 L3 L4 L5 Diagnostic Medial BB performed by Marcial Essex, MD at [address] [place] [address]  01/05/2016    Normal colon. Hemorrhoids.  Dr Oskar Gregory  2015    full dental extraction    HERNIA REPAIR Left 3/2/2020    Laparoscopic Robotic Assisted Left Inguinal HERNIA REPAIR WITH MESH &VENTRAL  hernia repair performed by Marcelo Greene DO at [address] [place] [address]  Right 9/26/2017    Right L4 & L5 Transforaminal  performed by Marcial Essex, MD at [address] [place] [address]  Right 10/3/2017    Right L4 & L5 Transforaminal performed by Marcial Essex, MD at [address] [place] [address] Dr Right 1/8/2019    Right L4 L5 TRANSFORAMINAL performed by Marcial Essex, MD at [address] [place] [address] AA&/STRD LUMBAR PLEXUS CONT NFS CATH Bilateral 1/19/2018    Bilat L2 L3 L4 L5 Confirmatory Medial BB performed by Marcial Essex, MD at [address] [place] [address] 1, W IMAGE GUIDE,EA ADDL LEVEL Right 1/25/2018    Right L2 L3 L4 L5 RADIOFREQUENCY performed by Marcial Essex, MD at [address] [place] [address] 1, W IMAGE GUIDE,EA ADDL LEVEL Left 3/15/2018    Left L2 L3 L4 L5 RADIOFREQUENCY performed by Marcial Essex, MD at [address] [place] [address]         Current Outpatient Medications   Medication Sig Dispense Refill    famotidine (PEPCID) 20 MG tablet Take 20 mg by mouth nightly      topiramate (TOPAMAX) 50 MG tablet Take 1 tablet by mouth 2 times daily 60 tablet 2    SUMAtriptan (IMITREX) 100 MG tablet TAKE 1 TABLET BY MOUTH ONCE AS NEEDED FOR MIGRAINE(NOT MORE THAN TWO MAXIUMUM PER DAY) 12 tablet 2    rOPINIRole (REQUIP) 0.25 MG tablet Take 1 tablet by mouth nightly 90 tablet 1    butalbital-acetaminophen-caffeine (FIORICET, ESGIC) -40 MG per tablet TAKE 1 TO 2 TABLETS BY MOUTH TWICE DAILY AS NEEDED FOR HEADACHE 60 tablet 2    gabapentin (NEURONTIN) 800 MG tablet Take 1 tablet by mouth 3 times daily for 30 days. 90 tablet 5    DULoxetine (CYMBALTA) 60 MG extended release capsule TAKE 2 CAPSULES BY MOUTH DAILY 60 capsule 11    hydrOXYzine (ATARAX) 10 MG tablet Take 10 mg by mouth 2 times daily as needed      cyclobenzaprine (FLEXERIL) 5 MG tablet       LIVALO 4 MG TABS tablet TAKE 1 TABLET BY MOUTH DAILY      propranolol (INDERAL LA) 60 MG extended release capsule       traMADol (ULTRAM) 50 MG tablet Take 1 tablet by mouth as needed.  omeprazole (PRILOSEC) 40 MG delayed release capsule Take 1 capsule by mouth daily 90 capsule 1    ondansetron (ZOFRAN ODT) 4 MG disintegrating tablet Take 1 tablet by mouth every 8 hours as needed for Nausea or Vomiting 30 tablet 2    naproxen (EC NAPROSYN) 500 MG EC tablet Take 1 tablet by mouth daily       nystatin (MYCOSTATIN) 205112 UNIT/GM cream Apply 1 Dose topically daily      Loratadine-Pseudoephedrine (CLARITIN-D 24 HOUR PO) Take by mouth daily as needed      loperamide (IMODIUM) 2 MG capsule Take 2 mg by mouth 4 times daily as needed for Diarrhea      fluticasone (FLONASE) 50 MCG/ACT nasal spray SHAKE LIQUID AND USE 1 SPRAY IN EACH NOSTRIL DAILY 48 g 0    SYMTUZA [phone] MG TABS TK 1 T PO  QD  6    aspirin-acetaminophen-caffeine (EXCEDRIN MIGRAINE) 250-250-65 MG per tablet Take 1 tablet by mouth every 6 hours as needed for Headaches       Clotrimazole (MYCELEX) 10 MG LOZG lozenge Take 1 lozenge by mouth 5 times daily 70 lozenge 1    fenofibric acid (FIBRICOR) 135 MG CPDR capsule TAKE 1 CAPSULE BY MOUTH DAILY 90 capsule 0    LORazepam (ATIVAN) 1 MG tablet Take 1 mg by mouth every 6 hours as needed for Anxiety.       Darunavir-Cobicistat (PREZCOBIX) 800-150 MG TABS Take by mouth nightly      Nebulizers (COMPRESSOR/NEBULIZER) MISC As directed 1 each 0    Respiratory Therapy Supplies (NEBULIZER/TUBING/MOUTHPIECE) KIT 1 kit by Does not apply route daily 1 kit 0    albuterol (PROVENTIL) (2.5 MG/3ML) 0.083% nebulizer solution Take 2.5 mg by nebulization every 6 hours as needed for Wheezing       TURMERIC PO Take by mouth three times daily      Glucosamine HCl (GLUCOSAMINE PO) Take by mouth daily      Multiple Vitamins-Minerals (MULTIVITAMIN PO) Take by mouth daily      INVEGA 6 MG extended release tablet Take 6 mg by mouth daily      DESCOVY 200-25 MG TABS Take 1 tablet by mouth daily      MYTESI 125 MG TBEC Take 125 mg by mouth       No current facility-administered medications for this visit. Allergies   Allergen Reactions    Pcn [Penicillins] Rash    Sulfa Antibiotics Rash         Family History   Problem Relation Age of Onset    Cancer Mother         uterine s/p hysterectomy    Cancer Maternal Grandmother     Cataracts Neg Hx     Diabetes Neg Hx     Glaucoma Neg Hx          Social History     Socioeconomic History    Marital status: Single     Spouse name: Not on file    Number of children: Not on file    Years of education: Not on file    Highest education level: Not on file   Occupational History    Not on file   Tobacco Use    Smoking status: Former Smoker     Packs/day: 3.00     Years: 30.00     Pack years: 90.00     Quit date: 10/7/2014     Years since quittin.2    Smokeless tobacco: Never Used   Vaping Use    Vaping Use: Never used   Substance and Sexual Activity    Alcohol use: Yes     Comment: socially    Drug use: No    Sexual activity: Not on file   Other Topics Concern    Not on file   Social History Narrative    Not on file     Social Determinants of Health     Financial Resource Strain:     Difficulty of Paying Living Expenses: Not on file   Food Insecurity:     Worried About Running Out of Food in the Last Year: Not on file    [place] of Food in the Last Year: Not on file   Transportation Needs:     Lack of Transportation (Medical): Not on file    Lack of Transportation (Non-Medical):  Not on file   Physical Activity:     Days of Exercise per Week: Not on file    Minutes of Exercise per Session: Not on file   Stress:     Feeling of Stress : Not on file   Social Connections:     Frequency of Communication with Friends and Family: Not on file    Frequency of Social Gatherings with Friends and Family: Not on file    Attends Latter day Services: Not on file    Active Member of Clubs or Organizations: Not on file    Attends Club or Organization Meetings: Not on file    Marital Status: Not on file   Intimate Partner Violence:     Fear of Current or Ex-Partner: Not on file    Emotionally Abused: Not on file    Physically Abused: Not on file    Sexually Abused: Not on file   Housing Stability:     Unable to Pay for Housing in the Last Year: Not on file    Number of Places Lived in the Last Year: Not on file    Unstable Housing in the Last Year: Not on file       Vitals:    01/21/22 0942   BP: 110/88   Pulse: 98   SpO2: 95%         Wt Readings from Last 3 Encounters:   01/21/22 221 lb 4 oz (100.4 kg)   10/15/21 223 lb (101.2 kg)   08/17/21 226 lb 12.8 oz (102.9 kg)         BP Readings from Last 3 Encounters:   01/21/22 110/88   10/15/21 122/80   08/17/21 130/74       Hematology and Coagulation  Lab Results   Component Value Date    WBC 9.4 08/17/2021    RBC 4.40 08/17/2021    HGB 14.0 08/17/2021    HCT 42.8 08/17/2021    MCV 97.3 08/17/2021    MCH 31.8 08/17/2021    MCHC 32.7 08/17/2021    RDW 12.7 08/17/2021     08/17/2021    MPV 9.2 08/17/2021       Chemistries  Lab Results   Component Value Date     08/17/2021    K 4.2 08/17/2021     08/17/2021    CO2 24 08/17/2021    BUN 14 08/17/2021    CREATININE 0.93 08/17/2021    CALCIUM 9.1 08/17/2021    PROT 6.8 08/17/2021    LABALBU 4.1 08/17/2021    BILITOT 0.14 08/17/2021    ALKPHOS 102 08/17/2021    AST 21 08/17/2021    ALT 20 08/17/2021     Lab Results   Component Value Date    ALKPHOS 102 08/17/2021    ALT 20 08/17/2021    AST 21 08/17/2021    PROT 6.8 08/17/2021    BILITOT 0.14 08/17/2021    BILIDIR <0.08 06/11/2017    LABALBU 4.1 08/17/2021     Lab Results   Component Value Date    BUN 14 08/17/2021    CREATININE 0.93 08/17/2021     Lab Results   Component Value Date    CALCIUM 9.1 08/17/2021     Lab Results   Component Value Date    AST 21 08/17/2021    ALT 20 08/17/2021             Review of Systems   Constitutional: Negative for appetite change, chills, fatigue, fever, unexpected weight change and weight loss. HENT: Negative for congestion, dental problem, drooling, ear discharge, ear pain, facial swelling, hearing loss, mouth sores, nosebleeds, postnasal drip, rhinorrhea, sinus pressure, sore throat, tinnitus, trouble swallowing and voice change. Eyes: Positive for photophobia. Negative for blurred vision, pain, discharge, redness, itching and visual disturbance. Respiratory: Negative for apnea, cough, choking, chest tightness, shortness of breath and wheezing. Cardiovascular: Negative for chest pain, palpitations and leg swelling. Gastrointestinal: Positive for nausea. Negative for abdominal distention, abdominal pain, anorexia, blood in stool, constipation, diarrhea and vomiting. Endocrine: Negative for cold intolerance, heat intolerance, polydipsia, polyphagia and polyuria. Musculoskeletal: Positive for arthralgias, back pain and neck pain. Negative for gait problem, joint swelling, myalgias and neck stiffness. Skin: Negative for color change, pallor, rash and wound. Allergic/Immunologic: Negative for environmental allergies, food allergies and immunocompromised state. Neurological: Positive for headaches and loss of balance. Negative for dizziness, tingling, tremors, seizures, syncope, facial asymmetry, speech difficulty, weakness, light-headedness and numbness. Hematological: Negative for adenopathy. Does not bruise/bleed easily. Psychiatric/Behavioral: Positive for decreased concentration.  Negative for agitation, behavioral problems, confusion, dysphoric mood, hallucinations, self-injury, sleep disturbance and suicidal ideas. The patient is nervous/anxious. The patient does not have insomnia and is not hyperactive. OBJECTIVE:    Physical Exam  Constitutional:       Appearance: He is well-developed. HENT:      Head: Normocephalic and atraumatic. No raccoon eyes or Oviedo's sign. Right Ear: External ear normal.      Left Ear: External ear normal.      Nose: Nose normal.   Eyes:      Conjunctiva/sclera: Conjunctivae normal.      Pupils: Pupils are equal, round, and reactive to light. Neck:      Thyroid: No thyroid mass or thyromegaly. Vascular: No carotid bruit. Trachea: No tracheal deviation. Meningeal: Brudzinski's sign and Kernig's sign absent. Cardiovascular:      Rate and Rhythm: Normal rate and regular rhythm. Pulmonary:      Effort: Pulmonary effort is normal.   Musculoskeletal:         General: No tenderness. Cervical back: Normal range of motion and neck supple. No rigidity. No muscular tenderness. Normal range of motion. Skin:     General: Skin is warm. Coloration: Skin is not pale. Findings: No erythema or rash. Nails: There is no clubbing. Psychiatric:         Attention and Perception: He is attentive. Mood and Affect: Mood is anxious. Mood is not depressed. Affect is not labile, blunt or inappropriate. Speech: He is communicative. Speech is not rapid and pressured, delayed, slurred or tangential.         Behavior: Behavior is not agitated, slowed, aggressive, withdrawn, hyperactive or combative. Behavior is cooperative. Thought Content: Thought content is not paranoid or delusional. Thought content does not include homicidal or suicidal ideation. Thought content does not include homicidal or suicidal plan. Cognition and Memory: Cognition is impaired. Memory is impaired. He does not exhibit impaired remote memory.          Judgment: Judgment is not impulsive or inappropriate. NEUROLOGICALEXAMINATION :       A) MENTAL STATUS:                   Alert and  oriented  To time, place  And  Person. No Aphasia. No  Dysarthria. Able   To  Follow    SIMPLE      commands   without   Any  Difficulty. No right  To left confusion. Normal  Speech  And language function. Insight and  Judgment ,Fund  Of  Knowledge   within normal limits                Recent  And  Remote memory    DECREASED                Attention &  Concentration are     DECREASED                                             B) CRANIAL NERVES :      CN : Visual  Acuity  And  Visual fields  within normal limits               Fundi  Could  Not  Be  Could  Not  Be  Evaluated. 3,4,6 CN : Both  Pupils are   Reactive and  Equal.  Movements  Are  Intact. No  Nystagmus. No  JOSIE. No  Afferent  Pupillary  Defect noted. 5 CN :  Normal  Facial sensations and Corneal  Reflexes           7 CN:  Normal  Facial  Symmetry  And  Strength. No facial  Weakness. 8 CN :  Hearing  Appears within normal limits          9, 10 CN: Normal   spontaneous, reflex   palate   movements         11 CN:   Normal  Shoulder  shrug and  strength         12 CN :   Normal  Tongue movements and  Tongue  In midline                        No tongue   Fasciculations or atrophy       C) MOTOR  EXAM:                 Strength  In upper  And  Lower   extremities   within   normal limits               No  Drift. No  Atrophy               Rapid   alternating  And  repetitions  Movements  within   normal limits               Muscle  Tone  In upper  And  Lower  Extremities  normal                No rigidity. No  Spasticity. Bradykinesia   absent               No  Asterixis.               Sustention  Tremor , Resting   Tremor   absent                    No   other  Abnormal  Movements noted           D) SENSORY :               Light   touch, pinprick,   position  And  Vibration  within normal limits        E) REFLEXES:                   Deep  Tendon  Reflexes   normal                  No  pathological  Reflexes  Bilaterally. F) COORDINATION  AND  GAIT :                                Station and  Gait  normal                              Romberg 's test negative                          Ataxia negative          ASSESSMENT:      Patient Active Problem List   Diagnosis    HIV (human immunodeficiency virus infection) ([facility]    IFG (impaired fasting glucose)    Lumbar radicular pain    Chronic bilateral low back pain with right-sided sciatica    Panniculitis involving lumbar region    Lumbar facet joint syndrome    Abnormal EKG    Centrilobular emphysema ([facility])    Muscle spasm    Lumbosacral spondylosis without myelopathy    Neural foraminal stenosis of lumbar spine    Bipolar disorder ([facility] 75[address]    Memory problem    Abnormal CT of the chest    Ex-smoker    Neck pain    Other complicated headache syndrome    Ventral hernia without obstruction or gangrene    Left inguinal hernia    Intractable headache    Former smoker    Asymptomatic HIV infection ([facility]             MRI OF THE BRAIN WITHOUT AND WITH CONTRAST  12/24/2019 10:37 am       TECHNIQUE:   Multiplanar multisequence MRI of the head/brain was performed without and   with the administration of intravenous contrast.       COMPARISON:   CT brain performed 06/11/2017.       HISTORY:   ORDERING SYSTEM PROVIDED HISTORY: Memory problem   TECHNOLOGIST PROVIDED HISTORY:   migraines   Reason for Exam: Neck pain and left shoulder pain. Migraines. Symptoms for   quite a while. Acuity: Chronic   Type of Exam: Unknown   Additional signs and symptoms: Memory problem;  Intractable migraine without   status migrainosus, unspecified migraine type.       FINDINGS:   INTRACRANIAL STRUCTURES/VENTRICLES:  The sellar and suprasellar structures,   optic chiasm, corpus callosum, pineal gland, tectum, and midline brainstem   structures are unremarkable.  The craniocervical junction is unremarkable. There is no acute intracranial hemorrhage, mass effect, or midline shift. There is satisfactory overall gray-white matter differentiation.  There is   chronic microvascular disease.  The ventricular structures are symmetric and   unremarkable.  The infratentorial structures including the cerebellopontine   angles and internal auditory canals are unremarkable. There is no abnormal   restricted diffusion. There is no abnormal blooming artifact on   susceptibility weighted imaging.  There is no abnormal postcontrast   enhancement.       ORBITS: The visualized portion of the orbits demonstrate no acute abnormality.       SINUSES: The visualized paranasal sinuses and mastoid air cells are well   aerated.       BONES/SOFT TISSUES: The bone marrow signal intensity appears normal. The soft   tissues demonstrate no acute abnormality.           Impression   Chronic microvascular disease without acute intracranial abnormality.  No   abnormal postcontrast enhancement. MRI OF THE CERVICAL SPINE WITHOUT CONTRAST 12/24/2019 10:07 am       TECHNIQUE:   Multiplanar multisequence MRI of the cervical spine was performed without the   administration of intravenous contrast.       COMPARISON:   None.       HISTORY:   ORDERING SYSTEM PROVIDED HISTORY: Neck pain   TECHNOLOGIST PROVIDED HISTORY:   neck pain   Reason for Exam: Neck pain and left shoulder pain. Migraines. Symptoms for   quite a while.    Acuity: Chronic   Type of Exam: Unknown   Additional signs and symptoms: Neck pain       FINDINGS:   BONES/ALIGNMENT: There is straightening of the cervical spine.  The vertebral   body heights are maintained.  There is age-appropriate bone marrow signal.   There is mild edema within the left C3-4 facets.  There is multilevel   degenerative disc disease with loss of disc signal. Ahmed Ship is disc space   narrowing primarily at the C6-7 level.  There is no significant   spondylolisthesis.       SPINAL CORD: The spinal cord is normal in caliber and signal.  The visualized   intracranial structures are unremarkable.       SOFT TISSUES: The posterior paraspinal soft tissues are unremarkable.  The   prevertebral soft tissues are unremarkable.       C2-C3: There is no significant disc protrusion, spinal canal stenosis or   neural foraminal narrowing.       C3-C4: There is a mild disc osteophyte complex eccentric to the left with   associated uncovertebral and facet hypertrophy.  There is canal stenosis   measuring 8 mm in AP dimension.  There is moderate to severe left foraminal   narrowing and no significant right foraminal narrowing.       C4-C5: There is a disc osteophyte complex with uncovertebral and facet   hypertrophy.  There is canal stenosis measuring 9 mm in AP dimension.  There   is moderate right foraminal narrowing and no significant left foraminal   narrowing.       C5-C6: There is a disc osteophyte complex with uncovertebral and facet   hypertrophy that is worse on the left.  There is no significant canal   stenosis or right foraminal narrowing.  There is severe left foraminal   narrowing.       C6-C7: There is a disc osteophyte complex with uncovertebral and facet   hypertrophy.  There is canal stenosis measuring 8 mm in AP dimension.  There   is severe bilateral foraminal narrowing.       C7-T1: There is a disc osteophyte complex with uncovertebral and facet   hypertrophy.  There is no significant canal stenosis or foraminal narrowing.           Impression   Multilevel degenerative disc disease with associated uncovertebral and facet   hypertrophy resulting in canal stenosis most pronounced at C3-4 and C6-7.       Moderate to severe left foraminal narrowing at C3-4, moderate right foraminal   narrowing at C4-5, severe left foraminal narrowing at C5-6, and severe bilateral foraminal narrowing at C6-7.       Edema is demonstrated within the left C3-4 facets that is likely related to   degenerative facet disease although a degree of septic arthritis is a   consideration and correlation is needed. MRI OF THE LUMBAR SPINE WITHOUT CONTRAST, 6/11/2018 10:22 am       TECHNIQUE:   Multiplanar multisequence MRI of the lumbar spine was performed without the   administration of intravenous contrast.       COMPARISON:   None       HISTORY:   ORDERING SYSTEM PROVIDED HISTORY: Lumbar facet joint syndrome   TECHNOLOGIST PROVIDED HISTORY:   Reason for exam:->right lumbar radiculopathy   Ordering Physician Provided Reason for Exam:  Low back pain to the right   side, right leg pain. Symptoms for about one week. Acuity: Acute   Type of Exam: Initial   Additional signs and symptoms:  Lumbar facet syndrome; Right lumbar   radiculopathy       Initial evaluation.       FINDINGS:   BONES/ALIGNMENT: There is normal alignment of the spine. The vertebral body   heights are maintained.  The bone marrow signal appears unremarkable.  There   is degenerative disc disease with disc space narrowing and osteophytes at   L3-4, L4-5, and L5-S1.       SPINAL CORD: The conus terminates normally.       SOFT TISSUES: No paraspinal mass identified. Delmy Callander is a cyst in the left   kidney that is not fully evaluated.  Ultrasound could better evaluate.       L1-L2:  The thecal sac and neural foramina are intact.       L2-L3:  There is mild facet and ligamentum flavum hypertrophy.  The thecal   sac is not stenotic.  Disc and/or osteophytes cause minimal narrowing of the   neural foramina bilaterally.       L3-L4: The thecal sac is not stenotic.   Disc and/or osteophytes result in   mild narrowing of the neural foramina bilaterally.   There is mild facet and   ligamentum flavum hypertrophy.       L4-L5:  There is moderate facet and ligamentum flavum hypertrophy.  The   thecal sac is not stenotic.  Disc and/or osteophytes result in moderate   narrowing of the neural foramina bilaterally. The left neural foramen is   narrowed greater than right.       L5-S1:  There is mild facet and ligamentum flavum hypertrophy.  The thecal   sac is not stenotic.  The S1 nerve roots are intact. Disc and/or osteophytes   result in moderate narrowing of the neural foramina bilaterally. The right   neural foramen is narrowed greater than the left.       There is no significant change in the findings from the prior study.           Impression   Disc and osteophytes result in narrowing of the neural foramina at several   levels as discussed above.  No significant stenosis of thecal sac in the   lumbar region. CT OF THE HEAD WITHOUT CONTRAST  6/11/2017 8:49 pm       TECHNIQUE:   CT of the head was performed without the administration of intravenous   contrast. Dose modulation, iterative reconstruction, and/or weight based   adjustment of the mA/kV was utilized to reduce the radiation dose to as low   as reasonably achievable.       COMPARISON:   None.       HISTORY:   ORDERING SYSTEM PROVIDED HISTORY: neuro symptoms/ HIV   TECHNOLOGIST PROVIDED HISTORY:   Ordering Physician Provided Reason for Exam: Passed out while coughing and   hit forehead on table today, previous loss of consciousness spells in the   last weeks.  Neuro symptoms, reaction to HIV medication.    Acuity: Acute   Type of Exam: Initial   Mechanism of Injury: passed out and fell   Relevant Medical/Surgical History: n/a       FINDINGS:   BRAIN/VENTRICLES: The ventricular system is mildly prominent in size and in   the midline.  No evidence of extra-axial fluid collection, hemorrhage or mass.       ORBITS: The visualized portion of the orbits demonstrate no acute abnormality.       SINUSES: Mild bilateral ethmoid sinus mucosal thickening.  Paranasal sinuses   visualized are otherwise clear.  Mastoid regions are poorly aerated likely a   developmental basis.       SOFT Centrilobular emphysema. VISITING DIAGNOSIS:      ICD-10-CM    1. Other complicated headache syndrome  G44.59    2. Lumbar radicular pain  M54.16    3. Bipolar affective disorder, currently manic, mild ([facility] [address]  F31.11    4. IFG (impaired fasting glucose)  R73.01    5. Neural foraminal stenosis of lumbar spine  M48.061    6. Asymptomatic [facility] ([facility]  Z21    7. Bipolar affective disorder, current episode hypomanic ([facility]  F31.0    8. Chronic tension-type headache, intractable  G44.221    9. Memory problem  R41.3    10. Muscle spasm  M62.838               CONCERNS   &   INCREASED   RISK   FOR         * TIA,  CEREBRO  VASCULAR  ISCHEMIA,      *    COMPLICATED  MIGRAINES      *   TENSION  HEADACHES      *   COGNITIVE  &   MEMORY PROBLEMS                      VARIOUS  RISK   FACTORS   WERE  REVIEWED   AND   DISCUSSED. *  PATIENT   HAS  MULTIPLE   MEDICAL, NEUROLOGICAL                        AND   MENTAL HEALTH   PROBLEMS . PATIENT'S   MANAGEMENT  IS  CHALLENGING. PLAN:                         Fany Torresing  Of  The  Diagnoses,  The  Management & Treatment  Options            AND    Care  plan  Were          Reviewed and   Discussed   With  patient. * Goals  And  Expectations  Of  The  Therapy  Discussed   And  Reviewed. *   Benefits   And   Side  Effect  Profile  Of  Medication/s   Were   Discussed                * Need   For  Further   Follow up For  The  Various  Problems Were  discussed. * Results  Of  The  Previous  Diagnostic tests were reviewed and  discussed                 Medical  Decision  Making  Was  Made  Based on the   Complexity  Of  Above  Mentioned  Diagnoses,    Data reviewed             And    Risk  Of  Significant   Co morbidities and   complicating   Factors.                  Medical  Decision  Was   High     Complexity   Due   To  The  Patient's  Multiple  Symptoms,      Complex  Treatment  Regimen,  Multiple medications           and   Risk Of   Side  Effects,  Difficulty  In  Medication  Management  And  Diagnostic  Challenges       In  View  Of  The  Associated   Co  Morbid  Conditions   And  Problems. *   BE  CAREFUL  WITH  ACTIVITIES   INCLUDING  DRIVING. *   AVOID   NECK  AND/ BACK  STRAINING  ACTIVITIES        *   ADEQUATE   FLUIDINTAKE   AND  ELECTROLYTE  BALANCE         * KEEP  DAIRY  OF   THE  NEUROLOGICAL  SYMPTOMS        RECORDING THE    DURATION  AND  FREQUENCY. *  AVOID    CONDITIONS  AND  FACTORS   THAT  MAKE                  NEUROLOGICAL  SYMPTOMS  WORSE.             *TO  MAINTAIN  REGULAR  SLEEP  WAKE  CYCLES. *   TO  HAVE  ADEQUATE  REST  AND   SLEEP    HOURS.            *    AVOID  ANY USAGE OF    TOBACCO,              EXCESSIVE  ALCOHOL  AND   ILLEGAL   SUBSTANCES        *  CONTINUE   MEDICATIONS    PRESCRIBED   BY NEUROLOGIST    AS    RECOMMENDED       *   Compliance   With  Medications   And  Instructions        * CURRENTLY    TOLERATING  THE  PRESCRIBED   MEDICATIONS. WITHOUT  ANY  SIGNIFICANT  SIDE  EFFECTS   &  GETTING BENEFIT.          *    MIGRAINE/ HEADACHE    DAIRY   WITH  MONITORING                       OF  DURATION  AND  FREQUENCY. *    Antiplatelet  therapy    As   Recommended  Was   Discussed      *    Prophylactic  Use   Of     Vitamin   B   Complex,  Folic  Acid,    Vitamin  B12    Multivitamin,       Calcium  With  magnesium  And  Vit D    Supplementations   Over  The  Counter  Discussed                    *  EVALUATIONS  AND  FOLLOW UP:                     *   OPTHALMOLOGY                                      * PAIN  MANAGEMENT                 *  HIV  CONSULTANTS                                *      CURRENTLY    PATIENT   FEELS  BETTER  WITH  HIS  MIGRAINES                                 AND   HEADACHES                                    AND  TOLERATING  THE  MEDICATIONS                                  FOR  THE  SAME.                          *      PATIENT  DENIES ANY  NEW  NEUROLOGICAL  CONCERNS                             Controlled Substances Monitoring: Periodic Controlled Substance Monitoring: Possible medication side effects, risk of tolerance/dependence & alternative treatments discussed. ,Assessed functional status. Ehsan Alba MD)              Orders Placed This Encounter   Medications    topiramate (TOPAMAX) 50 MG tablet     Sig: Take 1 tablet by mouth 2 times daily     Dispense:  60 tablet     Refill:  2    SUMAtriptan (IMITREX) 100 MG tablet     Sig: TAKE 1 TABLET BY MOUTH ONCE AS NEEDED FOR MIGRAINE(NOT MORE THAN TWO MAXIUMUM PER DAY)     Dispense:  12 tablet     Refill:  2                 *PATIENT   TO  FOLLOW  UP  WITH   PRIMARY  CARE         OTHER  CONSULTANTS  AS  BEFORE.           *TO  FOLLOW  WITH   MENTAL  HEALTH  PROFESSIONALS ,  INCLUDING            PSYCHOLOGICAL  COUNSELING   AND  PSYCHIATRIC  EVALUTIONS,                 *  Maintain   Healthy  Life Style    With   Periodic  Monitoring  Of      Any  Medical  Conditions  Including   Elevated  Blood  Pressure,  Lipid  Profile,     Blood  Sugar levels  AndHeart  Disease. *   Period   Screening  For  Cancers  Involving  Breast,  Colon,    lungs  And  Other  Organs  As  Applicable,  In consultation   With  Your  Primary Care Providers. *Second  Neurological  Opinion  And  Evaluations  In  [facility]  Setting  If  Patient  Is  Interested. * Please   Contact   Neurology  Clinic   Early   If   Are  Any  New  Neurological   And  Any neurological  Concerns.                   *  If  The  Patient remains  Neurologically  Stable   Return   To  [facility] Neurology Department   IN          3         MONTHS  TIME   FOR  FURTHER              FOLLOW UP.                       *   The  Neurological   Findings,  Possible  Diagnosis,  Differential diagnoses                    And  Options  For    Further   Investigations                   And management   Are  Discussed  Comprehensively. Medications   And  Prescription   Risks  And  Side effects  Are   Also  Discussed. *  If   There is  Any  Significant  Worsening   Of  Current  Symptoms  And  Or                  If patient  Develops   Any additional  New  NeurologicalSymptoms                  Or  Significant  Concerns   Should  Call  911 or  Go  To  Emergency  Department                          For  Further  Immediate  Evaluation. The   Above  Were  Reviewed  With  patient   and                       questions  Answered  In  Detail. More   Than  50% of face  To face Time   Was  Spent  On  Counseling                    And   Coordination  Of  Care   Of   Patient's  multiple   Neurological  Problems                         And   Comorbid  Medical   Conditions. Electronically signed by Brittany Benjamin MD.,   [address] [place] [address]      Board Certified in  Neurology &  In  [facility] ([facility])      DISCLAIMER:   Although every effort was made to ensure the accuracy of this  electronictranscription,   some errors in transcription may have occurred. GENERAL PATIENT INSTRUCTIONS:      A Healthy Lifestyle: Care Instructions   Your Care Instructions   A healthy lifestyle can help you feel good, stay at ahealthy weight, and have plenty of energy for both work and play. A healthy lifestyle is something you can share with your whole family.  A healthy lifestyle also can lower your risk for serious health problems, such ashigh blood pressure, heart disease, and diabetes.  You can follow a few steps listed below to improve your health and the health of your family.  Follow-up careis a key part of your treatment and safety. Be sure to make and go to all appointments, and call your doctor if you are having problems.  Its also a good idea to know your test results and keep a list of the medicines you take.  How can you care for yourself at home?  Do not eat too much sugar, fat, or fast foods. You can still have dessert and treats nowand then. The goal is moderation.  Start small to improve your eating habits. Pay attention to portion sizes, drink less juice and soda pop, and eat more fruits and vegetables.  Eat a healthy amount of food. A 3-ounce serving of meat, for example, is about the size of a deck of cards. Fill the rest of your plate with vegetables and whole grains.  Limit theamount of soda and sports drinks you have every day. Drink more water when you are thirsty.  Eat at least 5 servings of fruits and vegetables every day. It may seem like a lot, but it is not hard to reach this goal. Aserving or helping is 1 piece of fruit, 1 cup of vegetables, or 2 cups of leafy, raw vegetables. Have an apple or some carrot sticks as an afternoon snack instead of a candy bar. Try to have fruits and/or vegetables at everymeal.   Make exercise part of your daily routine. You may want to start with simple activities, such as walking, bicycling, or slow swimming. Try kay active 30 to 60 minutes every day. You do not need to do all 30 to 60 minutes all at once. For example, you can exercise 3 times a day for 10 or 20 minutes. Moderate exercise is safe for most people, but it is always agood idea to talk to your doctor before starting an exercise program.   Keep moving. Albertina Hamper the lawn, work in the garden, or [employer]. Take the stairs instead of the elevator at work.  If you smoke, quit. Peoplewho smoke have an increased risk for heart attack, stroke, cancer, and other lung illnesses. Quitting is hard, but there are ways to boost your chance of quitting tobacco for good.  Use nicotine gum, patches, or lozenges.  Ask your doctor about stop-smoking programs and medicines.  Keep trying.    In addition to reducing your risk of diseases in the future, you will notice some benefits soon after you stop using tobacco. If you have shortness of breath or asthma symptoms, they will likely getbetter within a few weeks after you quit.  Limit how much alcohol you drink. Moderate amounts of alcohol (up to 2 drinks a day for men, 1drink a day for women) are okay. But drinking too much can lead to liver problems, high blood pressure, and other health problems.  health   If you have a family, there are many things you can do together to improve your health.  Eat meals together as a family as often as possible.  Eat healthy foods. This includes fruits, vegetables, lean meats and dairy, and whole grains.  Include your family in your fitness plan. Most peoplethink of activities such as jogging or tennis as the way to fitness, but there are many ways you and your family can be more active. Anything that makes you breathe hard and gets your heart pumping is exercise. Here are sometips:   Walk to do errands or to take your child to school or the bus.  Go for a family bike ride after dinner instead of watching TV.  Where can you learn more?  Go toDropboxtps://AppDev[employer]UEIS.[employer]ann[employer]. org and sign in to your [employer] account. Enter Y398 in the Search HealthInformation box to learn more about \"A Healthy Lifestyle: Care Instructions. \"     If you do not have anaccount, please click on the \"Sign Up Now\" link.  Current as of: July 26, 2016   Content Version: 11.2   © 4049-8407 [employer]. Care instructions adapted under license by [employer]. If you have questions about a medical condition or this instruction, always ask your healthcare professional. [employer] disclaims any warranty or liability for your use of this information.

## 2022-01-26 NOTE — TELEPHONE ENCOUNTER
Outside order received for CT lung screening. EMR and order reviewed. Information regarding ct lung screening and smoking cessation referral mailed to patient.

## 2022-02-07 ENCOUNTER — HOSPITAL ENCOUNTER (OUTPATIENT)
Dept: CT IMAGING | Age: 57
Discharge: HOME | End: 2022-02-09
Payer: MEDICARE

## 2022-02-07 DIAGNOSIS — G44.221 CHRONIC TENSION-TYPE HEADACHE, INTRACTABLE: Primary | ICD-10-CM

## 2022-02-07 DIAGNOSIS — Z87.891 PERSONAL HISTORY OF NICOTINE DEPENDENCE: ICD-10-CM

## 2022-02-07 PROCEDURE — 71271 CT THORAX LUNG CANCER SCR C-: CPT

## 2022-02-07 RX ORDER — SUMATRIPTAN 100 MG/1
TABLET, FILM COATED ORAL
Qty: 12 TABLET | Refills: 2 | Status: SHIPPED | OUTPATIENT
Start: 2022-02-07 | End: 2022-03-30

## 2022-02-22 ENCOUNTER — HOSPITAL ENCOUNTER (OUTPATIENT)
Age: 57
Setting detail: SPECIMEN
Discharge: HOME | End: 2022-02-22
Payer: MEDICARE

## 2022-02-22 ENCOUNTER — HOSPITAL ENCOUNTER (OUTPATIENT)
Dept: LAB | Age: 57
Discharge: HOME | End: 2022-02-22
Payer: MEDICARE

## 2022-02-22 PROCEDURE — 81382 HLA II TYPING 1 LOC HR: CPT

## 2022-02-22 PROCEDURE — 83520 IMMUNOASSAY QUANT NOS NONAB: CPT

## 2022-02-22 PROCEDURE — 83993 ASSAY FOR CALPROTECTIN FECAL: CPT

## 2022-02-22 PROCEDURE — 88346 IMFLUOR 1ST 1ANTB STAIN PX: CPT

## 2022-02-22 PROCEDURE — 82784 ASSAY IGA/IGD/IGG/IGM EACH: CPT

## 2022-02-22 PROCEDURE — 36415 COLL VENOUS BLD VENIPUNCTURE: CPT

## 2022-02-22 PROCEDURE — 87493 C DIFF AMPLIFIED PROBE: CPT

## 2022-02-23 ENCOUNTER — HOSPITAL ENCOUNTER (OUTPATIENT)
Age: 57
Setting detail: SPECIMEN
Discharge: HOME | End: 2022-02-23
Payer: MEDICARE

## 2022-02-23 LAB
MICRO OVA & PARASITES: NORMAL
REASON FOR REJECTION: NORMAL
SPECIMEN DESCRIPTION: NORMAL
ZZ NTE CLEAN UP: ORDERED TEST: NORMAL
ZZ NTE WITH NAME CLEAN UP: SPECIMEN SOURCE: NORMAL

## 2022-02-23 PROCEDURE — 87116 MYCOBACTERIA CULTURE: CPT

## 2022-02-23 PROCEDURE — 87209 SMEAR COMPLEX STAIN: CPT

## 2022-02-23 PROCEDURE — 87015 SPECIMEN INFECT AGNT CONCNTJ: CPT

## 2022-02-23 PROCEDURE — 87506 IADNA-DNA/RNA PROBE TQ 6-11: CPT

## 2022-02-23 PROCEDURE — 87206 SMEAR FLUORESCENT/ACID STAI: CPT

## 2022-02-23 PROCEDURE — 87210 SMEAR WET MOUNT SALINE/INK: CPT

## 2022-02-23 PROCEDURE — 87329 GIARDIA AG IA: CPT

## 2022-02-23 PROCEDURE — 87328 CRYPTOSPORIDIUM AG IA: CPT

## 2022-02-24 LAB
C DIFFICILE TOXINS, PCR: NORMAL
CAMPYLOBACTER PCR: NORMAL
DIRECT EXAM: NORMAL
DIRECT EXAM: NORMAL
E COLI ENTEROTOXIGENIC PCR: NORMAL
PLESIOMONAS SHIGELLOIDES PCR: NORMAL
SALMONELLA PCR: NORMAL
SHIGATOXIN GENE PCR: NORMAL
SHIGELLA SP PCR: NORMAL
SPECIMEN DESCRIPTION: NORMAL
VIBRIO PCR: NORMAL
YERSINIA ENTEROCOLITICA PCR: NORMAL

## 2022-02-26 LAB — CALPROTECTIN, FECAL: 72 UG/G

## 2022-03-03 LAB
CULTURE: NORMAL
DIRECT EXAM: NORMAL
SPECIMEN DESCRIPTION: NORMAL

## 2022-03-21 RX ORDER — GABAPENTIN 800 MG/1
TABLET ORAL
Qty: 90 TABLET | Refills: 5 | Status: SHIPPED | OUTPATIENT
Start: 2022-03-21 | End: 2022-10-19

## 2022-03-21 NOTE — TELEPHONE ENCOUNTER
Last Appt:  7/2/2021  Next Appt:   6/21/21  Med verified in [facility] [place], and [place]: 2/18/22 gabapentin 800mg #90. Due Now.

## 2022-03-30 ENCOUNTER — OFFICE VISIT (OUTPATIENT)
Dept: NEUROLOGY | Age: 57
End: 2022-03-30
Payer: MEDICARE

## 2022-03-30 VITALS
HEART RATE: 102 BPM | HEIGHT: 71 IN | BODY MASS INDEX: 29.48 KG/M2 | DIASTOLIC BLOOD PRESSURE: 72 MMHG | SYSTOLIC BLOOD PRESSURE: 118 MMHG | WEIGHT: 210.6 LBS | OXYGEN SATURATION: 97 %

## 2022-03-30 DIAGNOSIS — M62.838 MUSCLE SPASM: ICD-10-CM

## 2022-03-30 DIAGNOSIS — R73.01 IFG (IMPAIRED FASTING GLUCOSE): ICD-10-CM

## 2022-03-30 DIAGNOSIS — G44.59 OTHER COMPLICATED HEADACHE SYNDROME: Primary | ICD-10-CM

## 2022-03-30 DIAGNOSIS — M54.2 NECK PAIN: ICD-10-CM

## 2022-03-30 DIAGNOSIS — Z21 ASYMPTOMATIC HIV INFECTION (HCC): ICD-10-CM

## 2022-03-30 DIAGNOSIS — G44.221 CHRONIC TENSION-TYPE HEADACHE, INTRACTABLE: ICD-10-CM

## 2022-03-30 DIAGNOSIS — M54.16 LUMBAR RADICULAR PAIN: ICD-10-CM

## 2022-03-30 DIAGNOSIS — R41.3 MEMORY PROBLEM: ICD-10-CM

## 2022-03-30 DIAGNOSIS — Z87.891 EX-SMOKER: ICD-10-CM

## 2022-03-30 DIAGNOSIS — F31.10 BIPOLAR AFFECTIVE DISORDER, CURRENT EPISODE MANIC, CURRENT EPISODE SEVERITY UNSPECIFIED (HCC): ICD-10-CM

## 2022-03-30 PROCEDURE — G8427 DOCREV CUR MEDS BY ELIG CLIN: HCPCS | Performed by: PSYCHIATRY & NEUROLOGY

## 2022-03-30 PROCEDURE — 1036F TOBACCO NON-USER: CPT | Performed by: PSYCHIATRY & NEUROLOGY

## 2022-03-30 PROCEDURE — 99214 OFFICE O/P EST MOD 30 MIN: CPT | Performed by: PSYCHIATRY & NEUROLOGY

## 2022-03-30 PROCEDURE — G8417 CALC BMI ABV UP PARAM F/U: HCPCS | Performed by: PSYCHIATRY & NEUROLOGY

## 2022-03-30 PROCEDURE — G8484 FLU IMMUNIZE NO ADMIN: HCPCS | Performed by: PSYCHIATRY & NEUROLOGY

## 2022-03-30 PROCEDURE — 3017F COLORECTAL CA SCREEN DOC REV: CPT | Performed by: PSYCHIATRY & NEUROLOGY

## 2022-03-30 RX ORDER — TOPIRAMATE 50 MG/1
50 TABLET, FILM COATED ORAL 2 TIMES DAILY
Qty: 60 TABLET | Refills: 2 | Status: SHIPPED | OUTPATIENT
Start: 2022-03-30 | End: 2022-07-20 | Stop reason: SDUPTHER

## 2022-03-30 RX ORDER — RIZATRIPTAN BENZOATE 10 MG/1
10 TABLET, ORALLY DISINTEGRATING ORAL
Qty: 12 TABLET | Refills: 2 | Status: SHIPPED | OUTPATIENT
Start: 2022-03-30 | End: 2022-07-20 | Stop reason: SDUPTHER

## 2022-03-30 RX ORDER — ROPINIROLE 0.5 MG/1
TABLET, FILM COATED ORAL
COMMUNITY
Start: 2022-03-23 | End: 2022-10-19 | Stop reason: SDUPTHER

## 2022-03-30 RX ORDER — BUTALBITAL, ACETAMINOPHEN AND CAFFEINE 50; 325; 40 MG/1; MG/1; MG/1
TABLET ORAL
Qty: 60 TABLET | Refills: 2 | Status: SHIPPED | OUTPATIENT
Start: 2022-03-30 | End: 2022-07-20 | Stop reason: SDUPTHER

## 2022-03-30 RX ORDER — PANTOPRAZOLE SODIUM 40 MG/1
TABLET, DELAYED RELEASE ORAL
COMMUNITY
Start: 2022-02-21

## 2022-03-30 ASSESSMENT — ENCOUNTER SYMPTOMS
COLOR CHANGE: 0
ABDOMINAL PAIN: 0
SHORTNESS OF BREATH: 0
FACIAL SWEATING: 0
EYE PAIN: 0
EYE REDNESS: 0
SORE THROAT: 0
TROUBLE SWALLOWING: 0
RHINORRHEA: 0
EYE ITCHING: 0
ABDOMINAL DISTENTION: 0
NAUSEA: 1
EYE DISCHARGE: 0
FACIAL SWELLING: 0
VISUAL CHANGE: 0
CONSTIPATION: 0
VOICE CHANGE: 0
EYE WATERING: 0
CHEST TIGHTNESS: 0
BACK PAIN: 1
WHEEZING: 0
DIARRHEA: 0
BLURRED VISION: 0
SCALP TENDERNESS: 0
BLOOD IN STOOL: 0
VOMITING: 0
CHOKING: 0
SWOLLEN GLANDS: 0
COUGH: 0
SINUS PRESSURE: 0
APNEA: 0
PHOTOPHOBIA: 1

## 2022-03-30 NOTE — PROGRESS NOTES
[facility]  Neurology  [address] [address]  KFNAT:[phone]   Fax: [phone]      SUBJECTIVE:     PATIENT ID:  Zainab Villavicencio is a  RIGHT     HANDED 64 y.o. male. Headache   This is a recurrent problem. Episode onset: SINCE  OCTOBER 2019. The problem occurs constantly. The problem has been gradually worsening. The pain is located in the occipital region. The pain does not radiate. The pain quality is not similar to prior headaches. The quality of the pain is described as pulsating and throbbing. The pain is at a severity of 3/10. The pain is mild. Associated symptoms include back pain, a loss of balance, nausea, neck pain, phonophobia and photophobia. Pertinent negatives include no abdominal pain, abnormal behavior, anorexia, blurred vision, coughing, dizziness, drainage, ear pain, eye pain, eye redness, eye watering, facial sweating, fever, hearing loss, insomnia, muscle aches, numbness, rhinorrhea, scalp tenderness, seizures, sinus pressure, sore throat, swollen glands, tingling, tinnitus, visual change, vomiting, weakness or weight loss. The symptoms are aggravated by unknown. He has tried ergotamines and NSAIDs for the symptoms. The treatment provided no relief. His past medical history is significant for immunosuppression and migraine headaches. There is no history of cancer, cluster headaches, hypertension, migraines in the family, obesity, pseudotumor cerebri, recent head traumas, sinus disease or TMJ. Migraine   This is a chronic problem. Episode onset: SINCE  TEENAGE   The problem has been waxing and waning. The pain is located in the bilateral region. The pain does not radiate. The pain quality is similar to prior headaches. The quality of the pain is described as aching, throbbing and pulsating. The pain is at a severity of 3/10. The pain is mild.  Associated symptoms include back pain, a loss of balance, nausea, neck pain, phonophobia and photophobia. Pertinent negatives include no abdominal pain, abnormal behavior, anorexia, blurred vision, coughing, dizziness, drainage, ear pain, eye pain, eye redness, eye watering, facial sweating, fever, hearing loss, insomnia, muscle aches, numbness, rhinorrhea, scalp tenderness, seizures, sinus pressure, sore throat, swollen glands, tingling, tinnitus, visual change, vomiting, weakness or weight loss. The symptoms are aggravated by unknown. He has tried NSAIDs and Excedrin for the symptoms. The treatment provided moderate relief. His past medical history is significant for immunosuppression and migraine headaches. There is no history of cancer, cluster headaches, hypertension, migraines in the family, obesity, pseudotumor cerebri, recent head traumas, sinus disease or TMJ. History obtained from  The patient   AND  HIS  MALE  FRIEND       and other  available   medical  records   were  Also  reviewed. The  Duration,  Quality,  Severity,  Location,  Timing,  Context,  Modifying  Factors   Of   The   Chief   Complaint   And  Present  Illness       Was   Reviewed   In   Chronological   Manner. PATIENT'S  MAIN  CONCERNS INCLUDE :                       1)     H /O    CHRONIC   MIGRAINES    SINCE  TEENAGE                            2)     PREVIOUS     H/O   BAD  MIGRAINE       8   YEARS   AGO                             3)     H/O     HEADACHE   IN  [facility]   AREA        SINCE   October 2019                                           PATIENT  TRIED     EXCEDRIN  MIGRAINE,    ALEVE                                                WHICH  DID  NOT  HELP. 4)     H/O    MILD     NECK PAIN       WITH  RADIATION  TO     THE  LEFT    SIDE                                         WORSE     SINCE  2020                                               -   IMPROVED                           5)     NO    PRECIPITATING   FACTORS. NO   FEVER.      NO  TRAUMA                            6)     H/O    CHRONIC    LUMBAR  PAIN   AND  RADICULOPATHY                                       BEING  FOLLOWED  BY  PAIN  MANAGEMENT                                    -   ON  NEURONTIN,     CYMBALTA                                  7)       H/O   CHRONIC  ANXIETY,    BIPOLAR  DISORDER                                      -  ON  INVEGA, ,     ATIVAN     PRN                                     TO  FOLLOW  WITH     MENTAL  HEALTH  PROFESSIONALS                           8)     H/O     HIV  INFECTION       ASYMPTOMATIC                                    - ON  MEDICATION          FROM    [facility]     PROVIDERS                              9)      H/O    CHRONIC    MILD     MEMORY  PROBLEMS                                         -    NEEDS  MONITORING                        10)   H/O   EX  SMOKER                            H/O   ABNORMAL   CT   CHEST     IN    2017                              TO  FOLLOW  WITH   HIS  PCP                                         11)    HAD  NEURO  DIAGNOSTIC  EVALUATIONS  IN NOV./ DEC.  2019                              LABS  WORK  UP    DID  NOT    SHOW  ANY  SIGNIFICANT  ABNORMALITIES                             MRI  BRAIN      SHOWED  CHRONIC  CEREBRAL ISCHEMIA                                     MRI  CERVICAL SPINE       SHOWED    MULTI LEVEL  DISC  DISEASES                       12)    PATIENT'S     MIGRAINES   AND   HEADACHES                                      -       IMPROVED     AND      STABLE                                 AND  TOLERATING  THE  MEDICATIONS                                                13)      H/O     OCCIPITAL  HEADACHES     IN    SUMMER   2020    DUE  TO  HEAT                                       -     RESOLVED                                14)     H/O   INTOLERANCE      WITH  NAPROSYN                                         DUE  TO  GI  SIDE EFFECTS Memory problemspresent              Confusion        absent            Paresthesia   numbness          absent           Seizures  And  Starring  Episodes           absent           Syncope,  Near  syncopal episodes         absent           Speech   problems           absent             Swallowing   Problems      absent            Dizziness,  Light headedness           absent              Vertigo        absent             Generalized   Weakness    absent              focal  Weakness     absent             Tremors         absent              Sleep  Problems     absent             History  Of   Recent  Head  Injury     absent             History  Of   Recent  TIA     absent             History  Of   Recent    Stroke     absent             Neck  Pain   and   Neck muscle  Spasms  present               Radiating  down   And   Weakness           present            Lower back   Pain  And     Spasms  present              Radiating    Down   And   Weakness          present                H/OFALLS        absent               History  Of   Visual  Symptoms    absent                  Associated   Diplopia       absent                                               Also   Additional   Symptoms   Present    As  Documented    In   The   detailed                  Review  Of  Systems   And    Please   Refer   To    Them for   Additional    Information. Any components  That are either  Unobtainable  Or  Limited  In   HPI, ROS  And/or PFSH   Are                   Due   ToPatient's  Medical  Problems,  Clinical  Condition   and/or lack of                                other    Alternate   resources.           RECORDS   REVIEWED:    historical medical records       INFORMATION   REVIEWED:     MEDICAL   HISTORY,SURGICAL   HISTORY,     MEDICATIONS   LIST,   ALLERGIES AND  DRUG  INTOLERANCES,       FAMILY   HISTORY,  SOCIAL  HISTORY,      PROBLEM  LIST   FOR  PATIENT  CARE   COORDINATION      Past Medical History: Diagnosis Date    Bipolar disorder [facility])     follows with Dr. Tamera Harmon Centrilobular emphysema [facility])     HIV (human immunodeficiency virus infection) ([address] 2001    followed by Inga Crowley (Infectious Disease at [address] [place] [address])    Pneumonia 06/11/2017         Past Surgical History:   Procedure Laterality Date    ANESTHESIA NERVE BLOCK Bilateral 1/12/2018    Bilat L2 L3 L4 L5 Diagnostic Medial BB performed by Mumtaz Sosa MD at [address] [facility]  01/05/2016    Normal colon. Hemorrhoids.  Dr Sarabjit Wyatt  2015    full dental extraction    HERNIA REPAIR Left 3/2/2020    Laparoscopic Robotic Assisted Left Inguinal HERNIA REPAIR WITH MESH &VENTRAL  hernia repair performed by Pam Cox DO at [facility] [place] [address] Right 9/26/2017    Right L4 & L5 Transforaminal  performed by Mumtaz Sosa MD at [facility] [address] Right 10/3/2017    Right L4 & L5 Transforaminal performed by Mumtaz Sosa MD at [facility] [place] [address] Right 1/8/2019    Right L4 L5 TRANSFORAMINAL performed by Mumtaz Sosa MD at [address] [place] [address] AA&/STRD LUMBAR PLEXUS CONT NFS CATH Bilateral 1/19/2018    Bilat L2 L3 L4 L5 Confirmatory Medial BB performed by Mumtaz Sosa MD at [address] [place] [address] 1, W IMAGE GUIDE,EA ADDL LEVEL Right 1/25/2018    Right L2 L3 L4 L5 RADIOFREQUENCY performed by Mumtaz Sosa MD at [address] [place] [address] 1, W IMAGE GUIDE,EA ADDL LEVEL Left 3/15/2018    Left L2 L3 L4 L5 RADIOFREQUENCY performed by Mumtaz Sosa MD at [address] [place] [address]         Current Outpatient Medications   Medication Sig Dispense Refill    pantoprazole (PROTONIX) 40 MG tablet TAKE 1 TABLET BY MOUTH DAILY      rOPINIRole (REQUIP) 0.5 MG tablet       butalbital-acetaminophen-caffeine (FIORICET, ESGIC) -40 MG per tablet TAKE 1 TO 2 TABLETS BY MOUTH TWICE DAILY AS NEEDED FOR HEADACHE 60 tablet 2    topiramate (TOPAMAX) 50 MG tablet Take 1 tablet by mouth 2 times daily 60 tablet 2    rizatriptan (MAXALT-MLT) 10 MG disintegrating tablet Take 1 tablet by mouth once as needed for Migraine May repeat in 2 hours if needed 12 tablet 2    gabapentin (NEURONTIN) 800 MG tablet TAKE 1 TABLET BY MOUTH THREE TIMES DAILY 90 tablet 5    famotidine (PEPCID) 20 MG tablet Take 20 mg by mouth nightly      DULoxetine (CYMBALTA) 60 MG extended release capsule TAKE 2 CAPSULES BY MOUTH DAILY 60 capsule 11    hydrOXYzine (ATARAX) 10 MG tablet Take 10 mg by mouth 2 times daily as needed      cyclobenzaprine (FLEXERIL) 5 MG tablet       LIVALO 4 MG TABS tablet TAKE 1 TABLET BY MOUTH DAILY      propranolol (INDERAL LA) 60 MG extended release capsule       traMADol (ULTRAM) 50 MG tablet Take 1 tablet by mouth as needed.  omeprazole (PRILOSEC) 40 MG delayed release capsule Take 1 capsule by mouth daily 90 capsule 1    ondansetron (ZOFRAN ODT) 4 MG disintegrating tablet Take 1 tablet by mouth every 8 hours as needed for Nausea or Vomiting 30 tablet 2    Loratadine-Pseudoephedrine (CLARITIN-D 24 HOUR PO) Take by mouth daily as needed      loperamide (IMODIUM) 2 MG capsule Take 2 mg by mouth 4 times daily as needed for Diarrhea      fluticasone (FLONASE) 50 MCG/ACT nasal spray SHAKE LIQUID AND USE 1 SPRAY IN EACH NOSTRIL DAILY 48 g 0    SYMTUZA [phone] MG TABS TK 1 T PO  QD  6    aspirin-acetaminophen-caffeine (EXCEDRIN MIGRAINE) 250-250-65 MG per tablet Take 1 tablet by mouth every 6 hours as needed for Headaches       Clotrimazole (MYCELEX) 10 MG LOZG lozenge Take 1 lozenge by mouth 5 times daily 70 lozenge 1    fenofibric acid (FIBRICOR) 135 MG CPDR capsule TAKE 1 CAPSULE BY MOUTH DAILY 90 capsule 0    LORazepam (ATIVAN) 1 MG tablet Take 1 mg by mouth every 6 hours as needed for Anxiety.       Darunavir-Cobicistat (PREZCOBIX) 800-150 MG TABS Take by mouth nightly      Nebulizers (COMPRESSOR/NEBULIZER) MISC As directed 1 each 0  Respiratory Therapy Supplies (NEBULIZER/TUBING/MOUTHPIECE) KIT 1 kit by Does not apply route daily 1 kit 0    albuterol (PROVENTIL) (2.5 MG/3ML) 0.083% nebulizer solution Take 2.5 mg by nebulization every 6 hours as needed for Wheezing       TURMERIC PO Take by mouth three times daily      Glucosamine HCl (GLUCOSAMINE PO) Take by mouth daily      Multiple Vitamins-Minerals (MULTIVITAMIN PO) Take by mouth daily      INVEGA 6 MG extended release tablet Take 6 mg by mouth daily      DESCOVY 200-25 MG TABS Take 1 tablet by mouth daily      MYTESI 125 MG TBEC Take 125 mg by mouth       No current facility-administered medications for this visit.          Allergies   Allergen Reactions    Pcn [Penicillins] Rash    Sulfa Antibiotics Rash         Family History   Problem Relation Age of Onset    Cancer Mother         uterine s/p hysterectomy    Cancer Maternal Grandmother     Cataracts Neg Hx     Diabetes Neg Hx     Glaucoma Neg Hx          Social History     Socioeconomic History    Marital status: Single     Spouse name: Not on file    Number of children: Not on file    Years of education: Not on file    Highest education level: Not on file   Occupational History    Not on file   Tobacco Use    Smoking status: Former Smoker     Packs/day: 3.00     Years: 30.00     Pack years: 90.00     Quit date: 10/7/2014     Years since quittin.4    Smokeless tobacco: Never Used   Vaping Use    Vaping Use: Never used   Substance and Sexual Activity    Alcohol use: Yes     Comment: socially    Drug use: No    Sexual activity: Not on file   Other Topics Concern    Not on file   Social History Narrative    Not on file     Social Determinants of Health     Financial Resource Strain:     Difficulty of Paying Living Expenses: Not on file   Food Insecurity:     Worried About Running Out of Food in the Last Year: Not on file    [place] of Food in the Last Year: Not on file   Transportation Needs:     Lack of Transportation (Medical): Not on file    Lack of Transportation (Non-Medical):  Not on file   Physical Activity:     Days of Exercise per Week: Not on file    Minutes of Exercise per Session: Not on file   Stress:     Feeling of Stress : Not on file   Social Connections:     Frequency of Communication with Friends and Family: Not on file    Frequency of Social Gatherings with Friends and Family: Not on file    Attends Yazdanism Services: Not on file    Active Member of Clubs or Organizations: Not on file    Attends Club or Organization Meetings: Not on file    Marital Status: Not on file   Intimate Partner Violence:     Fear of Current or Ex-Partner: Not on file    Emotionally Abused: Not on file    Physically Abused: Not on file    Sexually Abused: Not on file   Housing Stability:     Unable to Pay for Housing in the Last Year: Not on file    Number of Places Lived in the Last Year: Not on file    Unstable Housing in the Last Year: Not on file       Vitals:    03/30/22 0853   BP: 118/72   Pulse: 102   SpO2: 97%         Wt Readings from Last 3 Encounters:   03/30/22 210 lb 9.6 oz (95.5 kg)   01/21/22 221 lb 4 oz (100.4 kg)   10/15/21 223 lb (101.2 kg)         BP Readings from Last 3 Encounters:   03/30/22 118/72   01/21/22 110/88   10/15/21 122/80       Hematology and Coagulation  Lab Results   Component Value Date    WBC 9.4 08/17/2021    RBC 4.40 08/17/2021    HGB 14.0 08/17/2021    HCT 42.8 08/17/2021    MCV 97.3 08/17/2021    MCH 31.8 08/17/2021    MCHC 32.7 08/17/2021    RDW 12.7 08/17/2021     08/17/2021    MPV 9.2 08/17/2021       Chemistries  Lab Results   Component Value Date     08/17/2021    K 4.2 08/17/2021     08/17/2021    CO2 24 08/17/2021    BUN 14 08/17/2021    CREATININE 0.93 08/17/2021    CALCIUM 9.1 08/17/2021    PROT 6.8 08/17/2021    LABALBU 4.1 08/17/2021    BILITOT 0.14 08/17/2021    ALKPHOS 102 08/17/2021    AST 21 08/17/2021    ALT 20 08/17/2021     Lab bruise/bleed easily. Psychiatric/Behavioral: Positive for decreased concentration. Negative for agitation, behavioral problems, confusion, dysphoric mood, hallucinations, self-injury, sleep disturbance and suicidal ideas. The patient is nervous/anxious. The patient does not have insomnia and is not hyperactive. OBJECTIVE:    Physical Exam  Constitutional:       Appearance: He is well-developed. HENT:      Head: Normocephalic and atraumatic. No raccoon eyes or Oviedo's sign. Right Ear: External ear normal.      Left Ear: External ear normal.      Nose: Nose normal.   Eyes:      Conjunctiva/sclera: Conjunctivae normal.      Pupils: Pupils are equal, round, and reactive to light. Neck:      Thyroid: No thyroid mass or thyromegaly. Vascular: No carotid bruit. Trachea: No tracheal deviation. Meningeal: Brudzinski's sign and Kernig's sign absent. Cardiovascular:      Rate and Rhythm: Normal rate and regular rhythm. Pulmonary:      Effort: Pulmonary effort is normal.   Musculoskeletal:         General: No tenderness. Cervical back: Normal range of motion and neck supple. No rigidity. No muscular tenderness. Normal range of motion. Skin:     General: Skin is warm. Coloration: Skin is not pale. Findings: No erythema or rash. Nails: There is no clubbing. Psychiatric:         Attention and Perception: He is attentive. Mood and Affect: Mood is anxious. Mood is not depressed. Affect is not labile, blunt or inappropriate. Speech: He is communicative. Speech is not rapid and pressured, delayed, slurred or tangential.         Behavior: Behavior is not agitated, slowed, aggressive, withdrawn, hyperactive or combative. Behavior is cooperative. Thought Content: Thought content is not paranoid or delusional. Thought content does not include homicidal or suicidal ideation. Thought content does not include homicidal or suicidal plan.          Cognition and Memory: Cognition is impaired. Memory is impaired. He does not exhibit impaired remote memory. Judgment: Judgment is not impulsive or inappropriate. NEUROLOGICALEXAMINATION :       A) MENTAL STATUS:                   Alert and  oriented  To time, place  And  Person. No Aphasia. No  Dysarthria. Able   To  Follow    SIMPLE      commands   without   Any  Difficulty. No right  To left confusion. Normal  Speech  And language function. Insight and  Judgment ,Fund  Of  Knowledge   within normal limits                Recent  And  Remote memory    DECREASED                Attention &  Concentration are     DECREASED                                             B) CRANIAL NERVES :      CN : Visual  Acuity  And  Visual fields  within normal limits               Fundi  Could  Not  Be  Could  Not  Be  Evaluated. 3,4,6 CN : Both  Pupils are   Reactive and  Equal.  Movements  Are  Intact. No  Nystagmus. No  JOSIE. No  Afferent  Pupillary  Defect noted. 5 CN :  Normal  Facial sensations and Corneal  Reflexes           7 CN:  Normal  Facial  Symmetry  And  Strength. No facial  Weakness. 8 CN :  Hearing  Appears within normal limits          9, 10 CN: Normal   spontaneous, reflex   palate   movements         11 CN:   Normal  Shoulder  shrug and  strength         12 CN :   Normal  Tongue movements and  Tongue  In midline                        No tongue   Fasciculations or atrophy       C) MOTOR  EXAM:                 Strength  In upper  And  Lower   extremities   within   normal limits               No  Drift. No  Atrophy               Rapid   alternating  And  repetitions  Movements  within   normal limits               Muscle  Tone  In upper  And  Lower  Extremities  normal                No rigidity. No  Spasticity.                  Bradykinesia   absent No  Asterixis. Sustention  Tremor , Resting   Tremor   absent                    No   other  Abnormal  Movements noted           D) SENSORY :               Light   touch, pinprick,   position  And  Vibration  within normal limits        E) REFLEXES:                   Deep  Tendon  Reflexes   normal                  No  pathological  Reflexes  Bilaterally. F) COORDINATION  AND  GAIT :                                Station and  Gait  normal                              Romberg 's test negative                          Ataxia negative          ASSESSMENT:      Patient Active Problem List   Diagnosis    HIV (human immunodeficiency virus infection) ([address]    IFG (impaired fasting glucose)    Lumbar radicular pain    Chronic bilateral low back pain with right-sided sciatica    Panniculitis involving lumbar region    Lumbar facet joint syndrome    Abnormal EKG    Centrilobular emphysema (HCC)    Muscle spasm    Lumbosacral spondylosis without myelopathy    Neural foraminal stenosis of lumbar spine    Bipolar disorder ([address]    Memory problem    Abnormal CT of the chest    Ex-smoker    Neck pain    Other complicated headache syndrome    Ventral hernia without obstruction or gangrene    Left inguinal hernia    Intractable headache    Former smoker    Asymptomatic [facility] infection [address] [address]             MRI OF THE BRAIN WITHOUT AND WITH CONTRAST  12/24/2019 10:37 am       TECHNIQUE:   Multiplanar multisequence MRI of the head/brain was performed without and   with the administration of intravenous contrast.       COMPARISON:   CT brain performed 06/11/2017.       HISTORY:   ORDERING SYSTEM PROVIDED HISTORY: Memory problem   TECHNOLOGIST PROVIDED HISTORY:   migraines   Reason for Exam: Neck pain and left shoulder pain. Migraines. Symptoms for   quite a while. Acuity: Chronic   Type of Exam: Unknown   Additional signs and symptoms: Memory problem;  Intractable migraine without   status migrainosus, unspecified migraine type.       FINDINGS:   INTRACRANIAL STRUCTURES/VENTRICLES:  The sellar and suprasellar structures,   optic chiasm, corpus callosum, pineal gland, tectum, and midline brainstem   structures are unremarkable.  The craniocervical junction is unremarkable. There is no acute intracranial hemorrhage, mass effect, or midline shift. There is satisfactory overall gray-white matter differentiation.  There is   chronic microvascular disease.  The ventricular structures are symmetric and   unremarkable.  The infratentorial structures including the cerebellopontine   angles and internal auditory canals are unremarkable. There is no abnormal   restricted diffusion. There is no abnormal blooming artifact on   susceptibility weighted imaging.  There is no abnormal postcontrast   enhancement.       ORBITS: The visualized portion of the orbits demonstrate no acute abnormality.       SINUSES: The visualized paranasal sinuses and mastoid air cells are well   aerated.       BONES/SOFT TISSUES: The bone marrow signal intensity appears normal. The soft   tissues demonstrate no acute abnormality.           Impression   Chronic microvascular disease without acute intracranial abnormality.  No   abnormal postcontrast enhancement. MRI OF THE CERVICAL SPINE WITHOUT CONTRAST 12/24/2019 10:07 am       TECHNIQUE:   Multiplanar multisequence MRI of the cervical spine was performed without the   administration of intravenous contrast.       COMPARISON:   None.       HISTORY:   ORDERING SYSTEM PROVIDED HISTORY: Neck pain   TECHNOLOGIST PROVIDED HISTORY:   neck pain   Reason for Exam: Neck pain and left shoulder pain. Migraines. Symptoms for   quite a while.    Acuity: Chronic   Type of Exam: Unknown   Additional signs and symptoms: Neck pain       FINDINGS:   BONES/ALIGNMENT: There is straightening of the cervical spine.  The vertebral   body heights are maintained. [place] is to severe left foraminal narrowing at C3-4, moderate right foraminal   narrowing at C4-5, severe left foraminal narrowing at C5-6, and severe   bilateral foraminal narrowing at C6-7.       Edema is demonstrated within the left C3-4 facets that is likely related to   degenerative facet disease although a degree of septic arthritis is a   consideration and correlation is needed. MRI OF THE LUMBAR SPINE WITHOUT CONTRAST, 6/11/2018 10:22 am       TECHNIQUE:   Multiplanar multisequence MRI of the lumbar spine was performed without the   administration of intravenous contrast.       COMPARISON:   None       HISTORY:   ORDERING SYSTEM PROVIDED HISTORY: Lumbar facet joint syndrome   TECHNOLOGIST PROVIDED HISTORY:   Reason for exam:->right lumbar radiculopathy   Ordering Physician Provided Reason for Exam:  Low back pain to the right   side, right leg pain. Symptoms for about one week. Acuity: Acute   Type of Exam: Initial   Additional signs and symptoms:  Lumbar facet syndrome; Right lumbar   radiculopathy       Initial evaluation.       FINDINGS:   BONES/ALIGNMENT: There is normal alignment of the spine. The vertebral body   heights are maintained.  The bone marrow signal appears unremarkable.  There   is degenerative disc disease with disc space narrowing and osteophytes at   L3-4, L4-5, and L5-S1.       SPINAL CORD: The conus terminates normally.       SOFT TISSUES: No paraspinal mass identified. Rogene Denia is a cyst in the left   kidney that is not fully evaluated.  Ultrasound could better evaluate.       L1-L2:  The thecal sac and neural foramina are intact.       L2-L3:  There is mild facet and ligamentum flavum hypertrophy.  The thecal   sac is not stenotic.  Disc and/or osteophytes cause minimal narrowing of the   neural foramina bilaterally.       L3-L4: The thecal sac is not stenotic.   Disc and/or osteophytes result in   mild narrowing of the neural foramina bilaterally.   There is mild facet and ligamentum flavum hypertrophy.       L4-L5:  There is moderate facet and ligamentum flavum hypertrophy.  The   thecal sac is not stenotic.  Disc and/or osteophytes result in moderate   narrowing of the neural foramina bilaterally. The left neural foramen is   narrowed greater than right.       L5-S1:  There is mild facet and ligamentum flavum hypertrophy.  The thecal   sac is not stenotic.  The S1 nerve roots are intact. Disc and/or osteophytes   result in moderate narrowing of the neural foramina bilaterally. The right   neural foramen is narrowed greater than the left.       There is no significant change in the findings from the prior study.           Impression   Disc and osteophytes result in narrowing of the neural foramina at several   levels as discussed above.  No significant stenosis of thecal sac in the   lumbar region. CT OF THE HEAD WITHOUT CONTRAST  6/11/2017 8:49 pm       TECHNIQUE:   CT of the head was performed without the administration of intravenous   contrast. Dose modulation, iterative reconstruction, and/or weight based   adjustment of the mA/kV was utilized to reduce the radiation dose to as low   as reasonably achievable.       COMPARISON:   None.       HISTORY:   ORDERING SYSTEM PROVIDED HISTORY: neuro symptoms/ HIV   TECHNOLOGIST PROVIDED HISTORY:   Ordering Physician Provided Reason for Exam: Passed out while coughing and   hit forehead on table today, previous loss of consciousness spells in the   last weeks.  Neuro symptoms, reaction to HIV medication.    Acuity: Acute   Type of Exam: Initial   Mechanism of Injury: passed out and fell   Relevant Medical/Surgical History: n/a       FINDINGS:   BRAIN/VENTRICLES: The ventricular system is mildly prominent in size and in   the midline.  No evidence of extra-axial fluid collection, hemorrhage or mass.       ORBITS: The visualized portion of the orbits demonstrate no acute abnormality.       SINUSES: Mild bilateral ethmoid sinus mucosal thickening.  Paranasal sinuses   visualized are otherwise clear.  Mastoid regions are poorly aerated likely a   developmental basis.       SOFT TISSUES/SKULL:  No acute abnormality of the visualized skull or soft   tissues.           Impression   No acute intracranial abnormality.         CT OF THE CHEST WITH CONTRAST 7/5/2017 2:06 pm       TECHNIQUE:   CT of the chest was performed with the administration of intravenous   contrast. Multiplanar reformatted images are provided for review. Dose   modulation, iterative reconstruction, and/or weight based adjustment of the   mA/kV was utilized to reduce the radiation dose to as low as reasonably   achievable.       COMPARISON:   Chest radiograph dated 06/27/2017       HISTORY:   ORDERING SYSTEM PROVIDED HISTORY: Pneumonia of right lower lobe due to   infectious organism   TECHNOLOGIST PROVIDED HISTORY:   Reason for exam:->pneumonia   Ordering Physician Provided Reason for Exam: Pneumonia of right lower lobe   due to infectious organism. Cough feeling 50 percent better x 2 weeks. Quit   smoking 3 years ago after smoking for 50 years.    Acuity: Acute   Type of Exam: Ongoing       FINDINGS:   The nodular opacity of concern in the right lower lobe on recent chest   radiograph corresponds to benign calcified granulomas in the inferior segment   of the right middle lobe.  Calcified pulmonary granulomas are also present in   the upper lobes bilaterally.       No pulmonary infiltrate or pleural effusion is present. Deniz Conradi is   centrilobular emphysema with an upper lobe predominance and scattered   interstitial scarring.  No pleural effusion is present.       No osteolytic or osteoblastic lesion is present in the included portion of   the axial skeleton.       The heart and great vessels are normal in caliber and appearance.  No   pericardial effusion is present. Deniz Conradi is no pathologic mediastinal or hilar   lymphadenopathy.  Calcified right infrahilar lymph nodes reflect residue from   prior granulomatous infection.           Impression   1. Benign pulmonary granulomas.       2. No evidence of pneumonia.       3. Centrilobular emphysema. VISITING DIAGNOSIS:      ICD-10-CM    1. Other complicated headache syndrome  G44.59 butalbital-acetaminophen-caffeine (FIORICET, ESGIC) -40 MG per tablet   2. Chronic tension-type headache, intractable  G44.221 butalbital-acetaminophen-caffeine (FIORICET, ESGIC) -40 MG per tablet   3. Asymptomatic HIV infection ([facility] [address]  Z21    4. Bipolar affective disorder, current episode manic, current episode severity unspecified ([facility] [address]  F31.10    5. Lumbar radicular pain  M54.16    6. Ex-smoker  Z87.891    7. Muscle spasm  M62.838    8. Memory problem  R41.3    9. IFG (impaired fasting glucose)  R73.01    10. Neck pain  M54.2               CONCERNS   &   INCREASED   RISK   FOR         * TIA,  CEREBRO  VASCULAR  ISCHEMIA,      *    COMPLICATED  MIGRAINES      *   TENSION  HEADACHES      *   COGNITIVE  &   MEMORY PROBLEMS                      VARIOUS  RISK   FACTORS   WERE  REVIEWED   AND   DISCUSSED. *  PATIENT   HAS  MULTIPLE   MEDICAL, NEUROLOGICAL                        AND   MENTAL HEALTH   PROBLEMS . PATIENT'S   MANAGEMENT  IS  CHALLENGING. PLAN:                         Cuate Chamberlain  Of  The  Diagnoses,  The  Management & Treatment  Options            AND    Care  plan  Were          Reviewed and   Discussed   With  patient. * Goals  And  Expectations  Of  The  Therapy  Discussed   And  Reviewed. *   Benefits   And   Side  Effect  Profile  Of  Medication/s   Were   Discussed                * Need   For  Further   Follow up For  The  Various  Problems Were  discussed.           * Results  Of  The  Previous  Diagnostic tests were reviewed and  discussed                 Medical  Decision  Making  Was  Made  Based on the   Complexity  Of  Above  Mentioned  Diagnoses,    Data reviewed             And    Risk Of  Significant   Co morbidities and   complicating   Factors. Medical  Decision  Was   High     Complexity   Due   To  The  Patient's  Multiple  Symptoms,      Complex  Treatment  Regimen,  Multiple medications           and   Risk  Of   Side  Effects,  Difficulty  In  Medication  Management  And  Diagnostic  Challenges       In  View  Of  The  Associated   Co  Morbid  Conditions   And  Problems. *   BE  CAREFUL  WITH  ACTIVITIES   INCLUDING  DRIVING. *   AVOID   NECK  AND/ BACK  STRAINING  ACTIVITIES        *   ADEQUATE   FLUIDINTAKE   AND  ELECTROLYTE  BALANCE         * KEEP  DAIRY  OF   THE  NEUROLOGICAL  SYMPTOMS        RECORDING THE    DURATION  AND  FREQUENCY. *  AVOID    CONDITIONS  AND  FACTORS   THAT  MAKE                  NEUROLOGICAL  SYMPTOMS  WORSE.             *TO  MAINTAIN  REGULAR  SLEEP  WAKE  CYCLES. *   TO  HAVE  ADEQUATE  REST  AND   SLEEP    HOURS.            *    AVOID  ANY USAGE OF    TOBACCO,              EXCESSIVE  ALCOHOL  AND   ILLEGAL   SUBSTANCES        *  CONTINUE   MEDICATIONS    PRESCRIBED   BY NEUROLOGIST    AS    RECOMMENDED       *   Compliance   With  Medications   And  Instructions        * CURRENTLY    TOLERATING  THE  PRESCRIBED   MEDICATIONS. WITHOUT  ANY  SIGNIFICANT  SIDE  EFFECTS   &  GETTING BENEFIT.          *    MIGRAINE/ HEADACHE    DAIRY   WITH  MONITORING                       OF  DURATION  AND  FREQUENCY.               *    Antiplatelet  therapy    As   Recommended  Was   Discussed      *    Prophylactic  Use   Of     Vitamin   B   Complex,  Folic  Acid,    Vitamin  B12    Multivitamin,       Calcium  With  magnesium  And  Vit D    Supplementations   Over  The  Counter  Discussed                    *  EVALUATIONS  AND  FOLLOW UP:                     *   OPTHALMOLOGY                                      * PAIN  MANAGEMENT                 *  HIV  CONSULTANTS                                *      CURRENTLY    PATIENT FEELS  BETTER  WITH  HIS  MIGRAINES                                 AND   HEADACHES                                    AND  TOLERATING  THE  MEDICATIONS                                           *      PATIENT  DENIES  ANY  NEW  NEUROLOGICAL  CONCERNS                             Controlled Substances Monitoring: Periodic Controlled Substance Monitoring: Possible medication side effects, risk of tolerance/dependence & alternative treatments discussed. ,Assessed functional status. Jaydon Justice MD)              Orders Placed This Encounter   Medications    butalbital-acetaminophen-caffeine (FIORICET, ESGIC) -40 MG per tablet     Sig: TAKE 1 TO 2 TABLETS BY MOUTH TWICE DAILY AS NEEDED FOR HEADACHE     Dispense:  60 tablet     Refill:  2    topiramate (TOPAMAX) 50 MG tablet     Sig: Take 1 tablet by mouth 2 times daily     Dispense:  60 tablet     Refill:  2    rizatriptan (MAXALT-MLT) 10 MG disintegrating tablet     Sig: Take 1 tablet by mouth once as needed for Migraine May repeat in 2 hours if needed     Dispense:  12 tablet     Refill:  2                 *PATIENT   TO  FOLLOW  UP  WITH   PRIMARY  CARE         OTHER  CONSULTANTS  AS  BEFORE.           *TO  FOLLOW  WITH   MENTAL  HEALTH  PROFESSIONALS ,  INCLUDING            PSYCHOLOGICAL  COUNSELING   AND  PSYCHIATRIC  EVALUTIONS,                 *  Maintain   Healthy  Life Style    With   Periodic  Monitoring  Of      Any  Medical  Conditions  Including   Elevated  Blood  Pressure,  Lipid  Profile,     Blood  Sugar levels  AndHeart  Disease. *   Period   Screening  For  Cancers  Involving  Breast,  Colon,    lungs  And  Other  Organs  As  Applicable,  In consultation   With  Your  Primary Care Providers. *Second  Neurological  Opinion  And  Evaluations  In  [facility]  Setting  If  Patient  Is  Interested.             * Please   Contact   Neurology  Clinic   Early   If   Are  Any  New  Neurological   And  Any neurological  Concerns. *  If  The  Patient remains  Neurologically  Stable   Return   To  [facility] Neurology Department   IN          3         MONTHS  TIME   FOR  FURTHER              FOLLOW UP.                       *   The  Neurological   Findings,  Possible  Diagnosis,  Differential diagnoses                    And  Options  For    Further   Investigations                   And  management   Are  Discussed  Comprehensively. Medications   And  Prescription   Risks  And  Side effects  Are   Also  Discussed. *  If   There is  Any  Significant  Worsening   Of  Current  Symptoms  And  Or                  If patient  Develops   Any additional  New  NeurologicalSymptoms                  Or  Significant  Concerns   Should  Call  911 or  Go  To  Emergency  Department                          For  Further  Immediate  Evaluation. The   Above  Were  Reviewed  With  patient   and                       questions  Answered  In  Detail. More   Than  50% of face  To face Time   Was  Spent  On  Counseling                    And   Coordination  Of  Care   Of   Patient's  multiple   Neurological  Problems                         And   Comorbid  Medical   Conditions. Electronically signed by Eyal Ibarra MD.,   [facility]      Board Certified in  Neurology &  In  [employer] [employer] [address] [employer] and Neurology ([employer])      DISCLAIMER:   Although every effort was made to ensure the accuracy of this  electronictranscription,   some errors in transcription may have occurred. GENERAL PATIENT INSTRUCTIONS:      A Healthy Lifestyle: Care Instructions   Your Care Instructions   A healthy lifestyle can help you feel good, stay at ahealthy weight, and have plenty of energy for both work and play. A healthy lifestyle is something you can share with your whole family.    A healthy lifestyle also can lower your risk for serious health problems, such ashigh blood pressure, heart disease, and diabetes.  You can follow a few steps listed below to improve your health and the health of your family.  Follow-up careis a key part of your treatment and safety. Be sure to make and go to all appointments, and call your doctor if you are having problems. Its also a good idea to know your test results and keep a list of the medicines you take.  How can you care for yourself at home?  Do not eat too much sugar, fat, or fast foods. You can still have dessert and treats nowand then. The goal is moderation.  Start small to improve your eating habits. Pay attention to portion sizes, drink less juice and soda pop, and eat more fruits and vegetables.  Eat a healthy amount of food. A 3-ounce serving of meat, for example, is about the size of a deck of cards. Fill the rest of your plate with vegetables and whole grains.  Limit theamount of soda and sports drinks you have every day. Drink more water when you are thirsty.  Eat at least 5 servings of fruits and vegetables every day. It may seem like a lot, but it is not hard to reach this goal. Aserving or helping is 1 piece of fruit, 1 cup of vegetables, or 2 cups of leafy, raw vegetables. Have an apple or some carrot sticks as an afternoon snack instead of a candy bar. Try to have fruits and/or vegetables at everymeal.   Make exercise part of your daily routine. You may want to start with simple activities, such as walking, bicycling, or slow swimming. Try kay active 30 to 60 minutes every day. You do not need to do all 30 to 60 minutes all at once. For example, you can exercise 3 times a day for 10 or 20 minutes. Moderate exercise is safe for most people, but it is always agood idea to talk to your doctor before starting an exercise program.   Keep moving. Gia Never the lawn, work in the garden, or [employer].  Take the stairs instead of the elevator at work.  If you smoke, quit. Peoplewho smoke have an increased risk for heart attack, stroke, cancer, and other lung illnesses. Quitting is hard, but there are ways to boost your chance of quitting tobacco for good.  Use nicotine gum, patches, or lozenges.  Ask your doctor about stop-smoking programs and medicines.  Keep trying.  In addition to reducing your risk of diseases in the future, you will notice some benefits soon after you stop using tobacco. If you have shortness of breath or asthma symptoms, they will likely getbetter within a few weeks after you quit.  Limit how much alcohol you drink. Moderate amounts of alcohol (up to 2 drinks a day for men, 1drink a day for women) are okay. But drinking too much can lead to liver problems, high blood pressure, and other health problems.  health   If you have a family, there are many things you can do together to improve your health.  Eat meals together as a family as often as possible.  Eat healthy foods. This includes fruits, vegetables, lean meats and dairy, and whole grains.  Include your family in your fitness plan. Most peoplethink of activities such as jogging or tennis as the way to fitness, but there are many ways you and your family can be more active. Anything that makes you breathe hard and gets your heart pumping is exercise. Here are sometips:   Walk to do errands or to take your child to school or the bus.  Go for a family bike ride after dinner instead of watching TV.  Where can you learn more?  Go totps://cristobal.healthMethod. org and sign in to your [employer] account. Enter X957 in the Search HealthInformation box to learn more about \"A Healthy Lifestyle: Care Instructions. \"     If you do not have anaccount, please click on the \"Sign Up Now\" link.  Current as of: July 26, 2016   Content Version: 11.2   © 5093-2629 Healthwise, Incorporated. Care instructions adapted under license by [facility] ([facility]).  If you have questions about a medical condition or this instruction, always ask your healthcare professional. Healthwise,[employer] disclaims any warranty or liability for your use of this information.

## 2022-06-23 ENCOUNTER — HOSPITAL ENCOUNTER (OUTPATIENT)
Dept: LAB | Age: 57
Discharge: HOME | End: 2022-06-23
Payer: MEDICARE

## 2022-06-23 LAB
ABSOLUTE EOS #: 0.35 K/UL (ref 0–0.44)
ABSOLUTE IMMATURE GRANULOCYTE: <0.03 K/UL (ref 0–0.3)
ABSOLUTE LYMPH #: 2.56 K/UL (ref 1.1–3.7)
ABSOLUTE MONO #: 0.61 K/UL (ref 0.1–1.2)
BASOPHILS # BLD: 1 % (ref 0–2)
BASOPHILS ABSOLUTE: 0.07 K/UL (ref 0–0.2)
C-REACTIVE PROTEIN: 3.2 MG/L (ref 0–5)
EOSINOPHILS RELATIVE PERCENT: 5 % (ref 1–4)
HCT VFR BLD CALC: 45.1 % (ref 40.7–50.3)
HEMOGLOBIN: 14.2 G/DL (ref 13–17)
IMMATURE GRANULOCYTES: 0 %
LYMPHOCYTES # BLD: 38 % (ref 24–43)
MCH RBC QN AUTO: 31.5 PG (ref 25.2–33.5)
MCHC RBC AUTO-ENTMCNC: 31.5 G/DL (ref 25.2–33.5)
MCV RBC AUTO: 100 FL (ref 82.6–102.9)
MONOCYTES # BLD: 9 % (ref 3–12)
NRBC AUTOMATED: 0 PER 100 WBC
PDW BLD-RTO: 13.1 % (ref 11.8–14.4)
PLATELET # BLD: 303 K/UL (ref 138–453)
PMV BLD AUTO: 9.1 FL (ref 8.1–13.5)
RBC # BLD: 4.51 M/UL (ref 4.21–5.77)
SEG NEUTROPHILS: 47 % (ref 36–65)
SEGMENTED NEUTROPHILS ABSOLUTE COUNT: 3.2 K/UL (ref 1.5–8.1)
THYROXINE, FREE: 1.06 NG/DL (ref 0.93–1.7)
TSH SERPL DL<=0.05 MIU/L-ACNC: 1.29 UIU/ML (ref 0.3–5)
WBC # BLD: 6.8 K/UL (ref 3.5–11.3)

## 2022-06-23 PROCEDURE — 84439 ASSAY OF FREE THYROXINE: CPT

## 2022-06-23 PROCEDURE — 86038 ANTINUCLEAR ANTIBODIES: CPT

## 2022-06-23 PROCEDURE — 36415 COLL VENOUS BLD VENIPUNCTURE: CPT

## 2022-06-23 PROCEDURE — 84443 ASSAY THYROID STIM HORMONE: CPT

## 2022-06-23 PROCEDURE — 86225 DNA ANTIBODY NATIVE: CPT

## 2022-06-23 PROCEDURE — 85025 COMPLETE CBC W/AUTO DIFF WBC: CPT

## 2022-06-23 PROCEDURE — 86140 C-REACTIVE PROTEIN: CPT

## 2022-06-24 LAB
ANTI DNA DOUBLE STRANDED: <0.5 IU/ML
ANTI-NUCLEAR ANTIBODY (ANA): NEGATIVE
ENA ANTIBODIES SCREEN: 0.1 U/ML

## 2022-07-20 ENCOUNTER — OFFICE VISIT (OUTPATIENT)
Dept: NEUROLOGY | Age: 57
End: 2022-07-20
Payer: MEDICARE

## 2022-07-20 VITALS
SYSTOLIC BLOOD PRESSURE: 112 MMHG | HEART RATE: 88 BPM | DIASTOLIC BLOOD PRESSURE: 70 MMHG | HEIGHT: 71 IN | OXYGEN SATURATION: 94 % | WEIGHT: 208 LBS | BODY MASS INDEX: 29.12 KG/M2

## 2022-07-20 DIAGNOSIS — J43.2 CENTRILOBULAR EMPHYSEMA (HCC): ICD-10-CM

## 2022-07-20 DIAGNOSIS — Z21 ASYMPTOMATIC HIV INFECTION (HCC): ICD-10-CM

## 2022-07-20 DIAGNOSIS — F31.0 BIPOLAR AFFECTIVE DISORDER, CURRENT EPISODE HYPOMANIC (HCC): ICD-10-CM

## 2022-07-20 DIAGNOSIS — M62.838 MUSCLE SPASM: ICD-10-CM

## 2022-07-20 DIAGNOSIS — M54.2 NECK PAIN: ICD-10-CM

## 2022-07-20 DIAGNOSIS — M54.16 LUMBAR RADICULAR PAIN: ICD-10-CM

## 2022-07-20 DIAGNOSIS — R41.3 MEMORY PROBLEM: ICD-10-CM

## 2022-07-20 DIAGNOSIS — G44.221 CHRONIC TENSION-TYPE HEADACHE, INTRACTABLE: Primary | ICD-10-CM

## 2022-07-20 DIAGNOSIS — R93.89 ABNORMAL CT OF THE CHEST: ICD-10-CM

## 2022-07-20 DIAGNOSIS — F31.9 BIPOLAR AFFECTIVE DISORDER, REMISSION STATUS UNSPECIFIED (HCC): ICD-10-CM

## 2022-07-20 DIAGNOSIS — G89.29 CHRONIC BILATERAL LOW BACK PAIN WITH RIGHT-SIDED SCIATICA: ICD-10-CM

## 2022-07-20 DIAGNOSIS — G44.59 OTHER COMPLICATED HEADACHE SYNDROME: ICD-10-CM

## 2022-07-20 DIAGNOSIS — M47.817 LUMBOSACRAL SPONDYLOSIS WITHOUT MYELOPATHY: ICD-10-CM

## 2022-07-20 DIAGNOSIS — M54.41 CHRONIC BILATERAL LOW BACK PAIN WITH RIGHT-SIDED SCIATICA: ICD-10-CM

## 2022-07-20 DIAGNOSIS — R73.01 IFG (IMPAIRED FASTING GLUCOSE): ICD-10-CM

## 2022-07-20 DIAGNOSIS — Z87.891 FORMER SMOKER: ICD-10-CM

## 2022-07-20 PROCEDURE — 1036F TOBACCO NON-USER: CPT | Performed by: PSYCHIATRY & NEUROLOGY

## 2022-07-20 PROCEDURE — 3023F SPIROM DOC REV: CPT | Performed by: PSYCHIATRY & NEUROLOGY

## 2022-07-20 PROCEDURE — 3017F COLORECTAL CA SCREEN DOC REV: CPT | Performed by: PSYCHIATRY & NEUROLOGY

## 2022-07-20 PROCEDURE — G8417 CALC BMI ABV UP PARAM F/U: HCPCS | Performed by: PSYCHIATRY & NEUROLOGY

## 2022-07-20 PROCEDURE — 99214 OFFICE O/P EST MOD 30 MIN: CPT | Performed by: PSYCHIATRY & NEUROLOGY

## 2022-07-20 PROCEDURE — G8427 DOCREV CUR MEDS BY ELIG CLIN: HCPCS | Performed by: PSYCHIATRY & NEUROLOGY

## 2022-07-20 RX ORDER — MESALAMINE 1000 MG/1
SUPPOSITORY RECTAL
COMMUNITY
Start: 2022-07-13

## 2022-07-20 RX ORDER — BUTALBITAL, ACETAMINOPHEN AND CAFFEINE 50; 325; 40 MG/1; MG/1; MG/1
TABLET ORAL
Qty: 60 TABLET | Refills: 2 | Status: SHIPPED | OUTPATIENT
Start: 2022-07-20 | End: 2022-10-19 | Stop reason: SDUPTHER

## 2022-07-20 RX ORDER — TOPIRAMATE 50 MG/1
50 TABLET, FILM COATED ORAL 2 TIMES DAILY
Qty: 60 TABLET | Refills: 2 | Status: SHIPPED | OUTPATIENT
Start: 2022-07-20 | End: 2022-10-19 | Stop reason: SDUPTHER

## 2022-07-20 RX ORDER — ONDANSETRON 4 MG/1
4 TABLET, ORALLY DISINTEGRATING ORAL EVERY 8 HOURS PRN
Qty: 30 TABLET | Refills: 2 | Status: SHIPPED | OUTPATIENT
Start: 2022-07-20 | End: 2022-10-19 | Stop reason: SDUPTHER

## 2022-07-20 RX ORDER — OMEPRAZOLE 40 MG/1
40 CAPSULE, DELAYED RELEASE ORAL DAILY
Qty: 90 CAPSULE | Refills: 1 | Status: SHIPPED | OUTPATIENT
Start: 2022-07-20 | End: 2022-10-19 | Stop reason: SDUPTHER

## 2022-07-20 RX ORDER — RIZATRIPTAN BENZOATE 10 MG/1
10 TABLET, ORALLY DISINTEGRATING ORAL
Qty: 12 TABLET | Refills: 2 | Status: SHIPPED | OUTPATIENT
Start: 2022-07-20 | End: 2022-10-19 | Stop reason: SDUPTHER

## 2022-07-20 ASSESSMENT — ENCOUNTER SYMPTOMS
ABDOMINAL DISTENTION: 0
ABDOMINAL PAIN: 0
FACIAL SWELLING: 0
EYE ITCHING: 0
NAUSEA: 1
VOICE CHANGE: 0
EYE DISCHARGE: 0
SCALP TENDERNESS: 0
CHOKING: 0
SINUS PRESSURE: 0
SHORTNESS OF BREATH: 0
SWOLLEN GLANDS: 0
TROUBLE SWALLOWING: 0
FACIAL SWEATING: 0
EYE WATERING: 0
BACK PAIN: 1
BLURRED VISION: 0
RHINORRHEA: 0
APNEA: 0
PHOTOPHOBIA: 1
WHEEZING: 0
SORE THROAT: 0
DIARRHEA: 0
EYE REDNESS: 0
COLOR CHANGE: 0
VISUAL CHANGE: 0
BLOOD IN STOOL: 0
EYE PAIN: 0
CHEST TIGHTNESS: 0
VOMITING: 0
COUGH: 0
CONSTIPATION: 0

## 2022-07-20 NOTE — PROGRESS NOTES
[facility]  Neurology  [address] [place] [place] [address]  SHPAY:[phone]   Fax: [phone]      SUBJECTIVE:     PATIENT ID:  Bebo Meeks is a  RIGHT     HANDED 64 y.o. male. Headache   This is a recurrent problem. Episode onset: SINCE  OCTOBER 2019. The problem occurs constantly. The problem has been gradually worsening. The pain is located in the Occipital region. The pain does not radiate. The pain quality is not similar to prior headaches. The quality of the pain is described as pulsating and throbbing. The pain is at a severity of 3/10. The pain is mild. Associated symptoms include back pain, a loss of balance, nausea, neck pain, phonophobia and photophobia. Pertinent negatives include no abdominal pain, abnormal behavior, anorexia, blurred vision, coughing, dizziness, drainage, ear pain, eye pain, eye redness, eye watering, facial sweating, hearing loss, insomnia, muscle aches, rhinorrhea, scalp tenderness, seizures, sinus pressure, sore throat, swollen glands, tinnitus, visual change, vomiting, weakness or weight loss. The symptoms are aggravated by unknown. He has tried ergotamines and NSAIDs for the symptoms. The treatment provided no relief. His past medical history is significant for immunosuppression and migraine headaches. There is no history of cancer, cluster headaches, hypertension, migraines in the family, obesity, pseudotumor cerebri, recent head traumas, sinus disease or TMJ. Migraine   This is a chronic problem. Episode onset: SINCE  TEENAGE   The problem has been waxing and waning. The pain is located in the Bilateral region. The pain does not radiate. The pain quality is similar to prior headaches. The quality of the pain is described as aching, throbbing and pulsating. The pain is at a severity of 3/10. The pain is mild. Associated symptoms include back pain, a loss of balance, nausea, neck pain, phonophobia and photophobia.  Pertinent negatives 6)     H/O    CHRONIC    LUMBAR  PAIN   AND  RADICULOPATHY                                       BEING  FOLLOWED  BY  PAIN  MANAGEMENT                                    -   ON  NEURONTIN,     CYMBALTA                                  7)       H/O   CHRONIC  ANXIETY,    BIPOLAR  DISORDER                                      -  ON  INVEGA, ,     ATIVAN     PRN                                     TO  FOLLOW  WITH     MENTAL  HEALTH  PROFESSIONALS                           8)     H/O     HIV  INFECTION       ASYMPTOMATIC                                    - ON  MEDICATION          FROM    [facility]     [facility]                              9)      H/O    CHRONIC    MILD     MEMORY  PROBLEMS                                         -    NEEDS  MONITORING                        10)   H/O   EX  SMOKER                            H/O   ABNORMAL   CT   CHEST     IN    2017                              TO  FOLLOW  WITH   HIS  PCP                                         11)    HAD  NEURO  DIAGNOSTIC  EVALUATIONS  IN NOV./ DEC.  2019                              LABS  WORK  UP    DID  NOT    SHOW  ANY  SIGNIFICANT  ABNORMALITIES                             MRI  BRAIN      SHOWED  CHRONIC  CEREBRAL ISCHEMIA                                     MRI  CERVICAL SPINE       SHOWED    MULTI LEVEL  DISC  DISEASES                       12)    PATIENT'S     MIGRAINES   AND   HEADACHES                                      -       IMPROVED     AND      STABLE                                 AND  TOLERATING  THE  MEDICATIONS                                                13)      H/O     OCCIPITAL  HEADACHES     IN    SUMMER   2020    DUE  TO  HEAT                                       -     RESOLVED                                14)     H/O   INTOLERANCE      WITH  NAPROSYN                                         DUE  TO  GI  SIDE EFFECTS                              PATIENT   TO   AVOID     NSAID 15)          PATIENT       ON      TOPAMAX,  IMITREX,    FIORICET     AS   NEEDED                                    WITH  BETTER  CONTROL  OF      HIS   MIGRAINES                                                           16)      H/O      CHRONIC    RESTLESS  LEG   SYNDROME                                                   -   IMPROVED       ON     REQUIP                                                           17)        PATIENT  DENIES  ANY  NEW  NEUROLOGICAL  CONCERNS                                 REQUESTS     REFILLS  FOR  HIS  MEDICATIONS                    18)        VARIOUS  RISK   FACTORS   WERE  REVIEWED   AND   DISCUSSED. PATIENT   HAS  MULTIPLE   MEDICAL, NEUROLOGICAL                        AND   MENTAL HEALTH   PROBLEMS . PATIENT'S   MANAGEMENT  IS  CHALLENGING.                        PRECIPITATING  FACTORS: including  fever/infection, exertion/relaxation, position change, stress, weather change,                        medications/alcohol, time of day/darkness/light  Are  absent                                                              MODIFYING  FACTORS:  fever/infection, exertion/relaxation, position change, stress, weather change,                        medications/alcohol, time of day/darkness/light  Are  absent             Patient   Indicates   The  Presence   And  The  Absence  Of  The  Following    Associated  And          Additional  Neurological    Symptoms:                                Balance  And coordination   problems  absent           Gait problems     absent            Headaches      present              Migraines           present           Memory problemspresent              Confusion        absent            Paresthesia   numbness          absent           Seizures  And  Starring  Episodes           absent           Syncope,  Near  syncopal episodes         absent           Speech   problems           absent Swallowing   Problems      absent            Dizziness,  Light headedness           absent              Vertigo        absent             Generalized   Weakness    absent              focal  Weakness     absent             Tremors         absent              Sleep  Problems     absent             History  Of   Recent  Head  Injury     absent             History  Of   Recent  TIA     absent             History  Of   Recent    Stroke     absent             Neck  Pain   and   Neck muscle  Spasms  present               Radiating  down   And   Weakness           present            Lower back   Pain  And     Spasms  present              Radiating    Down   And   Weakness          present                H/OFALLS        absent               History  Of   Visual  Symptoms    absent                  Associated   Diplopia       absent                                               Also   Additional   Symptoms   Present    As  Documented    In   The   detailed                  Review  Of  Systems   And    Please   Refer   To    Them for   Additional    Information. Any components  That are either  Unobtainable  Or  Limited  In   HPI, ROS  And/or PFSH   Are                   Due   ToPatient's  Medical  Problems,  Clinical  Condition   and/or lack of                                other    Alternate   resources.           RECORDS   REVIEWED:    historical medical records       INFORMATION   REVIEWED:     MEDICAL   HISTORY,SURGICAL   HISTORY,     MEDICATIONS   LIST,   ALLERGIES AND  DRUG  INTOLERANCES,       FAMILY   HISTORY,  SOCIAL  HISTORY,      PROBLEM  LIST   FOR  PATIENT  CARE   COORDINATION      Past Medical History:   Diagnosis Date    Bipolar disorder ([facility]     follows with Dr. Jaqueline Camarillo    Centrilobular emphysema [facility])     HIV (human immunodeficiency virus infection) ([facility] 2001    followed by Willa Gallo (Infectious Disease at [address] [place] [address])    Pneumonia 06/11/2017         Past Surgical History: Procedure Laterality Date    ANESTHESIA NERVE BLOCK Bilateral 1/12/2018    Bilat L2 L3 L4 L5 Diagnostic Medial BB performed by Juarez Regalado MD at [address]  01/05/2016    Normal colon. Hemorrhoids. Dr Angel Pang  2015    full dental extraction    HERNIA REPAIR Left 3/2/2020    Laparoscopic Robotic Assisted Left Inguinal HERNIA REPAIR WITH MESH &VENTRAL  hernia repair performed by Luiz Enrique DO at [address] [place] [address] Dr Right 9/26/2017    Right L4 & L5 Transforaminal  performed by Juarez Regalado MD at [address] [place] [address]  Right 10/3/2017    Right L4 & L5 Transforaminal performed by Juarez Regalado MD at [address] [place] [address] Dr Right 1/8/2019    Right L4 L5 TRANSFORAMINAL performed by Juarez Regalado MD at [facility]. [address] AA&/STRD LUMBAR PLEXUS CONT NFS CATH Bilateral 1/19/2018    Bilat L2 L3 L4 L5 Confirmatory Medial BB performed by Juarez Regalado MD at [address] [facility], W IMAGE GUIDE,EA ADDL LEVEL Right 1/25/2018    Right L2 L3 L4 L5 RADIOFREQUENCY performed by Juarez Regalado MD at [address], W IMAGE GUIDE,EA ADDL LEVEL Left 3/15/2018    Left L2 L3 L4 L5 RADIOFREQUENCY performed by Juarez Regalado MD at [address] [place] [address]         Current Outpatient Medications   Medication Sig Dispense Refill    butalbital-acetaminophen-caffeine (FIORICET, ESGIC) -40 MG per tablet TAKE 1 TO 2 TABLETS BY MOUTH TWICE DAILY AS NEEDED FOR HEADACHE 60 tablet 2    topiramate (TOPAMAX) 50 MG tablet Take 1 tablet by mouth in the morning and 1 tablet before bedtime.  60 tablet 2    rizatriptan (MAXALT-MLT) 10 MG disintegrating tablet Take 1 tablet by mouth once as needed for Migraine May repeat in 2 hours if needed 12 tablet 2    ondansetron (ZOFRAN ODT) 4 MG disintegrating tablet Take 1 tablet by mouth every 8 hours as needed for Nausea or Vomiting 30 tablet 2    omeprazole (PRILOSEC) 40 MG delayed release capsule Take 1 capsule by mouth in the morning. 90 capsule 1    pantoprazole (PROTONIX) 40 MG tablet TAKE 1 TABLET BY MOUTH DAILY      rOPINIRole (REQUIP) 0.5 MG tablet       gabapentin (NEURONTIN) 800 MG tablet TAKE 1 TABLET BY MOUTH THREE TIMES DAILY 90 tablet 5    famotidine (PEPCID) 20 MG tablet Take 20 mg by mouth nightly      DULoxetine (CYMBALTA) 60 MG extended release capsule TAKE 2 CAPSULES BY MOUTH DAILY 60 capsule 11    hydrOXYzine (ATARAX) 10 MG tablet Take 10 mg by mouth 2 times daily as needed      cyclobenzaprine (FLEXERIL) 5 MG tablet       LIVALO 4 MG TABS tablet TAKE 1 TABLET BY MOUTH DAILY      propranolol (INDERAL LA) 60 MG extended release capsule       traMADol (ULTRAM) 50 MG tablet Take 1 tablet by mouth as needed. Loratadine-Pseudoephedrine (CLARITIN-D 24 HOUR PO) Take by mouth daily as needed      loperamide (IMODIUM) 2 MG capsule Take 2 mg by mouth 4 times daily as needed for Diarrhea      fluticasone (FLONASE) 50 MCG/ACT nasal spray SHAKE LIQUID AND USE 1 SPRAY IN EACH NOSTRIL DAILY 48 g 0    SYMTUZA [phone] MG TABS TK 1 T PO  QD  6    aspirin-acetaminophen-caffeine (EXCEDRIN MIGRAINE) 250-250-65 MG per tablet Take 1 tablet by mouth every 6 hours as needed for Headaches       Clotrimazole (MYCELEX) 10 MG LOZG lozenge Take 1 lozenge by mouth 5 times daily 70 lozenge 1    fenofibric acid (FIBRICOR) 135 MG CPDR capsule TAKE 1 CAPSULE BY MOUTH DAILY 90 capsule 0    LORazepam (ATIVAN) 1 MG tablet Take 1 mg by mouth every 6 hours as needed for Anxiety.       darunavir-cobicistat 800-150 MG TABS tablet Take by mouth nightly      Nebulizers (COMPRESSOR/NEBULIZER) MISC As directed 1 each 0    Respiratory Therapy Supplies (NEBULIZER/TUBING/MOUTHPIECE) KIT 1 kit by Does not apply route daily 1 kit 0    TURMERIC PO Take by mouth three times daily      Glucosamine HCl (GLUCOSAMINE PO) Take by mouth daily      Multiple Vitamins-Minerals (MULTIVITAMIN PO) Take by mouth daily INVEGA 6 MG extended release tablet Take 6 mg by mouth daily      DESCOVY 200-25 MG TABS Take 1 tablet by mouth daily      MYTESI 125 MG TBEC Take 125 mg by mouth      mesalamine (CANASA) 1000 MG suppository UNWRAP AND INSERT 1 SUPPOSITORY RECTALLY DAILY AT BEDTIME      albuterol (PROVENTIL) (2.5 MG/3ML) 0.083% nebulizer solution Take 2.5 mg by nebulization every 6 hours as needed for Wheezing        No current facility-administered medications for this visit.          Allergies   Allergen Reactions    Pcn [Penicillins] Rash    Sulfa Antibiotics Rash         Family History   Problem Relation Age of Onset    Cancer Mother         uterine s/p hysterectomy    Cancer Maternal Grandmother     Cataracts Neg Hx     Diabetes Neg Hx     Glaucoma Neg Hx          Social History     Socioeconomic History    Marital status: Single     Spouse name: Not on file    Number of children: Not on file    Years of education: Not on file    Highest education level: Not on file   Occupational History    Not on file   Tobacco Use    Smoking status: Former     Packs/day: 3.00     Years: 30.00     Pack years: 90.00     Types: Cigarettes     Quit date: 10/7/2014     Years since quittin.7    Smokeless tobacco: Never   Vaping Use    Vaping Use: Never used   Substance and Sexual Activity    Alcohol use: Yes     Comment: socially    Drug use: No    Sexual activity: Not on file   Other Topics Concern    Not on file   Social History Narrative    Not on file     Social Determinants of Health     Financial Resource Strain: Not on file   Food Insecurity: Not on file   Transportation Needs: Not on file   Physical Activity: Not on file   Stress: Not on file   Social Connections: Not on file   Intimate Partner Violence: Not on file   Housing Stability: Not on file       Vitals:    22 0937   BP: 112/70   Pulse: 88   SpO2: 94%         Wt Readings from Last 3 Encounters:   22 208 lb (94.3 kg)   22 212 lb 3.2 oz (96.3 kg)   22 210 Eyes:  Positive for photophobia. Negative for blurred vision, pain, discharge, redness, itching and visual disturbance. Respiratory:  Negative for apnea, cough, choking, chest tightness, shortness of breath and wheezing. Cardiovascular:  Negative for chest pain, palpitations and leg swelling. Gastrointestinal:  Positive for nausea. Negative for abdominal distention, abdominal pain, anorexia, blood in stool, constipation, diarrhea and vomiting. Endocrine: Negative for cold intolerance, heat intolerance, polydipsia, polyphagia and polyuria. Musculoskeletal:  Positive for arthralgias, back pain and neck pain. Negative for gait problem, joint swelling, myalgias and neck stiffness. Skin:  Negative for color change, pallor, rash and wound. Allergic/Immunologic: Negative for environmental allergies, food allergies and immunocompromised state. Neurological:  Positive for headaches and loss of balance. Negative for dizziness, tremors, seizures, syncope, facial asymmetry, speech difficulty, weakness and light-headedness. Hematological:  Negative for adenopathy. Does not bruise/bleed easily. Psychiatric/Behavioral:  Positive for decreased concentration. Negative for agitation, behavioral problems, confusion, dysphoric mood, hallucinations, self-injury, sleep disturbance and suicidal ideas. The patient is nervous/anxious. The patient does not have insomnia and is not hyperactive. OBJECTIVE:    Physical Exam  Constitutional:       Appearance: He is well-developed. HENT:      Head: Normocephalic and atraumatic. No raccoon eyes or Oviedo's sign. Right Ear: External ear normal.      Left Ear: External ear normal.      Nose: Nose normal.   Eyes:      Conjunctiva/sclera: Conjunctivae normal.      Pupils: Pupils are equal, round, and reactive to light. Neck:      Thyroid: No thyroid mass or thyromegaly. Vascular: No carotid bruit. Trachea: No tracheal deviation.       Meningeal: Brudzinski's sign and Kernig's sign absent. Cardiovascular:      Rate and Rhythm: Normal rate and regular rhythm. Pulmonary:      Effort: Pulmonary effort is normal.   Musculoskeletal:         General: No tenderness. Cervical back: Normal range of motion and neck supple. No rigidity. No muscular tenderness. Normal range of motion. Skin:     General: Skin is warm. Coloration: Skin is not pale. Findings: No erythema or rash. Nails: There is no clubbing. Psychiatric:         Attention and Perception: He is attentive. Mood and Affect: Mood is anxious. Mood is not depressed. Affect is not labile, blunt or inappropriate. Speech: He is communicative. Speech is not rapid and pressured, delayed, slurred or tangential.         Behavior: Behavior is not agitated, slowed, aggressive, withdrawn, hyperactive or combative. Behavior is cooperative. Thought Content: Thought content is not paranoid or delusional. Thought content does not include homicidal or suicidal ideation. Thought content does not include homicidal or suicidal plan. Cognition and Memory: Cognition is impaired. Memory is impaired. He does not exhibit impaired remote memory. Judgment: Judgment is not impulsive or inappropriate. NEUROLOGICALEXAMINATION :       A) MENTAL STATUS:                   Alert and  oriented  To time, place  And  Person. No Aphasia. No  Dysarthria. Able   To  Follow    SIMPLE      commands   without   Any  Difficulty. No right  To left confusion. Normal  Speech  And language function.                  Insight and  Judgment ,Fund  Of  Knowledge   within normal limits                Recent  And  Remote memory    DECREASED                Attention &  Concentration are     DECREASED                                             B) CRANIAL NERVES :      CN : Visual  Acuity  And  Visual fields  within normal limits               Fundi  Could  Not  Be  Could  Not  Be  Evaluated. 3,4,6 CN : Both  Pupils are   Reactive and  Equal.  Movements  Are  Intact. No  Nystagmus. No  JOSIE. No  Afferent  Pupillary  Defect noted. 5 CN :  Normal  Facial sensations and Corneal  Reflexes           7 CN:  Normal  Facial  Symmetry  And  Strength. No facial  Weakness. 8 CN :  Hearing  Appears within normal limits          9, 10 CN: Normal   spontaneous, reflex   palate   movements         11 CN:   Normal  Shoulder  shrug and  strength         12 CN :   Normal  Tongue movements and  Tongue  In midline                        No tongue   Fasciculations or atrophy       C) MOTOR  EXAM:                 Strength  In upper  And  Lower   extremities   within   normal limits               No  Drift. No  Atrophy               Rapid   alternating  And  repetitions  Movements  within   normal limits               Muscle  Tone  In upper  And  Lower  Extremities  normal                No rigidity. No  Spasticity. Bradykinesia   absent               No  Asterixis. Sustention  Tremor , Resting   Tremor   absent                    No   other  Abnormal  Movements noted           D) SENSORY :               Light   touch, pinprick,   position  And  Vibration  within normal limits        E) REFLEXES:                   Deep  Tendon  Reflexes   normal                  No  pathological  Reflexes  Bilaterally.                                   F) COORDINATION  AND  GAIT :                                Station and  Gait  normal                              Romberg 's test negative                          Ataxia negative          ASSESSMENT:      Patient Active Problem List   Diagnosis    HIV (human immunodeficiency virus infection) ([facility]    IFG (impaired fasting glucose)    Lumbar radicular pain    Chronic bilateral low back pain with right-sided sciatica    Panniculitis involving lumbar region    Lumbar facet joint syndrome    Abnormal EKG    Centrilobular emphysema ([facility])    Muscle spasm    Lumbosacral spondylosis without myelopathy    Neural foraminal stenosis of lumbar spine    Bipolar disorder (HCC)    Memory problem    Abnormal CT of the chest    Ex-smoker    Neck pain    Other complicated headache syndrome    Ventral hernia without obstruction or gangrene    Left inguinal hernia    Intractable headache    Former smoker    Asymptomatic HIV infection ([facility]             MRI OF THE BRAIN WITHOUT AND WITH CONTRAST  12/24/2019 10:37 am       TECHNIQUE:   Multiplanar multisequence MRI of the head/brain was performed without and   with the administration of intravenous contrast.       COMPARISON:   CT brain performed 06/11/2017. HISTORY:   ORDERING SYSTEM PROVIDED HISTORY: Memory problem   TECHNOLOGIST PROVIDED HISTORY:   migraines   Reason for Exam: Neck pain and left shoulder pain. Migraines. Symptoms for   quite a while. Acuity: Chronic   Type of Exam: Unknown   Additional signs and symptoms: Memory problem; Intractable migraine without   status migrainosus, unspecified migraine type. FINDINGS:   INTRACRANIAL STRUCTURES/VENTRICLES:  The sellar and suprasellar structures,   optic chiasm, corpus callosum, pineal gland, tectum, and midline brainstem   structures are unremarkable. The craniocervical junction is unremarkable. There is no acute intracranial hemorrhage, mass effect, or midline shift. There is satisfactory overall gray-white matter differentiation. There is   chronic microvascular disease. The ventricular structures are symmetric and   unremarkable. The infratentorial structures including the cerebellopontine   angles and internal auditory canals are unremarkable. There is no abnormal   restricted diffusion. There is no abnormal blooming artifact on   susceptibility weighted imaging. There is no abnormal postcontrast   enhancement.        ORBITS: The visualized portion of the orbits demonstrate no acute abnormality. SINUSES: The visualized paranasal sinuses and mastoid air cells are well   aerated. BONES/SOFT TISSUES: The bone marrow signal intensity appears normal. The soft   tissues demonstrate no acute abnormality. Impression   Chronic microvascular disease without acute intracranial abnormality. No   abnormal postcontrast enhancement. MRI OF THE CERVICAL SPINE WITHOUT CONTRAST 12/24/2019 10:07 am       TECHNIQUE:   Multiplanar multisequence MRI of the cervical spine was performed without the   administration of intravenous contrast.       COMPARISON:   None. HISTORY:   ORDERING SYSTEM PROVIDED HISTORY: Neck pain   TECHNOLOGIST PROVIDED HISTORY:   neck pain   Reason for Exam: Neck pain and left shoulder pain. Migraines. Symptoms for   quite a while. Acuity: Chronic   Type of Exam: Unknown   Additional signs and symptoms: Neck pain       FINDINGS:   BONES/ALIGNMENT: There is straightening of the cervical spine. The vertebral   body heights are maintained. There is age-appropriate bone marrow signal.   There is mild edema within the left C3-4 facets. There is multilevel   degenerative disc disease with loss of disc signal.  There is disc space   narrowing primarily at the C6-7 level. There is no significant   spondylolisthesis. SPINAL CORD: The spinal cord is normal in caliber and signal.  The visualized   intracranial structures are unremarkable. SOFT TISSUES: The posterior paraspinal soft tissues are unremarkable. The   prevertebral soft tissues are unremarkable. C2-C3: There is no significant disc protrusion, spinal canal stenosis or   neural foraminal narrowing. C3-C4: There is a mild disc osteophyte complex eccentric to the left with   associated uncovertebral and facet hypertrophy. There is canal stenosis   measuring 8 mm in AP dimension.   There is moderate to severe left foraminal narrowing and no significant right foraminal narrowing. C4-C5: There is a disc osteophyte complex with uncovertebral and facet   hypertrophy. There is canal stenosis measuring 9 mm in AP dimension. There   is moderate right foraminal narrowing and no significant left foraminal   narrowing. C5-C6: There is a disc osteophyte complex with uncovertebral and facet   hypertrophy that is worse on the left. There is no significant canal   stenosis or right foraminal narrowing. There is severe left foraminal   narrowing. C6-C7: There is a disc osteophyte complex with uncovertebral and facet   hypertrophy. There is canal stenosis measuring 8 mm in AP dimension. There   is severe bilateral foraminal narrowing. C7-T1: There is a disc osteophyte complex with uncovertebral and facet   hypertrophy. There is no significant canal stenosis or foraminal narrowing. Impression   Multilevel degenerative disc disease with associated uncovertebral and facet   hypertrophy resulting in canal stenosis most pronounced at C3-4 and C6-7. Moderate to severe left foraminal narrowing at C3-4, moderate right foraminal   narrowing at C4-5, severe left foraminal narrowing at C5-6, and severe   bilateral foraminal narrowing at C6-7. Edema is demonstrated within the left C3-4 facets that is likely related to   degenerative facet disease although a degree of septic arthritis is a   consideration and correlation is needed.          MRI OF THE LUMBAR SPINE WITHOUT CONTRAST, 6/11/2018 10:22 am       TECHNIQUE:   Multiplanar multisequence MRI of the lumbar spine was performed without the   administration of intravenous contrast.       COMPARISON:   None       HISTORY:   ORDERING SYSTEM PROVIDED HISTORY: Lumbar facet joint syndrome   TECHNOLOGIST PROVIDED HISTORY:   Reason for exam:->right lumbar radiculopathy   Ordering Physician Provided Reason for Exam:  Low back pain to the right   side, right leg pain. Symptoms for about one week. Acuity: Acute   Type of Exam: Initial   Additional signs and symptoms:  Lumbar facet syndrome; Right lumbar   radiculopathy       Initial evaluation. FINDINGS:   BONES/ALIGNMENT: There is normal alignment of the spine. The vertebral body   heights are maintained. The bone marrow signal appears unremarkable. There   is degenerative disc disease with disc space narrowing and osteophytes at   L3-4, L4-5, and L5-S1. SPINAL CORD: The conus terminates normally. SOFT TISSUES: No paraspinal mass identified. There is a cyst in the left   kidney that is not fully evaluated. Ultrasound could better evaluate. L1-L2:  The thecal sac and neural foramina are intact. L2-L3:  There is mild facet and ligamentum flavum hypertrophy. The thecal   sac is not stenotic. Disc and/or osteophytes cause minimal narrowing of the   neural foramina bilaterally. L3-L4: The thecal sac is not stenotic. Disc and/or osteophytes result in   mild narrowing of the neural foramina bilaterally. There is mild facet and   ligamentum flavum hypertrophy. L4-L5:  There is moderate facet and ligamentum flavum hypertrophy. The   thecal sac is not stenotic. Disc and/or osteophytes result in moderate   narrowing of the neural foramina bilaterally. The left neural foramen is   narrowed greater than right. L5-S1:  There is mild facet and ligamentum flavum hypertrophy. The thecal   sac is not stenotic. The S1 nerve roots are intact. Disc and/or osteophytes   result in moderate narrowing of the neural foramina bilaterally. The right   neural foramen is narrowed greater than the left. There is no significant change in the findings from the prior study. Impression   Disc and osteophytes result in narrowing of the neural foramina at several   levels as discussed above. No significant stenosis of thecal sac in the   lumbar region.      CT OF THE HEAD WITHOUT CONTRAST  6/11/2017 8:49 pm       TECHNIQUE:   CT of the head was performed without the administration of intravenous   contrast. Dose modulation, iterative reconstruction, and/or weight based   adjustment of the mA/kV was utilized to reduce the radiation dose to as low   as reasonably achievable. COMPARISON:   None. HISTORY:   ORDERING SYSTEM PROVIDED HISTORY: neuro symptoms/ HIV   TECHNOLOGIST PROVIDED HISTORY:   Ordering Physician Provided Reason for Exam: Passed out while coughing and   hit forehead on table today, previous loss of consciousness spells in the   last weeks. Neuro symptoms, reaction to HIV medication. Acuity: Acute   Type of Exam: Initial   Mechanism of Injury: passed out and fell   Relevant Medical/Surgical History: n/a       FINDINGS:   BRAIN/VENTRICLES: The ventricular system is mildly prominent in size and in   the midline. No evidence of extra-axial fluid collection, hemorrhage or mass. ORBITS: The visualized portion of the orbits demonstrate no acute abnormality. SINUSES: Mild bilateral ethmoid sinus mucosal thickening. Paranasal sinuses   visualized are otherwise clear. Mastoid regions are poorly aerated likely a   developmental basis. SOFT TISSUES/SKULL:  No acute abnormality of the visualized skull or soft   tissues. Impression   No acute intracranial abnormality. CT OF THE CHEST WITH CONTRAST 7/5/2017 2:06 pm       TECHNIQUE:   CT of the chest was performed with the administration of intravenous   contrast. Multiplanar reformatted images are provided for review. Dose   modulation, iterative reconstruction, and/or weight based adjustment of the   mA/kV was utilized to reduce the radiation dose to as low as reasonably   achievable.        COMPARISON:   Chest radiograph dated 06/27/2017       HISTORY:   ORDERING SYSTEM PROVIDED HISTORY: Pneumonia of right lower lobe due to   infectious organism   TECHNOLOGIST PROVIDED HISTORY:   Reason for exam:->pneumonia   Ordering Physician Provided Reason for Exam: Pneumonia of right lower lobe   due to infectious organism. Cough feeling 50 percent better x 2 weeks. Quit   smoking 3 years ago after smoking for 50 years. Acuity: Acute   Type of Exam: Ongoing       FINDINGS:   The nodular opacity of concern in the right lower lobe on recent chest   radiograph corresponds to benign calcified granulomas in the inferior segment   of the right middle lobe. Calcified pulmonary granulomas are also present in   the upper lobes bilaterally. No pulmonary infiltrate or pleural effusion is present. There is   centrilobular emphysema with an upper lobe predominance and scattered   interstitial scarring. No pleural effusion is present. No osteolytic or osteoblastic lesion is present in the included portion of   the axial skeleton. The heart and great vessels are normal in caliber and appearance. No   pericardial effusion is present. There is no pathologic mediastinal or hilar   lymphadenopathy. Calcified right infrahilar lymph nodes reflect residue from   prior granulomatous infection. Impression   1. Benign pulmonary granulomas. 2. No evidence of pneumonia. 3. Centrilobular emphysema. VISITING DIAGNOSIS:      ICD-10-CM    1. Chronic tension-type headache, intractable  G44.221 butalbital-acetaminophen-caffeine (FIORICET, ESGIC) -40 MG per tablet     ondansetron (ZOFRAN ODT) 4 MG disintegrating tablet      2. Asymptomatic HIV infection ([facility] [address]  Z21       3. Former smoker  Z87.891       4. Lumbar radicular pain  M54.16       5. Chronic bilateral low back pain with right-sided sciatica  M54.41     G89.29       6. Abnormal CT of the chest  R93.89       7. Memory problem  R41.3       8. Bipolar affective disorder, current episode hypomanic ([address]  F31.0       9. Neck pain  M54.2       10. Centrilobular emphysema (HCC)  J43.2       11.  IFG (impaired fasting glucose)  R73.01 12. Lumbosacral spondylosis without myelopathy  M47.817       13. Bipolar affective disorder, remission status unspecified (HCC)  F31.9       14. Muscle spasm  M62.838       15. Other complicated headache syndrome  G44.59 butalbital-acetaminophen-caffeine (FIORICET, ESGIC) -40 MG per tablet     ondansetron (ZOFRAN ODT) 4 MG disintegrating tablet                 CONCERNS   &   INCREASED   RISK   FOR         * TIA,  CEREBRO  VASCULAR  ISCHEMIA,      *    COMPLICATED  MIGRAINES      *   TENSION  HEADACHES      *   COGNITIVE  &   MEMORY PROBLEMS                      VARIOUS  RISK   FACTORS   WERE  REVIEWED   AND   DISCUSSED. *  PATIENT   HAS  MULTIPLE   MEDICAL, NEUROLOGICAL                        AND   MENTAL HEALTH   PROBLEMS . PATIENT'S   MANAGEMENT  IS  CHALLENGING. PLAN:                         Gera Folk  Of  The  Diagnoses,  The  Management & Treatment  Options            AND    Care  plan  Were          Reviewed and   Discussed   With  patient. * Goals  And  Expectations  Of  The  Therapy  Discussed   And  Reviewed. *   Benefits   And   Side  Effect  Profile  Of  Medication/s   Were   Discussed                * Need   For  Further   Follow up For  The  Various  Problems Were  discussed. * Results  Of  The  Previous  Diagnostic tests were reviewed and  discussed                 Medical  Decision  Making  Was  Made  Based on the   Complexity  Of  Above  Mentioned  Diagnoses,    Data reviewed             And    Risk  Of  Significant   Co morbidities and   complicating   Factors. Medical  Decision  Was   High     Complexity   Due   To  The  Patient's  Multiple  Symptoms,      Complex  Treatment  Regimen,  Multiple medications           and   Risk  Of   Side  Effects,  Difficulty  In  Medication  Management  And  Diagnostic  Challenges       In  View  Of  The  Associated   Co  Morbid  Conditions   And  Problems.                 *   BE CAREFUL  WITH  ACTIVITIES   INCLUDING  DRIVING. *   AVOID   NECK  AND/ BACK  STRAINING  ACTIVITIES        *   ADEQUATE   FLUIDINTAKE   AND  ELECTROLYTE  BALANCE         * KEEP  DAIRY  OF   THE  NEUROLOGICAL  SYMPTOMS        RECORDING THE    DURATION  AND  FREQUENCY. *  AVOID    CONDITIONS  AND  FACTORS   THAT  MAKE                  NEUROLOGICAL  SYMPTOMS  WORSE.             *TO  MAINTAIN  REGULAR  SLEEP  WAKE  CYCLES. *   TO  HAVE  ADEQUATE  REST  AND   SLEEP    HOURS.            *    AVOID  ANY USAGE OF    TOBACCO,              EXCESSIVE  ALCOHOL  AND   ILLEGAL   SUBSTANCES        *  CONTINUE   MEDICATIONS    PRESCRIBED   BY NEUROLOGIST    AS    RECOMMENDED       *   Compliance   With  Medications   And  Instructions        * CURRENTLY    TOLERATING  THE  PRESCRIBED   MEDICATIONS. WITHOUT  ANY  SIGNIFICANT  SIDE  EFFECTS   &  GETTING BENEFIT.          *    MIGRAINE/ HEADACHE    DAIRY   WITH  MONITORING                       OF  DURATION  AND  FREQUENCY.               *    Antiplatelet  therapy    As   Recommended  Was   Discussed      *    Prophylactic  Use   Of     Vitamin   B   Complex,  Folic  Acid,    Vitamin  B12    Multivitamin,       Calcium  With  magnesium  And  Vit D    Supplementations   Over  The  Counter  Discussed                    *  EVALUATIONS  AND  FOLLOW UP:                     *   OPTHALMOLOGY                                      * PAIN  MANAGEMENT                 *  HIV  CONSULTANTS                                *      CURRENTLY    PATIENT   FEELS  BETTER  WITH  HIS  MIGRAINES                                 AND   HEADACHES                                    AND  TOLERATING  THE  MEDICATIONS                                           *      PATIENT  DENIES  ANY  NEW  NEUROLOGICAL  CONCERNS                             Controlled Substances Monitoring: Periodic Controlled Substance Monitoring: Possible medication side effects, risk of tolerance/dependence & alternative treatments discussed. , Assessed functional status. Michelle Cleaning MD)              Orders Placed This Encounter   Medications    butalbital-acetaminophen-caffeine (FIORICET, ESGIC) -40 MG per tablet     Sig: TAKE 1 TO 2 TABLETS BY MOUTH TWICE DAILY AS NEEDED FOR HEADACHE     Dispense:  60 tablet     Refill:  2    topiramate (TOPAMAX) 50 MG tablet     Sig: Take 1 tablet by mouth in the morning and 1 tablet before bedtime. Dispense:  60 tablet     Refill:  2    rizatriptan (MAXALT-MLT) 10 MG disintegrating tablet     Sig: Take 1 tablet by mouth once as needed for Migraine May repeat in 2 hours if needed     Dispense:  12 tablet     Refill:  2    ondansetron (ZOFRAN ODT) 4 MG disintegrating tablet     Sig: Take 1 tablet by mouth every 8 hours as needed for Nausea or Vomiting     Dispense:  30 tablet     Refill:  2    omeprazole (PRILOSEC) 40 MG delayed release capsule     Sig: Take 1 capsule by mouth in the morning. Dispense:  90 capsule     Refill:  1                   *PATIENT   TO  FOLLOW  UP  WITH   PRIMARY  CARE         OTHER  CONSULTANTS  AS  BEFORE.           *TO  FOLLOW  WITH   MENTAL  HEALTH  PROFESSIONALS ,  INCLUDING            PSYCHOLOGICAL  COUNSELING   AND  PSYCHIATRIC  EVALUTIONS,                 *  Maintain   Healthy  Life Style    With   Periodic  Monitoring  Of      Any  Medical  Conditions  Including   Elevated  Blood  Pressure,  Lipid  Profile,     Blood  Sugar levels  AndHeart  Disease. *   Period   Screening  For  Cancers  Involving  Breast,  Colon,    lungs  And  Other  Organs  As  Applicable,  In consultation   With  Your  Primary Care Providers. *Second  Neurological  Opinion  And  Evaluations  In  [place] campus  Setting  If  Patient  Is  Interested. * Please   Contact   Neurology  Clinic   Early   If   Are  Any  New  Neurological   And  Any neurological  Concerns.                   *  If  The  Patient remains  Neurologically Stable   Return   To  [facility] Neurology Department   IN          3         MONTHS  TIME   FOR  FURTHER              FOLLOW UP.                       *   The  Neurological   Findings,  Possible  Diagnosis,  Differential diagnoses                    And  Options  For    Further   Investigations                   And  management   Are  Discussed  Comprehensively. Medications   And  Prescription   Risks  And  Side effects  Are   Also  Discussed. *  If   There is  Any  Significant  Worsening   Of  Current  Symptoms  And  Or                  If patient  Develops   Any additional  New  NeurologicalSymptoms                  Or  Significant  Concerns   Should  Call  911 or  Go  To  Emergency  Department                          For  Further  Immediate  Evaluation. The   Above  Were  Reviewed  With  patient   and                       questions  Answered  In  Detail. More   Than  50% of face  To face Time   Was  Spent  On  Counseling                    And   Coordination  Of  Care   Of   Patient's  multiple   Neurological  Problems                         And   Comorbid  Medical   Conditions. Electronically signed by Mathieu Pina MD.,   [facility]      Board Certified in  Neurology &  In  [employer] and Neurology ([employer])      DISCLAIMER:   Although every effort was made to ensure the accuracy of this  electronictranscription,   some errors in transcription may have occurred. GENERAL PATIENT INSTRUCTIONS:     A Healthy Lifestyle: Care Instructions  Your Care Instructions  A healthy lifestyle can help you feel good, stay at ahealthy weight, and have plenty of energy for both work and play. A healthy lifestyle is something you can share with your whole family.   A healthy lifestyle also can lower your risk for serious health problems, such ashigh blood pressure, heart disease, and diabetes. You can follow a few steps listed below to improve your health and the health of your family. Follow-up careis a key part of your treatment and safety. Be sure to make and go to all appointments, and call your doctor if you are having problems. Its also a good idea to know your test results and keep a list of the medicines you take. How can you care for yourself at home? Do not eat too much sugar, fat, or fast foods. You can still have dessert and treats nowand then. The goal is moderation. Start small to improve your eating habits. Pay attention to portion sizes, drink less juice and soda pop, and eat more fruits and vegetables. Eat a healthy amount of food. A 3-ounce serving of meat, for example, is about the size of a deck of cards. Fill the rest of your plate with vegetables and whole grains. Limit theamount of soda and sports drinks you have every day. Drink more water when you are thirsty. Eat at least 5 servings of fruits and vegetables every day. It may seem like a lot, but it is not hard to reach this goal. Aserving or helping is 1 piece of fruit, 1 cup of vegetables, or 2 cups of leafy, raw vegetables. Have an apple or some carrot sticks as an afternoon snack instead of a candy bar. Try to have fruits and/or vegetables at everymeal.  Make exercise part of your daily routine. You may want to start with simple activities, such as walking, bicycling, or slow swimming. Try kay active 30 to 60 minutes every day. You do not need to do all 30 to 60 minutes all at once. For example, you can exercise 3 times a day for 10 or 20 minutes. Moderate exercise is safe for most people, but it is always agood idea to talk to your doctor before starting an exercise program.  Keep moving. Kaelyn Griggsm the lawn, work in the garden, or [employer]. Take the stairs instead of the elevator at work. If you smoke, quit.  Peoplewho smoke have an increased risk for heart attack, stroke, cancer, and or liability for your use of this information.

## 2022-08-01 ENCOUNTER — OFFICE VISIT (OUTPATIENT)
Dept: PAIN MANAGEMENT | Age: 57
End: 2022-08-01
Payer: MEDICARE

## 2022-08-01 VITALS
DIASTOLIC BLOOD PRESSURE: 78 MMHG | OXYGEN SATURATION: 94 % | WEIGHT: 205 LBS | BODY MASS INDEX: 28.7 KG/M2 | SYSTOLIC BLOOD PRESSURE: 120 MMHG | HEART RATE: 89 BPM | HEIGHT: 71 IN

## 2022-08-01 DIAGNOSIS — M79.641 RIGHT HAND PAIN: ICD-10-CM

## 2022-08-01 DIAGNOSIS — G89.29 CHRONIC BILATERAL LOW BACK PAIN WITH RIGHT-SIDED SCIATICA: Primary | ICD-10-CM

## 2022-08-01 DIAGNOSIS — M54.41 CHRONIC BILATERAL LOW BACK PAIN WITH RIGHT-SIDED SCIATICA: Primary | ICD-10-CM

## 2022-08-01 DIAGNOSIS — M47.816 LUMBAR FACET JOINT SYNDROME: ICD-10-CM

## 2022-08-01 DIAGNOSIS — M48.061 NEURAL FORAMINAL STENOSIS OF LUMBAR SPINE: ICD-10-CM

## 2022-08-01 DIAGNOSIS — M54.16 LUMBAR RADICULAR PAIN: ICD-10-CM

## 2022-08-01 DIAGNOSIS — M47.817 LUMBOSACRAL SPONDYLOSIS WITHOUT MYELOPATHY: ICD-10-CM

## 2022-08-01 PROCEDURE — 1036F TOBACCO NON-USER: CPT | Performed by: NURSE PRACTITIONER

## 2022-08-01 PROCEDURE — 99214 OFFICE O/P EST MOD 30 MIN: CPT | Performed by: NURSE PRACTITIONER

## 2022-08-01 PROCEDURE — 3017F COLORECTAL CA SCREEN DOC REV: CPT | Performed by: NURSE PRACTITIONER

## 2022-08-01 PROCEDURE — G8417 CALC BMI ABV UP PARAM F/U: HCPCS | Performed by: NURSE PRACTITIONER

## 2022-08-01 PROCEDURE — G8427 DOCREV CUR MEDS BY ELIG CLIN: HCPCS | Performed by: NURSE PRACTITIONER

## 2022-08-01 RX ORDER — METHYLPREDNISOLONE 4 MG/1
TABLET ORAL
Qty: 1 KIT | Refills: 0 | Status: SHIPPED | OUTPATIENT
Start: 2022-08-01 | End: 2022-08-07

## 2022-08-01 RX ORDER — CELECOXIB 100 MG/1
100 CAPSULE ORAL 2 TIMES DAILY
Qty: 60 CAPSULE | Refills: 3 | Status: SHIPPED | OUTPATIENT
Start: 2022-08-01 | End: 2022-08-31

## 2022-08-01 ASSESSMENT — ENCOUNTER SYMPTOMS
DIARRHEA: 0
BACK PAIN: 1
CONSTIPATION: 0
EYES NEGATIVE: 1
RESPIRATORY NEGATIVE: 1

## 2022-08-01 NOTE — PROGRESS NOTES
Subjective:      Patient ID: Zion Ko is a 64 y.o. male. Chief Complaint   Patient presents with    Annual Exam     Back Pain    Migraine   Associated symptoms include back pain. Other  Associated symptoms include arthralgias, fatigue and myalgias. Here today for routine pain clinic recheck. Gabapentin seems to be working well in combination with dry needling, no complaints, no complaints side effects. Stopped cymbalta because it was impacting his mood. Would like to address the arthritis in his hand    Pain Assessment  Location of Pain: Back  Severity of Pain: 6  Quality of Pain: Aching  Duration of Pain: Persistent  Frequency of Pain: Constant  Aggravating Factors: Bending, Stretching, Straightening, Exercise, Kneeling, Squatting, Standing, Walking, Stairs (weather change)  Limiting Behavior: Yes  Relieving Factors: Rest, Ice, Heat (meds)  Result of Injury: No  Work-Related Injury: No    Allergies   Allergen Reactions    Pcn [Penicillins] Rash    Sulfa Antibiotics Rash       Outpatient Medications Marked as Taking for the 8/1/22 encounter (Office Visit) with FELICE Maldonado CNP   Medication Sig Dispense Refill    methylPREDNISolone (MEDROL DOSEPACK) 4 MG tablet Take by mouth. 1 kit 0    celecoxib (CELEBREX) 100 MG capsule Take 1 capsule by mouth in the morning and 1 capsule before bedtime. 60 capsule 3    mesalamine (CANASA) 1000 MG suppository UNWRAP AND INSERT 1 SUPPOSITORY RECTALLY DAILY AT BEDTIME      butalbital-acetaminophen-caffeine (FIORICET, ESGIC) -40 MG per tablet TAKE 1 TO 2 TABLETS BY MOUTH TWICE DAILY AS NEEDED FOR HEADACHE 60 tablet 2    topiramate (TOPAMAX) 50 MG tablet Take 1 tablet by mouth in the morning and 1 tablet before bedtime.  60 tablet 2    rizatriptan (MAXALT-MLT) 10 MG disintegrating tablet Take 1 tablet by mouth once as needed for Migraine May repeat in 2 hours if needed 12 tablet 2    ondansetron (ZOFRAN ODT) 4 MG disintegrating tablet Take 1 tablet by mouth every 8 hours as needed for Nausea or Vomiting 30 tablet 2    omeprazole (PRILOSEC) 40 MG delayed release capsule Take 1 capsule by mouth in the morning. 90 capsule 1    pantoprazole (PROTONIX) 40 MG tablet TAKE 1 TABLET BY MOUTH DAILY      rOPINIRole (REQUIP) 0.5 MG tablet       gabapentin (NEURONTIN) 800 MG tablet TAKE 1 TABLET BY MOUTH THREE TIMES DAILY 90 tablet 5    famotidine (PEPCID) 20 MG tablet Take 20 mg by mouth nightly      hydrOXYzine (ATARAX) 10 MG tablet Take 10 mg by mouth 2 times daily as needed      cyclobenzaprine (FLEXERIL) 5 MG tablet       LIVALO 4 MG TABS tablet TAKE 1 TABLET BY MOUTH DAILY      propranolol (INDERAL LA) 60 MG extended release capsule       traMADol (ULTRAM) 50 MG tablet Take 1 tablet by mouth as needed. Loratadine-Pseudoephedrine (CLARITIN-D 24 HOUR PO) Take by mouth daily as needed      loperamide (IMODIUM) 2 MG capsule Take 2 mg by mouth 4 times daily as needed for Diarrhea      fluticasone (FLONASE) 50 MCG/ACT nasal spray SHAKE LIQUID AND USE 1 SPRAY IN EACH NOSTRIL DAILY 48 g 0    SYMTUZA [phone] MG TABS TK 1 T PO  QD  6    aspirin-acetaminophen-caffeine (EXCEDRIN MIGRAINE) 250-250-65 MG per tablet Take 1 tablet by mouth every 6 hours as needed for Headaches       Clotrimazole (MYCELEX) 10 MG LOZG lozenge Take 1 lozenge by mouth 5 times daily 70 lozenge 1    fenofibric acid (FIBRICOR) 135 MG CPDR capsule TAKE 1 CAPSULE BY MOUTH DAILY 90 capsule 0    LORazepam (ATIVAN) 1 MG tablet Take 1 mg by mouth every 6 hours as needed for Anxiety.       darunavir-cobicistat 800-150 MG TABS tablet Take by mouth nightly      Nebulizers (COMPRESSOR/NEBULIZER) MISC As directed 1 each 0    Respiratory Therapy Supplies (NEBULIZER/TUBING/MOUTHPIECE) KIT 1 kit by Does not apply route daily 1 kit 0    albuterol (PROVENTIL) (2.5 MG/3ML) 0.083% nebulizer solution Take 2.5 mg by nebulization every 6 hours as needed for Wheezing       TURMERIC PO Take by mouth three times daily Cancer Mother         uterine s/p hysterectomy    Cancer Maternal Grandmother     Cataracts Neg Hx     Diabetes Neg Hx     Glaucoma Neg Hx        Social History     Socioeconomic History    Marital status: Single     Spouse name: None    Number of children: None    Years of education: None    Highest education level: None   Tobacco Use    Smoking status: Former     Packs/day: 3.00     Years: 30.00     Pack years: 90.00     Types: Cigarettes     Quit date: 10/7/2014     Years since quittin.8    Smokeless tobacco: Never   Vaping Use    Vaping Use: Never used   Substance and Sexual Activity    Alcohol use: Yes     Comment: socially    Drug use: No       Review of Systems   Constitutional:  Positive for activity change and fatigue. HENT: Negative. Eyes: Negative. Respiratory: Negative. Cardiovascular: Negative. Gastrointestinal:  Negative for constipation and diarrhea. Endocrine: Negative. Genitourinary:  Negative for difficulty urinating. Musculoskeletal:  Positive for arthralgias, back pain and myalgias. Skin: Negative. Psychiatric/Behavioral:  Positive for sleep disturbance. Objective:   Physical Exam  Vitals reviewed. Constitutional:       General: He is not in acute distress. Appearance: He is well-developed. He is not ill-appearing, toxic-appearing or diaphoretic. HENT:      Head: Normocephalic and atraumatic. Cardiovascular:      Rate and Rhythm: Normal rate. Pulmonary:      Effort: Pulmonary effort is normal. No accessory muscle usage or respiratory distress.    Musculoskeletal:      Comments: MRI OF THE LUMBAR SPINE WITHOUT CONTRAST, 2018 10:22 am     TECHNIQUE:  Multiplanar multisequence MRI of the lumbar spine was performed without the  administration of intravenous contrast.     COMPARISON:  None     HISTORY:  ORDERING SYSTEM PROVIDED HISTORY: Lumbar facet joint syndrome  TECHNOLOGIST PROVIDED HISTORY:  Reason for exam:->right lumbar radiculopathy  Ordering Physician Provided Reason for Exam:  Low back pain to the right  side, right leg pain. Symptoms for about one week. Acuity: Acute  Type of Exam: Initial  Additional signs and symptoms:  Lumbar facet syndrome; Right lumbar  radiculopathy     Initial evaluation. FINDINGS:  BONES/ALIGNMENT: There is normal alignment of the spine. The vertebral body  heights are maintained. The bone marrow signal appears unremarkable. There  is degenerative disc disease with disc space narrowing and osteophytes at  L3-4, L4-5, and L5-S1. SPINAL CORD: The conus terminates normally. SOFT TISSUES: No paraspinal mass identified. There is a cyst in the left  kidney that is not fully evaluated. Ultrasound could better evaluate. L1-L2:  The thecal sac and neural foramina are intact. L2-L3:  There is mild facet and ligamentum flavum hypertrophy. The thecal  sac is not stenotic. Disc and/or osteophytes cause minimal narrowing of the  neural foramina bilaterally. L3-L4: The thecal sac is not stenotic. Disc and/or osteophytes result in  mild narrowing of the neural foramina bilaterally. There is mild facet and  ligamentum flavum hypertrophy. L4-L5:  There is moderate facet and ligamentum flavum hypertrophy. The  thecal sac is not stenotic. Disc and/or osteophytes result in moderate  narrowing of the neural foramina bilaterally. The left neural foramen is  narrowed greater than right. L5-S1:  There is mild facet and ligamentum flavum hypertrophy. The thecal  sac is not stenotic. The S1 nerve roots are intact. Disc and/or osteophytes  result in moderate narrowing of the neural foramina bilaterally. The right  neural foramen is narrowed greater than the left. There is no significant change in the findings from the prior study. Impression  Disc and osteophytes result in narrowing of the neural foramina at several  levels as discussed above.   No significant stenosis of thecal sac in the  lumbar region. Skin:     General: Skin is warm and dry. Capillary Refill: Capillary refill takes less than 2 seconds. Coloration: Skin is not pale. Nails: There is no clubbing. Neurological:      Mental Status: He is alert and oriented to person, place, and time. GCS: GCS eye subscore is 4. GCS verbal subscore is 5. GCS motor subscore is 6. Cranial Nerves: No cranial nerve deficit. Psychiatric:         Mood and Affect: Mood is not depressed. Affect is not angry. Speech: He is communicative. Speech is not slurred. Behavior: Behavior normal. Behavior is not agitated, aggressive or withdrawn. Behavior is cooperative. Judgment: Judgment normal. Judgment is not impulsive or inappropriate. Assessment:      1. Chronic bilateral low back pain with right-sided sciatica    2. Lumbar radicular pain    3. Lumbar facet joint syndrome    4. Lumbosacral spondylosis without myelopathy    5. Neural foraminal stenosis of lumbar spine    6. Right hand pain          Plan:      Chronic pain diagnoses such as   1. Chronic bilateral low back pain with right-sided sciatica    2. Lumbar radicular pain    3. Lumbar facet joint syndrome    4. Lumbosacral spondylosis without myelopathy    5. Neural foraminal stenosis of lumbar spine    6. Right hand pain     controlled on current medication regime, wll continue current pain medications to improve quality of life and function.    Medrol dose pack, after complete start celebrex     Follow up one month    FELICE Reeves - CNP

## 2022-08-29 ENCOUNTER — HOSPITAL ENCOUNTER (OUTPATIENT)
Dept: GENERAL RADIOLOGY | Age: 57
Discharge: HOME | End: 2022-08-31
Payer: MEDICARE

## 2022-08-29 DIAGNOSIS — R05.9 COUGH: ICD-10-CM

## 2022-08-29 PROCEDURE — 71046 X-RAY EXAM CHEST 2 VIEWS: CPT

## 2022-08-31 ENCOUNTER — OFFICE VISIT (OUTPATIENT)
Dept: PAIN MANAGEMENT | Age: 57
End: 2022-08-31
Payer: MEDICARE

## 2022-08-31 VITALS
WEIGHT: 204 LBS | RESPIRATION RATE: 19 BRPM | OXYGEN SATURATION: 96 % | BODY MASS INDEX: 28.56 KG/M2 | SYSTOLIC BLOOD PRESSURE: 116 MMHG | HEIGHT: 71 IN | DIASTOLIC BLOOD PRESSURE: 74 MMHG | HEART RATE: 79 BPM

## 2022-08-31 DIAGNOSIS — M54.41 CHRONIC BILATERAL LOW BACK PAIN WITH RIGHT-SIDED SCIATICA: Primary | ICD-10-CM

## 2022-08-31 DIAGNOSIS — M48.061 NEURAL FORAMINAL STENOSIS OF LUMBAR SPINE: ICD-10-CM

## 2022-08-31 DIAGNOSIS — G89.29 CHRONIC BILATERAL LOW BACK PAIN WITH RIGHT-SIDED SCIATICA: Primary | ICD-10-CM

## 2022-08-31 DIAGNOSIS — M47.816 LUMBAR FACET JOINT SYNDROME: ICD-10-CM

## 2022-08-31 DIAGNOSIS — M54.16 LUMBAR RADICULAR PAIN: ICD-10-CM

## 2022-08-31 DIAGNOSIS — M19.042 ARTHRITIS OF BOTH HANDS: ICD-10-CM

## 2022-08-31 DIAGNOSIS — M19.041 ARTHRITIS OF BOTH HANDS: ICD-10-CM

## 2022-08-31 PROCEDURE — 99214 OFFICE O/P EST MOD 30 MIN: CPT | Performed by: NURSE PRACTITIONER

## 2022-08-31 PROCEDURE — 1036F TOBACCO NON-USER: CPT | Performed by: NURSE PRACTITIONER

## 2022-08-31 PROCEDURE — G8417 CALC BMI ABV UP PARAM F/U: HCPCS | Performed by: NURSE PRACTITIONER

## 2022-08-31 PROCEDURE — G8427 DOCREV CUR MEDS BY ELIG CLIN: HCPCS | Performed by: NURSE PRACTITIONER

## 2022-08-31 PROCEDURE — 3017F COLORECTAL CA SCREEN DOC REV: CPT | Performed by: NURSE PRACTITIONER

## 2022-08-31 RX ORDER — GUAIFENESIN AND CODEINE PHOSPHATE 100; 10 MG/5ML; MG/5ML
5 SOLUTION ORAL 3 TIMES DAILY PRN
COMMUNITY
Start: 2022-08-29 | End: 2022-10-19

## 2022-08-31 RX ORDER — CELECOXIB 200 MG/1
200 CAPSULE ORAL 2 TIMES DAILY
Qty: 60 CAPSULE | Refills: 5 | Status: SHIPPED | OUTPATIENT
Start: 2022-08-31

## 2022-08-31 RX ORDER — DULOXETIN HYDROCHLORIDE 60 MG/1
CAPSULE, DELAYED RELEASE ORAL
COMMUNITY
Start: 2022-08-29 | End: 2022-09-12

## 2022-08-31 RX ORDER — GUAIFENESIN 600 MG/1
600 TABLET, EXTENDED RELEASE ORAL EVERY 12 HOURS SCHEDULED
COMMUNITY
Start: 2022-08-22 | End: 2022-08-31

## 2022-08-31 RX ORDER — AZITHROMYCIN 250 MG/1
TABLET, FILM COATED ORAL
COMMUNITY
Start: 2022-08-30 | End: 2022-10-19

## 2022-08-31 RX ORDER — BENZONATATE 100 MG/1
CAPSULE ORAL
COMMUNITY
Start: 2022-08-25 | End: 2022-10-19

## 2022-08-31 ASSESSMENT — ENCOUNTER SYMPTOMS
EYES NEGATIVE: 1
BACK PAIN: 1
RESPIRATORY NEGATIVE: 1
DIARRHEA: 0
CONSTIPATION: 0

## 2022-08-31 NOTE — PROGRESS NOTES
Subjective:      Patient ID: Zion Ko is a 62 y.o. male. Chief Complaint   Patient presents with    Back Pain     Back Pain    Migraine   Associated symptoms include back pain. Other  Associated symptoms include arthralgias, fatigue and myalgias. Here today for routine pain clinic recheck. Gabapentin seems to be working well in combination with dry needling, no complaints, no complaints side effects. Stopped cymbalta because it was impacting his mood. Would like to address the arthritis in his hand-started on celebrex with relief, no side effects, would like it increased for improved pain control    Pain Assessment  Location of Pain: Back  Location Modifiers: Posterior, Medial, Lateral  Severity of Pain: 7  Quality of Pain: Aching, Throbbing  Duration of Pain: Persistent  Frequency of Pain: Constant  Aggravating Factors:  (working weather)  Limiting Behavior: No  Relieving Factors: Rest (meds)  Result of Injury: No  Work-Related Injury: No  Are there other pain locations you wish to document?: No    Allergies   Allergen Reactions    Pcn [Penicillins] Rash    Sulfa Antibiotics Rash       Outpatient Medications Marked as Taking for the 8/31/22 encounter (Office Visit) with FELICE Jordan - CNP   Medication Sig Dispense Refill    azithromycin (ZITHROMAX) 250 MG tablet       benzonatate (TESSALON) 100 MG capsule       guaiFENesin-codeine (TUSSI-ORGANIDIN NR) 100-10 MG/5ML syrup Take 5 mLs by mouth 3 times daily as needed.       DULoxetine (CYMBALTA) 60 MG extended release capsule       celecoxib (CELEBREX) 200 MG capsule Take 1 capsule by mouth 2 times daily 60 capsule 5    mesalamine (CANASA) 1000 MG suppository UNWRAP AND INSERT 1 SUPPOSITORY RECTALLY DAILY AT BEDTIME      butalbital-acetaminophen-caffeine (FIORICET, ESGIC) -40 MG per tablet TAKE 1 TO 2 TABLETS BY MOUTH TWICE DAILY AS NEEDED FOR HEADACHE 60 tablet 2    topiramate (TOPAMAX) 50 MG tablet Take 1 tablet by mouth in the by mouth three times daily      Glucosamine HCl (GLUCOSAMINE PO) Take by mouth daily      Multiple Vitamins-Minerals (MULTIVITAMIN PO) Take by mouth daily      INVEGA 6 MG extended release tablet Take 6 mg by mouth daily      DESCOVY 200-25 MG TABS Take 1 tablet by mouth daily      MYTESI 125 MG TBEC Take 125 mg by mouth         Past Medical History:   Diagnosis Date    [facility]     follows with Dr. Aundrea Bryant    Centrilobular emphysema [facility])     HIV (human immunodeficiency virus infection) ([facility] 2001    followed by Kirk Mena (Infectious Disease at [address] [place] [address])    Pneumonia 06/11/2017       Past Surgical History:   Procedure Laterality Date    ANESTHESIA NERVE BLOCK Bilateral 1/12/2018    Bilat L2 L3 L4 L5 Diagnostic Medial BB performed by Angela Singh MD at [address] [place] [address]  01/05/2016    Normal colon. Hemorrhoids.  Dr Thiago Byrne  2015    full dental extraction    HERNIA REPAIR Left 3/2/2020    Laparoscopic Robotic Assisted Left Inguinal HERNIA REPAIR WITH MESH &VENTRAL  hernia repair performed by Cheli Coello DO at [address] [place] [address] Dr Cifuentes 9/26/2017    Right L4 & L5 Transforaminal  performed by Angela Singh MD at [address] [place] [address] Dr Cifuentes 10/3/2017    Right L4 & L5 Transforaminal performed by Angela Singh MD at [address] [place] [address] Dr Cifuentes 1/8/2019    Right L4 L5 TRANSFORAMINAL performed by Angela Singh MD at [facility]. Dianne Conroy 34 AA&/STRD LUMBAR PLEXUS CONT NFS CATH Bilateral 1/19/2018    Bilat L2 L3 L4 L5 Confirmatory Medial BB performed by Angela Singh MD at [address] [place] [address], W IMAGE [id number] [facility] LEVEL Right 1/25/2018    Right L2 L3 L4 L5 RADIOFREQUENCY performed by Angela Singh MD at [address] [place] [address], W IMAGE [id number] [facility] LEVEL Left 3/15/2018    Left L2 L3 L4 L5 RADIOFREQUENCY performed by Angela Singh MD at [facility] OR       Family History   Problem Relation Age of Onset    Cancer Mother         uterine s/p hysterectomy    Cancer Maternal Grandmother     Cataracts Neg Hx     Diabetes Neg Hx     Glaucoma Neg Hx        Social History     Socioeconomic History    Marital status: Single     Spouse name: None    Number of children: None    Years of education: None    Highest education level: None   Tobacco Use    Smoking status: Former     Packs/day: 3.00     Years: 30.00     Pack years: 90.00     Types: Cigarettes     Quit date: 10/7/2014     Years since quittin.9    Smokeless tobacco: Never   Vaping Use    Vaping Use: Never used   Substance and Sexual Activity    Alcohol use: Yes     Comment: socially    Drug use: No       Review of Systems   Constitutional:  Positive for activity change and fatigue. HENT: Negative. Eyes: Negative. Respiratory: Negative. Cardiovascular: Negative. Gastrointestinal:  Negative for constipation and diarrhea. Endocrine: Negative. Genitourinary:  Negative for difficulty urinating. Musculoskeletal:  Positive for arthralgias, back pain and myalgias. Skin: Negative. Psychiatric/Behavioral:  Positive for sleep disturbance. Objective:   Physical Exam  Vitals reviewed. Constitutional:       General: He is not in acute distress. Appearance: He is well-developed. He is not ill-appearing, toxic-appearing or diaphoretic. HENT:      Head: Normocephalic and atraumatic. Cardiovascular:      Rate and Rhythm: Normal rate. Pulmonary:      Effort: Pulmonary effort is normal. No accessory muscle usage or respiratory distress.    Musculoskeletal:      Comments: MRI OF THE LUMBAR SPINE WITHOUT CONTRAST, 2018 10:22 am     TECHNIQUE:  Multiplanar multisequence MRI of the lumbar spine was performed without the  administration of intravenous contrast.     COMPARISON:  None     HISTORY:  ORDERING SYSTEM PROVIDED HISTORY: Lumbar facet joint syndrome  TECHNOLOGIST PROVIDED HISTORY:  Reason for exam:->right lumbar radiculopathy  Ordering Physician Provided Reason for Exam:  Low back pain to the right  side, right leg pain. Symptoms for about one week. Acuity: Acute  Type of Exam: Initial  Additional signs and symptoms:  Lumbar facet syndrome; Right lumbar  radiculopathy     Initial evaluation. FINDINGS:  BONES/ALIGNMENT: There is normal alignment of the spine. The vertebral body  heights are maintained. The bone marrow signal appears unremarkable. There  is degenerative disc disease with disc space narrowing and osteophytes at  L3-4, L4-5, and L5-S1. SPINAL CORD: The conus terminates normally. SOFT TISSUES: No paraspinal mass identified. There is a cyst in the left  kidney that is not fully evaluated. Ultrasound could better evaluate. L1-L2:  The thecal sac and neural foramina are intact. L2-L3:  There is mild facet and ligamentum flavum hypertrophy. The thecal  sac is not stenotic. Disc and/or osteophytes cause minimal narrowing of the  neural foramina bilaterally. L3-L4: The thecal sac is not stenotic. Disc and/or osteophytes result in  mild narrowing of the neural foramina bilaterally. There is mild facet and  ligamentum flavum hypertrophy. L4-L5:  There is moderate facet and ligamentum flavum hypertrophy. The  thecal sac is not stenotic. Disc and/or osteophytes result in moderate  narrowing of the neural foramina bilaterally. The left neural foramen is  narrowed greater than right. L5-S1:  There is mild facet and ligamentum flavum hypertrophy. The thecal  sac is not stenotic. The S1 nerve roots are intact. Disc and/or osteophytes  result in moderate narrowing of the neural foramina bilaterally. The right  neural foramen is narrowed greater than the left. There is no significant change in the findings from the prior study. Impression  Disc and osteophytes result in narrowing of the neural foramina at several  levels as discussed above.   No significant stenosis of thecal sac in the  lumbar region. Skin:     General: Skin is warm and dry. Capillary Refill: Capillary refill takes less than 2 seconds. Coloration: Skin is not pale. Nails: There is no clubbing. Neurological:      Mental Status: He is alert and oriented to person, place, and time. GCS: GCS eye subscore is 4. GCS verbal subscore is 5. GCS motor subscore is 6. Cranial Nerves: No cranial nerve deficit. Psychiatric:         Mood and Affect: Mood is not depressed. Affect is not angry. Speech: He is communicative. Speech is not slurred. Behavior: Behavior normal. Behavior is not agitated, aggressive or withdrawn. Behavior is cooperative. Judgment: Judgment normal. Judgment is not impulsive or inappropriate. Assessment:      1. Chronic bilateral low back pain with right-sided sciatica    2. Neural foraminal stenosis of lumbar spine    3. Lumbar facet joint syndrome    4.  Lumbar radicular pain          Plan:      Increase celebrex 200mg bid     Follow up 3 months    FELICE Platt - CNP

## 2022-09-12 RX ORDER — DULOXETIN HYDROCHLORIDE 60 MG/1
CAPSULE, DELAYED RELEASE ORAL
Qty: 60 CAPSULE | Refills: 5 | Status: SHIPPED | OUTPATIENT
Start: 2022-09-12

## 2022-09-12 NOTE — TELEPHONE ENCOUNTER
William Carlisle  called requesting a refill of the below medication which has been pended for you:     Requested Prescriptions     Pending Prescriptions Disp Refills    DULoxetine (CYMBALTA) 60 MG extended release capsule [Pharmacy Med Name: DULOXETINE DR 60MG CAPSULES] 60 capsule      Sig: TAKE 2 CAPSULES BY MOUTH EVERY DAY       Allergies   Allergen Reactions    Pcn [Penicillins] Rash    Sulfa Antibiotics Rash               Last Appointment:  8/31/2022   Next Appointment:  11/29/2022

## 2022-09-14 ENCOUNTER — HOSPITAL ENCOUNTER (OUTPATIENT)
Dept: LAB | Age: 57
Discharge: HOME | End: 2022-09-14
Payer: MEDICARE

## 2022-09-14 LAB
ALBUMIN SERPL-MCNC: 4.2 G/DL (ref 3.5–5.2)
ALBUMIN/GLOBULIN RATIO: 1.6 (ref 1–2.5)
ALP BLD-CCNC: 77 U/L (ref 40–129)
ALT SERPL-CCNC: 13 U/L (ref 5–41)
ANION GAP SERPL CALCULATED.3IONS-SCNC: 10 MMOL/L (ref 9–17)
AST SERPL-CCNC: 14 U/L
BILIRUB SERPL-MCNC: 0.3 MG/DL (ref 0.3–1.2)
BUN BLDV-MCNC: 14 MG/DL (ref 6–20)
BUN/CREAT BLD: 16 (ref 9–20)
CALCIUM SERPL-MCNC: 9.3 MG/DL (ref 8.6–10.4)
CHLORIDE BLD-SCNC: 105 MMOL/L (ref 98–107)
CHOLESTEROL/HDL RATIO: 4.9
CHOLESTEROL: 207 MG/DL
CO2: 23 MMOL/L (ref 20–31)
CREAT SERPL-MCNC: 0.88 MG/DL (ref 0.7–1.2)
GFR AFRICAN AMERICAN: >60 ML/MIN
GFR NON-AFRICAN AMERICAN: >60 ML/MIN
GFR SERPL CREATININE-BSD FRML MDRD: ABNORMAL ML/MIN/{1.73_M2}
GLUCOSE BLD-MCNC: 116 MG/DL (ref 70–99)
HDLC SERPL-MCNC: 42 MG/DL
LDL CHOLESTEROL DIRECT: 87 MG/DL
LDL CHOLESTEROL: ABNORMAL MG/DL (ref 0–130)
POTASSIUM SERPL-SCNC: 4.3 MMOL/L (ref 3.7–5.3)
PROSTATE SPECIFIC ANTIGEN: 6.5 NG/ML
SODIUM BLD-SCNC: 138 MMOL/L (ref 135–144)
TOTAL PROTEIN: 6.9 G/DL (ref 6.4–8.3)
TRIGL SERPL-MCNC: 432 MG/DL

## 2022-09-14 PROCEDURE — G0103 PSA SCREENING: HCPCS

## 2022-09-14 PROCEDURE — 36415 COLL VENOUS BLD VENIPUNCTURE: CPT

## 2022-09-14 PROCEDURE — 83721 ASSAY OF BLOOD LIPOPROTEIN: CPT

## 2022-09-14 PROCEDURE — 80053 COMPREHEN METABOLIC PANEL: CPT

## 2022-09-14 PROCEDURE — 80061 LIPID PANEL: CPT

## 2022-10-19 ENCOUNTER — OFFICE VISIT (OUTPATIENT)
Dept: NEUROLOGY | Age: 57
End: 2022-10-19
Payer: MEDICARE

## 2022-10-19 VITALS
SYSTOLIC BLOOD PRESSURE: 118 MMHG | HEIGHT: 71 IN | DIASTOLIC BLOOD PRESSURE: 78 MMHG | OXYGEN SATURATION: 95 % | BODY MASS INDEX: 30.24 KG/M2 | HEART RATE: 101 BPM | WEIGHT: 216 LBS

## 2022-10-19 DIAGNOSIS — M54.2 NECK PAIN: ICD-10-CM

## 2022-10-19 DIAGNOSIS — F31.11 BIPOLAR AFFECTIVE DISORDER, CURRENTLY MANIC, MILD (HCC): ICD-10-CM

## 2022-10-19 DIAGNOSIS — M54.41 CHRONIC BILATERAL LOW BACK PAIN WITH RIGHT-SIDED SCIATICA: ICD-10-CM

## 2022-10-19 DIAGNOSIS — G44.59 OTHER COMPLICATED HEADACHE SYNDROME: Primary | ICD-10-CM

## 2022-10-19 DIAGNOSIS — R41.3 MEMORY PROBLEM: ICD-10-CM

## 2022-10-19 DIAGNOSIS — M47.817 LUMBOSACRAL SPONDYLOSIS WITHOUT MYELOPATHY: ICD-10-CM

## 2022-10-19 DIAGNOSIS — F31.10 BIPOLAR AFFECTIVE DISORDER, CURRENT EPISODE MANIC, CURRENT EPISODE SEVERITY UNSPECIFIED (HCC): ICD-10-CM

## 2022-10-19 DIAGNOSIS — G44.221 CHRONIC TENSION-TYPE HEADACHE, INTRACTABLE: ICD-10-CM

## 2022-10-19 DIAGNOSIS — M62.838 MUSCLE SPASM: ICD-10-CM

## 2022-10-19 DIAGNOSIS — G89.29 CHRONIC BILATERAL LOW BACK PAIN WITH RIGHT-SIDED SCIATICA: ICD-10-CM

## 2022-10-19 DIAGNOSIS — M54.16 LUMBAR RADICULAR PAIN: ICD-10-CM

## 2022-10-19 DIAGNOSIS — Z87.891 EX-SMOKER: ICD-10-CM

## 2022-10-19 PROCEDURE — G8417 CALC BMI ABV UP PARAM F/U: HCPCS | Performed by: PSYCHIATRY & NEUROLOGY

## 2022-10-19 PROCEDURE — 99214 OFFICE O/P EST MOD 30 MIN: CPT | Performed by: PSYCHIATRY & NEUROLOGY

## 2022-10-19 PROCEDURE — G8427 DOCREV CUR MEDS BY ELIG CLIN: HCPCS | Performed by: PSYCHIATRY & NEUROLOGY

## 2022-10-19 PROCEDURE — 1036F TOBACCO NON-USER: CPT | Performed by: PSYCHIATRY & NEUROLOGY

## 2022-10-19 PROCEDURE — G8484 FLU IMMUNIZE NO ADMIN: HCPCS | Performed by: PSYCHIATRY & NEUROLOGY

## 2022-10-19 PROCEDURE — 3017F COLORECTAL CA SCREEN DOC REV: CPT | Performed by: PSYCHIATRY & NEUROLOGY

## 2022-10-19 RX ORDER — RIZATRIPTAN BENZOATE 10 MG/1
10 TABLET, ORALLY DISINTEGRATING ORAL
Qty: 12 TABLET | Refills: 2 | Status: SHIPPED | OUTPATIENT
Start: 2022-10-19 | End: 2022-10-19

## 2022-10-19 RX ORDER — ATORVASTATIN CALCIUM 10 MG/1
TABLET, FILM COATED ORAL
COMMUNITY
Start: 2022-09-30

## 2022-10-19 RX ORDER — BUTALBITAL, ACETAMINOPHEN AND CAFFEINE 50; 325; 40 MG/1; MG/1; MG/1
TABLET ORAL
Qty: 60 TABLET | Refills: 2 | Status: SHIPPED | OUTPATIENT
Start: 2022-10-19

## 2022-10-19 RX ORDER — ONDANSETRON 4 MG/1
4 TABLET, ORALLY DISINTEGRATING ORAL EVERY 8 HOURS PRN
Qty: 30 TABLET | Refills: 2 | Status: SHIPPED | OUTPATIENT
Start: 2022-10-19

## 2022-10-19 RX ORDER — OMEPRAZOLE 40 MG/1
40 CAPSULE, DELAYED RELEASE ORAL DAILY
Qty: 90 CAPSULE | Refills: 1 | Status: SHIPPED | OUTPATIENT
Start: 2022-10-19

## 2022-10-19 RX ORDER — TOPIRAMATE 50 MG/1
50 TABLET, FILM COATED ORAL 2 TIMES DAILY
Qty: 60 TABLET | Refills: 2 | Status: SHIPPED | OUTPATIENT
Start: 2022-10-19

## 2022-10-19 RX ORDER — ROPINIROLE 0.5 MG/1
0.5 TABLET, FILM COATED ORAL
Qty: 90 TABLET | Refills: 2 | Status: SHIPPED | OUTPATIENT
Start: 2022-10-19

## 2022-10-19 ASSESSMENT — ENCOUNTER SYMPTOMS
NAUSEA: 1
CONSTIPATION: 0
COLOR CHANGE: 0
ABDOMINAL PAIN: 0
BLOOD IN STOOL: 0
COUGH: 0
BLURRED VISION: 0
SCALP TENDERNESS: 0
ABDOMINAL DISTENTION: 0
SORE THROAT: 0
TROUBLE SWALLOWING: 0
EYE PAIN: 0
EYE ITCHING: 0
WHEEZING: 0
FACIAL SWEATING: 0
SHORTNESS OF BREATH: 0
FACIAL SWELLING: 0
VOMITING: 0
EYE REDNESS: 0
SWOLLEN GLANDS: 0
RHINORRHEA: 0
VISUAL CHANGE: 0
BACK PAIN: 1
PHOTOPHOBIA: 1
CHEST TIGHTNESS: 0
APNEA: 0
DIARRHEA: 0
VOICE CHANGE: 0
EYE WATERING: 0
EYE DISCHARGE: 0
CHOKING: 0
SINUS PRESSURE: 0

## 2022-10-19 NOTE — PROGRESS NOTES
[facility]  Neurology  [address] [address]  BYZYO:[phone]   Fax: [phone]      SUBJECTIVE:     PATIENT ID:  Dara Barraza is a  RIGHT     HANDED 62 y.o. male. Headache   This is a recurrent problem. Episode onset: SINCE  OCTOBER 2019. The problem occurs constantly. The problem has been gradually worsening. The pain is located in the Occipital region. The pain does not radiate. The pain quality is not similar to prior headaches. The quality of the pain is described as pulsating and throbbing. The pain is at a severity of 3/10. The pain is mild. Associated symptoms include back pain, a loss of balance, nausea, neck pain, phonophobia and photophobia. Pertinent negatives include no abdominal pain, abnormal behavior, anorexia, blurred vision, coughing, dizziness, drainage, ear pain, eye pain, eye redness, eye watering, facial sweating, hearing loss, insomnia, muscle aches, rhinorrhea, scalp tenderness, seizures, sinus pressure, sore throat, swollen glands, tinnitus, visual change, vomiting, weakness or weight loss. The symptoms are aggravated by unknown. He has tried ergotamines and NSAIDs for the symptoms. The treatment provided no relief. His past medical history is significant for immunosuppression and migraine headaches. There is no history of cancer, cluster headaches, hypertension, migraines in the family, obesity, pseudotumor cerebri, recent head traumas, sinus disease or TMJ. Migraine   This is a chronic problem. Episode onset: SINCE  TEENAGE   The problem has been waxing and waning. The pain is located in the Bilateral region. The pain does not radiate. The pain quality is similar to prior headaches. The quality of the pain is described as aching, throbbing and pulsating. The pain is at a severity of 3/10. The pain is mild. Associated symptoms include back pain, a loss of balance, nausea, neck pain, phonophobia and photophobia.  Pertinent negatives include no abdominal pain, abnormal behavior, anorexia, blurred vision, coughing, dizziness, drainage, ear pain, eye pain, eye redness, eye watering, facial sweating, hearing loss, insomnia, muscle aches, rhinorrhea, scalp tenderness, seizures, sinus pressure, sore throat, swollen glands, tinnitus, visual change, vomiting, weakness or weight loss. The symptoms are aggravated by unknown. He has tried NSAIDs and Excedrin for the symptoms. The treatment provided moderate relief. His past medical history is significant for immunosuppression and migraine headaches. There is no history of cancer, cluster headaches, hypertension, migraines in the family, obesity, pseudotumor cerebri, recent head traumas, sinus disease or TMJ. History obtained from  The patient   AND  HIS  MALE  FRIEND       and other  available   medical  records   were  Also  reviewed. The  Duration,  Quality,  Severity,  Location,  Timing,  Context,  Modifying  Factors   Of   The   Chief   Complaint   And  Present  Illness       Was   Reviewed   In   Chronological   Manner. PATIENT'S  MAIN  CONCERNS INCLUDE :                       1)     H /O    CHRONIC   MIGRAINES    SINCE  TEENAGE                            2)     PREVIOUS     H/O   BAD  MIGRAINE       8   YEARS   AGO                             3)     H/O     HEADACHE   IN  [facility]   AREA        SINCE   October 2019                                           PATIENT  TRIED     EXCEDRIN  MIGRAINE,    ALEVE                                                WHICH  DID  NOT  HELP. 4)     H/O    MILD     NECK PAIN       WITH  RADIATION  TO     THE  LEFT    SIDE                                         WORSE     SINCE  2020                                               -   IMPROVED                           5)     NO    PRECIPITATING   FACTORS. NO   FEVER.      NO  TRAUMA 6)     H/O    CHRONIC    LUMBAR  PAIN   AND  RADICULOPATHY                                       BEING  FOLLOWED  BY  PAIN  MANAGEMENT                                    -   ON  NEURONTIN,     CYMBALTA                                  7)       H/O   CHRONIC  ANXIETY,    BIPOLAR  DISORDER                                      -  ON  INVEGA, ,     ATIVAN     PRN                                     TO  FOLLOW  WITH     MENTAL  HEALTH  PROFESSIONALS                           8)     H/O     HIV  INFECTION       ASYMPTOMATIC                                    - ON  MEDICATION          FROM    [facility]     PR[facility]                              9)      H/O    CHRONIC    MILD     MEMORY  PROBLEMS                                         -    NEEDS  MONITORING                        10)   H/O   EX  SMOKER                            H/O   ABNORMAL   CT   CHEST     IN    2017                              TO  FOLLOW  WITH   HIS  PCP                                         11)    HAD  NEURO  DIAGNOSTIC  EVALUATIONS  IN NOV./ DEC.  2019                              LABS  WORK  UP    DID  NOT    SHOW  ANY  SIGNIFICANT  ABNORMALITIES                             MRI  BRAIN      SHOWED  CHRONIC  CEREBRAL ISCHEMIA                                     MRI  CERVICAL SPINE       SHOWED    MULTI LEVEL  DISC  DISEASES                       12)    PATIENT'S     MIGRAINES   AND   HEADACHES                                      -       IMPROVED     AND      STABLE                                 AND  TOLERATING  THE  MEDICATIONS                                                13)      H/O     OCCIPITAL  HEADACHES     IN    SUMMER   2020    DUE  TO  HEAT                                       -     RESOLVED                                14)     H/O   INTOLERANCE      WITH  NAPROSYN                                         DUE  TO  GI  SIDE EFFECTS                              PATIENT   TO   AVOID     NSAID 15)          PATIENT       ON      TOPAMAX,  IMITREX,    FIORICET     AS   NEEDED                                    WITH  BETTER  CONTROL  OF      HIS   MIGRAINES                                                           16)      H/O      CHRONIC    RESTLESS  LEG   SYNDROME                                                 STABLE  -     ON     REQUIP                                                           17)        PATIENT  DENIES  ANY  NEW  NEUROLOGICAL  CONCERNS                                 REQUESTS     REFILLS  FOR  HIS  MEDICATIONS                    18)        VARIOUS  RISK   FACTORS   WERE  REVIEWED   AND   DISCUSSED. PATIENT   HAS  MULTIPLE   MEDICAL, NEUROLOGICAL                        AND   MENTAL HEALTH   PROBLEMS . PATIENT'S   MANAGEMENT  IS  CHALLENGING.                        PRECIPITATING  FACTORS: including  fever/infection, exertion/relaxation, position change, stress, weather change,                        medications/alcohol, time of day/darkness/light  Are  absent                                                              MODIFYING  FACTORS:  fever/infection, exertion/relaxation, position change, stress, weather change,                        medications/alcohol, time of day/darkness/light  Are  absent             Patient   Indicates   The  Presence   And  The  Absence  Of  The  Following    Associated  And          Additional  Neurological    Symptoms:                                Balance  And coordination   problems  absent           Gait problems     absent            Headaches      present              Migraines           present           Memory problemspresent              Confusion        absent            Paresthesia   numbness          absent           Seizures  And  Starring  Episodes           absent           Syncope,  Near  syncopal episodes         absent           Speech   problems           absent             Swallowing Problems      absent            Dizziness,  Light headedness           absent              Vertigo        absent             Generalized   Weakness    absent              focal  Weakness     absent             Tremors         absent              Sleep  Problems     absent             History  Of   Recent  Head  Injury     absent             History  Of   Recent  TIA     absent             History  Of   Recent    Stroke     absent             Neck  Pain   and   Neck muscle  Spasms  present               Radiating  down   And   Weakness           present            Lower back   Pain  And     Spasms  present              Radiating    Down   And   Weakness          present                H/OFALLS        absent               History  Of   Visual  Symptoms    absent                  Associated   Diplopia       absent                                               Also   Additional   Symptoms   Present    As  Documented    In   The   detailed                  Review  Of  Systems   And    Please   Refer   To    Them for   Additional    Information. Any components  That are either  Unobtainable  Or  Limited  In   HPI, ROS  And/or PFSH   Are                   Due   ToPatient's  Medical  Problems,  Clinical  Condition   and/or lack of                                other    Alternate   resources.           RECORDS   REVIEWED:    historical medical records       INFORMATION   REVIEWED:     MEDICAL   HISTORY,SURGICAL   HISTORY,     MEDICATIONS   LIST,   ALLERGIES AND  DRUG  INTOLERANCES,       FAMILY   HISTORY,  SOCIAL  HISTORY,      PROBLEM  LIST   FOR  PATIENT  CARE   COORDINATION      Past Medical History:   Diagnosis Date    Bipolar disorder [facility]     follows with Dr. Shereen Aguirre    [facility])     HIV (human immunodeficiency virus infection) ([facility] 2001    followed by Rosie Eisenberg (Infectious Disease at [address] [place] [address])    Pneumonia 06/11/2017         Past Surgical History:   Procedure Laterality Date    ANESTHESIA NERVE BLOCK Bilateral 1/12/2018    Bilat L2 L3 L4 L5 Diagnostic Medial BB performed by Sukh Kaufman MD at [facility]  01/05/2016    Normal colon. Hemorrhoids.  Dr David Neri  2015    full dental extraction    HERNIA REPAIR Left 3/2/2020    Laparoscopic Robotic Assisted Left Inguinal HERNIA REPAIR WITH MESH &VENTRAL  hernia repair performed by Mercy Kate DO at [address] [place] [address] Dr Right 9/26/2017    Right L4 & L5 Transforaminal  performed by Sukh Kaufman MD at [address] [place] [address] Dr Right 10/3/2017    Right L4 & L5 Transforaminal performed by Sukh Kaufman MD at [address] [place] [address] Dr Right 1/8/2019    Right L4 L5 TRANSFORAMINAL performed by Sukh Kaufman MD at [facility]. [facility] [address] AA&/STRD LUMBAR PLEXUS CONT NFS CATH Bilateral 1/19/2018    Bilat L2 L3 L4 L5 Confirmatory Medial BB performed by Sukh Kaufman MD at [address] [place] [address], W IMAGE GUIDE,EA ADDL LEVEL Right 1/25/2018    Right L2 L3 L4 L5 RADIOFREQUENCY performed by Sukh Kaufman MD at [facility], W IMAGE GUIDE,EA ADDL LEVEL Left 3/15/2018    Left L2 L3 L4 L5 RADIOFREQUENCY performed by Sukh Kaufman MD at [address] [place] [address]         Current Outpatient Medications   Medication Sig Dispense Refill    atorvastatin (LIPITOR) 10 MG tablet       butalbital-acetaminophen-caffeine (FIORICET, ESGIC) -40 MG per tablet TAKE 1 TO 2 TABLETS BY MOUTH TWICE DAILY AS NEEDED FOR HEADACHE 60 tablet 2    topiramate (TOPAMAX) 50 MG tablet Take 1 tablet by mouth 2 times daily 60 tablet 2    rizatriptan (MAXALT-MLT) 10 MG disintegrating tablet Take 1 tablet by mouth once as needed for Migraine May repeat in 2 hours if needed 12 tablet 2    ondansetron (ZOFRAN ODT) 4 MG disintegrating tablet Take 1 tablet by mouth every 8 hours as needed for Nausea or Vomiting 30 tablet 2    omeprazole (PRILOSEC) 40 MG delayed release capsule Take 1 capsule by mouth daily 90 capsule 1    rOPINIRole (REQUIP) 0.5 MG tablet Take 1 tablet by mouth Daily with supper 90 tablet 2    DULoxetine (CYMBALTA) 60 MG extended release capsule TAKE 2 CAPSULES BY MOUTH EVERY DAY 60 capsule 5    celecoxib (CELEBREX) 200 MG capsule Take 1 capsule by mouth 2 times daily 60 capsule 5    mesalamine (CANASA) 1000 MG suppository UNWRAP AND INSERT 1 SUPPOSITORY RECTALLY DAILY AT BEDTIME      pantoprazole (PROTONIX) 40 MG tablet TAKE 1 TABLET BY MOUTH DAILY      gabapentin (NEURONTIN) 800 MG tablet TAKE 1 TABLET BY MOUTH THREE TIMES DAILY 90 tablet 5    famotidine (PEPCID) 20 MG tablet Take 20 mg by mouth nightly      hydrOXYzine (ATARAX) 10 MG tablet Take 10 mg by mouth 2 times daily as needed      cyclobenzaprine (FLEXERIL) 5 MG tablet       LIVALO 4 MG TABS tablet TAKE 1 TABLET BY MOUTH DAILY      propranolol (INDERAL LA) 60 MG extended release capsule       traMADol (ULTRAM) 50 MG tablet Take 1 tablet by mouth as needed. Loratadine-Pseudoephedrine (CLARITIN-D 24 HOUR PO) Take by mouth daily as needed      loperamide (IMODIUM) 2 MG capsule Take 2 mg by mouth 4 times daily as needed for Diarrhea      fluticasone (FLONASE) 50 MCG/ACT nasal spray SHAKE LIQUID AND USE 1 SPRAY IN EACH NOSTRIL DAILY 48 g 0    SYMTUZA [phone] MG TABS TK 1 T PO  QD  6    aspirin-acetaminophen-caffeine (EXCEDRIN MIGRAINE) 250-250-65 MG per tablet Take 1 tablet by mouth every 6 hours as needed for Headaches       Clotrimazole (MYCELEX) 10 MG LOZG lozenge Take 1 lozenge by mouth 5 times daily 70 lozenge 1    fenofibric acid (FIBRICOR) 135 MG CPDR capsule TAKE 1 CAPSULE BY MOUTH DAILY 90 capsule 0    LORazepam (ATIVAN) 1 MG tablet Take 1 mg by mouth every 6 hours as needed for Anxiety.       darunavir-cobicistat 800-150 MG TABS tablet Take by mouth nightly      Nebulizers (COMPRESSOR/NEBULIZER) MISC As directed 1 each 0    Respiratory Therapy Supplies (NEBULIZER/TUBING/MOUTHPIECE) KIT 1 kit by Does not apply route daily 1 kit 0    albuterol (PROVENTIL) (2.5 MG/3ML) 0.083% nebulizer solution Take 2.5 mg by nebulization every 6 hours as needed for Wheezing       TURMERIC PO Take by mouth three times daily      Glucosamine HCl (GLUCOSAMINE PO) Take by mouth daily      Multiple Vitamins-Minerals (MULTIVITAMIN PO) Take by mouth daily      INVEGA 6 MG extended release tablet Take 6 mg by mouth daily      DESCOVY 200-25 MG TABS Take 1 tablet by mouth daily      MYTESI 125 MG TBEC Take 125 mg by mouth       No current facility-administered medications for this visit.          Allergies   Allergen Reactions    Pcn [Penicillins] Rash    Sulfa Antibiotics Rash         Family History   Problem Relation Age of Onset    Cancer Mother         uterine s/p hysterectomy    Cancer Maternal Grandmother     Cataracts Neg Hx     Diabetes Neg Hx     Glaucoma Neg Hx          Social History     Socioeconomic History    Marital status: Single     Spouse name: Not on file    Number of children: Not on file    Years of education: Not on file    Highest education level: Not on file   Occupational History    Not on file   Tobacco Use    Smoking status: Former     Packs/day: 3.00     Years: 30.00     Pack years: 90.00     Types: Cigarettes     Quit date: 10/7/2014     Years since quittin.0    Smokeless tobacco: Never   Vaping Use    Vaping Use: Never used   Substance and Sexual Activity    Alcohol use: Yes     Comment: socially    Drug use: No    Sexual activity: Not on file   Other Topics Concern    Not on file   Social History Narrative    Not on file     Social Determinants of Health     Financial Resource Strain: Not on file   Food Insecurity: Not on file   Transportation Needs: Not on file   Physical Activity: Not on file   Stress: Not on file   Social Connections: Not on file   Intimate Partner Violence: Not on file   Housing Stability: Not on file       Vitals:    10/19/22 0920   BP: 118/78   Pulse: (!) 101   SpO2: 95%         Wt Readings from Last 3 Encounters:   10/19/22 216 lb (98 kg)   08/31/22 204 lb (92.5 kg)   08/01/22 205 lb (93 kg)         BP Readings from Last 3 Encounters:   10/19/22 118/78   08/31/22 116/74   08/01/22 120/78       Hematology and Coagulation  Lab Results   Component Value Date/Time    WBC 6.8 06/23/2022 09:05 AM    RBC 4.51 06/23/2022 09:05 AM    HGB 14.2 06/23/2022 09:05 AM    HCT 45.1 06/23/2022 09:05 AM    .0 06/23/2022 09:05 AM    MCH 31.5 06/23/2022 09:05 AM    MCHC 31.5 06/23/2022 09:05 AM    RDW 13.1 06/23/2022 09:05 AM     06/23/2022 09:05 AM    MPV 9.1 06/23/2022 09:05 AM       Chemistries  Lab Results   Component Value Date/Time     09/14/2022 09:33 AM    K 4.3 09/14/2022 09:33 AM     09/14/2022 09:33 AM    CO2 23 09/14/2022 09:33 AM    BUN 14 09/14/2022 09:33 AM    CREATININE 0.88 09/14/2022 09:33 AM    CALCIUM 9.3 09/14/2022 09:33 AM    PROT 6.9 09/14/2022 09:33 AM    LABALBU 4.2 09/14/2022 09:33 AM    BILITOT 0.3 09/14/2022 09:33 AM    ALKPHOS 77 09/14/2022 09:33 AM    AST 14 09/14/2022 09:33 AM    ALT 13 09/14/2022 09:33 AM     Lab Results   Component Value Date/Time    ALKPHOS 77 09/14/2022 09:33 AM    ALT 13 09/14/2022 09:33 AM    AST 14 09/14/2022 09:33 AM    PROT 6.9 09/14/2022 09:33 AM    BILITOT 0.3 09/14/2022 09:33 AM    BILIDIR <0.08 06/11/2017 08:22 PM    LABALBU 4.2 09/14/2022 09:33 AM     Lab Results   Component Value Date/Time    BUN 14 09/14/2022 09:33 AM    CREATININE 0.88 09/14/2022 09:33 AM     Lab Results   Component Value Date/Time    CALCIUM 9.3 09/14/2022 09:33 AM     Lab Results   Component Value Date/Time    AST 14 09/14/2022 09:33 AM    ALT 13 09/14/2022 09:33 AM             Review of Systems   Constitutional:  Negative for appetite change, chills, fatigue, unexpected weight change and weight loss.    HENT:  Negative for congestion, dental problem, drooling, ear discharge, ear pain, facial swelling, hearing loss, mouth sores, nosebleeds, postnasal drip, rhinorrhea, sinus pressure, sore throat, tinnitus, trouble swallowing and voice change. Eyes:  Positive for photophobia. Negative for blurred vision, pain, discharge, redness, itching and visual disturbance. Respiratory:  Negative for apnea, cough, choking, chest tightness, shortness of breath and wheezing. Cardiovascular:  Negative for chest pain, palpitations and leg swelling. Gastrointestinal:  Positive for nausea. Negative for abdominal distention, abdominal pain, anorexia, blood in stool, constipation, diarrhea and vomiting. Endocrine: Negative for cold intolerance, heat intolerance, polydipsia, polyphagia and polyuria. Musculoskeletal:  Positive for arthralgias, back pain and neck pain. Negative for gait problem, joint swelling, myalgias and neck stiffness. Skin:  Negative for color change, pallor, rash and wound. Allergic/Immunologic: Negative for environmental allergies, food allergies and immunocompromised state. Neurological:  Positive for headaches and loss of balance. Negative for dizziness, tremors, seizures, syncope, facial asymmetry, speech difficulty, weakness and light-headedness. Hematological:  Negative for adenopathy. Does not bruise/bleed easily. Psychiatric/Behavioral:  Positive for decreased concentration. Negative for agitation, behavioral problems, confusion, dysphoric mood, hallucinations, self-injury, sleep disturbance and suicidal ideas. The patient is nervous/anxious. The patient does not have insomnia and is not hyperactive. OBJECTIVE:    Physical Exam  Constitutional:       Appearance: He is well-developed. HENT:      Head: Normocephalic and atraumatic. No raccoon eyes or Ovideo's sign. Right Ear: External ear normal.      Left Ear: External ear normal.      Nose: Nose normal.   Eyes:      Conjunctiva/sclera: Conjunctivae normal.      Pupils: Pupils are equal, round, and reactive to light.    Neck: Thyroid: No thyroid mass or thyromegaly. Vascular: No carotid bruit. Trachea: No tracheal deviation. Meningeal: Brudzinski's sign and Kernig's sign absent. Cardiovascular:      Rate and Rhythm: Normal rate and regular rhythm. Pulmonary:      Effort: Pulmonary effort is normal.   Musculoskeletal:         General: No tenderness. Cervical back: Normal range of motion and neck supple. No rigidity. No muscular tenderness. Normal range of motion. Skin:     General: Skin is warm. Coloration: Skin is not pale. Findings: No erythema or rash. Nails: There is no clubbing. Psychiatric:         Attention and Perception: He is attentive. Mood and Affect: Mood is anxious. Mood is not depressed. Affect is not labile, blunt or inappropriate. Speech: He is communicative. Speech is not rapid and pressured, delayed, slurred or tangential.         Behavior: Behavior is not agitated, slowed, aggressive, withdrawn, hyperactive or combative. Behavior is cooperative. Thought Content: Thought content is not paranoid or delusional. Thought content does not include homicidal or suicidal ideation. Thought content does not include homicidal or suicidal plan. Cognition and Memory: Cognition is impaired. Memory is impaired. He does not exhibit impaired remote memory. Judgment: Judgment is not impulsive or inappropriate. NEUROLOGICALEXAMINATION :       A) MENTAL STATUS:                   Alert and  oriented  To time, place  And  Person. No Aphasia. No  Dysarthria. Able   To  Follow    SIMPLE      commands   without   Any  Difficulty. No right  To left confusion. Normal  Speech  And language function.                  Insight and  Judgment ,Fund  Of  Knowledge   within normal limits                Recent  And  Remote memory    DECREASED                Attention &  Concentration are DECREASED                                             B) CRANIAL NERVES :      CN : Visual  Acuity  And  Visual fields  within normal limits               Fundi  Could  Not  Be  Could  Not  Be  Evaluated. 3,4,6 CN : Both  Pupils are   Reactive and  Equal.  Movements  Are  Intact. No  Nystagmus. No  JOSIE. No  Afferent  Pupillary  Defect noted. 5 CN :  Normal  Facial sensations and Corneal  Reflexes           7 CN:  Normal  Facial  Symmetry  And  Strength. No facial  Weakness. 8 CN :  Hearing  Appears within normal limits          9, 10 CN: Normal   spontaneous, reflex   palate   movements         11 CN:   Normal  Shoulder  shrug and  strength         12 CN :   Normal  Tongue movements and  Tongue  In midline                        No tongue   Fasciculations or atrophy       C) MOTOR  EXAM:                 Strength  In upper  And  Lower   extremities   within   normal limits               No  Drift. No  Atrophy               Rapid   alternating  And  repetitions  Movements  within   normal limits               Muscle  Tone  In upper  And  Lower  Extremities  normal                No rigidity. No  Spasticity. Bradykinesia   absent               No  Asterixis. Sustention  Tremor , Resting   Tremor   absent                    No   other  Abnormal  Movements noted           D) SENSORY :               Light   touch, pinprick,   position  And  Vibration  within normal limits        E) REFLEXES:                   Deep  Tendon  Reflexes   normal                  No  pathological  Reflexes  Bilaterally.                                   F) COORDINATION  AND  GAIT :                                Station and  Gait  normal                              Romberg 's test negative                          Ataxia negative          ASSESSMENT:      Patient Active Problem List   Diagnosis    [facility] immunodeficiency virus infection) [facility] IFG (impaired fasting glucose)    Lumbar radicular pain    Chronic bilateral low back pain with right-sided sciatica    Panniculitis involving lumbar region    Lumbar facet joint syndrome    Abnormal EKG    Centrilobular emphysema (HCC)    Muscle spasm    Lumbosacral spondylosis without myelopathy    Neural foraminal stenosis of lumbar spine    Bipolar disorder (HCC)    Memory problem    Abnormal CT of the chest    Ex-smoker    Neck pain    Other complicated headache syndrome    Ventral hernia without obstruction or gangrene    Left inguinal hernia    Intractable headache    Former smoker    Asymptomatic HIV infection ([address]    Arthritis of both hands    Bipolar affective disorder, current episode manic ([address]             MRI OF THE BRAIN WITHOUT AND WITH CONTRAST  12/24/2019 10:37 am       TECHNIQUE:   Multiplanar multisequence MRI of the head/brain was performed without and   with the administration of intravenous contrast.       COMPARISON:   CT brain performed 06/11/2017. HISTORY:   ORDERING SYSTEM PROVIDED HISTORY: Memory problem   TECHNOLOGIST PROVIDED HISTORY:   migraines   Reason for Exam: Neck pain and left shoulder pain. Migraines. Symptoms for   quite a while. Acuity: Chronic   Type of Exam: Unknown   Additional signs and symptoms: Memory problem; Intractable migraine without   status migrainosus, unspecified migraine type. FINDINGS:   INTRACRANIAL STRUCTURES/VENTRICLES:  The sellar and suprasellar structures,   optic chiasm, corpus callosum, pineal gland, tectum, and midline brainstem   structures are unremarkable. The craniocervical junction is unremarkable. There is no acute intracranial hemorrhage, mass effect, or midline shift. There is satisfactory overall gray-white matter differentiation. There is   chronic microvascular disease. The ventricular structures are symmetric and   unremarkable.   The infratentorial structures including the cerebellopontine   angles and internal auditory canals are unremarkable. There is no abnormal   restricted diffusion. There is no abnormal blooming artifact on   susceptibility weighted imaging. There is no abnormal postcontrast   enhancement. ORBITS: The visualized portion of the orbits demonstrate no acute abnormality. SINUSES: The visualized paranasal sinuses and mastoid air cells are well   aerated. BONES/SOFT TISSUES: The bone marrow signal intensity appears normal. The soft   tissues demonstrate no acute abnormality. Impression   Chronic microvascular disease without acute intracranial abnormality. No   abnormal postcontrast enhancement. MRI OF THE CERVICAL SPINE WITHOUT CONTRAST 12/24/2019 10:07 am       TECHNIQUE:   Multiplanar multisequence MRI of the cervical spine was performed without the   administration of intravenous contrast.       COMPARISON:   None. HISTORY:   ORDERING SYSTEM PROVIDED HISTORY: Neck pain   TECHNOLOGIST PROVIDED HISTORY:   neck pain   Reason for Exam: Neck pain and left shoulder pain. Migraines. Symptoms for   quite a while. Acuity: Chronic   Type of Exam: Unknown   Additional signs and symptoms: Neck pain       FINDINGS:   BONES/ALIGNMENT: There is straightening of the cervical spine. The vertebral   body heights are maintained. There is age-appropriate bone marrow signal.   There is mild edema within the left C3-4 facets. There is multilevel   degenerative disc disease with loss of disc signal.  There is disc space   narrowing primarily at the C6-7 level. There is no significant   spondylolisthesis. SPINAL CORD: The spinal cord is normal in caliber and signal.  The visualized   intracranial structures are unremarkable. SOFT TISSUES: The posterior paraspinal soft tissues are unremarkable. The   prevertebral soft tissues are unremarkable. C2-C3: There is no significant disc protrusion, spinal canal stenosis or   neural foraminal narrowing.        C3-C4: There is a mild disc osteophyte complex eccentric to the left with   associated uncovertebral and facet hypertrophy. There is canal stenosis   measuring 8 mm in AP dimension. There is moderate to severe left foraminal   narrowing and no significant right foraminal narrowing. C4-C5: There is a disc osteophyte complex with uncovertebral and facet   hypertrophy. There is canal stenosis measuring 9 mm in AP dimension. There   is moderate right foraminal narrowing and no significant left foraminal   narrowing. C5-C6: There is a disc osteophyte complex with uncovertebral and facet   hypertrophy that is worse on the left. There is no significant canal   stenosis or right foraminal narrowing. There is severe left foraminal   narrowing. C6-C7: There is a disc osteophyte complex with uncovertebral and facet   hypertrophy. There is canal stenosis measuring 8 mm in AP dimension. There   is severe bilateral foraminal narrowing. C7-T1: There is a disc osteophyte complex with uncovertebral and facet   hypertrophy. There is no significant canal stenosis or foraminal narrowing. Impression   Multilevel degenerative disc disease with associated uncovertebral and facet   hypertrophy resulting in canal stenosis most pronounced at C3-4 and C6-7. Moderate to severe left foraminal narrowing at C3-4, moderate right foraminal   narrowing at C4-5, severe left foraminal narrowing at C5-6, and severe   bilateral foraminal narrowing at C6-7. Edema is demonstrated within the left C3-4 facets that is likely related to   degenerative facet disease although a degree of septic arthritis is a   consideration and correlation is needed.          MRI OF THE LUMBAR SPINE WITHOUT CONTRAST, 6/11/2018 10:22 am       TECHNIQUE:   Multiplanar multisequence MRI of the lumbar spine was performed without the   administration of intravenous contrast.       COMPARISON:   None       HISTORY:   ORDERING SYSTEM PROVIDED HISTORY: Lumbar facet joint syndrome   TECHNOLOGIST PROVIDED HISTORY:   Reason for exam:->right lumbar radiculopathy   Ordering Physician Provided Reason for Exam:  Low back pain to the right   side, right leg pain. Symptoms for about one week. Acuity: Acute   Type of Exam: Initial   Additional signs and symptoms:  Lumbar facet syndrome; Right lumbar   radiculopathy       Initial evaluation. FINDINGS:   BONES/ALIGNMENT: There is normal alignment of the spine. The vertebral body   heights are maintained. The bone marrow signal appears unremarkable. There   is degenerative disc disease with disc space narrowing and osteophytes at   L3-4, L4-5, and L5-S1. SPINAL CORD: The conus terminates normally. SOFT TISSUES: No paraspinal mass identified. There is a cyst in the left   kidney that is not fully evaluated. Ultrasound could better evaluate. L1-L2:  The thecal sac and neural foramina are intact. L2-L3:  There is mild facet and ligamentum flavum hypertrophy. The thecal   sac is not stenotic. Disc and/or osteophytes cause minimal narrowing of the   neural foramina bilaterally. L3-L4: The thecal sac is not stenotic. Disc and/or osteophytes result in   mild narrowing of the neural foramina bilaterally. There is mild facet and   ligamentum flavum hypertrophy. L4-L5:  There is moderate facet and ligamentum flavum hypertrophy. The   thecal sac is not stenotic. Disc and/or osteophytes result in moderate   narrowing of the neural foramina bilaterally. The left neural foramen is   narrowed greater than right. L5-S1:  There is mild facet and ligamentum flavum hypertrophy. The thecal   sac is not stenotic. The S1 nerve roots are intact. Disc and/or osteophytes   result in moderate narrowing of the neural foramina bilaterally. The right   neural foramen is narrowed greater than the left. There is no significant change in the findings from the prior study. Impression   Disc and osteophytes result in narrowing of the neural foramina at several   levels as discussed above. No significant stenosis of thecal sac in the   lumbar region. CT OF THE HEAD WITHOUT CONTRAST  6/11/2017 8:49 pm       TECHNIQUE:   CT of the head was performed without the administration of intravenous   contrast. Dose modulation, iterative reconstruction, and/or weight based   adjustment of the mA/kV was utilized to reduce the radiation dose to as low   as reasonably achievable. COMPARISON:   None. HISTORY:   ORDERING SYSTEM PROVIDED HISTORY: neuro symptoms/ HIV   TECHNOLOGIST PROVIDED HISTORY:   Ordering Physician Provided Reason for Exam: Passed out while coughing and   hit forehead on table today, previous loss of consciousness spells in the   last weeks. Neuro symptoms, reaction to HIV medication. Acuity: Acute   Type of Exam: Initial   Mechanism of Injury: passed out and fell   Relevant Medical/Surgical History: n/a       FINDINGS:   BRAIN/VENTRICLES: The ventricular system is mildly prominent in size and in   the midline. No evidence of extra-axial fluid collection, hemorrhage or mass. ORBITS: The visualized portion of the orbits demonstrate no acute abnormality. SINUSES: Mild bilateral ethmoid sinus mucosal thickening. Paranasal sinuses   visualized are otherwise clear. Mastoid regions are poorly aerated likely a   developmental basis. SOFT TISSUES/SKULL:  No acute abnormality of the visualized skull or soft   tissues. Impression   No acute intracranial abnormality. CT OF THE CHEST WITH CONTRAST 7/5/2017 2:06 pm       TECHNIQUE:   CT of the chest was performed with the administration of intravenous   contrast. Multiplanar reformatted images are provided for review.  Dose   modulation, iterative reconstruction, and/or weight based adjustment of the   mA/kV was utilized to reduce the radiation dose to as low as reasonably achievable. COMPARISON:   Chest radiograph dated 06/27/2017       HISTORY:   ORDERING SYSTEM PROVIDED HISTORY: Pneumonia of right lower lobe due to   infectious organism   TECHNOLOGIST PROVIDED HISTORY:   Reason for exam:->pneumonia   Ordering Physician Provided Reason for Exam: Pneumonia of right lower lobe   due to infectious organism. Cough feeling 50 percent better x 2 weeks. Quit   smoking 3 years ago after smoking for 50 years. Acuity: Acute   Type of Exam: Ongoing       FINDINGS:   The nodular opacity of concern in the right lower lobe on recent chest   radiograph corresponds to benign calcified granulomas in the inferior segment   of the right middle lobe. Calcified pulmonary granulomas are also present in   the upper lobes bilaterally. No pulmonary infiltrate or pleural effusion is present. There is   centrilobular emphysema with an upper lobe predominance and scattered   interstitial scarring. No pleural effusion is present. No osteolytic or osteoblastic lesion is present in the included portion of   the axial skeleton. The heart and great vessels are normal in caliber and appearance. No   pericardial effusion is present. There is no pathologic mediastinal or hilar   lymphadenopathy. Calcified right infrahilar lymph nodes reflect residue from   prior granulomatous infection. Impression   1. Benign pulmonary granulomas. 2. No evidence of pneumonia. 3. Centrilobular emphysema. VISITING DIAGNOSIS:      ICD-10-CM    1. Other complicated headache syndrome  G44.59 butalbital-acetaminophen-caffeine (FIORICET, ESGIC) -40 MG per tablet     ondansetron (ZOFRAN ODT) 4 MG disintegrating tablet      2. Chronic tension-type headache, intractable  G44.221 butalbital-acetaminophen-caffeine (FIORICET, ESGIC) -40 MG per tablet     ondansetron (ZOFRAN ODT) 4 MG disintegrating tablet      3.  Chronic bilateral low back pain with right-sided sciatica M54.41     G89.29       4. Neck pain  M54.2       5. Memory problem  R41.3       6. Muscle spasm  M62.838       7. Lumbosacral spondylosis without myelopathy  M47.817       8. Bipolar affective disorder, currently manic, mild ([address]  F31.11       9. Ex-smoker  Z87.891       10. Bipolar affective disorder, current episode manic, current episode severity unspecified ([address]  F31.10       11. Lumbar radicular pain  M54.16                  CONCERNS   &   INCREASED   RISK   FOR         * TIA,  CEREBRO  VASCULAR  ISCHEMIA,      *    COMPLICATED  MIGRAINES      *   TENSION  HEADACHES      *   COGNITIVE  &   MEMORY PROBLEMS                      VARIOUS  RISK   FACTORS   WERE  REVIEWED   AND   DISCUSSED. *  PATIENT   HAS  MULTIPLE   MEDICAL, NEUROLOGICAL                        AND   MENTAL HEALTH   PROBLEMS . PATIENT'S   MANAGEMENT  IS  CHALLENGING. PLAN:                         Trey Huggins  Of  The  Diagnoses,  The  Management & Treatment  Options            AND    Care  plan  Were          Reviewed and   Discussed   With  patient. * Goals  And  Expectations  Of  The  Therapy  Discussed   And  Reviewed. *   Benefits   And   Side  Effect  Profile  Of  Medication/s   Were   Discussed                * Need   For  Further   Follow up For  The  Various  Problems Were  discussed. * Results  Of  The  Previous  Diagnostic tests were reviewed and  discussed                 Medical  Decision  Making  Was  Made  Based on the   Complexity  Of  Above  Mentioned  Diagnoses,    Data reviewed             And    Risk  Of  Significant   Co morbidities and   complicating   Factors.                  Medical  Decision  Was   High     Complexity   Due   To  The  Patient's  Multiple  Symptoms,      Complex  Treatment  Regimen,  Multiple medications           and   Risk  Of   Side  Effects,  Difficulty  In  Medication  Management  And  Diagnostic  Challenges       In  View  Of  The  Associated   Co Morbid  Conditions   And  Problems. *   BE  CAREFUL  WITH  ACTIVITIES   INCLUDING  DRIVING. *   AVOID   NECK  AND/ BACK  STRAINING  ACTIVITIES        *   ADEQUATE   FLUIDINTAKE   AND  ELECTROLYTE  BALANCE         * KEEP  DAIRY  OF   THE  NEUROLOGICAL  SYMPTOMS        RECORDING THE    DURATION  AND  FREQUENCY. *  AVOID    CONDITIONS  AND  FACTORS   THAT  MAKE                  NEUROLOGICAL  SYMPTOMS  WORSE.             *TO  MAINTAIN  REGULAR  SLEEP  WAKE  CYCLES. *   TO  HAVE  ADEQUATE  REST  AND   SLEEP    HOURS.            *    AVOID  ANY USAGE OF    TOBACCO,              EXCESSIVE  ALCOHOL  AND   ILLEGAL   SUBSTANCES        *  CONTINUE   MEDICATIONS    PRESCRIBED   BY NEUROLOGIST    AS    RECOMMENDED       *   Compliance   With  Medications   And  Instructions        * CURRENTLY    TOLERATING  THE  PRESCRIBED   MEDICATIONS. WITHOUT  ANY  SIGNIFICANT  SIDE  EFFECTS   &  GETTING BENEFIT.          *    MIGRAINE/ HEADACHE    DAIRY   WITH  MONITORING                       OF  DURATION  AND  FREQUENCY.               *    Antiplatelet  therapy    As   Recommended  Was   Discussed      *    Prophylactic  Use   Of     Vitamin   B   Complex,  Folic  Acid,    Vitamin  B12    Multivitamin,       Calcium  With  magnesium  And  Vit D    Supplementations   Over  The  Counter  Discussed                    *  EVALUATIONS  AND  FOLLOW UP:                     *   OPTHALMOLOGY                                      * PAIN  MANAGEMENT                 *  HIV  CONSULTANTS                                *      CURRENTLY    PATIENT   FEELS  BETTER  WITH  HIS  MIGRAINES                                 AND   HEADACHES                                    AND  TOLERATING  THE  MEDICATIONS                                           *      PATIENT  DENIES  ANY  NEW  NEUROLOGICAL  CONCERNS                               Orders Placed This Encounter   Medications    butalbital-acetaminophen-caffeine (FIORICET, ESGIC) -40 MG per tablet     Sig: TAKE 1 TO 2 TABLETS BY MOUTH TWICE DAILY AS NEEDED FOR HEADACHE     Dispense:  60 tablet     Refill:  2    topiramate (TOPAMAX) 50 MG tablet     Sig: Take 1 tablet by mouth 2 times daily     Dispense:  60 tablet     Refill:  2    rizatriptan (MAXALT-MLT) 10 MG disintegrating tablet     Sig: Take 1 tablet by mouth once as needed for Migraine May repeat in 2 hours if needed     Dispense:  12 tablet     Refill:  2    ondansetron (ZOFRAN ODT) 4 MG disintegrating tablet     Sig: Take 1 tablet by mouth every 8 hours as needed for Nausea or Vomiting     Dispense:  30 tablet     Refill:  2    omeprazole (PRILOSEC) 40 MG delayed release capsule     Sig: Take 1 capsule by mouth daily     Dispense:  90 capsule     Refill:  1    rOPINIRole (REQUIP) 0.5 MG tablet     Sig: Take 1 tablet by mouth Daily with supper     Dispense:  90 tablet     Refill:  2                     *PATIENT   TO  FOLLOW  UP  WITH   PRIMARY  CARE         OTHER  CONSULTANTS  AS  BEFORE.           *TO  FOLLOW  WITH   MENTAL  HEALTH  PROFESSIONALS ,  INCLUDING            PSYCHOLOGICAL  COUNSELING   AND  PSYCHIATRIC  EVALUTIONS,                 *  Maintain   Healthy  Life Style    With   Periodic  Monitoring  Of      Any  Medical  Conditions  Including   Elevated  Blood  Pressure,  Lipid  Profile,     Blood  Sugar levels  AndHeart  Disease. *   Period   Screening  For  Cancers  Involving  Breast,  Colon,    lungs  And  Other  Organs  As  Applicable,  In consultation   With  Your  Primary Care Providers. *Second  Neurological  Opinion  And  Evaluations  In  [facility]  Setting  If  Patient  Is  Interested. * Please   Contact   Neurology  Clinic   Early   If   Are  Any  New  Neurological   And  Any neurological  Concerns.                   *  If  The  Patient remains  Neurologically  Stable   Return   To  [facility] Neurology Department   IN          3 MONTHS  TIME   FOR  FURTHER              FOLLOW UP.                       *   The  Neurological   Findings,  Possible  Diagnosis,  Differential diagnoses                    And  Options  For    Further   Investigations                   And  management   Are  Discussed  Comprehensively. Medications   And  Prescription   Risks  And  Side effects  Are   Also  Discussed. *  If   There is  Any  Significant  Worsening   Of  Current  Symptoms  And  Or                  If patient  Develops   Any additional  New  NeurologicalSymptoms                  Or  Significant  Concerns   Should  Call  911 or  Go  To  Emergency  Department                          For  Further  Immediate  Evaluation. The   Above  Were  Reviewed  With  patient   and                       questions  Answered  In  Detail. More   Than  50% of face  To face Time   Was  Spent  On  Counseling                    And   Coordination  Of  Care   Of   Patient's  multiple   Neurological  Problems                         And   Comorbid  Medical   Conditions. Electronically signed by Tavia De Los Santos MD.,   [facility]      Board Certified in  Neurology &  In  [employer] of Psychiatry and Neurology (ABPN)      DISCLAIMER:   Although every effort was made to ensure the accuracy of this  electronictranscription,   some errors in transcription may have occurred. GENERAL PATIENT INSTRUCTIONS:     A Healthy Lifestyle: Care Instructions  Your Care Instructions  A healthy lifestyle can help you feel good, stay at ahealthy weight, and have plenty of energy for both work and play. A healthy lifestyle is something you can share with your whole family. A healthy lifestyle also can lower your risk for serious health problems, such ashigh blood pressure, heart disease, and diabetes.   You can follow a few steps listed below to improve your health and the health of your family. Follow-up careis a key part of your treatment and safety. Be sure to make and go to all appointments, and call your doctor if you are having problems. Its also a good idea to know your test results and keep a list of the medicines you take. How can you care for yourself at home? Do not eat too much sugar, fat, or fast foods. You can still have dessert and treats nowand then. The goal is moderation. Start small to improve your eating habits. Pay attention to portion sizes, drink less juice and soda pop, and eat more fruits and vegetables. Eat a healthy amount of food. A 3-ounce serving of meat, for example, is about the size of a deck of cards. Fill the rest of your plate with vegetables and whole grains. Limit theamount of soda and sports drinks you have every day. Drink more water when you are thirsty. Eat at least 5 servings of fruits and vegetables every day. It may seem like a lot, but it is not hard to reach this goal. Aserving or helping is 1 piece of fruit, 1 cup of vegetables, or 2 cups of leafy, raw vegetables. Have an apple or some carrot sticks as an afternoon snack instead of a candy bar. Try to have fruits and/or vegetables at everymeal.  Make exercise part of your daily routine. You may want to start with simple activities, such as walking, bicycling, or slow swimming. Try kay active 30 to 60 minutes every day. You do not need to do all 30 to 60 minutes all at once. For example, you can exercise 3 times a day for 10 or 20 minutes. Moderate exercise is safe for most people, but it is always agood idea to talk to your doctor before starting an exercise program.  Keep moving. [place] [employer] the lawn, work in the garden, or [employer]. Take the stairs instead of the elevator at work. If you smoke, quit. Peoplewho smoke have an increased risk for heart attack, stroke, cancer, and other lung illnesses.  Quitting is hard, but there are ways to boost your chance of quitting tobacco for good. Use nicotine gum, patches, or lozenges. Ask your doctor about stop-smoking programs and medicines. Keep trying. In addition to reducing your risk of diseases in the future, you will notice some benefits soon after you stop using tobacco. If you have shortness of breath or asthma symptoms, they will likely getbetter within a few weeks after you quit. Limit how much alcohol you drink. Moderate amounts of alcohol (up to 2 drinks a day for men, 1drink a day for women) are okay. But drinking too much can lead to liver problems, high blood pressure, and other health problems. health  If you have a family, there are many things you can do together to improve your health. Eat meals together as a family as often as possible. Eat healthy foods. This includes fruits, vegetables, lean meats and dairy, and whole grains. Include your family in your fitness plan. Most peoplethink of activities such as jogging or tennis as the way to fitness, but there are many ways you and your family can be more active. Anything that makes you breathe hard and gets your heart pumping is exercise. Here are sometips:  Walk to do errands or to take your child to school or the bus. Go for a family bike ride after dinner instead of watching TV. Where can you learn more? Go toInogentps://[employer].com.[employer]. org and sign in to your [employer] account. Enter Z682 in the Search HealthInformation box to learn more about \"A Healthy Lifestyle: Care Instructions. \"     If you do not have anaccount, please click on the \"Sign Up Now\" link. Current as of: July 26, 2016  Content Version: 11.2  © 7627-4761 [employer]. Care instructions adapted under license by [facility] ([facility]). If you have questions about a medical condition or this instruction, always ask your healthcare professional. [employer] disclaims any warranty or liability for your use of this information.

## 2022-11-02 ENCOUNTER — HOSPITAL ENCOUNTER (OUTPATIENT)
Dept: LAB | Age: 57
Discharge: HOME | End: 2022-11-02
Payer: MEDICARE

## 2022-11-02 LAB
ALBUMIN SERPL-MCNC: 4.3 G/DL (ref 3.5–5.2)
ALBUMIN/GLOBULIN RATIO: 1.8 (ref 1–2.5)
ALP BLD-CCNC: 85 U/L (ref 40–129)
ALT SERPL-CCNC: 13 U/L (ref 5–41)
AST SERPL-CCNC: 15 U/L
BILIRUB SERPL-MCNC: 0.2 MG/DL (ref 0.3–1.2)
BILIRUBIN DIRECT: <0.1 MG/DL
BILIRUBIN, INDIRECT: ABNORMAL MG/DL (ref 0–1)
CHOLESTEROL/HDL RATIO: 5.1
CHOLESTEROL: 241 MG/DL
GLOBULIN: 2.4 G/DL (ref 1.5–3.8)
HDLC SERPL-MCNC: 47 MG/DL
LDL CHOLESTEROL DIRECT: 108 MG/DL
LDL CHOLESTEROL: ABNORMAL MG/DL (ref 0–130)
TOTAL PROTEIN: 6.7 G/DL (ref 6.4–8.3)
TRIGL SERPL-MCNC: 674 MG/DL

## 2022-11-02 PROCEDURE — 83721 ASSAY OF BLOOD LIPOPROTEIN: CPT

## 2022-11-02 PROCEDURE — 80076 HEPATIC FUNCTION PANEL: CPT

## 2022-11-02 PROCEDURE — 80061 LIPID PANEL: CPT

## 2022-11-02 PROCEDURE — 36415 COLL VENOUS BLD VENIPUNCTURE: CPT

## 2022-11-28 RX ORDER — GABAPENTIN 800 MG/1
TABLET ORAL
Qty: 90 TABLET | Refills: 5 | Status: SHIPPED | OUTPATIENT
Start: 2022-11-28 | End: 2022-12-28

## 2022-11-29 ENCOUNTER — OFFICE VISIT (OUTPATIENT)
Dept: PAIN MANAGEMENT | Age: 57
End: 2022-11-29
Payer: MEDICARE

## 2022-11-29 VITALS
HEART RATE: 89 BPM | OXYGEN SATURATION: 95 % | DIASTOLIC BLOOD PRESSURE: 82 MMHG | WEIGHT: 218.2 LBS | HEIGHT: 71 IN | SYSTOLIC BLOOD PRESSURE: 130 MMHG | BODY MASS INDEX: 30.55 KG/M2

## 2022-11-29 DIAGNOSIS — G89.29 CHRONIC BILATERAL LOW BACK PAIN WITH RIGHT-SIDED SCIATICA: Primary | ICD-10-CM

## 2022-11-29 DIAGNOSIS — M48.061 NEURAL FORAMINAL STENOSIS OF LUMBAR SPINE: ICD-10-CM

## 2022-11-29 DIAGNOSIS — M47.816 LUMBAR FACET JOINT SYNDROME: ICD-10-CM

## 2022-11-29 DIAGNOSIS — M54.16 LUMBAR RADICULAR PAIN: ICD-10-CM

## 2022-11-29 DIAGNOSIS — M54.41 CHRONIC BILATERAL LOW BACK PAIN WITH RIGHT-SIDED SCIATICA: Primary | ICD-10-CM

## 2022-11-29 PROCEDURE — 99214 OFFICE O/P EST MOD 30 MIN: CPT | Performed by: NURSE PRACTITIONER

## 2022-11-29 PROCEDURE — G8427 DOCREV CUR MEDS BY ELIG CLIN: HCPCS | Performed by: NURSE PRACTITIONER

## 2022-11-29 PROCEDURE — G8484 FLU IMMUNIZE NO ADMIN: HCPCS | Performed by: NURSE PRACTITIONER

## 2022-11-29 PROCEDURE — 1036F TOBACCO NON-USER: CPT | Performed by: NURSE PRACTITIONER

## 2022-11-29 PROCEDURE — 3017F COLORECTAL CA SCREEN DOC REV: CPT | Performed by: NURSE PRACTITIONER

## 2022-11-29 PROCEDURE — G8417 CALC BMI ABV UP PARAM F/U: HCPCS | Performed by: NURSE PRACTITIONER

## 2022-11-29 RX ORDER — CELECOXIB 100 MG/1
CAPSULE ORAL
COMMUNITY
Start: 2022-10-25 | End: 2022-11-29 | Stop reason: DRUGHIGH

## 2022-11-29 RX ORDER — FENOFIBRATE 145 MG/1
TABLET, COATED ORAL
COMMUNITY
Start: 2022-11-14

## 2022-11-29 ASSESSMENT — ENCOUNTER SYMPTOMS
EYES NEGATIVE: 1
DIARRHEA: 0
CONSTIPATION: 0
RESPIRATORY NEGATIVE: 1
BACK PAIN: 1

## 2022-11-29 NOTE — PROGRESS NOTES
Subjective:      Patient ID: Olivia Snyder is a 62 y.o. male. Chief Complaint   Patient presents with    Back Pain    Medication Refill     Back Pain    Migraine   Associated symptoms include back pain. Other  Associated symptoms include arthralgias, fatigue and myalgias. Medication Refill  Associated symptoms include arthralgias, fatigue and myalgias. Here today for routine pain clinic recheck. Pain Assessment  Location of Pain: Back  Severity of Pain: 6  Quality of Pain: Throbbing  Duration of Pain: Persistent  Frequency of Pain: Constant  Aggravating Factors: Stairs, Walking, Standing, Squatting, Kneeling, Exercise, Straightening, Stretching, Bending  Limiting Behavior: Yes  Relieving Factors: Rest, Heat (meds)  Result of Injury: No  Work-Related Injury: No    Allergies   Allergen Reactions    Pcn [Penicillins] Rash    Sulfa Antibiotics Rash       Outpatient Medications Marked as Taking for the 11/29/22 encounter (Office Visit) with FELICE Morrison CNP   Medication Sig Dispense Refill    fenofibrate (TRICOR) 145 MG tablet       Handicap Placard MISC by Does not apply route Issue parking placard for persons with disabilities. [facility] sec 4503.44. Length of time expected to have disablilty:36 months. Applicant meets the qualifying disability criteria.  1 each 0    gabapentin (NEURONTIN) 800 MG tablet TAKE 1 TABLET BY MOUTH THREE TIMES DAILY 90 tablet 5    atorvastatin (LIPITOR) 10 MG tablet       butalbital-acetaminophen-caffeine (FIORICET, ESGIC) -40 MG per tablet TAKE 1 TO 2 TABLETS BY MOUTH TWICE DAILY AS NEEDED FOR HEADACHE 60 tablet 2    topiramate (TOPAMAX) 50 MG tablet Take 1 tablet by mouth 2 times daily 60 tablet 2    ondansetron (ZOFRAN ODT) 4 MG disintegrating tablet Take 1 tablet by mouth every 8 hours as needed for Nausea or Vomiting 30 tablet 2    omeprazole (PRILOSEC) 40 MG delayed release capsule Take 1 capsule by mouth daily 90 capsule 1    rOPINIRole (REQUIP) 0.5 MG tablet Take 1 tablet by mouth Daily with supper 90 tablet 2    DULoxetine (CYMBALTA) 60 MG extended release capsule TAKE 2 CAPSULES BY MOUTH EVERY DAY 60 capsule 5    celecoxib (CELEBREX) 200 MG capsule Take 1 capsule by mouth 2 times daily 60 capsule 5    mesalamine (CANASA) 1000 MG suppository UNWRAP AND INSERT 1 SUPPOSITORY RECTALLY DAILY AT BEDTIME      pantoprazole (PROTONIX) 40 MG tablet TAKE 1 TABLET BY MOUTH DAILY      famotidine (PEPCID) 20 MG tablet Take 20 mg by mouth nightly      hydrOXYzine (ATARAX) 10 MG tablet Take 10 mg by mouth 2 times daily as needed      cyclobenzaprine (FLEXERIL) 5 MG tablet       LIVALO 4 MG TABS tablet TAKE 1 TABLET BY MOUTH DAILY      propranolol (INDERAL LA) 60 MG extended release capsule       traMADol (ULTRAM) 50 MG tablet Take 1 tablet by mouth as needed. Loratadine-Pseudoephedrine (CLARITIN-D 24 HOUR PO) Take by mouth daily as needed      loperamide (IMODIUM) 2 MG capsule Take 2 mg by mouth 4 times daily as needed for Diarrhea      fluticasone (FLONASE) 50 MCG/ACT nasal spray SHAKE LIQUID AND USE 1 SPRAY IN EACH NOSTRIL DAILY 48 g 0    SYMTUZA [phone] MG TABS TK 1 T PO  QD  6    aspirin-acetaminophen-caffeine (EXCEDRIN MIGRAINE) 250-250-65 MG per tablet Take 1 tablet by mouth every 6 hours as needed for Headaches       Clotrimazole (MYCELEX) 10 MG LOZG lozenge Take 1 lozenge by mouth 5 times daily 70 lozenge 1    fenofibric acid (FIBRICOR) 135 MG CPDR capsule TAKE 1 CAPSULE BY MOUTH DAILY 90 capsule 0    LORazepam (ATIVAN) 1 MG tablet Take 1 mg by mouth every 6 hours as needed for Anxiety.       darunavir-cobicistat 800-150 MG TABS tablet Take by mouth nightly      Nebulizers (COMPRESSOR/NEBULIZER) MISC As directed 1 each 0    Respiratory Therapy Supplies (NEBULIZER/TUBING/MOUTHPIECE) KIT 1 kit by Does not apply route daily 1 kit 0    albuterol (PROVENTIL) (2.5 MG/3ML) 0.083% nebulizer solution Take 2.5 mg by nebulization every 6 hours as needed for Wheezing TURMERIC PO Take by mouth three times daily      Glucosamine HCl (GLUCOSAMINE PO) Take by mouth daily      Multiple Vitamins-Minerals (MULTIVITAMIN PO) Take by mouth daily      INVEGA 6 MG extended release tablet Take 6 mg by mouth daily      DESCOVY 200-25 MG TABS Take 1 tablet by mouth daily      MYTESI 125 MG TBEC Take 125 mg by mouth         Past Medical History:   Diagnosis Date    Bipolar disorder ([facility]     follows with Dr. Albertina Fang    Centrilobular emphysema [facility])     HIV (human immunodeficiency virus infection) ([facility] 2001    followed by Clint Cartagena (Infectious Disease at [address] [place] [address])    Pneumonia 06/11/2017       Past Surgical History:   Procedure Laterality Date    ANESTHESIA NERVE BLOCK Bilateral 1/12/2018    Bilat L2 L3 L4 L5 Diagnostic Medial BB performed by Queen Sia MD at [facility]  01/05/2016    Normal colon. Hemorrhoids.  Dr Florida Wallis  2015    full dental extraction    HERNIA REPAIR Left 3/2/2020    Laparoscopic Robotic Assisted Left Inguinal HERNIA REPAIR WITH MESH &VENTRAL  hernia repair performed by Cortez Live DO at [address] Right 9/26/2017    Right L4 & L5 Transforaminal  performed by Queen Sia MD at [address] Right 10/3/2017    Right L4 & L5 Transforaminal performed by Queen Sia MD at [address] Right 1/8/2019    Right L4 L5 TRANSFORAMINAL performed by Queen Sia MD at [facility]. [address] AA&/STRD LUMBAR PLEXUS CONT NFS CATH Bilateral 1/19/2018    Bilat L2 L3 L4 L5 Confirmatory Medial BB performed by Queen Sia MD at [address], [address] IMAGE [address] LEVEL Right 1/25/2018    Right L2 L3 L4 L5 RADIOFREQUENCY performed by Queen Sia MD at [address] [place], [address] IM[address] LEVEL Left 3/15/2018    Left L2 L3 L4 L5 RADIOFREQUENCY performed by Queen Sia MD at [place] [address] History   Problem Relation Age of Onset    Cancer Mother         uterine s/p hysterectomy    Cancer Maternal Grandmother     Cataracts Neg Hx     Diabetes Neg Hx     Glaucoma Neg Hx        Social History     Socioeconomic History    Marital status: Single     Spouse name: None    Number of children: None    Years of education: None    Highest education level: None   Tobacco Use    Smoking status: Former     Packs/day: 3.00     Years: 30.00     Pack years: 90.00     Types: Cigarettes     Quit date: 10/7/2014     Years since quittin.1    Smokeless tobacco: Never   Vaping Use    Vaping Use: Never used   Substance and Sexual Activity    Alcohol use: Yes     Comment: socially    Drug use: No       Review of Systems   Constitutional:  Positive for activity change and fatigue. HENT: Negative. Eyes: Negative. Respiratory: Negative. Cardiovascular: Negative. Gastrointestinal:  Negative for constipation and diarrhea. Endocrine: Negative. Genitourinary:  Negative for difficulty urinating. Musculoskeletal:  Positive for arthralgias, back pain and myalgias. Skin: Negative. Psychiatric/Behavioral:  Positive for sleep disturbance. Objective:   Physical Exam  Vitals reviewed. Constitutional:       General: He is not in acute distress. Appearance: He is well-developed. He is not ill-appearing, toxic-appearing or diaphoretic. HENT:      Head: Normocephalic and atraumatic. Cardiovascular:      Rate and Rhythm: Normal rate. Pulmonary:      Effort: Pulmonary effort is normal. No accessory muscle usage or respiratory distress.    Musculoskeletal:      Comments: MRI OF THE LUMBAR SPINE WITHOUT CONTRAST, 2018 10:22 am     TECHNIQUE:  Multiplanar multisequence MRI of the lumbar spine was performed without the  administration of intravenous contrast.     COMPARISON:  None     HISTORY:  ORDERING SYSTEM PROVIDED HISTORY: Lumbar facet joint syndrome  TECHNOLOGIST PROVIDED HISTORY:  Reason for significant stenosis of thecal sac in the  lumbar region. Skin:     General: Skin is warm and dry. Capillary Refill: Capillary refill takes less than 2 seconds. Coloration: Skin is not pale. Nails: There is no clubbing. Neurological:      Mental Status: He is alert and oriented to person, place, and time. GCS: GCS eye subscore is 4. GCS verbal subscore is 5. GCS motor subscore is 6. Cranial Nerves: No cranial nerve deficit. Psychiatric:         Mood and Affect: Mood is not depressed. Affect is not angry. Speech: He is communicative. Speech is not slurred. Behavior: Behavior normal. Behavior is not agitated, aggressive or withdrawn. Behavior is cooperative. Judgment: Judgment normal. Judgment is not impulsive or inappropriate. Assessment:      1. Chronic bilateral low back pain with right-sided sciatica    2. Neural foraminal stenosis of lumbar spine    3. Lumbar facet joint syndrome    4. Lumbar radicular pain          Plan:    Chronic pain diagnoses such as   1. Chronic bilateral low back pain with right-sided sciatica    2. Neural foraminal stenosis of lumbar spine    3. Lumbar facet joint syndrome    4. Lumbar radicular pain     controlled on current medication regime, wll continue current pain medications to improve quality of life and function.      Rx handicap placard     Follow up 3 months    FELICE Lee - CNP

## 2023-01-17 ENCOUNTER — TELEPHONE (OUTPATIENT)
Dept: ONCOLOGY | Age: 58
End: 2023-01-17

## 2023-01-18 ENCOUNTER — OFFICE VISIT (OUTPATIENT)
Dept: UROLOGY | Age: 58
End: 2023-01-18
Payer: MEDICARE

## 2023-01-18 VITALS
SYSTOLIC BLOOD PRESSURE: 122 MMHG | DIASTOLIC BLOOD PRESSURE: 68 MMHG | OXYGEN SATURATION: 91 % | RESPIRATION RATE: 16 BRPM | HEART RATE: 80 BPM | BODY MASS INDEX: 30.1 KG/M2 | WEIGHT: 215 LBS | HEIGHT: 71 IN

## 2023-01-18 DIAGNOSIS — N28.1 RENAL CYST: ICD-10-CM

## 2023-01-18 DIAGNOSIS — N20.0 NEPHROLITHIASIS: ICD-10-CM

## 2023-01-18 DIAGNOSIS — R97.20 ELEVATED PSA: Primary | ICD-10-CM

## 2023-01-18 PROCEDURE — 3017F COLORECTAL CA SCREEN DOC REV: CPT | Performed by: UROLOGY

## 2023-01-18 PROCEDURE — 99214 OFFICE O/P EST MOD 30 MIN: CPT | Performed by: UROLOGY

## 2023-01-18 PROCEDURE — 99204 OFFICE O/P NEW MOD 45 MIN: CPT | Performed by: UROLOGY

## 2023-01-18 PROCEDURE — G8427 DOCREV CUR MEDS BY ELIG CLIN: HCPCS | Performed by: UROLOGY

## 2023-01-18 PROCEDURE — G8484 FLU IMMUNIZE NO ADMIN: HCPCS | Performed by: UROLOGY

## 2023-01-18 PROCEDURE — 1036F TOBACCO NON-USER: CPT | Performed by: UROLOGY

## 2023-01-18 PROCEDURE — G8417 CALC BMI ABV UP PARAM F/U: HCPCS | Performed by: UROLOGY

## 2023-01-18 NOTE — PROGRESS NOTES
Luna Vang MD   Urology Clinic office Visit      Patient:  Amelia Morrissey  YOB: 1965  Date: 1/18/2023    HISTORY OF PRESENT ILLNESS:   The patient is a 62 y.o. male who presents today for evaluation of the following problem(s):      1. Elevated PSA    2. Nephrolithiasis    3. Renal cyst           Overall the problem(s) : are worsening. Associated Symptoms: No dysuria, gross hematuria. Pain Severity:      Summary of old records: N/A  (Patient's old records, notes and chart reviewed and summarized above.)      Onset 9/20202  Severity is described as mild-moderate. The course of symptoms over time is chronic. Alleviating factors: none  Worsening factors: none  Lower urinary tract symptoms: frequency and nocturia, 1 times per night  Hx of stones in the past- remote. Passed on own. No family hx of prostate cancer    I independently reviewed and verified the images and reports from:    2021 renal US  Similar 4.4 cm right renal cyst.       Otherwise unremarkable renal ultrasound. Last several PSA's:  Lab Results   Component Value Date    PSA 6.50 (H) 09/14/2022       Last total testosterone:  Lab Results   Component Value Date    TESTOSTERONE 282 11/07/2016       Urinalysis today:  No results found for this visit on 01/18/23.       Last BUN and creatinine:  Lab Results   Component Value Date    BUN 14 09/14/2022     Lab Results   Component Value Date    CREATININE 0.88 09/14/2022       Imaging Reviewed during this Office Visit:   (results were independently reviewed by physician and radiology report verified)    PAST MEDICAL, FAMILY AND SOCIAL HISTORY:  Past Medical History:   Diagnosis Date    Bipolar disorder ([facility]     follows with Dr. Sugey Haile    Centrilobulanadira emphysema [facility])     HIV (human immunodeficiency virus infection) ([facility] 2001    followed by Pascual Lepe (Infectious Disease at [address] [place] [address])    Pneumonia 06/11/2017     Past Surgical History:   Procedure Laterality Date ANESTHESIA NERVE BLOCK Bilateral 1/12/2018    Bilat L2 L3 L4 L5 Diagnostic Medial BB performed by Jovan Wilde MD at [address] [facility]  01/05/2016    Normal colon. Hemorrhoids.  Dr Milena Dee  2015    full dental extraction    HERNIA REPAIR Left 3/2/2020    Laparoscopic Robotic Assisted Left Inguinal HERNIA REPAIR WITH MESH &VENTRAL  hernia repair performed by Thong Nance DO at [address] Right 9/26/2017    Right L4 & L5 Transforaminal  performed by Jovan Wilde MD at [address] Right 10/3/2017    Right L4 & L5 Transforaminal performed by Jovan Wilde MD at [address] Right 1/8/2019    Right L4 L5 TRANSFORAMINAL performed by Jovan Wilde MD at [facility] AGNT PARVERTEB FCT ADDL LMBR/SACRAL Right 1/25/2018    Right L2 L3 L4 L5 RADIOFREQUENCY performed by Jovan Wilde MD at [facility] AGNT PARVERTEB FCT ADDL LMBR/SACRAL Left 3/15/2018    Left L2 L3 L4 L5 RADIOFREQUENCY performed by Jovan Wilde MD at [facility]. [address] AA&/STRD LUMBAR PLEXUS CONT NFS CATH Bilateral 1/19/2018    Bilat L2 L3 L4 L5 Confirmatory Medial BB performed by Jovan Wilde MD at [facility] OR     Family History   Problem Relation Age of Onset    Cancer Mother         uterine s/p hysterectomy    Cancer Maternal Grandmother     Cataracts Neg Hx     Diabetes Neg Hx     Glaucoma Neg Hx      Outpatient Medications Marked as Taking for the 1/18/23 encounter (Office Visit) with Fabiano Sears MD   Medication Sig Dispense Refill    fenofibrate (TRICOR) 145 MG tablet       gabapentin (NEURONTIN) 800 MG tablet TAKE 1 TABLET BY MOUTH THREE TIMES DAILY 90 tablet 5    atorvastatin (LIPITOR) 10 MG tablet       butalbital-acetaminophen-caffeine (FIORICET, ESGIC) -40 MG per tablet TAKE 1 TO 2 TABLETS BY MOUTH TWICE DAILY AS NEEDED FOR HEADACHE 60 tablet 2    topiramate (TOPAMAX) 50 MG tablet Take 1 tablet by mouth 2 times daily 60 tablet 2    rizatriptan (MAXALT-MLT) 10 MG disintegrating tablet Take 1 tablet by mouth once as needed for Migraine May repeat in 2 hours if needed 12 tablet 2    ondansetron (ZOFRAN ODT) 4 MG disintegrating tablet Take 1 tablet by mouth every 8 hours as needed for Nausea or Vomiting 30 tablet 2    omeprazole (PRILOSEC) 40 MG delayed release capsule Take 1 capsule by mouth daily 90 capsule 1    rOPINIRole (REQUIP) 0.5 MG tablet Take 1 tablet by mouth Daily with supper 90 tablet 2    DULoxetine (CYMBALTA) 60 MG extended release capsule TAKE 2 CAPSULES BY MOUTH EVERY DAY 60 capsule 5    celecoxib (CELEBREX) 200 MG capsule Take 1 capsule by mouth 2 times daily 60 capsule 5    mesalamine (CANASA) 1000 MG suppository UNWRAP AND INSERT 1 SUPPOSITORY RECTALLY DAILY AT BEDTIME      pantoprazole (PROTONIX) 40 MG tablet TAKE 1 TABLET BY MOUTH DAILY      famotidine (PEPCID) 20 MG tablet Take 20 mg by mouth nightly      hydrOXYzine (ATARAX) 10 MG tablet Take 10 mg by mouth 2 times daily as needed      cyclobenzaprine (FLEXERIL) 5 MG tablet       LIVALO 4 MG TABS tablet TAKE 1 TABLET BY MOUTH DAILY      propranolol (INDERAL LA) 60 MG extended release capsule       traMADol (ULTRAM) 50 MG tablet Take 1 tablet by mouth as needed.        Loratadine-Pseudoephedrine (CLARITIN-D 24 HOUR PO) Take by mouth daily as needed      loperamide (IMODIUM) 2 MG capsule Take 2 mg by mouth 4 times daily as needed for Diarrhea      fluticasone (FLONASE) 50 MCG/ACT nasal spray SHAKE LIQUID AND USE 1 SPRAY IN EACH NOSTRIL DAILY 48 g 0    SYMTUZA [phone] MG TABS TK 1 T PO  QD  6    aspirin-acetaminophen-caffeine (EXCEDRIN MIGRAINE) 250-250-65 MG per tablet Take 1 tablet by mouth every 6 hours as needed for Headaches       Clotrimazole (MYCELEX) 10 MG LOZG lozenge Take 1 lozenge by mouth 5 times daily 70 lozenge 1    fenofibric acid (FIBRICOR) 135 MG CPDR capsule TAKE 1 CAPSULE BY MOUTH DAILY 90 capsule 0    LORazepam (ATIVAN) 1 MG tablet Take 1 mg by mouth every 6 hours as needed for Anxiety. darunavir-cobicistat 800-150 MG TABS tablet Take by mouth nightly      Nebulizers (COMPRESSOR/NEBULIZER) MISC As directed 1 each 0    Respiratory Therapy Supplies (NEBULIZER/TUBING/MOUTHPIECE) KIT 1 kit by Does not apply route daily 1 kit 0    albuterol (PROVENTIL) (2.5 MG/3ML) 0.083% nebulizer solution Take 2.5 mg by nebulization every 6 hours as needed for Wheezing       TURMERIC PO Take by mouth three times daily      Glucosamine HCl (GLUCOSAMINE PO) Take by mouth daily      Multiple Vitamins-Minerals (MULTIVITAMIN PO) Take by mouth daily      INVEGA 6 MG extended release tablet Take 6 mg by mouth daily      DESCOVY 200-25 MG TABS Take 1 tablet by mouth daily      MYTESI 125 MG TBEC Take 125 mg by mouth         Pcn [penicillins] and Sulfa antibiotics  Social History     Tobacco Use   Smoking Status Former    Packs/day: 3.00    Years: 30.00    Pack years: 90.00    Types: Cigarettes    Quit date: 10/7/2014    Years since quittin.2   Smokeless Tobacco Never       Social History     Substance and Sexual Activity   Alcohol Use Yes    Comment: socially       REVIEW OF SYSTEMS:  Constitutional: negative  Eyes: negative  Respiratory: negative  Cardiovascular: negative  Gastrointestinal: negative  Musculoskeletal: negative  Genitourinary: negative except for what is in HPI  Skin: negative   Neurological: negative  Hematological/Lymphatic: negative  Psychological: negative    Physical Exam:    This a 62 y.o. male   Vitals:    23 1031   BP: 122/68   Pulse: 80   Resp: 16   SpO2: 91%     Constitutional: Patient in no acute distress; Neuro: alert and oriented to person place and time. Psych: Mood and affect normal.  Skin: Normal  Lungs: Respiratory effort normal  Cardiovascular:  Normal peripheral pulses  Abdomen: Soft, non-tender, non-distended   Bladder non-tender and not distended.   Lymphatics: no palpable lymphadenopathy  Gait is within normal limits  Musculoskeletal: Normal range of motion       Assessment and Plan      1. Elevated PSA    2. Nephrolithiasis    3. Renal cyst           Elevated PSA: Discussed implications of elevated PSA. Discussed repeating again or proceeding to biopsy or MRI. Discussed risks of both. Discussed false positive and false negative risk of the above. Elevated PSA: Discussed implications of elevated PSA. Discussed things like infection, trauma, surgery, benign enlargement or cancer--can all alter PSA. Will repeat PSA and %free PSA. Discussed avoiding sexual activity and bike riding 1-2 weeks prior to recheck. Repeat PSA and %Free PSA  Follow up to review    Prescriptions Ordered:  No orders of the defined types were placed in this encounter. Orders Placed:  Orders Placed This Encounter   Procedures    PSA, free     Standing Status:   Future     Standing Expiration Date:   1/18/2024              Chauncey Perla M.D, MD    No follow-ups on file.       Harrison Barthel, MD  [facility]

## 2023-01-25 ENCOUNTER — OFFICE VISIT (OUTPATIENT)
Dept: NEUROLOGY | Age: 58
End: 2023-01-25
Payer: MEDICARE

## 2023-01-25 VITALS
WEIGHT: 214 LBS | SYSTOLIC BLOOD PRESSURE: 120 MMHG | BODY MASS INDEX: 29.96 KG/M2 | HEIGHT: 71 IN | OXYGEN SATURATION: 94 % | DIASTOLIC BLOOD PRESSURE: 78 MMHG | HEART RATE: 84 BPM

## 2023-01-25 DIAGNOSIS — R51.9 CHRONIC INTRACTABLE HEADACHE, UNSPECIFIED HEADACHE TYPE: ICD-10-CM

## 2023-01-25 DIAGNOSIS — M47.817 LUMBOSACRAL SPONDYLOSIS WITHOUT MYELOPATHY: ICD-10-CM

## 2023-01-25 DIAGNOSIS — Z87.891 EX-SMOKER: ICD-10-CM

## 2023-01-25 DIAGNOSIS — R73.01 IFG (IMPAIRED FASTING GLUCOSE): ICD-10-CM

## 2023-01-25 DIAGNOSIS — R41.3 MEMORY PROBLEM: ICD-10-CM

## 2023-01-25 DIAGNOSIS — G89.29 CHRONIC INTRACTABLE HEADACHE, UNSPECIFIED HEADACHE TYPE: ICD-10-CM

## 2023-01-25 DIAGNOSIS — G44.221 CHRONIC TENSION-TYPE HEADACHE, INTRACTABLE: ICD-10-CM

## 2023-01-25 DIAGNOSIS — M62.838 MUSCLE SPASM: ICD-10-CM

## 2023-01-25 DIAGNOSIS — F31.11 BIPOLAR AFFECTIVE DISORDER, CURRENTLY MANIC, MILD (HCC): ICD-10-CM

## 2023-01-25 DIAGNOSIS — G44.59 OTHER COMPLICATED HEADACHE SYNDROME: Primary | ICD-10-CM

## 2023-01-25 DIAGNOSIS — Z21 ASYMPTOMATIC HIV INFECTION (HCC): ICD-10-CM

## 2023-01-25 DIAGNOSIS — M54.2 NECK PAIN: ICD-10-CM

## 2023-01-25 PROCEDURE — 99214 OFFICE O/P EST MOD 30 MIN: CPT | Performed by: PSYCHIATRY & NEUROLOGY

## 2023-01-25 RX ORDER — RIZATRIPTAN BENZOATE 10 MG/1
10 TABLET, ORALLY DISINTEGRATING ORAL
Qty: 12 TABLET | Refills: 2 | Status: SHIPPED | OUTPATIENT
Start: 2023-01-25 | End: 2023-01-25

## 2023-01-25 RX ORDER — BUTALBITAL, ACETAMINOPHEN AND CAFFEINE 50; 325; 40 MG/1; MG/1; MG/1
TABLET ORAL
Qty: 60 TABLET | Refills: 2 | Status: SHIPPED | OUTPATIENT
Start: 2023-01-25

## 2023-01-25 RX ORDER — ROPINIROLE 0.5 MG/1
0.5 TABLET, FILM COATED ORAL
Qty: 90 TABLET | Refills: 2 | Status: SHIPPED | OUTPATIENT
Start: 2023-01-25

## 2023-01-25 RX ORDER — ONDANSETRON 4 MG/1
4 TABLET, ORALLY DISINTEGRATING ORAL EVERY 8 HOURS PRN
Qty: 30 TABLET | Refills: 2 | Status: SHIPPED | OUTPATIENT
Start: 2023-01-25

## 2023-01-25 RX ORDER — OMEPRAZOLE 40 MG/1
40 CAPSULE, DELAYED RELEASE ORAL DAILY
Qty: 90 CAPSULE | Refills: 1 | Status: SHIPPED | OUTPATIENT
Start: 2023-01-25

## 2023-01-25 RX ORDER — TOPIRAMATE 50 MG/1
50 TABLET, FILM COATED ORAL 2 TIMES DAILY
Qty: 60 TABLET | Refills: 2 | Status: SHIPPED | OUTPATIENT
Start: 2023-01-25

## 2023-01-25 ASSESSMENT — ENCOUNTER SYMPTOMS
FACIAL SWELLING: 0
APNEA: 0
NAUSEA: 1
EYE DISCHARGE: 0
RHINORRHEA: 0
WHEEZING: 0
PHOTOPHOBIA: 1
BLOOD IN STOOL: 0
COLOR CHANGE: 0
CONSTIPATION: 0
DIARRHEA: 0
SINUS PRESSURE: 0
VISUAL CHANGE: 0
ABDOMINAL DISTENTION: 0
COUGH: 0
CHOKING: 0
CHEST TIGHTNESS: 0
SWOLLEN GLANDS: 0
EYE REDNESS: 0
EYE PAIN: 0
EYE WATERING: 0
ABDOMINAL PAIN: 0
VOMITING: 0
SORE THROAT: 0
SCALP TENDERNESS: 0
TROUBLE SWALLOWING: 0
EYE ITCHING: 0
SHORTNESS OF BREATH: 0
FACIAL SWEATING: 0
BLURRED VISION: 0
VOICE CHANGE: 0
BACK PAIN: 1

## 2023-01-25 NOTE — PROGRESS NOTES
[facility]  Neurology  [address] [address]  QEEUL:[phone]   Fax: [phone]      SUBJECTIVE:     PATIENT ID:  Shayna Winchester is a  RIGHT     HANDED 62 y.o. male. Headache   This is a recurrent problem. Episode onset: SINCE  OCTOBER 2019. The problem occurs constantly. The problem has been gradually worsening. The pain is located in the Occipital region. The pain does not radiate. The pain quality is not similar to prior headaches. The quality of the pain is described as pulsating and throbbing. The pain is at a severity of 3/10. The pain is mild. Associated symptoms include back pain, a loss of balance, nausea, neck pain, phonophobia and photophobia. Pertinent negatives include no abdominal pain, abnormal behavior, anorexia, blurred vision, coughing, dizziness, drainage, ear pain, eye pain, eye redness, eye watering, facial sweating, hearing loss, insomnia, muscle aches, rhinorrhea, scalp tenderness, seizures, sinus pressure, sore throat, swollen glands, tinnitus, visual change, vomiting, weakness or weight loss. The symptoms are aggravated by unknown. He has tried ergotamines and NSAIDs for the symptoms. The treatment provided no relief. His past medical history is significant for immunosuppression and migraine headaches. There is no history of cancer, cluster headaches, hypertension, migraines in the family, obesity, pseudotumor cerebri, recent head traumas, sinus disease or TMJ. Migraine   This is a chronic problem. Episode onset: SINCE  TEENAGE   The problem has been waxing and waning. The pain is located in the Bilateral region. The pain does not radiate. The pain quality is similar to prior headaches. The quality of the pain is described as aching, throbbing and pulsating. The pain is at a severity of 3/10. The pain is mild. Associated symptoms include back pain, a loss of balance, nausea, neck pain, phonophobia and photophobia.  Pertinent negatives include no abdominal pain, abnormal behavior, anorexia, blurred vision, coughing, dizziness, drainage, ear pain, eye pain, eye redness, eye watering, facial sweating, hearing loss, insomnia, muscle aches, rhinorrhea, scalp tenderness, seizures, sinus pressure, sore throat, swollen glands, tinnitus, visual change, vomiting, weakness or weight loss. The symptoms are aggravated by unknown. He has tried NSAIDs and Excedrin for the symptoms. The treatment provided moderate relief. His past medical history is significant for immunosuppression and migraine headaches. There is no history of cancer, cluster headaches, hypertension, migraines in the family, obesity, pseudotumor cerebri, recent head traumas, sinus disease or TMJ. History obtained from  The patient   AND  HIS  MALE  FRIEND       and other  available   medical  records   were  Also  reviewed. The  Duration,  Quality,  Severity,  Location,  Timing,  Context,  Modifying  Factors   Of   The   Chief   Complaint   And  Present  Illness       Was   Reviewed   In   Chronological   [facility]anner. PATIENT'S  MAIN  CONCERNS INCLUDE :                       1)     H /O    CHRONIC   MIGRAINES    SINCE  TEENAGE                            2)     PREVIOUS     H/O   BAD  MIGRAINE       8   YEARS   AGO                             3)     H/O  CHRONIC     HEADACHE   IN  [facility]   AREA        SINCE   October 2019                                           PATIENT  TRIED     EXCEDRIN  MIGRAINE,    ALEVE                                                WHICH  DID  NOT  HELP. 4)     H/O    MILD     NECK PAIN       WITH  RADIATION  TO     THE  LEFT    SIDE                                            WORSE     SINCE  2020                                               -   IMPROVED                           5)     NO    PRECIPITATING   FACTORS. NO   FEVER.      NO TRAUMA                            6)     H/O    CHRONIC    LUMBAR  PAIN   AND  RADICULOPATHY                                       BEING  FOLLOWED  BY  PAIN  MANAGEMENT                                    -   ON  NEURONTIN,     CYMBALTA ,   CELEBREX                                    7)       H/O   CHRONIC  ANXIETY,    BIPOLAR  DISORDER                                      -  ON  INVEGA, ,     ATIVAN     PRN                                       TO  FOLLOW  WITH     MENTAL  HEALTH  PROFESSIONALS                           8)     H/O     HIV  INFECTION       ASYMPTOMATIC                                    - ON  MEDICATION          FROM    [facility]     PROVIDERS                              9)      H/O    CHRONIC    MILD     MEMORY  PROBLEMS                                         -    NEEDS  MONITORING                        10)   H/O   EX  SMOKER                            H/O   ABNORMAL   CT   CHEST     IN    2017                              TO  FOLLOW  WITH   HIS  PCP                                         11)    HAD  NEURO  DIAGNOSTIC  EVALUATIONS  IN NOV./ DEC.  2019                              LABS  WORK  UP    DID  NOT    SHOW  ANY  SIGNIFICANT  ABNORMALITIES                             MRI  BRAIN      SHOWED  CHRONIC  CEREBRAL ISCHEMIA                                     MRI  CERVICAL SPINE       SHOWED    MULTI LEVEL  DISC  DISEASES                       12)    PATIENT'S     MIGRAINES   AND   HEADACHES                                      -       IMPROVED     AND      STABLE                                 AND  TOLERATING  THE  MEDICATIONS                                                13)      H/O     OCCIPITAL  HEADACHES     IN    SUMMER   2020    DUE  TO  HEAT                                       -     RESOLVED                                14)     PREVIOUS    H/O   INTOLERANCE      WITH  NAPROSYN                                         DUE  TO  GI  SIDE EFFECTS                       15) PATIENT       ON      TOPAMAX,  IMITREX,    FIORICET     AS   NEEDED                                    WITH  BETTER  CONTROL  OF      HIS   MIGRAINES                                                           16)      H/O      CHRONIC    RESTLESS  LEG   SYNDROME                                                 STABLE  -     ON     REQUIP                                                           17)        PATIENT  DENIES  ANY  NEW  NEUROLOGICAL  CONCERNS                                 REQUESTS     REFILLS  FOR  HIS  MEDICATIONS                    18)        VARIOUS  RISK   FACTORS   WERE  REVIEWED   AND   DISCUSSED. PATIENT   HAS  MULTIPLE   MEDICAL, NEUROLOGICAL                        AND   MENTAL HEALTH   PROBLEMS . PATIENT'S   MANAGEMENT  IS  CHALLENGING.                        PRECIPITATING  FACTORS: including  fever/infection, exertion/relaxation, position change, stress, weather change,                        medications/alcohol, time of day/darkness/light  Are  absent                                                              MODIFYING  FACTORS:  fever/infection, exertion/relaxation, position change, stress, weather change,                        medications/alcohol, time of day/darkness/light  Are  absent             Patient   Indicates   The  Presence   And  The  Absence  Of  The  Following    Associated  And          Additional  Neurological    Symptoms:                                Balance  And coordination   problems  absent           Gait problems     absent            Headaches      present              Migraines           present           Memory problemspresent              Confusion        absent            Paresthesia   numbness          absent           Seizures  And  Starring  Episodes           absent           Syncope,  Near  syncopal episodes         absent           Speech   problems           absent             Swallowing   Problems absent            Dizziness,  Light headedness           absent              Vertigo        absent             Generalized   Weakness    absent              focal  Weakness     absent             Tremors         absent              Sleep  Problems     absent             History  Of   Recent  Head  Injury     absent             History  Of   Recent  TIA     absent             History  Of   Recent    Stroke     absent             Neck  Pain   and   Neck muscle  Spasms  present               Radiating  down   And   Weakness           present            Lower back   Pain  And     Spasms  present              Radiating    Down   And   Weakness          present                H/OFALLS        absent               History  Of   Visual  Symptoms    absent                  Associated   Diplopia       absent                                               Also   Additional   Symptoms   Present    As  Documented    In   The   detailed                  Review  Of  Systems   And    Please   Refer   To    Them for   Additional    Information. Any components  That are either  Unobtainable  Or  Limited  In   HPI, ROS  And/or PFSH   Are                   Due   ToPatient's  Medical  Problems,  Clinical  Condition   and/or lack of                                other    Alternate   resources.           RECORDS   REVIEWED:    historical medical records       INFORMATION   REVIEWED:     MEDICAL   HISTORY,SURGICAL   HISTORY,     MEDICATIONS   LIST,   ALLERGIES AND  DRUG  INTOLERANCES,       FAMILY   HISTORY,  SOCIAL  HISTORY,      PROBLEM  LIST   FOR  PATIENT  CARE   COORDINATION      Past Medical History:   Diagnosis Date    Bipolar disorder ([facility]     follows with Dr. Aundrea Braynt    Centrilobular emphysema [facility])     HIV (human immunodeficiency virus infection) ([facility] 2001    followed by Kirk Mena (Infectious Disease at [address] [place] [address])    Pneumonia 06/11/2017         Past Surgical History:   Procedure Laterality Date ANESTHESIA NERVE BLOCK Bilateral 1/12/2018    Bilat L2 L3 L4 L5 Diagnostic Medial BB performed by Araseli Adams MD at [address] [facility]  01/05/2016    Normal colon. Hemorrhoids.  Dr Collette Annalee  2015    full dental extraction    HERNIA REPAIR Left 3/2/2020    Laparoscopic Robotic Assisted Left Inguinal HERNIA REPAIR WITH MESH &VENTRAL  hernia repair performed by Frandy oHlliday DO at [address] [place] [address]  Right 9/26/2017    Right L4 & L5 Transforaminal  performed by Araseli Adams MD at [address] [place] [address]  Right 10/3/2017    Right L4 & L5 Transforaminal performed by Araseli Adams MD at [address] [place] [address]  Right 1/8/2019    Right L4 L5 TRANSFORAMINAL performed by Araseli Adams MD at [facility] 15 AGNT PARVERTEB FCT ADDL LMBR/SACRAL Right 1/25/2018    Right L2 L3 L4 L5 RADIOFREQUENCY performed by Araseli Adams MD at [facility] 15 AGNT PARVERTEB FCT ADDL LMBR/SACRAL Left 3/15/2018    Left L2 L3 L4 L5 RADIOFREQUENCY performed by Araseli Adams MD at [facility]. Amy-Aviva Conroy 34 AA&/STRD LUMBAR PLEXUS CONT NFS CATH Bilateral 1/19/2018    Bilat L2 L3 L4 L5 Confirmatory Medial BB performed by Araseli Adams MD at [address] [place] [address]         Current Outpatient Medications   Medication Sig Dispense Refill    butalbital-acetaminophen-caffeine (FIORICET, ESGIC) -40 MG per tablet TAKE 1 TO 2 TABLETS BY MOUTH TWICE DAILY AS NEEDED FOR HEADACHE 60 tablet 2    topiramate (TOPAMAX) 50 MG tablet Take 1 tablet by mouth 2 times daily 60 tablet 2    rizatriptan (MAXALT-MLT) 10 MG disintegrating tablet Take 1 tablet by mouth once as needed for Migraine May repeat in 2 hours if needed 12 tablet 2    ondansetron (ZOFRAN ODT) 4 MG disintegrating tablet Take 1 tablet by mouth every 8 hours as needed for Nausea or Vomiting 30 tablet 2    omeprazole (PRILOSEC) 40 MG delayed release capsule Take 1 capsule by mouth daily 90 capsule 1    rOPINIRole (REQUIP) 0.5 MG tablet Take 1 tablet by mouth Daily with supper 90 tablet 2    fenofibrate (TRICOR) 145 MG tablet       gabapentin (NEURONTIN) 800 MG tablet TAKE 1 TABLET BY MOUTH THREE TIMES DAILY 90 tablet 5    atorvastatin (LIPITOR) 10 MG tablet       DULoxetine (CYMBALTA) 60 MG extended release capsule TAKE 2 CAPSULES BY MOUTH EVERY DAY 60 capsule 5    celecoxib (CELEBREX) 200 MG capsule Take 1 capsule by mouth 2 times daily 60 capsule 5    mesalamine (CANASA) 1000 MG suppository UNWRAP AND INSERT 1 SUPPOSITORY RECTALLY DAILY AT BEDTIME      pantoprazole (PROTONIX) 40 MG tablet TAKE 1 TABLET BY MOUTH DAILY      famotidine (PEPCID) 20 MG tablet Take 20 mg by mouth nightly      hydrOXYzine (ATARAX) 10 MG tablet Take 10 mg by mouth 2 times daily as needed      cyclobenzaprine (FLEXERIL) 5 MG tablet       LIVALO 4 MG TABS tablet TAKE 1 TABLET BY MOUTH DAILY      propranolol (INDERAL LA) 60 MG extended release capsule       traMADol (ULTRAM) 50 MG tablet Take 1 tablet by mouth as needed. Loratadine-Pseudoephedrine (CLARITIN-D 24 HOUR PO) Take by mouth daily as needed      loperamide (IMODIUM) 2 MG capsule Take 2 mg by mouth 4 times daily as needed for Diarrhea      fluticasone (FLONASE) 50 MCG/ACT nasal spray SHAKE LIQUID AND USE 1 SPRAY IN EACH NOSTRIL DAILY 48 g 0    SYMTUZA 061-000-535-10 MG TABS TK 1 T PO  QD  6    aspirin-acetaminophen-caffeine (EXCEDRIN MIGRAINE) 250-250-65 MG per tablet Take 1 tablet by mouth every 6 hours as needed for Headaches       Clotrimazole (MYCELEX) 10 MG LOZG lozenge Take 1 lozenge by mouth 5 times daily 70 lozenge 1    fenofibric acid (FIBRICOR) 135 MG CPDR capsule TAKE 1 CAPSULE BY MOUTH DAILY 90 capsule 0    LORazepam (ATIVAN) 1 MG tablet Take 1 mg by mouth every 6 hours as needed for Anxiety.       darunavir-cobicistat 800-150 MG TABS tablet Take by mouth nightly      Nebulizers (COMPRESSOR/NEBULIZER) MISC As directed 1 each 0    Respiratory Therapy Supplies (NEBULIZER/TUBING/MOUTHPIECE) KIT 1 kit by Does not apply route daily 1 kit 0    albuterol (PROVENTIL) (2.5 MG/3ML) 0.083% nebulizer solution Take 2.5 mg by nebulization every 6 hours as needed for Wheezing       TURMERIC PO Take by mouth three times daily      Glucosamine HCl (GLUCOSAMINE PO) Take by mouth daily      Multiple Vitamins-Minerals (MULTIVITAMIN PO) Take by mouth daily      INVEGA 6 MG extended release tablet Take 6 mg by mouth daily      DESCOVY 200-25 MG TABS Take 1 tablet by mouth daily      MYTESI 125 MG TBEC Take 125 mg by mouth       No current facility-administered medications for this visit.          Allergies   Allergen Reactions    Pcn [Penicillins] Rash    Sulfa Antibiotics Rash         Family History   Problem Relation Age of Onset    Cancer Mother         uterine s/p hysterectomy    Cancer Maternal Grandmother     Cataracts Neg Hx     Diabetes Neg Hx     Glaucoma Neg Hx          Social History     Socioeconomic History    Marital status: Single     Spouse name: Not on file    Number of children: Not on file    Years of education: Not on file    Highest education level: Not on file   Occupational History    Not on file   Tobacco Use    Smoking status: Former     Packs/day: 3.00     Years: 30.00     Pack years: 90.00     Types: Cigarettes     Quit date: 10/7/2014     Years since quittin.3    Smokeless tobacco: Never   Vaping Use    Vaping Use: Never used   Substance and Sexual Activity    Alcohol use: Yes     Comment: socially    Drug use: No    Sexual activity: Not on file   Other Topics Concern    Not on file   Social History Narrative    Not on file     Social Determinants of Health     Financial Resource Strain: Not on file   Food Insecurity: Not on file   Transportation Needs: Not on file   Physical Activity: Not on file   Stress: Not on file   Social Connections: Not on file   Intimate Partner Violence: Not on file   Housing Stability: Not on file       Vitals:    23 1022   BP: 120/78   Pulse: 84   SpO2: 94% Wt Readings from Last 3 Encounters:   01/25/23 214 lb (97.1 kg)   01/18/23 215 lb (97.5 kg)   11/29/22 218 lb 3.2 oz (99 kg)         BP Readings from Last 3 Encounters:   01/25/23 120/78   01/18/23 122/68   11/29/22 130/82       Hematology and Coagulation  Lab Results   Component Value Date/Time    WBC 6.8 06/23/2022 09:05 AM    RBC 4.51 06/23/2022 09:05 AM    HGB 14.2 06/23/2022 09:05 AM    HCT 45.1 06/23/2022 09:05 AM    .0 06/23/2022 09:05 AM    MCH 31.5 06/23/2022 09:05 AM    MCHC 31.5 06/23/2022 09:05 AM    RDW 13.1 06/23/2022 09:05 AM     06/23/2022 09:05 AM    MPV 9.1 06/23/2022 09:05 AM       Chemistries  Lab Results   Component Value Date/Time     09/14/2022 09:33 AM    K 4.3 09/14/2022 09:33 AM     09/14/2022 09:33 AM    CO2 23 09/14/2022 09:33 AM    BUN 14 09/14/2022 09:33 AM    CREATININE 0.88 09/14/2022 09:33 AM    CALCIUM 9.3 09/14/2022 09:33 AM    PROT 6.7 11/02/2022 09:52 AM    LABALBU 4.3 11/02/2022 09:52 AM    BILITOT 0.2 11/02/2022 09:52 AM    ALKPHOS 85 11/02/2022 09:52 AM    AST 15 11/02/2022 09:52 AM    ALT 13 11/02/2022 09:52 AM     Lab Results   Component Value Date/Time    ALKPHOS 85 11/02/2022 09:52 AM    ALT 13 11/02/2022 09:52 AM    AST 15 11/02/2022 09:52 AM    PROT 6.7 11/02/2022 09:52 AM    BILITOT 0.2 11/02/2022 09:52 AM    BILIDIR <0.1 11/02/2022 09:52 AM    LABALBU 4.3 11/02/2022 09:52 AM     Lab Results   Component Value Date/Time    BUN 14 09/14/2022 09:33 AM    CREATININE 0.88 09/14/2022 09:33 AM     Lab Results   Component Value Date/Time    CALCIUM 9.3 09/14/2022 09:33 AM     Lab Results   Component Value Date/Time    AST 15 11/02/2022 09:52 AM    ALT 13 11/02/2022 09:52 AM             Review of Systems   Constitutional:  Negative for appetite change, chills, fatigue, unexpected weight change and weight loss.    HENT:  Negative for congestion, dental problem, drooling, ear discharge, ear pain, facial swelling, hearing loss, mouth sores, nosebleeds, postnasal drip, rhinorrhea, sinus pressure, sore throat, tinnitus, trouble swallowing and voice change. Eyes:  Positive for photophobia. Negative for blurred vision, pain, discharge, redness, itching and visual disturbance. Respiratory:  Negative for apnea, cough, choking, chest tightness, shortness of breath and wheezing. Cardiovascular:  Negative for chest pain, palpitations and leg swelling. Gastrointestinal:  Positive for nausea. Negative for abdominal distention, abdominal pain, anorexia, blood in stool, constipation, diarrhea and vomiting. Endocrine: Negative for cold intolerance, heat intolerance, polydipsia, polyphagia and polyuria. Musculoskeletal:  Positive for arthralgias, back pain and neck pain. Negative for gait problem, joint swelling, myalgias and neck stiffness. Skin:  Negative for color change, pallor, rash and wound. Allergic/Immunologic: Negative for environmental allergies, food allergies and immunocompromised state. Neurological:  Positive for headaches and loss of balance. Negative for dizziness, tremors, seizures, syncope, facial asymmetry, speech difficulty, weakness and light-headedness. Hematological:  Negative for adenopathy. Does not bruise/bleed easily. Psychiatric/Behavioral:  Positive for decreased concentration. Negative for agitation, behavioral problems, confusion, dysphoric mood, hallucinations, self-injury, sleep disturbance and suicidal ideas. The patient is nervous/anxious. The patient does not have insomnia and is not hyperactive. OBJECTIVE:    Physical Exam  Constitutional:       Appearance: He is well-developed. HENT:      Head: Normocephalic and atraumatic. No raccoon eyes or Oviedo's sign. Right Ear: External ear normal.      Left Ear: External ear normal.      Nose: Nose normal.   Eyes:      Conjunctiva/sclera: Conjunctivae normal.      Pupils: Pupils are equal, round, and reactive to light.    Neck:      Thyroid: No thyroid mass or thyromegaly. Vascular: No carotid bruit. Trachea: No tracheal deviation. Meningeal: Brudzinski's sign and Kernig's sign absent. Cardiovascular:      Rate and Rhythm: Normal rate and regular rhythm. Pulmonary:      Effort: Pulmonary effort is normal.   Musculoskeletal:         General: No tenderness. Cervical back: Normal range of motion and neck supple. No rigidity. No muscular tenderness. Normal range of motion. Skin:     General: Skin is warm. Coloration: Skin is not pale. Findings: No erythema or rash. Nails: There is no clubbing. Psychiatric:         Attention and Perception: He is attentive. Mood and Affect: Mood is anxious. Mood is not depressed. Affect is not labile, blunt or inappropriate. Speech: He is communicative. Speech is not rapid and pressured, delayed, slurred or tangential.         Behavior: Behavior is not agitated, slowed, aggressive, withdrawn, hyperactive or combative. Behavior is cooperative. Thought Content: Thought content is not paranoid or delusional. Thought content does not include homicidal or suicidal ideation. Thought content does not include homicidal or suicidal plan. Cognition and Memory: Cognition is impaired. Memory is impaired. He does not exhibit impaired remote memory. Judgment: Judgment is not impulsive or inappropriate. NEUROLOGICALEXAMINATION :       A) MENTAL STATUS:                   Alert and  oriented  To time, place  And  Person. No Aphasia. No  Dysarthria. Able   To  Follow    SIMPLE      commands   without   Any  Difficulty. No right  To left confusion. Normal  Speech  And language function.                  Insight and  Judgment ,Fund  Of  Knowledge   within normal limits                Recent  And  Remote memory    DECREASED                Attention &  Concentration are     DECREASED B) CRANIAL NERVES :      CN : Visual  Acuity  And  Visual fields  within normal limits               Fundi  Could  Not  Be  Could  Not  Be  Evaluated. 3,4,6 CN : Both  Pupils are   Reactive and  Equal.  Movements  Are  Intact. No  Nystagmus. No  JOSIE. No  Afferent  Pupillary  Defect noted. 5 CN :  Normal  Facial sensations and Corneal  Reflexes           7 CN:  Normal  Facial  Symmetry  And  Strength. No facial  Weakness. 8 CN :  Hearing  Appears within normal limits          9, 10 CN: Normal   spontaneous, reflex   palate   movements         11 CN:   Normal  Shoulder  shrug and  strength         12 CN :   Normal  Tongue movements and  Tongue  In midline                        No tongue   Fasciculations or atrophy       C) MOTOR  EXAM:                 Strength  In upper  And  Lower   extremities   within   normal limits               No  Drift. No  Atrophy               Rapid   alternating  And  repetitions  Movements  within   normal limits               Muscle  Tone  In upper  And  Lower  Extremities  normal                No rigidity. No  Spasticity. Bradykinesia   absent               No  Asterixis. Sustention  Tremor , Resting   Tremor   absent                    No   other  Abnormal  Movements noted           D) SENSORY :               Light   touch, pinprick,   position  And  Vibration  within normal limits        E) REFLEXES:                   Deep  Tendon  Reflexes   normal                  No  pathological  Reflexes  Bilaterally.                                   F) COORDINATION  AND  GAIT :                                Station and  Gait  normal                              Romberg 's test negative                          Ataxia negative          ASSESSMENT:      Patient Active Problem List   Diagnosis    HIV (human immunodeficiency virus infection) ([facility]    IFG (impaired fasting glucose)    Lumbar radicular pain    Chronic bilateral low back pain with right-sided sciatica    Panniculitis involving lumbar region    Lumbar facet joint syndrome    Abnormal EKG    Centrilobular emphysema ([facility])    Muscle spasm    Lumbosacral spondylosis without myelopathy    Neural foraminal stenosis of lumbar spine    Bipolar disorder (HCC)    Memory problem    Abnormal CT of the chest    Ex-smoker    Neck pain    Other complicated headache syndrome    Ventral hernia without obstruction or gangrene    Left inguinal hernia    Intractable headache    Former smoker    Asymptomatic HIV infection ([address]    Arthritis of both hands    Bipolar affective disorder, current episode manic ([address]             MRI OF THE BRAIN WITHOUT AND WITH CONTRAST  12/24/2019 10:37 am       TECHNIQUE:   Multiplanar multisequence MRI of the head/brain was performed without and   with the administration of intravenous contrast.       COMPARISON:   CT brain performed 06/11/2017. HISTORY:   ORDERING SYSTEM PROVIDED HISTORY: Memory problem   TECHNOLOGIST PROVIDED HISTORY:   migraines   Reason for Exam: Neck pain and left shoulder pain. Migraines. Symptoms for   quite a while. Acuity: Chronic   Type of Exam: Unknown   Additional signs and symptoms: Memory problem; Intractable migraine without   status migrainosus, unspecified migraine type. FINDINGS:   INTRACRANIAL STRUCTURES/VENTRICLES:  The sellar and suprasellar structures,   optic chiasm, corpus callosum, pineal gland, tectum, and midline brainstem   structures are unremarkable. The craniocervical junction is unremarkable. There is no acute intracranial hemorrhage, mass effect, or midline shift. There is satisfactory overall gray-white matter differentiation. There is   chronic microvascular disease. The ventricular structures are symmetric and   unremarkable.   The infratentorial structures including the cerebellopontine   angles and internal auditory canals are unremarkable. There is no abnormal   restricted diffusion. There is no abnormal blooming artifact on   susceptibility weighted imaging. There is no abnormal postcontrast   enhancement. ORBITS: The visualized portion of the orbits demonstrate no acute abnormality. SINUSES: The visualized paranasal sinuses and mastoid air cells are well   aerated. BONES/SOFT TISSUES: The bone marrow signal intensity appears normal. The soft   tissues demonstrate no acute abnormality. Impression   Chronic microvascular disease without acute intracranial abnormality. No   abnormal postcontrast enhancement. MRI OF THE CERVICAL SPINE WITHOUT CONTRAST 12/24/2019 10:07 am       TECHNIQUE:   Multiplanar multisequence MRI of the cervical spine was performed without the   administration of intravenous contrast.       COMPARISON:   None. HISTORY:   ORDERING SYSTEM PROVIDED HISTORY: Neck pain   TECHNOLOGIST PROVIDED HISTORY:   neck pain   Reason for Exam: Neck pain and left shoulder pain. Migraines. Symptoms for   quite a while. Acuity: Chronic   Type of Exam: Unknown   Additional signs and symptoms: Neck pain       FINDINGS:   BONES/ALIGNMENT: There is straightening of the cervical spine. The vertebral   body heights are maintained. There is age-appropriate bone marrow signal.   There is mild edema within the left C3-4 facets. There is multilevel   degenerative disc disease with loss of disc signal.  There is disc space   narrowing primarily at the C6-7 level. There is no significant   spondylolisthesis. SPINAL CORD: The spinal cord is normal in caliber and signal.  The visualized   intracranial structures are unremarkable. SOFT TISSUES: The posterior paraspinal soft tissues are unremarkable. The   prevertebral soft tissues are unremarkable. C2-C3: There is no significant disc protrusion, spinal canal stenosis or   neural foraminal narrowing.        C3-C4: There is a mild disc osteophyte complex eccentric to the left with   associated uncovertebral and facet hypertrophy. There is canal stenosis   measuring 8 mm in AP dimension. There is moderate to severe left foraminal   narrowing and no significant right foraminal narrowing. C4-C5: There is a disc osteophyte complex with uncovertebral and facet   hypertrophy. There is canal stenosis measuring 9 mm in AP dimension. There   is moderate right foraminal narrowing and no significant left foraminal   narrowing. C5-C6: There is a disc osteophyte complex with uncovertebral and facet   hypertrophy that is worse on the left. There is no significant canal   stenosis or right foraminal narrowing. There is severe left foraminal   narrowing. C6-C7: There is a disc osteophyte complex with uncovertebral and facet   hypertrophy. There is canal stenosis measuring 8 mm in AP dimension. There   is severe bilateral foraminal narrowing. C7-T1: There is a disc osteophyte complex with uncovertebral and facet   hypertrophy. There is no significant canal stenosis or foraminal narrowing. Impression   Multilevel degenerative disc disease with associated uncovertebral and facet   hypertrophy resulting in canal stenosis most pronounced at C3-4 and C6-7. Moderate to severe left foraminal narrowing at C3-4, moderate right foraminal   narrowing at C4-5, severe left foraminal narrowing at C5-6, and severe   bilateral foraminal narrowing at C6-7. Edema is demonstrated within the left C3-4 facets that is likely related to   degenerative facet disease although a degree of septic arthritis is a   consideration and correlation is needed.          MRI OF THE LUMBAR SPINE WITHOUT CONTRAST, 6/11/2018 10:22 am       TECHNIQUE:   Multiplanar multisequence MRI of the lumbar spine was performed without the   administration of intravenous contrast.       COMPARISON:   None       HISTORY:   ORDERING SYSTEM PROVIDED HISTORY: Lumbar facet joint syndrome   TECHNOLOGIST PROVIDED HISTORY:   Reason for exam:->right lumbar radiculopathy   Ordering Physician Provided Reason for Exam:  Low back pain to the right   side, right leg pain. Symptoms for about one week. Acuity: Acute   Type of Exam: Initial   Additional signs and symptoms:  Lumbar facet syndrome; Right lumbar   radiculopathy       Initial evaluation. FINDINGS:   BONES/ALIGNMENT: There is normal alignment of the spine. The vertebral body   heights are maintained. The bone marrow signal appears unremarkable. There   is degenerative disc disease with disc space narrowing and osteophytes at   L3-4, L4-5, and L5-S1. SPINAL CORD: The conus terminates normally. SOFT TISSUES: No paraspinal mass identified. There is a cyst in the left   kidney that is not fully evaluated. Ultrasound could better evaluate. L1-L2:  The thecal sac and neural foramina are intact. L2-L3:  There is mild facet and ligamentum flavum hypertrophy. The thecal   sac is not stenotic. Disc and/or osteophytes cause minimal narrowing of the   neural foramina bilaterally. L3-L4: The thecal sac is not stenotic. Disc and/or osteophytes result in   mild narrowing of the neural foramina bilaterally. There is mild facet and   ligamentum flavum hypertrophy. L4-L5:  There is moderate facet and ligamentum flavum hypertrophy. The   thecal sac is not stenotic. Disc and/or osteophytes result in moderate   narrowing of the neural foramina bilaterally. The left neural foramen is   narrowed greater than right. L5-S1:  There is mild facet and ligamentum flavum hypertrophy. The thecal   sac is not stenotic. The S1 nerve roots are intact. Disc and/or osteophytes   result in moderate narrowing of the neural foramina bilaterally. The right   neural foramen is narrowed greater than the left. There is no significant change in the findings from the prior study.            Impression Disc and osteophytes result in narrowing of the neural foramina at several   levels as discussed above. No significant stenosis of thecal sac in the   lumbar region. CT OF THE HEAD WITHOUT CONTRAST  6/11/2017 8:49 pm       TECHNIQUE:   CT of the head was performed without the administration of intravenous   contrast. Dose modulation, iterative reconstruction, and/or weight based   adjustment of the mA/kV was utilized to reduce the radiation dose to as low   as reasonably achievable. COMPARISON:   None. HISTORY:   ORDERING SYSTEM PROVIDED HISTORY: neuro symptoms/ HIV   TECHNOLOGIST PROVIDED HISTORY:   Ordering Physician Provided Reason for Exam: Passed out while coughing and   hit forehead on table today, previous loss of consciousness spells in the   last weeks. Neuro symptoms, reaction to HIV medication. Acuity: Acute   Type of Exam: Initial   Mechanism of Injury: passed out and fell   Relevant Medical/Surgical History: n/a       FINDINGS:   BRAIN/VENTRICLES: The ventricular system is mildly prominent in size and in   the midline. No evidence of extra-axial fluid collection, hemorrhage or mass. ORBITS: The visualized portion of the orbits demonstrate no acute abnormality. SINUSES: Mild bilateral ethmoid sinus mucosal thickening. Paranasal sinuses   visualized are otherwise clear. Mastoid regions are poorly aerated likely a   developmental basis. SOFT TISSUES/SKULL:  No acute abnormality of the visualized skull or soft   tissues. Impression   No acute intracranial abnormality. CT OF THE CHEST WITH CONTRAST 7/5/2017 2:06 pm       TECHNIQUE:   CT of the chest was performed with the administration of intravenous   contrast. Multiplanar reformatted images are provided for review. Dose   modulation, iterative reconstruction, and/or weight based adjustment of the   mA/kV was utilized to reduce the radiation dose to as low as reasonably   achievable. COMPARISON:   Chest radiograph dated 06/27/2017       HISTORY:   ORDERING SYSTEM PROVIDED HISTORY: Pneumonia of right lower lobe due to   infectious organism   TECHNOLOGIST PROVIDED HISTORY:   Reason for exam:->pneumonia   Ordering Physician Provided Reason for Exam: Pneumonia of right lower lobe   due to infectious organism. Cough feeling 50 percent better x 2 weeks. Quit   smoking 3 years ago after smoking for 50 years. Acuity: Acute   Type of Exam: Ongoing       FINDINGS:   The nodular opacity of concern in the right lower lobe on recent chest   radiograph corresponds to benign calcified granulomas in the inferior segment   of the right middle lobe. Calcified pulmonary granulomas are also present in   the upper lobes bilaterally. No pulmonary infiltrate or pleural effusion is present. There is   centrilobular emphysema with an upper lobe predominance and scattered   interstitial scarring. No pleural effusion is present. No osteolytic or osteoblastic lesion is present in the included portion of   the axial skeleton. The heart and great vessels are normal in caliber and appearance. No   pericardial effusion is present. There is no pathologic mediastinal or hilar   lymphadenopathy. Calcified right infrahilar lymph nodes reflect residue from   prior granulomatous infection. Impression   1. Benign pulmonary granulomas. 2. No evidence of pneumonia. 3. Centrilobular emphysema. VISITING DIAGNOSIS:      ICD-10-CM    1. Other complicated headache syndrome  G44.59 butalbital-acetaminophen-caffeine (FIORICET, ESGIC) -40 MG per tablet     ondansetron (ZOFRAN ODT) 4 MG disintegrating tablet      2. Chronic tension-type headache, intractable  G44.221 butalbital-acetaminophen-caffeine (FIORICET, ESGIC) -40 MG per tablet     ondansetron (ZOFRAN ODT) 4 MG disintegrating tablet      3. Lumbosacral spondylosis without myelopathy  M47.817       4.  Bipolar affective disorder, currently manic, [facility]  F31.11       5. Ex-smoker  Z87.891       6. Neck pain  M54.2       7. Memory problem  R41.3       8. Muscle spasm  M62.838       9. Asymptomatic HIV infection ([facility]  Z21       10. Chronic intractable headache, unspecified headache type  R51.9     G89.29       11. IFG (impaired fasting glucose)  R73.01                  CONCERNS   &   INCREASED   RISK   FOR         * TIA,  CEREBRO  VASCULAR  ISCHEMIA,      *    COMPLICATED  MIGRAINES      *   TENSION  HEADACHES      *   COGNITIVE  &   MEMORY PROBLEMS                      VARIOUS  RISK   FACTORS   WERE  REVIEWED   AND   DISCUSSED. *  PATIENT   HAS  MULTIPLE   MEDICAL, NEUROLOGICAL                        AND   MENTAL HEALTH   PROBLEMS . PATIENT'S   MANAGEMENT  IS  CHALLENGING. PLAN:                         Gera Folk  Of  The  Diagnoses,  The  Management & Treatment  Options            AND    Care  plan  Were          Reviewed and   Discussed   With  patient. * Goals  And  Expectations  Of  The  Therapy  Discussed   And  Reviewed. *   Benefits   And   Side  Effect  Profile  Of  Medication/s   Were   Discussed                * Need   For  Further   Follow up For  The  Various  Problems Were  discussed. * Results  Of  The  Previous  Diagnostic tests were reviewed and  discussed                 Medical  Decision  Making  Was  Made  Based on the   Complexity  Of  Above  Mentioned  Diagnoses,    Data reviewed             And    Risk  Of  Significant   Co morbidities and   complicating   Factors. Medical  Decision  Was   High     Complexity   Due   To  The  Patient's  Multiple  Symptoms,      Complex  Treatment  Regimen,  Multiple medications           and   Risk  Of   Side  Effects,  Difficulty  In  Medication  Management  And  Diagnostic  Challenges       In  View  Of  The  Associated   Co  Morbid  Conditions   And  Problems.                 *   BE  CAREFUL  WITH ACTIVITIES   INCLUDING  DRIVING. *   AVOID   NECK  AND/ BACK  STRAINING  ACTIVITIES        *   ADEQUATE   FLUIDINTAKE   AND  ELECTROLYTE  BALANCE         * KEEP  DAIRY  OF   THE  NEUROLOGICAL  SYMPTOMS        RECORDING THE    DURATION  AND  FREQUENCY. *  AVOID    CONDITIONS  AND  FACTORS   THAT  MAKE                  NEUROLOGICAL  SYMPTOMS  WORSE.             *TO  MAINTAIN  REGULAR  SLEEP  WAKE  CYCLES. *   TO  HAVE  ADEQUATE  REST  AND   SLEEP    HOURS.            *    AVOID  ANY USAGE OF    TOBACCO,              EXCESSIVE  ALCOHOL  AND   ILLEGAL   SUBSTANCES        *  CONTINUE   MEDICATIONS    PRESCRIBED   BY NEUROLOGIST    AS    RECOMMENDED       *   Compliance   With  Medications   And  Instructions        * CURRENTLY    TOLERATING  THE  PRESCRIBED   MEDICATIONS. WITHOUT  ANY  SIGNIFICANT  SIDE  EFFECTS   &  GETTING BENEFIT.          *    MIGRAINE/ HEADACHE    DAIRY   WITH  MONITORING                       OF  DURATION  AND  FREQUENCY. *    Antiplatelet  therapy    As   Recommended  Was   Discussed      *    Prophylactic  Use   Of     Vitamin   B   Complex,  Folic  Acid,    Vitamin  B12    Multivitamin,       Calcium  With  magnesium  And  Vit D    Supplementations   Over  The  Counter  Discussed                    *  EVALUATIONS  AND  FOLLOW UP:                     *   OPTHALMOLOGY                                      * PAIN  MANAGEMENT                 *  HIV  CONSULTANTS                                *       PATIENT   FEELS  BETTER  WITH  HIS  MIGRAINES                   AND   HEADACHES. PATIENT   TOLERATING  THE  MEDICATIONS                                           *      PATIENT  DENIES  ANY  NEW  NEUROLOGICAL  CONCERNS                           Controlled Substances Monitoring: Periodic Controlled Substance Monitoring: Possible medication side effects, risk of tolerance/dependence & alternative treatments discussed. , Assessed functional status.  Edith Jones, MD)              Orders Placed This Encounter   Medications    butalbital-acetaminophen-caffeine (FIORICET, ESGIC) -40 MG per tablet     Sig: TAKE 1 TO 2 TABLETS BY MOUTH TWICE DAILY AS NEEDED FOR HEADACHE     Dispense:  60 tablet     Refill:  2    topiramate (TOPAMAX) 50 MG tablet     Sig: Take 1 tablet by mouth 2 times daily     Dispense:  60 tablet     Refill:  2    rizatriptan (MAXALT-MLT) 10 MG disintegrating tablet     Sig: Take 1 tablet by mouth once as needed for Migraine May repeat in 2 hours if needed     Dispense:  12 tablet     Refill:  2    ondansetron (ZOFRAN ODT) 4 MG disintegrating tablet     Sig: Take 1 tablet by mouth every 8 hours as needed for Nausea or Vomiting     Dispense:  30 tablet     Refill:  2    omeprazole (PRILOSEC) 40 MG delayed release capsule     Sig: Take 1 capsule by mouth daily     Dispense:  90 capsule     Refill:  1    rOPINIRole (REQUIP) 0.5 MG tablet     Sig: Take 1 tablet by mouth Daily with supper     Dispense:  90 tablet     Refill:  2                           *PATIENT   TO  FOLLOW  UP  WITH   PRIMARY  CARE         OTHER  CONSULTANTS  AS  BEFORE.           *TO  FOLLOW  WITH   MENTAL  HEALTH  PROFESSIONALS ,  INCLUDING            PSYCHOLOGICAL  COUNSELING   AND  PSYCHIATRIC  EVALUTIONS,                 *  Maintain   Healthy  Life Style    With   Periodic  Monitoring  Of      Any  Medical  Conditions  Including   Elevated  Blood  Pressure,  Lipid  Profile,     Blood  Sugar levels  AndHeart  Disease.                *   Period   Screening  For  Cancers  Involving  Breast,  Colon,    lungs  And  Other  Organs  As  Applicable,  In consultation   With  Your  Primary Care Providers.              *Second  Neurological  Opinion  And  Evaluations  In  A  Teaching   Hospital  Setting  If  Patient  Is  Interested.            * Please   Contact   Neurology  Clinic   Early   If   Are  Any  New  Neurological   And  Any neurological  Concerns.                  *  If  The  Patient  remains  Neurologically  Stable   Return   To  [facility] Neurology Department   IN          3 - 4         MONTHS  TIME   FOR  FURTHER              FOLLOW UP.                       *   The  Neurological   Findings,  Possible  Diagnosis,  Differential diagnoses                    And  Options  For    Further   Investigations                   And  management   Are  Discussed  Comprehensively. Medications   And  Prescription   Risks  And  Side effects  Are   Also  Discussed. *  If   There is  Any  Significant  Worsening   Of  Current  Symptoms  And  Or                  If patient  Develops   Any additional  New  NeurologicalSymptoms                  Or  Significant  Concerns   Should  Call  911 or  Go  To  Emergency  Department                          For  Further  Immediate  Evaluation. The   Above  Were  Reviewed  With  patient   and                       questions  Answered  In  Detail. More   Than  50% of face  To face Time   Was  Spent  On  Counseling                    And   Coordination  Of  Care   Of   Patient's  multiple   Neurological  Problems                         And   Comorbid  Medical   Conditions. Electronically signed by Vicenta Travis MD.,   [facility]      Board Certified in  Neurology &  In  [employer] of Psychiatry and Neurology (ABPN)      DISCLAIMER:   Although every effort was made to ensure the accuracy of this  electronictranscription,   some errors in transcription may have occurred. GENERAL PATIENT INSTRUCTIONS:     A Healthy Lifestyle: Care Instructions  Your Care Instructions  A healthy lifestyle can help you feel good, stay at ahealthy weight, and have plenty of energy for both work and play. A healthy lifestyle is something you can share with your whole family.   A healthy lifestyle also can lower your risk for serious health problems, such ashigh blood pressure, heart disease, and diabetes. You can follow a few steps listed below to improve your health and the health of your family. Follow-up careis a key part of your treatment and safety. Be sure to make and go to all appointments, and call your doctor if you are having problems. Its also a good idea to know your test results and keep a list of the medicines you take. How can you care for yourself at home? Do not eat too much sugar, fat, or fast foods. You can still have dessert and treats nowand then. The goal is moderation. Start small to improve your eating habits. Pay attention to portion sizes, drink less juice and soda pop, and eat more fruits and vegetables. Eat a healthy amount of food. A 3-ounce serving of meat, for example, is about the size of a deck of cards. Fill the rest of your plate with vegetables and whole grains. Limit theamount of soda and sports drinks you have every day. Drink more water when you are thirsty. Eat at least 5 servings of fruits and vegetables every day. It may seem like a lot, but it is not hard to reach this goal. Aserving or helping is 1 piece of fruit, 1 cup of vegetables, or 2 cups of leafy, raw vegetables. Have an apple or some carrot sticks as an afternoon snack instead of a candy bar. Try to have fruits and/or vegetables at everymeal.  Make exercise part of your daily routine. You may want to start with simple activities, such as walking, bicycling, or slow swimming. Try kay active 30 to 60 minutes every day. You do not need to do all 30 to 60 minutes all at once. For example, you can exercise 3 times a day for 10 or 20 minutes. Moderate exercise is safe for most people, but it is always agood idea to talk to your doctor before starting an exercise program.  Keep moving. [place] the lawn, work in the garden, or [employer]. Take the stairs instead of the elevator at work. If you smoke, quit.  Peoplewho smoke have an increased risk for heart attack, stroke, cancer, and other lung illnesses. Quitting is hard, but there are ways to boost your chance of quitting tobacco for good.  Use nicotine gum, patches, or lozenges.  Ask your doctor about stop-smoking programs and medicines.  Keep trying.  In addition to reducing your risk of diseases in the future, you will notice some benefits soon after you stop using tobacco. If you have shortness of breath or asthma symptoms, they will likely getbetter within a few weeks after you quit.  Limit how much alcohol you drink. Moderate amounts of alcohol (up to 2 drinks a day for men, 1drink a day for women) are okay. But drinking too much can lead to liver problems, high blood pressure, and other health problems.  health  If you have a family, there are many things you can do together to improve your health.  Eat meals together as a family as often as possible.  Eat healthy foods. This includes fruits, vegetables, lean meats and dairy, and whole grains.  Include your family in your fitness plan. Most peoplethink of activities such as jogging or tennis as the way to fitness, but there are many ways you and your family can be more active. Anything that makes you breathe hard and gets your heart pumping is exercise. Here are sometips:  Walk to do errands or to take your child to school or the bus.  Go for a family bike ride after dinner instead of watching TV.  Where can you learn more?  Go toChristtube [email or web address] and sign in to your [employer] account. Enter U807 in the Search HealthInformation box to learn more about \"A Healthy Lifestyle: Care Instructions.\"     If you do not have anaccount, please click on the \"Sign Up Now\" link.  Current as of: July 26, 2016  Content Version: 11.2  © 7473-2518 [employer]. Care instructions adapted under license by [employer]. If you have questions about a medical condition or this instruction, always ask your healthcare professional.  [employer] disclaims any warranty or liability for your use of this information.

## 2023-02-01 ENCOUNTER — HOSPITAL ENCOUNTER (OUTPATIENT)
Age: 58
Discharge: HOME | End: 2023-02-01
Payer: MEDICARE

## 2023-02-01 DIAGNOSIS — R97.20 ELEVATED PSA: ICD-10-CM

## 2023-02-01 PROCEDURE — 36415 COLL VENOUS BLD VENIPUNCTURE: CPT

## 2023-02-01 PROCEDURE — 84154 ASSAY OF PSA FREE: CPT

## 2023-02-02 LAB
PSA FREE MFR SERPL: 16.7 %
PSA FREE SERPL-MCNC: 0.7 UG/L
PSA SERPL-MCNC: 4.2 UG/L (ref 0–4)

## 2023-02-27 ENCOUNTER — OFFICE VISIT (OUTPATIENT)
Dept: PAIN MANAGEMENT | Age: 58
End: 2023-02-27
Payer: MEDICARE

## 2023-02-27 VITALS
WEIGHT: 214 LBS | DIASTOLIC BLOOD PRESSURE: 78 MMHG | BODY MASS INDEX: 29.96 KG/M2 | HEIGHT: 71 IN | HEART RATE: 90 BPM | SYSTOLIC BLOOD PRESSURE: 110 MMHG

## 2023-02-27 DIAGNOSIS — M54.16 LUMBAR RADICULAR PAIN: ICD-10-CM

## 2023-02-27 DIAGNOSIS — G89.29 CHRONIC BILATERAL LOW BACK PAIN WITH RIGHT-SIDED SCIATICA: ICD-10-CM

## 2023-02-27 DIAGNOSIS — M47.816 LUMBAR FACET JOINT SYNDROME: ICD-10-CM

## 2023-02-27 DIAGNOSIS — M19.041 ARTHRITIS OF BOTH HANDS: Primary | ICD-10-CM

## 2023-02-27 DIAGNOSIS — M54.41 CHRONIC BILATERAL LOW BACK PAIN WITH RIGHT-SIDED SCIATICA: ICD-10-CM

## 2023-02-27 DIAGNOSIS — M19.042 ARTHRITIS OF BOTH HANDS: Primary | ICD-10-CM

## 2023-02-27 PROCEDURE — 3017F COLORECTAL CA SCREEN DOC REV: CPT | Performed by: NURSE PRACTITIONER

## 2023-02-27 PROCEDURE — G8484 FLU IMMUNIZE NO ADMIN: HCPCS | Performed by: NURSE PRACTITIONER

## 2023-02-27 PROCEDURE — 99214 OFFICE O/P EST MOD 30 MIN: CPT | Performed by: NURSE PRACTITIONER

## 2023-02-27 PROCEDURE — G8427 DOCREV CUR MEDS BY ELIG CLIN: HCPCS | Performed by: NURSE PRACTITIONER

## 2023-02-27 PROCEDURE — G8417 CALC BMI ABV UP PARAM F/U: HCPCS | Performed by: NURSE PRACTITIONER

## 2023-02-27 PROCEDURE — 1036F TOBACCO NON-USER: CPT | Performed by: NURSE PRACTITIONER

## 2023-02-27 RX ORDER — CELECOXIB 100 MG/1
100 CAPSULE ORAL 2 TIMES DAILY
Qty: 60 CAPSULE | Refills: 3 | Status: SHIPPED | OUTPATIENT
Start: 2023-02-27

## 2023-02-27 RX ORDER — DULOXETIN HYDROCHLORIDE 60 MG/1
60 CAPSULE, DELAYED RELEASE ORAL DAILY
Qty: 30 CAPSULE | Refills: 5 | Status: SHIPPED | OUTPATIENT
Start: 2023-02-27

## 2023-02-27 RX ORDER — GABAPENTIN 800 MG/1
TABLET ORAL
Qty: 90 TABLET | Refills: 5 | Status: SHIPPED | OUTPATIENT
Start: 2023-02-27 | End: 2023-03-25

## 2023-02-27 ASSESSMENT — ENCOUNTER SYMPTOMS
RESPIRATORY NEGATIVE: 1
EYES NEGATIVE: 1
CONSTIPATION: 0
BACK PAIN: 1
DIARRHEA: 0

## 2023-02-27 NOTE — PROGRESS NOTES
Subjective:      Patient ID: Bri Garcia is a 62 y.o. male. Chief Complaint   Patient presents with    Back Pain     3m    Medication Refill     Back Pain    Migraine   Associated symptoms include back pain. Other  Associated symptoms include arthralgias, fatigue and myalgias. Medication Refill  Associated symptoms include arthralgias, fatigue and myalgias. Here today for routine pain clinic recheck.     Pain Assessment  Location of Pain: Back  Severity of Pain: 7  Quality of Pain: Aching, Throbbing  Duration of Pain: Persistent  Frequency of Pain: Constant  Aggravating Factors: Bending, Stretching, Straightening, Exercise, Kneeling, Squatting, Standing, Walking, Stairs (daily chores)  Limiting Behavior: Yes  Relieving Factors: Rest, Ice, Heat (meds)  Result of Injury: No  Work-Related Injury: No    Allergies   Allergen Reactions    Pcn [Penicillins] Rash    Sulfa Antibiotics Rash       Outpatient Medications Marked as Taking for the 2/27/23 encounter (Office Visit) with FELICE Braun CNP   Medication Sig Dispense Refill    celecoxib (CELEBREX) 100 MG capsule Take 1 capsule by mouth 2 times daily 60 capsule 3    gabapentin (NEURONTIN) 800 MG tablet TAKE 1 TABLET BY MOUTH THREE TIMES DAILY 90 tablet 5    DULoxetine (CYMBALTA) 60 MG extended release capsule Take 1 capsule by mouth daily 30 capsule 5    butalbital-acetaminophen-caffeine (FIORICET, ESGIC) -40 MG per tablet TAKE 1 TO 2 TABLETS BY MOUTH TWICE DAILY AS NEEDED FOR HEADACHE 60 tablet 2    topiramate (TOPAMAX) 50 MG tablet Take 1 tablet by mouth 2 times daily 60 tablet 2    ondansetron (ZOFRAN ODT) 4 MG disintegrating tablet Take 1 tablet by mouth every 8 hours as needed for Nausea or Vomiting 30 tablet 2    omeprazole (PRILOSEC) 40 MG delayed release capsule Take 1 capsule by mouth daily 90 capsule 1    rOPINIRole (REQUIP) 0.5 MG tablet Take 1 tablet by mouth Daily with supper 90 tablet 2    fenofibrate (TRICOR) 145 MG tablet gabapentin (NEURONTIN) 800 MG tablet TAKE 1 TABLET BY MOUTH THREE TIMES DAILY 90 tablet 5    atorvastatin (LIPITOR) 10 MG tablet       DULoxetine (CYMBALTA) 60 MG extended release capsule TAKE 2 CAPSULES BY MOUTH EVERY DAY 60 capsule 5    celecoxib (CELEBREX) 200 MG capsule Take 1 capsule by mouth 2 times daily 60 capsule 5    mesalamine (CANASA) 1000 MG suppository UNWRAP AND INSERT 1 SUPPOSITORY RECTALLY DAILY AT BEDTIME      pantoprazole (PROTONIX) 40 MG tablet TAKE 1 TABLET BY MOUTH DAILY      famotidine (PEPCID) 20 MG tablet Take 20 mg by mouth nightly      hydrOXYzine (ATARAX) 10 MG tablet Take 10 mg by mouth 2 times daily as needed      cyclobenzaprine (FLEXERIL) 5 MG tablet       LIVALO 4 MG TABS tablet TAKE 1 TABLET BY MOUTH DAILY      propranolol (INDERAL LA) 60 MG extended release capsule       traMADol (ULTRAM) 50 MG tablet Take 1 tablet by mouth as needed.       Loratadine-Pseudoephedrine (CLARITIN-D 24 HOUR PO) Take by mouth daily as needed      loperamide (IMODIUM) 2 MG capsule Take 2 mg by mouth 4 times daily as needed for Diarrhea      fluticasone (FLONASE) 50 MCG/ACT nasal spray SHAKE LIQUID AND USE 1 SPRAY IN EACH NOSTRIL DAILY 48 g 0    SYMTUZA [phone] MG TABS TK 1 T PO  QD  6    aspirin-acetaminophen-caffeine (EXCEDRIN MIGRAINE) 250-250-65 MG per tablet Take 1 tablet by mouth every 6 hours as needed for Headaches       Clotrimazole (MYCELEX) 10 MG LOZG lozenge Take 1 lozenge by mouth 5 times daily 70 lozenge 1    fenofibric acid (FIBRICOR) 135 MG CPDR capsule TAKE 1 CAPSULE BY MOUTH DAILY 90 capsule 0    LORazepam (ATIVAN) 1 MG tablet Take 1 mg by mouth every 6 hours as needed for Anxiety.      darunavir-cobicistat 800-150 MG TABS tablet Take by mouth nightly      Nebulizers (COMPRESSOR/NEBULIZER) MISC As directed 1 each 0    Respiratory Therapy Supplies (NEBULIZER/TUBING/MOUTHPIECE) KIT 1 kit by Does not apply route daily 1 kit 0    albuterol (PROVENTIL) (2.5 MG/3ML) 0.083% nebulizer  solution Take 2.5 mg by nebulization every 6 hours as needed for Wheezing       TURMERIC PO Take by mouth three times daily      Glucosamine HCl (GLUCOSAMINE PO) Take by mouth daily      Multiple Vitamins-Minerals (MULTIVITAMIN PO) Take by mouth daily      INVEGA 6 MG extended release tablet Take 6 mg by mouth daily      DESCOVY 200-25 MG TABS Take 1 tablet by mouth daily      MYTESI 125 MG TBEC Take 125 mg by mouth         Past Medical History:   Diagnosis Date    Bipolar disorder ([facility] [address]     follows with Dr. Karlton Meigs    Centrilobular emphysema [facility])     HIV (human immunodeficiency virus infection) ([facility] [address] 2001    followed by Maliha Pemberton (Infectious Disease at [address] [place] [address])    Pneumonia 06/11/2017       Past Surgical History:   Procedure Laterality Date    ANESTHESIA NERVE BLOCK Bilateral 1/12/2018    Bilat L2 L3 L4 L5 Diagnostic Medial BB performed by Malini Meza MD at [address] [place] [address]  01/05/2016    Normal colon. Hemorrhoids.  Dr Ariza Re  2015    full dental extraction    HERNIA REPAIR Left 3/2/2020    Laparoscopic Robotic Assisted Left Inguinal HERNIA REPAIR WITH MESH &VENTRAL  hernia repair performed by Schuyler Pichardo DO at [address] [place] [address] Dr Cifuentes 9/26/2017    Right L4 & L5 Transforaminal  performed by Malini Meza MD at [address] [place] [address] Dr Cifuentes 10/3/2017    Right L4 & L5 Transforaminal performed by Malini Meza MD at [address] [place] [address] Dr Cifuentes 1/8/2019    Right L4 L5 TRANSFORAMINAL performed by Malini Meza MD at [facility] 15 AGNT PARVERTEB FCT ADDL LMBR/SACRAL Right 1/25/2018    Right L2 L3 L4 L5 RADIOFREQUENCY performed by Malini Meza MD at [facility] 15 AGNT PARVERTEB FCT ADDL LMBR/SACRAL Left 3/15/2018    Left L2 L3 L4 L5 RADIOFREQUENCY performed by Malini Meza MD at [facility]. [address] AA&/STRD LUMBAR PLEXUS CONT NFS CATH Bilateral 1/19/2018    Bilat L2 L3 L4 L5 Confirmatory Medial BB performed by Morgan Leiva MD at [facility] OR       Family History   Problem Relation Age of Onset    Cancer Mother         uterine s/p hysterectomy    Cancer Maternal Grandmother     Cataracts Neg Hx     Diabetes Neg Hx     Glaucoma Neg Hx        Social History     Socioeconomic History    Marital status: Single     Spouse name: None    Number of children: None    Years of education: None    Highest education level: None   Tobacco Use    Smoking status: Former     Packs/day: 3.00     Years: 30.00     Pack years: 90.00     Types: Cigarettes     Quit date: 10/7/2014     Years since quittin.3    Smokeless tobacco: Never   Vaping Use    Vaping Use: Never used   Substance and Sexual Activity    Alcohol use: Yes     Comment: socially    Drug use: No       Review of Systems   Constitutional:  Positive for activity change and fatigue. HENT: Negative. Eyes: Negative. Respiratory: Negative. Cardiovascular: Negative. Gastrointestinal:  Negative for constipation and diarrhea. Endocrine: Negative. Genitourinary:  Negative for difficulty urinating. Musculoskeletal:  Positive for arthralgias, back pain and myalgias. Skin: Negative. Psychiatric/Behavioral:  Positive for sleep disturbance. Objective:   Physical Exam  Vitals reviewed. Constitutional:       General: He is not in acute distress. Appearance: He is well-developed. He is not ill-appearing, toxic-appearing or diaphoretic. HENT:      Head: Normocephalic and atraumatic. Cardiovascular:      Rate and Rhythm: Normal rate. Pulmonary:      Effort: Pulmonary effort is normal. No accessory muscle usage or respiratory distress.    Musculoskeletal:      Comments: MRI OF THE LUMBAR SPINE WITHOUT CONTRAST, 2018 10:22 am     TECHNIQUE:  Multiplanar multisequence MRI of the lumbar spine was performed without the  administration of intravenous contrast.     COMPARISON:  None     HISTORY:  ORDERING SYSTEM PROVIDED HISTORY: Lumbar facet joint syndrome  TECHNOLOGIST PROVIDED HISTORY:  Reason for exam:->right lumbar radiculopathy  Ordering Physician Provided Reason for Exam:  Low back pain to the right  side, right leg pain. Symptoms for about one week. Acuity: Acute  Type of Exam: Initial  Additional signs and symptoms:  Lumbar facet syndrome; Right lumbar  radiculopathy     Initial evaluation. FINDINGS:  BONES/ALIGNMENT: There is normal alignment of the spine. The vertebral body  heights are maintained. The bone marrow signal appears unremarkable. There  is degenerative disc disease with disc space narrowing and osteophytes at  L3-4, L4-5, and L5-S1. SPINAL CORD: The conus terminates normally. SOFT TISSUES: No paraspinal mass identified. There is a cyst in the left  kidney that is not fully evaluated. Ultrasound could better evaluate. L1-L2:  The thecal sac and neural foramina are intact. L2-L3:  There is mild facet and ligamentum flavum hypertrophy. The thecal  sac is not stenotic. Disc and/or osteophytes cause minimal narrowing of the  neural foramina bilaterally. L3-L4: The thecal sac is not stenotic. Disc and/or osteophytes result in  mild narrowing of the neural foramina bilaterally. There is mild facet and  ligamentum flavum hypertrophy. L4-L5:  There is moderate facet and ligamentum flavum hypertrophy. The  thecal sac is not stenotic. Disc and/or osteophytes result in moderate  narrowing of the neural foramina bilaterally. The left neural foramen is  narrowed greater than right. L5-S1:  There is mild facet and ligamentum flavum hypertrophy. The thecal  sac is not stenotic. The S1 nerve roots are intact. Disc and/or osteophytes  result in moderate narrowing of the neural foramina bilaterally. The right  neural foramen is narrowed greater than the left. There is no significant change in the findings from the prior study.         Impression  Disc and osteophytes result in narrowing of the neural foramina at several  levels as discussed above. No significant stenosis of thecal sac in the  lumbar region. Skin:     General: Skin is warm and dry. Capillary Refill: Capillary refill takes less than 2 seconds. Coloration: Skin is not pale. Nails: There is no clubbing. Neurological:      Mental Status: He is alert and oriented to person, place, and time. GCS: GCS eye subscore is 4. GCS verbal subscore is 5. GCS motor subscore is 6. Cranial Nerves: No cranial nerve deficit. Psychiatric:         Mood and Affect: Mood is not depressed. Affect is not angry. Speech: He is communicative. Speech is not slurred. Behavior: Behavior normal. Behavior is not agitated, aggressive or withdrawn. Behavior is cooperative. Judgment: Judgment normal. Judgment is not impulsive or inappropriate. Assessment:      1. Arthritis of both hands    2. Chronic bilateral low back pain with right-sided sciatica    3. Lumbar facet joint syndrome    4. Lumbar radicular pain          Plan:    Chronic pain diagnoses such as   1. Arthritis of both hands    2. Chronic bilateral low back pain with right-sided sciatica    3. Lumbar facet joint syndrome    4. Lumbar radicular pain     controlled on current medication regime, wll continue current pain medications to improve quality of life and function.         Follow up 3 months    FELICE Magallon - SOPHIE

## 2023-03-02 RX ORDER — CELECOXIB 200 MG/1
CAPSULE ORAL
Qty: 60 CAPSULE | Refills: 5 | OUTPATIENT
Start: 2023-03-02

## 2023-03-15 VITALS — HEIGHT: 70 IN | BODY MASS INDEX: 30.78 KG/M2 | WEIGHT: 215 LBS

## 2023-03-15 NOTE — LETTER
3/15/2023        Wali Lucas  [address] [place] [id number]    Dear Wali Lucas: Your healthcare provider has ordered a low dose CT scan of the chest for lung cancer screening. Enclosed you will find information about CT lung screening and smoking cessation resources. If you are unable to keep you appointment for you CT lung screening, please call our scheduling department at [phone]. Keep in mind that CT lung screening does not take the place of smoking cessation. Please do not hesitate to contact me if you have any questions or concerns.     [address] [facility],      [facility] Lung Screening Program  [phone]

## 2023-03-22 ENCOUNTER — HOSPITAL ENCOUNTER (OUTPATIENT)
Dept: CT IMAGING | Age: 58
Discharge: HOME | End: 2023-03-24
Payer: MEDICARE

## 2023-03-22 DIAGNOSIS — Z87.891 FORMER SMOKER: ICD-10-CM

## 2023-03-22 PROCEDURE — 71271 CT THORAX LUNG CANCER SCR C-: CPT

## 2023-04-11 RX ORDER — DULOXETIN HYDROCHLORIDE 60 MG/1
CAPSULE, DELAYED RELEASE ORAL
Qty: 60 CAPSULE | Refills: 5 | OUTPATIENT
Start: 2023-04-11

## 2023-04-16 DIAGNOSIS — G44.59 OTHER COMPLICATED HEADACHE SYNDROME: Primary | ICD-10-CM

## 2023-04-17 RX ORDER — TOPIRAMATE 50 MG/1
TABLET, FILM COATED ORAL
Qty: 180 TABLET | Refills: 0 | Status: SHIPPED | OUTPATIENT
Start: 2023-04-17

## 2023-04-18 RX ORDER — OMEPRAZOLE 40 MG/1
40 CAPSULE, DELAYED RELEASE ORAL DAILY
Qty: 90 CAPSULE | Refills: 1 | Status: SHIPPED | OUTPATIENT
Start: 2023-04-18

## 2023-04-26 ENCOUNTER — OFFICE VISIT (OUTPATIENT)
Dept: NEUROLOGY | Age: 58
End: 2023-04-26
Payer: MEDICARE

## 2023-04-26 VITALS
HEART RATE: 92 BPM | HEIGHT: 70 IN | BODY MASS INDEX: 31.07 KG/M2 | WEIGHT: 217 LBS | OXYGEN SATURATION: 98 % | DIASTOLIC BLOOD PRESSURE: 78 MMHG | SYSTOLIC BLOOD PRESSURE: 112 MMHG

## 2023-04-26 DIAGNOSIS — G44.221 CHRONIC TENSION-TYPE HEADACHE, INTRACTABLE: ICD-10-CM

## 2023-04-26 DIAGNOSIS — F31.10 BIPOLAR AFFECTIVE DISORDER, CURRENT EPISODE MANIC, CURRENT EPISODE SEVERITY UNSPECIFIED (HCC): ICD-10-CM

## 2023-04-26 DIAGNOSIS — G43.009 MIGRAINE WITHOUT AURA AND WITHOUT STATUS MIGRAINOSUS, NOT INTRACTABLE: Primary | ICD-10-CM

## 2023-04-26 DIAGNOSIS — M47.817 LUMBOSACRAL SPONDYLOSIS WITHOUT MYELOPATHY: ICD-10-CM

## 2023-04-26 DIAGNOSIS — R41.3 MEMORY PROBLEM: ICD-10-CM

## 2023-04-26 DIAGNOSIS — M54.2 NECK PAIN: ICD-10-CM

## 2023-04-26 DIAGNOSIS — M48.061 NEURAL FORAMINAL STENOSIS OF LUMBAR SPINE: ICD-10-CM

## 2023-04-26 DIAGNOSIS — Z87.891 FORMER SMOKER: ICD-10-CM

## 2023-04-26 DIAGNOSIS — Z21 ASYMPTOMATIC HIV INFECTION (HCC): ICD-10-CM

## 2023-04-26 DIAGNOSIS — Z87.891 EX-SMOKER: ICD-10-CM

## 2023-04-26 DIAGNOSIS — M62.838 MUSCLE SPASM: ICD-10-CM

## 2023-04-26 DIAGNOSIS — G44.59 OTHER COMPLICATED HEADACHE SYNDROME: ICD-10-CM

## 2023-04-26 DIAGNOSIS — J43.2 CENTRILOBULAR EMPHYSEMA (HCC): ICD-10-CM

## 2023-04-26 DIAGNOSIS — M54.16 LUMBAR RADICULAR PAIN: ICD-10-CM

## 2023-04-26 DIAGNOSIS — R73.01 IFG (IMPAIRED FASTING GLUCOSE): ICD-10-CM

## 2023-04-26 PROCEDURE — 99214 OFFICE O/P EST MOD 30 MIN: CPT | Performed by: PSYCHIATRY & NEUROLOGY

## 2023-04-26 PROCEDURE — G8417 CALC BMI ABV UP PARAM F/U: HCPCS | Performed by: PSYCHIATRY & NEUROLOGY

## 2023-04-26 PROCEDURE — 3017F COLORECTAL CA SCREEN DOC REV: CPT | Performed by: PSYCHIATRY & NEUROLOGY

## 2023-04-26 PROCEDURE — 1036F TOBACCO NON-USER: CPT | Performed by: PSYCHIATRY & NEUROLOGY

## 2023-04-26 PROCEDURE — 3023F SPIROM DOC REV: CPT | Performed by: PSYCHIATRY & NEUROLOGY

## 2023-04-26 PROCEDURE — G8427 DOCREV CUR MEDS BY ELIG CLIN: HCPCS | Performed by: PSYCHIATRY & NEUROLOGY

## 2023-04-26 RX ORDER — ROPINIROLE 0.5 MG/1
0.5 TABLET, FILM COATED ORAL
Qty: 90 TABLET | Refills: 2 | Status: SHIPPED | OUTPATIENT
Start: 2023-04-26

## 2023-04-26 RX ORDER — BUTALBITAL, ACETAMINOPHEN AND CAFFEINE 50; 325; 40 MG/1; MG/1; MG/1
TABLET ORAL
Qty: 60 TABLET | Refills: 2 | Status: SHIPPED | OUTPATIENT
Start: 2023-04-26

## 2023-04-26 RX ORDER — RIZATRIPTAN BENZOATE 10 MG/1
10 TABLET, ORALLY DISINTEGRATING ORAL
Qty: 12 TABLET | Refills: 2 | Status: SHIPPED | OUTPATIENT
Start: 2023-04-26 | End: 2023-04-26

## 2023-04-26 RX ORDER — ONDANSETRON 4 MG/1
4 TABLET, ORALLY DISINTEGRATING ORAL EVERY 8 HOURS PRN
Qty: 30 TABLET | Refills: 2 | Status: SHIPPED | OUTPATIENT
Start: 2023-04-26

## 2023-04-26 ASSESSMENT — ENCOUNTER SYMPTOMS
BACK PAIN: 1
SWOLLEN GLANDS: 0
ABDOMINAL DISTENTION: 0
NAUSEA: 1
EYE WATERING: 0
DIARRHEA: 0
CHOKING: 0
SCALP TENDERNESS: 0
VOMITING: 0
FACIAL SWEATING: 0
FACIAL SWELLING: 0
RHINORRHEA: 0
SORE THROAT: 0
BLURRED VISION: 0
EYE PAIN: 0
EYE ITCHING: 0
COUGH: 0
CONSTIPATION: 0
SINUS PRESSURE: 0
EYE DISCHARGE: 0
COLOR CHANGE: 0
PHOTOPHOBIA: 1
WHEEZING: 0
EYE REDNESS: 0
SHORTNESS OF BREATH: 0
VOICE CHANGE: 0
TROUBLE SWALLOWING: 0
CHEST TIGHTNESS: 0
VISUAL CHANGE: 0
APNEA: 0
BLOOD IN STOOL: 0
ABDOMINAL PAIN: 0

## 2023-04-26 NOTE — PROGRESS NOTES
[facility]  Neurology  [address] [place] [address]  PQVQL:[phone]   Fax: [phone]      SUBJECTIVE:     PATIENT ID:  Jessica Allison is a  RIGHT     HANDED 62 y.o. male. Headache   This is a recurrent problem. Episode onset: SINCE  OCTOBER 2019. The problem occurs constantly. The problem has been gradually worsening. The pain is located in the Occipital region. The pain does not radiate. The pain quality is not similar to prior headaches. The quality of the pain is described as pulsating and throbbing. The pain is at a severity of 3/10. The pain is mild. Associated symptoms include back pain, a loss of balance, nausea, neck pain, phonophobia and photophobia. Pertinent negatives include no abdominal pain, abnormal behavior, anorexia, blurred vision, coughing, dizziness, drainage, ear pain, eye pain, eye redness, eye watering, facial sweating, hearing loss, insomnia, muscle aches, rhinorrhea, scalp tenderness, seizures, sinus pressure, sore throat, swollen glands, tinnitus, visual change, vomiting, weakness or weight loss. The symptoms are aggravated by unknown. He has tried ergotamines and NSAIDs for the symptoms. The treatment provided no relief. His past medical history is significant for immunosuppression and migraine headaches. There is no history of cancer, cluster headaches, hypertension, migraines in the family, obesity, pseudotumor cerebri, recent head traumas, sinus disease or TMJ. Migraine   This is a chronic problem. Episode onset: SINCE  TEENAGE   The problem has been waxing and waning. The pain is located in the Bilateral region. The pain does not radiate. The pain quality is similar to prior headaches. The quality of the pain is described as aching, throbbing and pulsating. The pain is at a severity of 3/10. The pain is mild. Associated symptoms include back pain, a loss of balance, nausea, neck pain, phonophobia and photophobia.  Pertinent negatives

## 2023-05-30 NOTE — TELEPHONE ENCOUNTER
Last Appt:  1/25/2023  Next Appt:   4/26/2023  Med verified in [facility] Partial Purse String (Simple) Text: Given the location of the defect and the characteristics of the surrounding skin a simple purse string closure was deemed most appropriate.  Undermining was performed circumferentially around the surgical defect.  A purse string suture was then placed and tightened. Wound tension of the circular defect prevented complete closure of the wound.

## 2023-07-25 DIAGNOSIS — G44.59 OTHER COMPLICATED HEADACHE SYNDROME: ICD-10-CM

## 2023-07-25 RX ORDER — TOPIRAMATE 50 MG/1
TABLET, FILM COATED ORAL
Qty: 180 TABLET | Refills: 0 | Status: SHIPPED | OUTPATIENT
Start: 2023-07-25

## 2023-09-01 ENCOUNTER — OFFICE VISIT (OUTPATIENT)
Dept: PAIN MANAGEMENT | Age: 58
End: 2023-09-01
Payer: MEDICARE

## 2023-09-01 VITALS
HEIGHT: 70 IN | WEIGHT: 212 LBS | SYSTOLIC BLOOD PRESSURE: 90 MMHG | HEART RATE: 84 BPM | BODY MASS INDEX: 30.35 KG/M2 | DIASTOLIC BLOOD PRESSURE: 78 MMHG

## 2023-09-01 DIAGNOSIS — G89.29 CHRONIC BILATERAL LOW BACK PAIN WITH RIGHT-SIDED SCIATICA: Primary | ICD-10-CM

## 2023-09-01 DIAGNOSIS — M47.816 LUMBAR FACET JOINT SYNDROME: ICD-10-CM

## 2023-09-01 DIAGNOSIS — M54.16 LUMBAR RADICULAR PAIN: ICD-10-CM

## 2023-09-01 DIAGNOSIS — M54.41 CHRONIC BILATERAL LOW BACK PAIN WITH RIGHT-SIDED SCIATICA: Primary | ICD-10-CM

## 2023-09-01 PROCEDURE — 99214 OFFICE O/P EST MOD 30 MIN: CPT | Performed by: NURSE PRACTITIONER

## 2023-09-01 PROCEDURE — 3017F COLORECTAL CA SCREEN DOC REV: CPT | Performed by: NURSE PRACTITIONER

## 2023-09-01 PROCEDURE — G8427 DOCREV CUR MEDS BY ELIG CLIN: HCPCS | Performed by: NURSE PRACTITIONER

## 2023-09-01 PROCEDURE — 1036F TOBACCO NON-USER: CPT | Performed by: NURSE PRACTITIONER

## 2023-09-01 PROCEDURE — G8417 CALC BMI ABV UP PARAM F/U: HCPCS | Performed by: NURSE PRACTITIONER

## 2023-09-01 RX ORDER — PREGABALIN 75 MG/1
75 CAPSULE ORAL 2 TIMES DAILY
Qty: 60 CAPSULE | Refills: 0 | Status: SHIPPED | OUTPATIENT
Start: 2023-09-01 | End: 2023-10-01

## 2023-09-01 RX ORDER — DOLUTEGRAVIR SODIUM AND RILPIVIRINE HYDROCHLORIDE 50; 25 MG/1; MG/1
1 TABLET, FILM COATED ORAL DAILY
COMMUNITY

## 2023-09-01 ASSESSMENT — ENCOUNTER SYMPTOMS
RESPIRATORY NEGATIVE: 1
CONSTIPATION: 0
BACK PAIN: 1
EYES NEGATIVE: 1
DIARRHEA: 0

## 2023-09-01 NOTE — PROGRESS NOTES
Single     Spouse name: None    Number of children: None    Years of education: None    Highest education level: None   Tobacco Use    Smoking status: Former     Packs/day: 4.00     Years: 34.00     Pack years: 136.00     Types: Cigarettes     Quit date: 10/7/2014     Years since quittin.9    Smokeless tobacco: Never   Vaping Use    Vaping Use: Never used   Substance and Sexual Activity    Alcohol use: Yes     Comment: socially    Drug use: No       Review of Systems   Constitutional:  Positive for activity change and fatigue. HENT: Negative. Eyes: Negative. Respiratory: Negative. Cardiovascular: Negative. Gastrointestinal:  Negative for constipation and diarrhea. Endocrine: Negative. Genitourinary:  Negative for difficulty urinating. Musculoskeletal:  Positive for arthralgias, back pain and myalgias. Skin: Negative. Psychiatric/Behavioral:  Positive for sleep disturbance. Objective:   Physical Exam  Vitals reviewed. Constitutional:       General: He is not in acute distress. Appearance: He is well-developed. He is not ill-appearing, toxic-appearing or diaphoretic. HENT:      Head: Normocephalic and atraumatic. Cardiovascular:      Rate and Rhythm: Normal rate. Pulmonary:      Effort: Pulmonary effort is normal. No accessory muscle usage or respiratory distress. Musculoskeletal:      Comments: MRI OF THE LUMBAR SPINE WITHOUT CONTRAST, 2018 10:22 am     TECHNIQUE:  Multiplanar multisequence MRI of the lumbar spine was performed without the  administration of intravenous contrast.     COMPARISON:  None     HISTORY:  ORDERING SYSTEM PROVIDED HISTORY: Lumbar facet joint syndrome  TECHNOLOGIST PROVIDED HISTORY:  Reason for exam:->right lumbar radiculopathy  Ordering Physician Provided Reason for Exam:  Low back pain to the right  side, right leg pain. Symptoms for about one week.   Acuity: Acute  Type of Exam: Initial  Additional signs and symptoms:  Lumbar facet

## 2023-09-14 RX ORDER — GABAPENTIN 800 MG/1
TABLET ORAL
Qty: 90 TABLET | Refills: 5 | Status: SHIPPED | OUTPATIENT
Start: 2023-09-14 | End: 2023-10-14

## 2023-09-14 NOTE — TELEPHONE ENCOUNTER
Viri sent request for Gabapentin 800. Last note on 9/1 stated to wean Gabapentin. Are we filling this?

## 2023-09-20 ENCOUNTER — HOSPITAL ENCOUNTER (OUTPATIENT)
Age: 58
Discharge: HOME | End: 2023-09-20
Payer: MEDICARE

## 2023-09-20 LAB
ALBUMIN SERPL-MCNC: 4.4 G/DL (ref 3.5–5.2)
ALBUMIN/GLOB SERPL: 2 {RATIO} (ref 1–2.5)
ALP SERPL-CCNC: 79 U/L (ref 40–129)
ALT SERPL-CCNC: 16 U/L (ref 5–41)
ANION GAP SERPL CALCULATED.3IONS-SCNC: 8 MMOL/L (ref 9–17)
AST SERPL-CCNC: 15 U/L
BILIRUB SERPL-MCNC: 0.2 MG/DL (ref 0.3–1.2)
BUN SERPL-MCNC: 21 MG/DL (ref 6–20)
BUN/CREAT SERPL: 21 (ref 9–20)
CALCIUM SERPL-MCNC: 9.4 MG/DL (ref 8.6–10.4)
CHLORIDE SERPL-SCNC: 111 MMOL/L (ref 98–107)
CHOLEST SERPL-MCNC: 143 MG/DL
CHOLESTEROL/HDL RATIO: 3.6
CO2 SERPL-SCNC: 25 MMOL/L (ref 20–31)
CREAT SERPL-MCNC: 1 MG/DL (ref 0.7–1.2)
GFR SERPL CREATININE-BSD FRML MDRD: >60 ML/MIN/1.73M2
GLUCOSE SERPL-MCNC: 105 MG/DL (ref 70–99)
HDLC SERPL-MCNC: 40 MG/DL
LDLC SERPL CALC-MCNC: 58 MG/DL (ref 0–130)
POTASSIUM SERPL-SCNC: 4.4 MMOL/L (ref 3.7–5.3)
PROT SERPL-MCNC: 6.6 G/DL (ref 6.4–8.3)
PSA SERPL-MCNC: 4.68 NG/ML
SODIUM SERPL-SCNC: 144 MMOL/L (ref 135–144)
TRIGL SERPL-MCNC: 227 MG/DL

## 2023-09-20 PROCEDURE — 36415 COLL VENOUS BLD VENIPUNCTURE: CPT

## 2023-09-20 PROCEDURE — 80053 COMPREHEN METABOLIC PANEL: CPT

## 2023-09-20 PROCEDURE — 80061 LIPID PANEL: CPT

## 2023-09-20 PROCEDURE — G0103 PSA SCREENING: HCPCS

## 2023-09-26 DIAGNOSIS — M54.41 CHRONIC BILATERAL LOW BACK PAIN WITH RIGHT-SIDED SCIATICA: ICD-10-CM

## 2023-09-26 DIAGNOSIS — G89.29 CHRONIC BILATERAL LOW BACK PAIN WITH RIGHT-SIDED SCIATICA: ICD-10-CM

## 2023-09-27 RX ORDER — CELECOXIB 200 MG/1
200 CAPSULE ORAL 2 TIMES DAILY
Qty: 60 CAPSULE | Refills: 5 | Status: SHIPPED | OUTPATIENT
Start: 2023-09-27 | End: 2023-09-28

## 2023-09-27 RX ORDER — PREGABALIN 75 MG/1
CAPSULE ORAL
Qty: 60 CAPSULE | OUTPATIENT
Start: 2023-09-27

## 2023-09-27 NOTE — TELEPHONE ENCOUNTER
Mary Arenas called requesting a refill of the below medication which has been pended for you:     Requested Prescriptions     Pending Prescriptions Disp Refills    celecoxib (CELEBREX) 200 MG capsule [Pharmacy Med Name: CELECOXIB 200MG CAPSULES] 60 capsule 5     Sig: TAKE 1 CAPSULE BY MOUTH TWICE DAILY       Last Appointment Date: 09/01/2023  Next Appointment Date: 09/28/2023    Allergies   Allergen Reactions    Pcn [Penicillins] Rash    Sulfa Antibiotics Rash       Pharmacy:  [facility]

## 2023-09-28 ENCOUNTER — OFFICE VISIT (OUTPATIENT)
Dept: PAIN MANAGEMENT | Age: 58
End: 2023-09-28
Payer: MEDICARE

## 2023-09-28 VITALS
BODY MASS INDEX: 29.96 KG/M2 | SYSTOLIC BLOOD PRESSURE: 114 MMHG | DIASTOLIC BLOOD PRESSURE: 70 MMHG | OXYGEN SATURATION: 96 % | WEIGHT: 214 LBS | HEART RATE: 61 BPM | HEIGHT: 71 IN | RESPIRATION RATE: 18 BRPM

## 2023-09-28 DIAGNOSIS — M47.816 LUMBAR FACET JOINT SYNDROME: ICD-10-CM

## 2023-09-28 DIAGNOSIS — G89.29 CHRONIC BILATERAL LOW BACK PAIN WITH RIGHT-SIDED SCIATICA: Primary | ICD-10-CM

## 2023-09-28 DIAGNOSIS — M54.16 LUMBAR RADICULAR PAIN: ICD-10-CM

## 2023-09-28 DIAGNOSIS — M48.061 NEURAL FORAMINAL STENOSIS OF LUMBAR SPINE: ICD-10-CM

## 2023-09-28 DIAGNOSIS — M54.41 CHRONIC BILATERAL LOW BACK PAIN WITH RIGHT-SIDED SCIATICA: Primary | ICD-10-CM

## 2023-09-28 PROCEDURE — 99214 OFFICE O/P EST MOD 30 MIN: CPT | Performed by: NURSE PRACTITIONER

## 2023-09-28 PROCEDURE — G8417 CALC BMI ABV UP PARAM F/U: HCPCS | Performed by: NURSE PRACTITIONER

## 2023-09-28 PROCEDURE — G8427 DOCREV CUR MEDS BY ELIG CLIN: HCPCS | Performed by: NURSE PRACTITIONER

## 2023-09-28 PROCEDURE — 1036F TOBACCO NON-USER: CPT | Performed by: NURSE PRACTITIONER

## 2023-09-28 PROCEDURE — 3017F COLORECTAL CA SCREEN DOC REV: CPT | Performed by: NURSE PRACTITIONER

## 2023-09-28 RX ORDER — PREGABALIN 150 MG/1
150 CAPSULE ORAL 2 TIMES DAILY
Qty: 60 CAPSULE | Refills: 3 | Status: SHIPPED | OUTPATIENT
Start: 2023-09-28 | End: 2024-01-26

## 2023-09-28 RX ORDER — DICLOFENAC SODIUM 75 MG/1
75 TABLET, DELAYED RELEASE ORAL 2 TIMES DAILY
Qty: 60 TABLET | Refills: 3 | Status: SHIPPED | OUTPATIENT
Start: 2023-09-28

## 2023-09-28 ASSESSMENT — ENCOUNTER SYMPTOMS
EYES NEGATIVE: 1
CONSTIPATION: 0
BACK PAIN: 1
DIARRHEA: 0
RESPIRATORY NEGATIVE: 1

## 2023-09-28 NOTE — PROGRESS NOTES
Subjective:      Patient ID: Srinath Noel is a 62 y.o. male. Chief Complaint   Patient presents with    Back Pain     lower     Back Pain    Migraine   Associated symptoms include back pain. Other  Associated symptoms include arthralgias, fatigue and myalgias. Medication Refill  Associated symptoms include arthralgias, fatigue and myalgias. Here today for routine pain clinic recheck. Gabapentin not working as well as it used to, started lyrica    Pain Assessment  Location of Pain: Back  Location Modifiers: Posterior, Medial, Lateral  Severity of Pain: 6  Quality of Pain: Throbbing  Duration of Pain: Persistent  Frequency of Pain: Constant  Aggravating Factors: Walking, Standing, Kneeling, Squatting, Bending  Limiting Behavior: Yes  Relieving Factors: Rest, Ice, Heat (meds)  Result of Injury: No  Work-Related Injury: No  Are there other pain locations you wish to document?: No    Allergies   Allergen Reactions    Pcn [Penicillins] Rash    Sulfa Antibiotics Rash       Outpatient Medications Marked as Taking for the 9/28/23 encounter (Office Visit) with FELICE Muse CNP   Medication Sig Dispense Refill    pregabalin (LYRICA) 150 MG capsule Take 1 capsule by mouth 2 times daily for 120 days.  Max Daily Amount: 300 mg 60 capsule 3    diclofenac (VOLTAREN) 75 MG EC tablet Take 1 tablet by mouth 2 times daily 60 tablet 3    dolutegravir-rilpivirine (JULUCA) 50-25 MG per tablet Take 1 tablet by mouth daily      topiramate (TOPAMAX) 50 MG tablet TAKE 1 TABLET BY MOUTH TWICE DAILY 180 tablet 0    butalbital-acetaminophen-caffeine (FIORICET, ESGIC) -40 MG per tablet TAKE 1 TO 2 TABLETS BY MOUTH TWICE DAILY AS NEEDED FOR HEADACHE 60 tablet 2    rizatriptan (MAXALT-MLT) 10 MG disintegrating tablet Take 1 tablet by mouth once as needed for Migraine May repeat in 2 hours if needed 12 tablet 2    ondansetron (ZOFRAN ODT) 4 MG disintegrating tablet Take 1 tablet by mouth every 8 hours as needed for

## 2023-11-01 DIAGNOSIS — G44.59 OTHER COMPLICATED HEADACHE SYNDROME: ICD-10-CM

## 2023-11-01 RX ORDER — OMEPRAZOLE 40 MG/1
40 CAPSULE, DELAYED RELEASE ORAL DAILY
Qty: 90 CAPSULE | Refills: 1 | Status: SHIPPED | OUTPATIENT
Start: 2023-11-01

## 2023-11-01 RX ORDER — TOPIRAMATE 50 MG/1
TABLET, FILM COATED ORAL
Qty: 180 TABLET | Refills: 0 | Status: SHIPPED | OUTPATIENT
Start: 2023-11-01 | End: 2023-11-02 | Stop reason: SDUPTHER

## 2023-11-01 NOTE — TELEPHONE ENCOUNTER
Last Appt:  4/26/2023  Next Appt:   11/2/2023  Med verified in [address] [place] [address] 15

## 2023-11-02 ENCOUNTER — OFFICE VISIT (OUTPATIENT)
Dept: NEUROLOGY | Age: 58
End: 2023-11-02
Payer: MEDICARE

## 2023-11-02 VITALS
BODY MASS INDEX: 29.79 KG/M2 | WEIGHT: 212.8 LBS | HEIGHT: 71 IN | SYSTOLIC BLOOD PRESSURE: 138 MMHG | DIASTOLIC BLOOD PRESSURE: 88 MMHG | HEART RATE: 99 BPM

## 2023-11-02 DIAGNOSIS — G44.221 CHRONIC TENSION-TYPE HEADACHE, INTRACTABLE: ICD-10-CM

## 2023-11-02 DIAGNOSIS — F31.10 BIPOLAR AFFECTIVE DISORDER, CURRENT EPISODE MANIC, CURRENT EPISODE SEVERITY UNSPECIFIED (HCC): ICD-10-CM

## 2023-11-02 DIAGNOSIS — Z21 ASYMPTOMATIC HIV INFECTION (HCC): ICD-10-CM

## 2023-11-02 DIAGNOSIS — R51.9 CHRONIC INTRACTABLE HEADACHE, UNSPECIFIED HEADACHE TYPE: Primary | ICD-10-CM

## 2023-11-02 DIAGNOSIS — R73.01 IFG (IMPAIRED FASTING GLUCOSE): ICD-10-CM

## 2023-11-02 DIAGNOSIS — G89.29 CHRONIC INTRACTABLE HEADACHE, UNSPECIFIED HEADACHE TYPE: Primary | ICD-10-CM

## 2023-11-02 DIAGNOSIS — M54.2 NECK PAIN: ICD-10-CM

## 2023-11-02 DIAGNOSIS — F31.0 BIPOLAR AFFECTIVE DISORDER, CURRENT EPISODE HYPOMANIC (HCC): ICD-10-CM

## 2023-11-02 DIAGNOSIS — M54.16 LUMBAR RADICULAR PAIN: ICD-10-CM

## 2023-11-02 DIAGNOSIS — G43.009 MIGRAINE WITHOUT AURA AND WITHOUT STATUS MIGRAINOSUS, NOT INTRACTABLE: ICD-10-CM

## 2023-11-02 DIAGNOSIS — M62.838 MUSCLE SPASM: ICD-10-CM

## 2023-11-02 DIAGNOSIS — Z87.891 FORMER SMOKER: ICD-10-CM

## 2023-11-02 DIAGNOSIS — M47.817 LUMBOSACRAL SPONDYLOSIS WITHOUT MYELOPATHY: ICD-10-CM

## 2023-11-02 DIAGNOSIS — G44.59 OTHER COMPLICATED HEADACHE SYNDROME: ICD-10-CM

## 2023-11-02 DIAGNOSIS — R41.3 MEMORY PROBLEM: ICD-10-CM

## 2023-11-02 PROCEDURE — 99214 OFFICE O/P EST MOD 30 MIN: CPT | Performed by: PSYCHIATRY & NEUROLOGY

## 2023-11-02 RX ORDER — BUTALBITAL, ACETAMINOPHEN AND CAFFEINE 50; 325; 40 MG/1; MG/1; MG/1
TABLET ORAL
Qty: 60 TABLET | Refills: 2 | Status: SHIPPED | OUTPATIENT
Start: 2023-11-02

## 2023-11-02 RX ORDER — ROPINIROLE 0.5 MG/1
0.5 TABLET, FILM COATED ORAL
Qty: 90 TABLET | Refills: 2 | Status: SHIPPED | OUTPATIENT
Start: 2023-11-02

## 2023-11-02 RX ORDER — TOPIRAMATE 50 MG/1
50 TABLET, FILM COATED ORAL 2 TIMES DAILY
Qty: 180 TABLET | Refills: 1 | Status: SHIPPED | OUTPATIENT
Start: 2023-11-02

## 2023-11-02 ASSESSMENT — ENCOUNTER SYMPTOMS
SORE THROAT: 0
VOICE CHANGE: 0
SWOLLEN GLANDS: 0
BACK PAIN: 1
EYE ITCHING: 0
COLOR CHANGE: 0
BLURRED VISION: 0
APNEA: 0
BLOOD IN STOOL: 0
SHORTNESS OF BREATH: 0
COUGH: 0
WHEEZING: 0
CHOKING: 0
VOMITING: 0
FACIAL SWELLING: 0
VISUAL CHANGE: 0
DIARRHEA: 0
NAUSEA: 1
FACIAL SWEATING: 0
ABDOMINAL PAIN: 0
CHEST TIGHTNESS: 0
EYE REDNESS: 0
CONSTIPATION: 0
EYE WATERING: 0
RHINORRHEA: 0
EYE DISCHARGE: 0
TROUBLE SWALLOWING: 0
ABDOMINAL DISTENTION: 0
EYE PAIN: 0
SINUS PRESSURE: 0
PHOTOPHOBIA: 1
SCALP TENDERNESS: 0

## 2023-11-02 NOTE — PROGRESS NOTES
sac in the   lumbar region. CT OF THE HEAD WITHOUT CONTRAST  6/11/2017 8:49 pm       TECHNIQUE:   CT of the head was performed without the administration of intravenous   contrast. Dose modulation, iterative reconstruction, and/or weight based   adjustment of the mA/kV was utilized to reduce the radiation dose to as low   as reasonably achievable. COMPARISON:   None. HISTORY:   ORDERING SYSTEM PROVIDED HISTORY: neuro symptoms/ HIV   TECHNOLOGIST PROVIDED HISTORY:   Ordering Physician Provided Reason for Exam: Passed out while coughing and   hit forehead on table today, previous loss of consciousness spells in the   last weeks. Neuro symptoms, reaction to HIV medication. Acuity: Acute   Type of Exam: Initial   Mechanism of Injury: passed out and fell   Relevant Medical/Surgical History: n/a       FINDINGS:   BRAIN/VENTRICLES: The ventricular system is mildly prominent in size and in   the midline. No evidence of extra-axial fluid collection, hemorrhage or mass. ORBITS: The visualized portion of the orbits demonstrate no acute abnormality. SINUSES: Mild bilateral ethmoid sinus mucosal thickening. Paranasal sinuses   visualized are otherwise clear. Mastoid regions are poorly aerated likely a   developmental basis. SOFT TISSUES/SKULL:  No acute abnormality of the visualized skull or soft   tissues. Impression   No acute intracranial abnormality. CT OF THE CHEST WITH CONTRAST 7/5/2017 2:06 pm       TECHNIQUE:   CT of the chest was performed with the administration of intravenous   contrast. Multiplanar reformatted images are provided for review. Dose   modulation, iterative reconstruction, and/or weight based adjustment of the   mA/kV was utilized to reduce the radiation dose to as low as reasonably   achievable.        COMPARISON:   Chest radiograph dated 06/27/2017       HISTORY:   ORDERING SYSTEM PROVIDED HISTORY: Pneumonia of right lower lobe due to

## 2023-11-08 ENCOUNTER — HOSPITAL ENCOUNTER (OUTPATIENT)
Age: 58
Discharge: HOME | End: 2023-11-08
Payer: MEDICARE

## 2023-11-08 LAB — T PALLIDUM AB SER QL IA: NONREACTIVE

## 2023-11-08 PROCEDURE — 36415 COLL VENOUS BLD VENIPUNCTURE: CPT

## 2023-11-08 PROCEDURE — 86780 TREPONEMA PALLIDUM: CPT

## 2023-11-19 DIAGNOSIS — M54.41 CHRONIC BILATERAL LOW BACK PAIN WITH RIGHT-SIDED SCIATICA: ICD-10-CM

## 2023-11-19 DIAGNOSIS — G89.29 CHRONIC BILATERAL LOW BACK PAIN WITH RIGHT-SIDED SCIATICA: ICD-10-CM

## 2023-11-20 NOTE — TELEPHONE ENCOUNTER
Lyrica 150 mg  Last Appt:  9/28/2023  Next Appt:   12/1/2023  Med verified in [address] [place] [address] 15

## 2023-11-22 RX ORDER — PREGABALIN 150 MG/1
CAPSULE ORAL
Qty: 180 CAPSULE | Refills: 5 | Status: SHIPPED | OUTPATIENT
Start: 2023-11-22 | End: 2023-12-22

## 2023-12-01 ENCOUNTER — OFFICE VISIT (OUTPATIENT)
Dept: PAIN MANAGEMENT | Age: 58
End: 2023-12-01
Payer: MEDICARE

## 2023-12-01 VITALS
HEART RATE: 80 BPM | OXYGEN SATURATION: 99 % | DIASTOLIC BLOOD PRESSURE: 66 MMHG | RESPIRATION RATE: 17 BRPM | BODY MASS INDEX: 29.4 KG/M2 | WEIGHT: 210 LBS | SYSTOLIC BLOOD PRESSURE: 110 MMHG | HEIGHT: 71 IN

## 2023-12-01 DIAGNOSIS — R20.2 NUMBNESS AND TINGLING IN BOTH HANDS: Primary | ICD-10-CM

## 2023-12-01 DIAGNOSIS — M54.41 CHRONIC BILATERAL LOW BACK PAIN WITH RIGHT-SIDED SCIATICA: ICD-10-CM

## 2023-12-01 DIAGNOSIS — R20.0 NUMBNESS AND TINGLING IN BOTH HANDS: Primary | ICD-10-CM

## 2023-12-01 DIAGNOSIS — M54.16 LUMBAR RADICULAR PAIN: ICD-10-CM

## 2023-12-01 DIAGNOSIS — G43.009 MIGRAINE WITHOUT AURA AND WITHOUT STATUS MIGRAINOSUS, NOT INTRACTABLE: ICD-10-CM

## 2023-12-01 DIAGNOSIS — G89.29 CHRONIC BILATERAL LOW BACK PAIN WITH RIGHT-SIDED SCIATICA: ICD-10-CM

## 2023-12-01 DIAGNOSIS — G44.221 CHRONIC TENSION-TYPE HEADACHE, INTRACTABLE: ICD-10-CM

## 2023-12-01 DIAGNOSIS — M47.816 LUMBAR FACET JOINT SYNDROME: ICD-10-CM

## 2023-12-01 PROCEDURE — 99214 OFFICE O/P EST MOD 30 MIN: CPT | Performed by: NURSE PRACTITIONER

## 2023-12-01 PROCEDURE — 3017F COLORECTAL CA SCREEN DOC REV: CPT | Performed by: NURSE PRACTITIONER

## 2023-12-01 PROCEDURE — G8484 FLU IMMUNIZE NO ADMIN: HCPCS | Performed by: NURSE PRACTITIONER

## 2023-12-01 PROCEDURE — 1036F TOBACCO NON-USER: CPT | Performed by: NURSE PRACTITIONER

## 2023-12-01 PROCEDURE — G8417 CALC BMI ABV UP PARAM F/U: HCPCS | Performed by: NURSE PRACTITIONER

## 2023-12-01 PROCEDURE — G8427 DOCREV CUR MEDS BY ELIG CLIN: HCPCS | Performed by: NURSE PRACTITIONER

## 2023-12-01 RX ORDER — PREGABALIN 200 MG/1
200 CAPSULE ORAL 2 TIMES DAILY
Qty: 60 CAPSULE | Refills: 0 | Status: SHIPPED | OUTPATIENT
Start: 2023-12-01 | End: 2023-12-31

## 2023-12-01 ASSESSMENT — ENCOUNTER SYMPTOMS
EYES NEGATIVE: 1
CONSTIPATION: 0
RESPIRATORY NEGATIVE: 1
DIARRHEA: 0
BACK PAIN: 1

## 2023-12-01 NOTE — PROGRESS NOTES
[facility]  [address]    Patient Name: Feliberto Russell  MRN: [id number]  Encounter Date: 12/1/2023     SUBJECTIVE:  Feliberto Russell is a 62 y.o., male being seen today regarding   Chief Complaint   Patient presents with    Back Pain     lower    and routine medication management. Functionality Assessment & Goals: On a scale of 0 (Does not Interfere) to 10 (Completely Interferes)  Which number describes how during the past week pain has interfered with the following:   A. General Activity: 7    B. Mood:  9   C. Walking Ability:  6   D. Normal Work (Includes both work outside the home and housework):  8   E. Relations with Other People:  8   F. Sleep:  9    G. Enjoyment of Life:  7  2. Patient prefers to Take their Pain Medications:   [x] On a regular basis   [] Only when necessary   [] Does not take pain medications  3. What are the Patient's Goals/ Expectations for Visiting Pain Management? [] Learn about my pain   [] Physical Therapy   [] Receive Injections   [] Deal with Anxiety and Stress   [x] Receive Medication   [] Treat Depression    [] Treat Sleep   [] Treat Opioid Dependence/ Addiction        Most recent Oswestry Disability Questionnaire completed by patient on 12/1/23     Current Pain Assessment:         12/1/2023     9:31 AM   AMB PAIN ASSESSMENT   Location of Pain Back   Location Modifiers Posterior;Medial;Lateral   Severity of Pain 8   Quality of Pain Throbbing;Aching   Duration of Pain Persistent   Frequency of Pain Constant   Aggravating Factors Bending;Stretching;Straightening;Exercise;Kneeling;Squatting;Standing;Walking;Stairs   Limiting Behavior Yes   Relieving Factors Rest;Heat   Result of Injury No   Work-Related Injury No   Are there other pain locations you wish to document?  No        Current Medications & Allergies:   Current Outpatient Medications   Medication Instructions    albuterol (PROVENTIL) 2.5 mg, Nebulization,

## 2024-01-02 DIAGNOSIS — R20.2 NUMBNESS AND TINGLING IN BOTH HANDS: ICD-10-CM

## 2024-01-02 DIAGNOSIS — R20.0 NUMBNESS AND TINGLING IN BOTH HANDS: ICD-10-CM

## 2024-01-02 RX ORDER — PREGABALIN 200 MG/1
CAPSULE ORAL
Qty: 60 CAPSULE | Refills: 5 | Status: SHIPPED | OUTPATIENT
Start: 2024-01-02 | End: 2024-02-01

## 2024-01-15 RX ORDER — DICLOFENAC SODIUM 75 MG/1
75 TABLET, DELAYED RELEASE ORAL 2 TIMES DAILY
Qty: 60 TABLET | Refills: 3 | Status: SHIPPED | OUTPATIENT
Start: 2024-01-15

## 2024-02-01 ENCOUNTER — OFFICE VISIT (OUTPATIENT)
Dept: PAIN MANAGEMENT | Age: 59
End: 2024-02-01
Payer: MEDICARE

## 2024-02-01 VITALS
OXYGEN SATURATION: 95 % | DIASTOLIC BLOOD PRESSURE: 82 MMHG | HEART RATE: 81 BPM | RESPIRATION RATE: 17 BRPM | WEIGHT: 210 LBS | BODY MASS INDEX: 29.4 KG/M2 | SYSTOLIC BLOOD PRESSURE: 118 MMHG | HEIGHT: 71 IN

## 2024-02-01 DIAGNOSIS — G89.29 CHRONIC BILATERAL LOW BACK PAIN WITH RIGHT-SIDED SCIATICA: ICD-10-CM

## 2024-02-01 DIAGNOSIS — M54.16 LUMBAR RADICULAR PAIN: Primary | ICD-10-CM

## 2024-02-01 DIAGNOSIS — M54.2 NECK PAIN: ICD-10-CM

## 2024-02-01 DIAGNOSIS — G44.221 CHRONIC TENSION-TYPE HEADACHE, INTRACTABLE: ICD-10-CM

## 2024-02-01 DIAGNOSIS — M48.061 NEURAL FORAMINAL STENOSIS OF LUMBAR SPINE: ICD-10-CM

## 2024-02-01 DIAGNOSIS — M62.838 MUSCLE SPASM: ICD-10-CM

## 2024-02-01 DIAGNOSIS — M47.817 LUMBOSACRAL SPONDYLOSIS WITHOUT MYELOPATHY: ICD-10-CM

## 2024-02-01 DIAGNOSIS — M54.41 CHRONIC BILATERAL LOW BACK PAIN WITH RIGHT-SIDED SCIATICA: ICD-10-CM

## 2024-02-01 PROCEDURE — G8417 CALC BMI ABV UP PARAM F/U: HCPCS | Performed by: NURSE PRACTITIONER

## 2024-02-01 PROCEDURE — G8427 DOCREV CUR MEDS BY ELIG CLIN: HCPCS | Performed by: NURSE PRACTITIONER

## 2024-02-01 PROCEDURE — 99214 OFFICE O/P EST MOD 30 MIN: CPT | Performed by: NURSE PRACTITIONER

## 2024-02-01 PROCEDURE — 3017F COLORECTAL CA SCREEN DOC REV: CPT | Performed by: NURSE PRACTITIONER

## 2024-02-01 PROCEDURE — 1036F TOBACCO NON-USER: CPT | Performed by: NURSE PRACTITIONER

## 2024-02-01 PROCEDURE — G8484 FLU IMMUNIZE NO ADMIN: HCPCS | Performed by: NURSE PRACTITIONER

## 2024-02-01 RX ORDER — AZITHROMYCIN 250 MG/1
TABLET, FILM COATED ORAL
COMMUNITY
Start: 2024-01-30

## 2024-02-01 RX ORDER — DIPHENOXYLATE HYDROCHLORIDE AND ATROPINE SULFATE 2.5; .025 MG/1; MG/1
TABLET ORAL
COMMUNITY

## 2024-02-01 RX ORDER — BICTEGRAVIR SODIUM, EMTRICITABINE, AND TENOFOVIR ALAFENAMIDE FUMARATE 50; 200; 25 MG/1; MG/1; MG/1
1 TABLET ORAL DAILY
COMMUNITY

## 2024-02-01 RX ORDER — PREDNISONE 20 MG/1
40 TABLET ORAL DAILY
COMMUNITY
Start: 2024-01-30 | End: 2024-02-04

## 2024-02-01 ASSESSMENT — ENCOUNTER SYMPTOMS
BACK PAIN: 1
EYES NEGATIVE: 1
DIARRHEA: 0
RESPIRATORY NEGATIVE: 1
CONSTIPATION: 0

## 2024-02-01 NOTE — PROGRESS NOTES
[facility]  [address] [place]. [address]    Patient Name: Tariq Duckworth  MRN: [id number]  Encounter Date: 2/1/2024     SUBJECTIVE:  Tariq Duckworth is a 58 y.o., male being seen today regarding   Chief Complaint   Patient presents with    Back Pain     lower    and routine medication management.    Functionality Assessment & Goals:  On a scale of 0 (Does not Interfere) to 10 (Completely Interferes)  Which number describes how during the past week pain has interfered with the following:   A.  General Activity: 7    B.  Mood:  9   C.  Walking Ability:  6   D.  Normal Work (Includes both work outside the home and housework):  8   E.  Relations with Other People:  8   F.  Sleep:  9    G.  Enjoyment of Life:  7  2.  Patient prefers to Take their Pain Medications:   [x] On a regular basis   [] Only when necessary   [] Does not take pain medications  3.  What are the Patient's Goals/ Expectations for Visiting Pain Management?   [] Learn about my pain   [] Physical Therapy   [] Receive Injections   [] Deal with Anxiety and Stress   [x] Receive Medication   [] Treat Depression    [] Treat Sleep   [] Treat Opioid Dependence/ Addiction    Most recent Oswestry Disability Questionnaire completed by patient on 12/1/23     Current Pain Assessment:         12/1/2023     9:31 AM   AMB PAIN ASSESSMENT   Location of Pain Back   Location Modifiers Posterior;Medial;Lateral   Severity of Pain 8   Quality of Pain Throbbing;Aching   Duration of Pain Persistent   Frequency of Pain Constant   Aggravating Factors Bending;Stretching;Straightening;Exercise;Kneeling;Squatting;Standing;Walking;Stairs   Limiting Behavior Yes   Relieving Factors Rest;Heat   Result of Injury No   Work-Related Injury No   Are there other pain locations you wish to document? No        Current Medications & Allergies:   Current Outpatient Medications   Medication Instructions    albuterol (PROVENTIL) 2.5 mg, Nebulization, EVERY 6

## 2024-02-21 ENCOUNTER — TELEPHONE (OUTPATIENT)
Dept: ONCOLOGY | Age: 59
End: 2024-02-21

## 2024-02-21 NOTE — TELEPHONE ENCOUNTER
Reviewed CT Lung Screening Reminder List and patient's chart, patient is due and not yet scheduled. Mailed letter to patient.

## 2024-02-29 ENCOUNTER — OFFICE VISIT (OUTPATIENT)
Dept: NEUROLOGY | Age: 59
End: 2024-02-29
Payer: MEDICARE

## 2024-02-29 VITALS
OXYGEN SATURATION: 97 % | SYSTOLIC BLOOD PRESSURE: 134 MMHG | DIASTOLIC BLOOD PRESSURE: 74 MMHG | HEART RATE: 86 BPM | BODY MASS INDEX: 28.87 KG/M2 | RESPIRATION RATE: 16 BRPM | WEIGHT: 207 LBS

## 2024-02-29 DIAGNOSIS — F31.9 BIPOLAR AFFECTIVE DISORDER, REMISSION STATUS UNSPECIFIED (HCC): ICD-10-CM

## 2024-02-29 DIAGNOSIS — Z21 ASYMPTOMATIC HIV INFECTION (HCC): ICD-10-CM

## 2024-02-29 DIAGNOSIS — G89.29 CHRONIC INTRACTABLE HEADACHE, UNSPECIFIED HEADACHE TYPE: ICD-10-CM

## 2024-02-29 DIAGNOSIS — M54.16 LUMBAR RADICULAR PAIN: ICD-10-CM

## 2024-02-29 DIAGNOSIS — M62.838 MUSCLE SPASM: ICD-10-CM

## 2024-02-29 DIAGNOSIS — R41.3 MEMORY PROBLEM: ICD-10-CM

## 2024-02-29 DIAGNOSIS — F31.10 BIPOLAR AFFECTIVE DISORDER, CURRENT EPISODE MANIC, CURRENT EPISODE SEVERITY UNSPECIFIED (HCC): ICD-10-CM

## 2024-02-29 DIAGNOSIS — Z87.891 FORMER SMOKER: ICD-10-CM

## 2024-02-29 DIAGNOSIS — G44.59 OTHER COMPLICATED HEADACHE SYNDROME: Primary | ICD-10-CM

## 2024-02-29 DIAGNOSIS — M54.2 NECK PAIN: ICD-10-CM

## 2024-02-29 DIAGNOSIS — G43.009 MIGRAINE WITHOUT AURA AND WITHOUT STATUS MIGRAINOSUS, NOT INTRACTABLE: ICD-10-CM

## 2024-02-29 DIAGNOSIS — M47.817 LUMBOSACRAL SPONDYLOSIS WITHOUT MYELOPATHY: ICD-10-CM

## 2024-02-29 DIAGNOSIS — R51.9 CHRONIC INTRACTABLE HEADACHE, UNSPECIFIED HEADACHE TYPE: ICD-10-CM

## 2024-02-29 DIAGNOSIS — G44.221 CHRONIC TENSION-TYPE HEADACHE, INTRACTABLE: ICD-10-CM

## 2024-02-29 DIAGNOSIS — R73.01 IFG (IMPAIRED FASTING GLUCOSE): ICD-10-CM

## 2024-02-29 PROCEDURE — 99214 OFFICE O/P EST MOD 30 MIN: CPT | Performed by: PSYCHIATRY & NEUROLOGY

## 2024-02-29 RX ORDER — BUTALBITAL, ACETAMINOPHEN AND CAFFEINE 50; 325; 40 MG/1; MG/1; MG/1
TABLET ORAL
Qty: 60 TABLET | Refills: 2 | Status: SHIPPED | OUTPATIENT
Start: 2024-02-29

## 2024-02-29 RX ORDER — CELECOXIB 100 MG/1
100 CAPSULE ORAL
COMMUNITY
Start: 2022-08-01

## 2024-02-29 RX ORDER — TOPIRAMATE 50 MG/1
50 TABLET, FILM COATED ORAL 2 TIMES DAILY
Qty: 180 TABLET | Refills: 1 | Status: SHIPPED | OUTPATIENT
Start: 2024-02-29

## 2024-02-29 RX ORDER — RIZATRIPTAN BENZOATE 10 MG/1
TABLET, ORALLY DISINTEGRATING ORAL
Qty: 12 TABLET | Refills: 2 | Status: SHIPPED | OUTPATIENT
Start: 2024-02-29

## 2024-02-29 RX ORDER — ROPINIROLE 0.5 MG/1
0.5 TABLET, FILM COATED ORAL
Qty: 90 TABLET | Refills: 2 | Status: SHIPPED | OUTPATIENT
Start: 2024-02-29

## 2024-02-29 RX ORDER — ONDANSETRON 4 MG/1
4 TABLET, ORALLY DISINTEGRATING ORAL EVERY 8 HOURS PRN
Qty: 30 TABLET | Refills: 2 | Status: SHIPPED | OUTPATIENT
Start: 2024-02-29

## 2024-02-29 RX ORDER — OMEPRAZOLE 40 MG/1
40 CAPSULE, DELAYED RELEASE ORAL DAILY
Qty: 90 CAPSULE | Refills: 1 | Status: SHIPPED | OUTPATIENT
Start: 2024-02-29

## 2024-02-29 ASSESSMENT — ENCOUNTER SYMPTOMS
SINUS PRESSURE: 0
BACK PAIN: 1
EYE REDNESS: 0
APNEA: 0
COUGH: 0
DIARRHEA: 0
CHEST TIGHTNESS: 0
PHOTOPHOBIA: 1
SHORTNESS OF BREATH: 0
BLOOD IN STOOL: 0
EYE DISCHARGE: 0
ABDOMINAL DISTENTION: 0
CHOKING: 0
VISUAL CHANGE: 0
SWOLLEN GLANDS: 0
EYE WATERING: 0
RHINORRHEA: 0
FACIAL SWEATING: 0
TROUBLE SWALLOWING: 0
ABDOMINAL PAIN: 0
NAUSEA: 1
VOMITING: 0
WHEEZING: 0
SCALP TENDERNESS: 0
FACIAL SWELLING: 0
BLURRED VISION: 0
VOICE CHANGE: 0
EYE PAIN: 0
COLOR CHANGE: 0
CONSTIPATION: 0
SORE THROAT: 0
EYE ITCHING: 0

## 2024-02-29 ASSESSMENT — PATIENT HEALTH QUESTIONNAIRE - PHQ9
2. FEELING DOWN, DEPRESSED OR HOPELESS: 0
SUM OF ALL RESPONSES TO PHQ QUESTIONS 1-9: 0
SUM OF ALL RESPONSES TO PHQ QUESTIONS 1-9: 0
SUM OF ALL RESPONSES TO PHQ9 QUESTIONS 1 & 2: 0
1. LITTLE INTEREST OR PLEASURE IN DOING THINGS: 0
SUM OF ALL RESPONSES TO PHQ QUESTIONS 1-9: 0

## 2024-02-29 NOTE — PROGRESS NOTES
sticks as an afternoon snack instead of a candy bar. Try to have fruits and/or vegetables at everymeal.  Make exercise part of your daily routine. You may want to start with simple activities, such as walking, bicycling, or slow swimming. Try kay active 30 to 60 minutes every day. You do not need to do all 30 to 60 minutes all at once. For example, you can exercise 3 times a day for 10 or 20 minutes. Moderate exercise is safe for most people, but it is always agood idea to talk to your doctor before starting an exercise program.  Keep moving. Mow the lawn, work in the garden, or clean your house. Take the stairs instead of the elevator at work.  If you smoke, quit. Peoplewho smoke have an increased risk for heart attack, stroke, cancer, and other lung illnesses. Quitting is hard, but there are ways to boost your chance of quitting tobacco for good.  Use nicotine gum, patches, or lozenges.  Ask your doctor about stop-smoking programs and medicines.  Keep trying.  In addition to reducing your risk of diseases in the future, you will notice some benefits soon after you stop using tobacco. If you have shortness of breath or asthma symptoms, they will likely getbetter within a few weeks after you quit.  Limit how much alcohol you drink. Moderate amounts of alcohol (up to 2 drinks a day for men, 1drink a day for women) are okay. But drinking too much can lead to liver problems, high blood pressure, and other health problems.  health  If you have a family, there are many things you can do together to improve your health.  Eat meals together as a family as often as possible.  Eat healthy foods. This includes fruits, vegetables, lean meats and dairy, and whole grains.  Include your family in your fitness plan. Most peoplethink of activities such as jogging or tennis as the way to fitness, but there are many ways you and your family can be more active. Anything that makes you breathe hard and gets your heart pumping is

## 2024-03-13 VITALS — WEIGHT: 212 LBS | HEIGHT: 70 IN | BODY MASS INDEX: 30.35 KG/M2

## 2024-03-25 ENCOUNTER — OFFICE VISIT (OUTPATIENT)
Dept: DERMATOLOGY | Age: 59
End: 2024-03-25
Payer: MEDICARE

## 2024-03-25 ENCOUNTER — HOSPITAL ENCOUNTER (OUTPATIENT)
Dept: CT IMAGING | Age: 59
Discharge: HOME | End: 2024-03-27
Payer: MEDICARE

## 2024-03-25 VITALS
BODY MASS INDEX: 28.71 KG/M2 | RESPIRATION RATE: 14 BRPM | WEIGHT: 212 LBS | SYSTOLIC BLOOD PRESSURE: 132 MMHG | HEIGHT: 72 IN | DIASTOLIC BLOOD PRESSURE: 82 MMHG | HEART RATE: 72 BPM

## 2024-03-25 DIAGNOSIS — L73.9 FOLLICULITIS: ICD-10-CM

## 2024-03-25 DIAGNOSIS — Z87.891 PERSONAL HISTORY OF NICOTINE DEPENDENCE: ICD-10-CM

## 2024-03-25 DIAGNOSIS — L90.5 SCAR: Primary | ICD-10-CM

## 2024-03-25 PROCEDURE — G8417 CALC BMI ABV UP PARAM F/U: HCPCS | Performed by: PHYSICIAN ASSISTANT

## 2024-03-25 PROCEDURE — G8427 DOCREV CUR MEDS BY ELIG CLIN: HCPCS | Performed by: PHYSICIAN ASSISTANT

## 2024-03-25 PROCEDURE — 1036F TOBACCO NON-USER: CPT | Performed by: PHYSICIAN ASSISTANT

## 2024-03-25 PROCEDURE — 99204 OFFICE O/P NEW MOD 45 MIN: CPT | Performed by: PHYSICIAN ASSISTANT

## 2024-03-25 PROCEDURE — 71271 CT THORAX LUNG CANCER SCR C-: CPT

## 2024-03-25 PROCEDURE — 3017F COLORECTAL CA SCREEN DOC REV: CPT | Performed by: PHYSICIAN ASSISTANT

## 2024-03-25 PROCEDURE — G8484 FLU IMMUNIZE NO ADMIN: HCPCS | Performed by: PHYSICIAN ASSISTANT

## 2024-03-25 RX ORDER — CHLORHEXIDINE GLUCONATE 40 MG/ML
SOLUTION TOPICAL
Qty: 473 ML | Refills: 6 | Status: SHIPPED | OUTPATIENT
Start: 2024-03-25

## 2024-03-25 RX ORDER — CLOBETASOL PROPIONATE 0.46 MG/ML
SOLUTION TOPICAL
Qty: 50 ML | Refills: 3 | Status: SHIPPED | OUTPATIENT
Start: 2024-03-25

## 2024-03-25 NOTE — PROGRESS NOTES
Dermatology Patient Note  [facility] SPECIALY CARE, [facility]  [facility]CX DERM AND SKIN A DEPARTMENT OF [facility]  [address]  [facility] [address]  Dept: [phone]  Dept Fax: [phone]      VISITDATE: 3/25/2024   REFERRING PROVIDER: No ref. provider found      Tariq Duckworth is a 58 y.o. male  who presents today in the office for:    Rash (Rash on penis 6 months duration now it is gone   also has concerns of scalp )      HISTORY OF PRESENT ILLNESS:  Pt presents for referral regarding a lesion on his distal dorsal shaft of the penis.  Of note, the pt does have HIV.  The pt first described the lesion as a \"sore\", but today states that the lesion appeared to be a \"cauliflower head\" when he looked at it with his glasses.  Pt denies any h/o similar lesions on the penis.  The pt states that he went to a facility in [place], [place] and was given \"an ointment\", which seemed to help the lesion resolve.  He denies pain or pruritus with the site.  RPR was negative.  The patient notes that the lesion has since resolved with a small scar.  Pt also c/o intermittent episodes of pruritic \"pimples\" on posterior scalp.  He admits to excoriating these lesions.    MEDICAL PROBLEMS:  Patient Active Problem List    Diagnosis Date Noted    Bipolar affective disorder, current episode manic ([facility]) 10/19/2022     Priority: Medium    Arthritis of both hands 08/31/2022     Priority: Medium    Migraine without aura and without status migrainosus, not intractable 11/02/2023    Chronic tension-type headache, intractable 11/02/2023    Asymptomatic HIV infection ([facility]) 01/21/2022    Former smoker 09/23/2020    Intractable headache 05/28/2020    Ventral hernia without obstruction or gangrene     Left inguinal hernia     Bipolar disorder ([facility]) 11/21/2019     follows with Dr. Rajput      Memory problem 11/21/2019    Abnormal CT of the chest 11/21/2019    Ex-smoker 11/21/2019    Neck pain 11/21/2019

## 2024-04-18 RX ORDER — DICLOFENAC SODIUM 75 MG/1
75 TABLET, DELAYED RELEASE ORAL 2 TIMES DAILY
Qty: 60 TABLET | Refills: 5 | Status: SHIPPED | OUTPATIENT
Start: 2024-04-18

## 2024-05-02 ENCOUNTER — HOSPITAL ENCOUNTER (OUTPATIENT)
Dept: GENERAL RADIOLOGY | Age: 59
Discharge: HOME | End: 2024-05-04
Payer: MEDICARE

## 2024-05-02 ENCOUNTER — OFFICE VISIT (OUTPATIENT)
Dept: PAIN MANAGEMENT | Age: 59
End: 2024-05-02
Payer: MEDICARE

## 2024-05-02 VITALS
WEIGHT: 210 LBS | RESPIRATION RATE: 12 BRPM | BODY MASS INDEX: 29.4 KG/M2 | OXYGEN SATURATION: 95 % | HEART RATE: 82 BPM | SYSTOLIC BLOOD PRESSURE: 136 MMHG | HEIGHT: 71 IN | DIASTOLIC BLOOD PRESSURE: 72 MMHG

## 2024-05-02 DIAGNOSIS — M47.816 LUMBAR FACET JOINT SYNDROME: ICD-10-CM

## 2024-05-02 DIAGNOSIS — M77.41 METATARSALGIA OF RIGHT FOOT: ICD-10-CM

## 2024-05-02 DIAGNOSIS — G89.29 CHRONIC BILATERAL LOW BACK PAIN WITH RIGHT-SIDED SCIATICA: Primary | ICD-10-CM

## 2024-05-02 DIAGNOSIS — M54.41 CHRONIC BILATERAL LOW BACK PAIN WITH RIGHT-SIDED SCIATICA: Primary | ICD-10-CM

## 2024-05-02 DIAGNOSIS — M48.061 NEURAL FORAMINAL STENOSIS OF LUMBAR SPINE: ICD-10-CM

## 2024-05-02 DIAGNOSIS — R20.2 NUMBNESS AND TINGLING IN BOTH HANDS: ICD-10-CM

## 2024-05-02 DIAGNOSIS — M54.16 LUMBAR RADICULAR PAIN: ICD-10-CM

## 2024-05-02 DIAGNOSIS — R20.0 NUMBNESS AND TINGLING IN BOTH HANDS: ICD-10-CM

## 2024-05-02 PROCEDURE — 1036F TOBACCO NON-USER: CPT | Performed by: NURSE PRACTITIONER

## 2024-05-02 PROCEDURE — G8427 DOCREV CUR MEDS BY ELIG CLIN: HCPCS | Performed by: NURSE PRACTITIONER

## 2024-05-02 PROCEDURE — 73630 X-RAY EXAM OF FOOT: CPT

## 2024-05-02 PROCEDURE — 3017F COLORECTAL CA SCREEN DOC REV: CPT | Performed by: NURSE PRACTITIONER

## 2024-05-02 PROCEDURE — G8417 CALC BMI ABV UP PARAM F/U: HCPCS | Performed by: NURSE PRACTITIONER

## 2024-05-02 PROCEDURE — 99214 OFFICE O/P EST MOD 30 MIN: CPT | Performed by: NURSE PRACTITIONER

## 2024-05-02 RX ORDER — PREGABALIN 200 MG/1
CAPSULE ORAL
Qty: 60 CAPSULE | Refills: 5 | Status: SHIPPED | OUTPATIENT
Start: 2024-05-02 | End: 2024-05-23

## 2024-05-02 ASSESSMENT — ENCOUNTER SYMPTOMS
RESPIRATORY NEGATIVE: 1
EYES NEGATIVE: 1
CONSTIPATION: 0
BACK PAIN: 1
DIARRHEA: 0

## 2024-05-02 NOTE — PROGRESS NOTES
aspirin-acetaminophen-caffeine (EXCEDRIN MIGRAINE) 250-250-65 MG per tablet 1 tablet, Oral, EVERY 6 HOURS PRN    atorvastatin (LIPITOR) 10 MG tablet No dose, route, or frequency recorded.    BIKTARVY -25 MG TABS per tablet 1 tablet, Oral, DAILY    Bismuth Subsalicylate (PEPTO-BISMOL PO) QID, 0 Refill(s)    butalbital-acetaminophen-caffeine (FIORICET, ESGIC) -40 MG per tablet TAKE 1 TO 2 TABLETS BY MOUTH TWICE DAILY AS NEEDED FOR HEADACHE    celecoxib (CELEBREX) 100 mg, Oral    chlorhexidine gluconate (ANTISEPTIC SKIN CLEANSER) 4 % SOLN external solution Use to wash affected area of scalp, daily.  Do NOT get in ears or eyes.    clobetasol (TEMOVATE) 0.05 % external solution Apply to affected areas of scalp twice daily, as needed, for itching.  Do NOT use on face, groin, or armpits.    Clotrimazole (MYCELEX) 10 mg, Oral, 5 TIMES DAILY    cyclobenzaprine (FLEXERIL) 5 MG tablet No dose, route, or frequency recorded.    darunavir-cobicistat 800-150 MG TABS tablet Oral, NIGHTLY    DESCOVY 200-25 MG TABS 1 tablet, Oral, DAILY    diclofenac (VOLTAREN) 75 mg, Oral, 2 TIMES DAILY    diphenoxylate-atropine (LOMOTIL) 2.5-0.025 MG per tablet TAKE 2 TABLETS BY MOUTH THREE TIMES DAILY AS NEEDED FOR LOOSE STOOLS    dolutegravir-rilpivirine (JULUCA) 50-25 MG per tablet 1 tablet, Oral, DAILY    DULoxetine (CYMBALTA) 60 MG extended release capsule TAKE 2 CAPSULES BY MOUTH EVERY DAY    famotidine (PEPCID) 20 mg, Oral, NIGHTLY    fenofibrate (TRICOR) 145 MG tablet No dose, route, or frequency recorded.    fenofibric acid (TRILIPIX) 135 mg, Oral, DAILY    fluticasone (FLONASE) 50 MCG/ACT nasal spray SHAKE LIQUID AND USE 1 SPRAY IN EACH NOSTRIL DAILY    Glucosamine HCl (GLUCOSAMINE PO) Oral, DAILY    hydrOXYzine HCl (ATARAX) 10 mg, Oral, 2 TIMES DAILY PRN    Invega 6 mg, Oral, DAILY    LIVALO 4 MG TABS tablet     loperamide (IMODIUM) 2 mg, Oral, 4 TIMES DAILY PRN    Loratadine-Pseudoephedrine (CLARITIN-D 24 HOUR PO) Oral,

## 2024-05-23 ENCOUNTER — OFFICE VISIT (OUTPATIENT)
Dept: PODIATRY | Age: 59
End: 2024-05-23
Payer: MEDICARE

## 2024-05-23 VITALS
WEIGHT: 201.8 LBS | RESPIRATION RATE: 20 BRPM | SYSTOLIC BLOOD PRESSURE: 136 MMHG | HEART RATE: 80 BPM | BODY MASS INDEX: 28.15 KG/M2 | DIASTOLIC BLOOD PRESSURE: 70 MMHG

## 2024-05-23 DIAGNOSIS — M77.41 METATARSALGIA OF RIGHT FOOT: ICD-10-CM

## 2024-05-23 DIAGNOSIS — M20.5X1 HALLUX LIMITUS OF RIGHT FOOT: Primary | ICD-10-CM

## 2024-05-23 DIAGNOSIS — M79.671 FOOT PAIN, RIGHT: ICD-10-CM

## 2024-05-23 PROCEDURE — 3017F COLORECTAL CA SCREEN DOC REV: CPT | Performed by: PODIATRIST

## 2024-05-23 PROCEDURE — 1036F TOBACCO NON-USER: CPT | Performed by: PODIATRIST

## 2024-05-23 PROCEDURE — G8427 DOCREV CUR MEDS BY ELIG CLIN: HCPCS | Performed by: PODIATRIST

## 2024-05-23 PROCEDURE — 99204 OFFICE O/P NEW MOD 45 MIN: CPT | Performed by: PODIATRIST

## 2024-05-23 PROCEDURE — G8417 CALC BMI ABV UP PARAM F/U: HCPCS | Performed by: PODIATRIST

## 2024-05-23 PROCEDURE — 99214 OFFICE O/P EST MOD 30 MIN: CPT | Performed by: PODIATRIST

## 2024-05-23 RX ORDER — METHYLPREDNISOLONE 4 MG/1
TABLET ORAL
Qty: 1 KIT | Refills: 0 | Status: SHIPPED | OUTPATIENT
Start: 2024-05-23

## 2024-05-23 NOTE — PROGRESS NOTES
Social History     Tobacco Use    Smoking status: Former     Current packs/day: 0.00     Average packs/day: 4.0 packs/day for 34.0 years (136.0 ttl pk-yrs)     Types: Cigarettes     Start date: 10/7/1980     Quit date: 10/7/2014     Years since quittin.6    Smokeless tobacco: Never   Substance Use Topics    Alcohol use: Yes     Comment: socially       ROS: All 14 ROS systems reviewed and pertinent positives noted above, all others negative.    Lower Extremity Physical Examination:     Vitals:   Vitals:    24 1044   BP: 136/70   Pulse: 80   Resp: 20     General: AAO x 3 in NAD.     Vascular: DP and PT pulses palpable 2/4, bilateral.  CFT <3 seconds, bilateral.  Hair growth present to the level of the digits, bilateral.  Edema absent, bilateral.  Varicosities absent, bilateral. Erythema absent, bilateral. Distal Rubor absent bilateral.  Temperature within normal limits bilateral. Hyperpigmentation absent bilateral. No atrophic skin.   Neurological: Sensation intact to light touch to level of digits, bilateral.  Protective sensation intact 10/10 sites via 5.07/10g [place]elo Monofilament, bilateral.  negative Tinel's, bilateral.  negative Valleix sign, bilateral.  Vibratory intact bilateral.  Reflexes Decreased bilateral.  Paresthesias positive  Sharp/dull intact bilateral.   Musculoskeletal: Muscle strength 5/5, bilateral.  Pain present palpation subright second and third metatarsal head.  No pain with range of motion of the MPJ itself.  No pain dorsally.  Normal medial longitudinal arch, bilateral.  Ankle ROM within normal limits,bilateral.  1st MPJ ROM decreased bilateral..  Dorsally contracted digits absent.  Integument: Warm, dry, supple, bilateral.  Open lesion absent, bilateral.  Interdigital maceration absent to web spaces bilateral.  Nails within normal limits.  Fissures absent, bilateral. Hyperkeratotic tissue is absent.        Asessment: Patient is a 58 y.o. male with:    Diagnosis

## 2024-05-23 NOTE — PATIENT INSTRUCTIONS
Pt to consider max cushioning shoes or sandals. [employer] brand  Oofas sandals  [employer]                 You may receive a survey that will ask about your recent patient visit experience.   We value your feedback, so we kindly request that you take a few moments to complete our survey. Your valuable insights will help us ensure that we provide top-notch care, and A+ quality to you and your loved ones.     We strive for 5's!    [facility]

## 2024-07-18 DIAGNOSIS — R20.0 NUMBNESS AND TINGLING IN BOTH HANDS: ICD-10-CM

## 2024-07-18 DIAGNOSIS — R20.2 NUMBNESS AND TINGLING IN BOTH HANDS: ICD-10-CM

## 2024-07-18 RX ORDER — PREGABALIN 200 MG/1
CAPSULE ORAL
Qty: 60 CAPSULE | Refills: 5 | Status: SHIPPED | OUTPATIENT
Start: 2024-07-18 | End: 2024-08-08

## 2024-07-18 NOTE — TELEPHONE ENCOUNTER
Tariq called requesting a refill of the below medication which has been pended for you:     Requested Prescriptions     Pending Prescriptions Disp Refills    pregabalin (LYRICA) 200 MG capsule 60 capsule 5     Sig: TAKE 1 CAPSULE BY MOUTH TWICE DAILY. MAX DAILY AMOUNT: 400 MG       Last Appt:  5/2/2024  Next Appt:   8/2/2024  Med verified in Epic    Allergies   Allergen Reactions    Pcn [Penicillins] Rash    Sulfa Antibiotics Rash       Pharmacy:  [facility]

## 2024-08-02 ENCOUNTER — OFFICE VISIT (OUTPATIENT)
Dept: PAIN MANAGEMENT | Age: 59
End: 2024-08-02
Payer: MEDICARE

## 2024-08-02 ENCOUNTER — OFFICE VISIT (OUTPATIENT)
Dept: NEUROLOGY | Age: 59
End: 2024-08-02
Payer: MEDICARE

## 2024-08-02 VITALS
HEIGHT: 71 IN | SYSTOLIC BLOOD PRESSURE: 130 MMHG | BODY MASS INDEX: 29.68 KG/M2 | WEIGHT: 212 LBS | DIASTOLIC BLOOD PRESSURE: 84 MMHG

## 2024-08-02 VITALS
OXYGEN SATURATION: 96 % | HEART RATE: 76 BPM | DIASTOLIC BLOOD PRESSURE: 72 MMHG | RESPIRATION RATE: 16 BRPM | WEIGHT: 210 LBS | BODY MASS INDEX: 29.29 KG/M2 | SYSTOLIC BLOOD PRESSURE: 118 MMHG

## 2024-08-02 DIAGNOSIS — F31.10 BIPOLAR AFFECTIVE DISORDER, CURRENT EPISODE MANIC, CURRENT EPISODE SEVERITY UNSPECIFIED (HCC): ICD-10-CM

## 2024-08-02 DIAGNOSIS — R20.2 NUMBNESS AND TINGLING IN BOTH HANDS: Primary | ICD-10-CM

## 2024-08-02 DIAGNOSIS — M54.41 CHRONIC BILATERAL LOW BACK PAIN WITH RIGHT-SIDED SCIATICA: ICD-10-CM

## 2024-08-02 DIAGNOSIS — R73.01 IFG (IMPAIRED FASTING GLUCOSE): ICD-10-CM

## 2024-08-02 DIAGNOSIS — G44.59 OTHER COMPLICATED HEADACHE SYNDROME: Primary | ICD-10-CM

## 2024-08-02 DIAGNOSIS — M54.16 LUMBAR RADICULAR PAIN: ICD-10-CM

## 2024-08-02 DIAGNOSIS — M47.816 LUMBAR FACET JOINT SYNDROME: ICD-10-CM

## 2024-08-02 DIAGNOSIS — G89.29 CHRONIC BILATERAL LOW BACK PAIN WITH RIGHT-SIDED SCIATICA: ICD-10-CM

## 2024-08-02 DIAGNOSIS — M54.2 NECK PAIN: ICD-10-CM

## 2024-08-02 DIAGNOSIS — J43.2 CENTRILOBULAR EMPHYSEMA (HCC): ICD-10-CM

## 2024-08-02 DIAGNOSIS — R41.3 MEMORY PROBLEM: ICD-10-CM

## 2024-08-02 DIAGNOSIS — Z21 ASYMPTOMATIC HIV INFECTION (HCC): ICD-10-CM

## 2024-08-02 DIAGNOSIS — Z87.891 FORMER SMOKER: ICD-10-CM

## 2024-08-02 DIAGNOSIS — M62.838 MUSCLE SPASM: ICD-10-CM

## 2024-08-02 DIAGNOSIS — M47.817 LUMBOSACRAL SPONDYLOSIS WITHOUT MYELOPATHY: ICD-10-CM

## 2024-08-02 DIAGNOSIS — M48.061 NEURAL FORAMINAL STENOSIS OF LUMBAR SPINE: ICD-10-CM

## 2024-08-02 DIAGNOSIS — R20.0 NUMBNESS AND TINGLING IN BOTH HANDS: Primary | ICD-10-CM

## 2024-08-02 DIAGNOSIS — Z87.891 EX-SMOKER: ICD-10-CM

## 2024-08-02 DIAGNOSIS — G43.009 MIGRAINE WITHOUT AURA AND WITHOUT STATUS MIGRAINOSUS, NOT INTRACTABLE: ICD-10-CM

## 2024-08-02 DIAGNOSIS — G44.221 CHRONIC TENSION-TYPE HEADACHE, INTRACTABLE: ICD-10-CM

## 2024-08-02 PROCEDURE — 99214 OFFICE O/P EST MOD 30 MIN: CPT | Performed by: NURSE PRACTITIONER

## 2024-08-02 PROCEDURE — 99214 OFFICE O/P EST MOD 30 MIN: CPT | Performed by: PSYCHIATRY & NEUROLOGY

## 2024-08-02 PROCEDURE — G8427 DOCREV CUR MEDS BY ELIG CLIN: HCPCS | Performed by: NURSE PRACTITIONER

## 2024-08-02 PROCEDURE — 3017F COLORECTAL CA SCREEN DOC REV: CPT | Performed by: NURSE PRACTITIONER

## 2024-08-02 PROCEDURE — G8417 CALC BMI ABV UP PARAM F/U: HCPCS | Performed by: NURSE PRACTITIONER

## 2024-08-02 PROCEDURE — 1036F TOBACCO NON-USER: CPT | Performed by: NURSE PRACTITIONER

## 2024-08-02 RX ORDER — ROPINIROLE 0.5 MG/1
0.5 TABLET, FILM COATED ORAL
Qty: 90 TABLET | Refills: 2 | Status: SHIPPED | OUTPATIENT
Start: 2024-08-02

## 2024-08-02 RX ORDER — BUTALBITAL, ACETAMINOPHEN AND CAFFEINE 50; 325; 40 MG/1; MG/1; MG/1
TABLET ORAL
Qty: 60 TABLET | Refills: 2 | Status: SHIPPED | OUTPATIENT
Start: 2024-08-02

## 2024-08-02 RX ORDER — ONDANSETRON 4 MG/1
4 TABLET, ORALLY DISINTEGRATING ORAL EVERY 8 HOURS PRN
Qty: 30 TABLET | Refills: 2 | Status: SHIPPED | OUTPATIENT
Start: 2024-08-02

## 2024-08-02 RX ORDER — OMEPRAZOLE 40 MG/1
40 CAPSULE, DELAYED RELEASE ORAL DAILY
Qty: 90 CAPSULE | Refills: 1 | Status: SHIPPED | OUTPATIENT
Start: 2024-08-02

## 2024-08-02 RX ORDER — TOPIRAMATE 50 MG/1
50 TABLET, FILM COATED ORAL 2 TIMES DAILY
Qty: 180 TABLET | Refills: 1 | Status: SHIPPED | OUTPATIENT
Start: 2024-08-02

## 2024-08-02 RX ORDER — PREGABALIN 225 MG/1
225 CAPSULE ORAL 2 TIMES DAILY
Qty: 60 CAPSULE | Refills: 2 | Status: SHIPPED | OUTPATIENT
Start: 2024-08-02 | End: 2024-09-01

## 2024-08-02 RX ORDER — RIZATRIPTAN BENZOATE 10 MG/1
TABLET, ORALLY DISINTEGRATING ORAL
Qty: 12 TABLET | Refills: 2 | Status: SHIPPED | OUTPATIENT
Start: 2024-08-02

## 2024-08-02 ASSESSMENT — ENCOUNTER SYMPTOMS
PHOTOPHOBIA: 1
SORE THROAT: 0
EYE WATERING: 0
ABDOMINAL DISTENTION: 0
BACK PAIN: 1
NAUSEA: 1
DIARRHEA: 0
EYE DISCHARGE: 0
RHINORRHEA: 0
EYE PAIN: 0
VISUAL CHANGE: 0
CONSTIPATION: 0
CHEST TIGHTNESS: 0
BLOOD IN STOOL: 0
SCALP TENDERNESS: 0
CONSTIPATION: 0
FACIAL SWELLING: 0
VOMITING: 0
EYE ITCHING: 0
VOICE CHANGE: 0
ABDOMINAL PAIN: 0
RESPIRATORY NEGATIVE: 1
FACIAL SWEATING: 0
CHOKING: 0
SINUS PRESSURE: 0
EYES NEGATIVE: 1
TROUBLE SWALLOWING: 0
COUGH: 0
EYE REDNESS: 0
SHORTNESS OF BREATH: 0
DIARRHEA: 0
BACK PAIN: 1
WHEEZING: 0
BLURRED VISION: 0
COLOR CHANGE: 0
SWOLLEN GLANDS: 0
APNEA: 0

## 2024-08-02 ASSESSMENT — PATIENT HEALTH QUESTIONNAIRE - PHQ9
SUM OF ALL RESPONSES TO PHQ9 QUESTIONS 1 & 2: 0
SUM OF ALL RESPONSES TO PHQ QUESTIONS 1-9: 0
SUM OF ALL RESPONSES TO PHQ QUESTIONS 1-9: 0
2. FEELING DOWN, DEPRESSED OR HOPELESS: NOT AT ALL
SUM OF ALL RESPONSES TO PHQ QUESTIONS 1-9: 0
1. LITTLE INTEREST OR PLEASURE IN DOING THINGS: NOT AT ALL
SUM OF ALL RESPONSES TO PHQ QUESTIONS 1-9: 0

## 2024-08-02 NOTE — PROGRESS NOTES
[facility]  [address] [place]. [address]    Patient Name: Tariq Duckworth  MRN: [id number]  Encounter Date: 8/2/2024     SUBJECTIVE:  Tariq Duckworth is a 58 y.o., male being seen today regarding   Chief Complaint   Patient presents with    Lower Back Pain     Right side    and routine medication management.    Lyrica not working as well as it used to    Functionality Assessment & Goals:  On a scale of 0 (Does not Interfere) to 10 (Completely Interferes)  Which number describes how during the past week pain has interfered with the following:   A.  General Activity: 7    B.  Mood:  9   C.  Walking Ability:  6   D.  Normal Work (Includes both work outside the home and housework):  8   E.  Relations with Other People:  8   F.  Sleep:  9    G.  Enjoyment of Life:  7  2.  Patient prefers to Take their Pain Medications:   [x] On a regular basis   [] Only when necessary   [] Does not take pain medications  3.  What are the Patient's Goals/ Expectations for Visiting Pain Management?   [] Learn about my pain   [] Physical Therapy   [] Receive Injections   [] Deal with Anxiety and Stress   [x] Receive Medication   [] Treat Depression    [] Treat Sleep   [] Treat Opioid Dependence/ Addiction    Most recent Oswestry Disability Questionnaire completed by patient on 12/1/23     Current Pain Assessment:         8/2/2024    10:54 AM   AMB PAIN ASSESSMENT   Location of Pain Back   Location Modifiers Right   Severity of Pain 7   Quality of Pain Sharp;Aching;Throbbing   Duration of Pain Persistent   Frequency of Pain Intermittent   Limiting Behavior Yes   Relieving Factors Ice;Heat;Rest   Result of Injury No   Work-Related Injury No   Are there other pain locations you wish to document? No        Current Medications & Allergies:   Current Outpatient Medications   Medication Instructions    albuterol (PROVENTIL) 2.5 mg, Nebulization, EVERY 6 HOURS PRN    aspirin-acetaminophen-caffeine (EXCEDRIN

## 2024-08-02 NOTE — PROGRESS NOTES
[facility]  Neurology  [address] [id number]  Phone:[phone]   Fax: [phone]      SUBJECTIVE:     PATIENT ID:  Tariq Duckworth is a  RIGHT     HANDED 58 y.o. male.      Headache   This is a recurrent problem. Episode onset: SINCE  OCTOBER 2019. The problem occurs constantly. The problem has been gradually worsening. The pain is located in the Occipital region. The pain does not radiate. The pain quality is not similar to prior headaches. The quality of the pain is described as pulsating and throbbing. The pain is at a severity of 3/10. The pain is mild. Associated symptoms include back pain, a loss of balance, nausea, neck pain, phonophobia and photophobia. Pertinent negatives include no abdominal pain, abnormal behavior, anorexia, blurred vision, coughing, dizziness, drainage, ear pain, eye pain, eye redness, eye watering, facial sweating, hearing loss, insomnia, muscle aches, rhinorrhea, scalp tenderness, seizures, sinus pressure, sore throat, swollen glands, tinnitus, visual change, vomiting, weakness or weight loss. The symptoms are aggravated by unknown. He has tried ergotamines and NSAIDs for the symptoms. The treatment provided no relief. His past medical history is significant for immunosuppression and migraine headaches. There is no history of cancer, cluster headaches, hypertension, migraines in the family, obesity, pseudotumor cerebri, recent head traumas, sinus disease or TMJ.   Migraine   This is a chronic problem. Episode onset: SINCE  TEENAGE   The problem has been waxing and waning. The pain is located in the Bilateral region. The pain does not radiate. The pain quality is similar to prior headaches. The quality of the pain is described as aching, throbbing and pulsating. The pain is at a severity of 3/10. The pain is mild. Associated symptoms include back pain, a loss of balance, nausea, neck pain, phonophobia and photophobia. Pertinent negatives

## 2024-09-06 ENCOUNTER — HOSPITAL ENCOUNTER (OUTPATIENT)
Dept: GENERAL RADIOLOGY | Age: 59
Discharge: HOME | End: 2024-09-08
Payer: MEDICARE

## 2024-09-06 DIAGNOSIS — S69.91XA UNSPECIFIED INJURY OF RIGHT WRIST, HAND AND FINGER(S), INITIAL ENCOUNTER: ICD-10-CM

## 2024-09-06 PROCEDURE — 73130 X-RAY EXAM OF HAND: CPT

## 2024-09-30 NOTE — PROGRESS NOTES
I have reviewed and agree to the content of the note written by the PTA.   Electronically signed by Heike Lemos PT [id number] Satisfactory

## 2024-10-11 RX ORDER — DICLOFENAC SODIUM 75 MG/1
75 TABLET, DELAYED RELEASE ORAL 2 TIMES DAILY
Qty: 60 TABLET | Refills: 5 | Status: SHIPPED | OUTPATIENT
Start: 2024-10-11

## 2024-11-01 ENCOUNTER — NURSE ONLY (OUTPATIENT)
Dept: LAB | Age: 59
End: 2024-11-01
Payer: MEDICARE

## 2024-11-01 ENCOUNTER — OFFICE VISIT (OUTPATIENT)
Dept: PAIN MANAGEMENT | Age: 59
End: 2024-11-01
Payer: MEDICARE

## 2024-11-01 VITALS
WEIGHT: 218 LBS | DIASTOLIC BLOOD PRESSURE: 62 MMHG | OXYGEN SATURATION: 96 % | HEIGHT: 71 IN | SYSTOLIC BLOOD PRESSURE: 118 MMHG | RESPIRATION RATE: 12 BRPM | HEART RATE: 76 BPM | BODY MASS INDEX: 30.52 KG/M2

## 2024-11-01 DIAGNOSIS — M54.41 CHRONIC BILATERAL LOW BACK PAIN WITH RIGHT-SIDED SCIATICA: Primary | ICD-10-CM

## 2024-11-01 DIAGNOSIS — M48.061 NEURAL FORAMINAL STENOSIS OF LUMBAR SPINE: ICD-10-CM

## 2024-11-01 DIAGNOSIS — R20.2 NUMBNESS AND TINGLING IN BOTH HANDS: ICD-10-CM

## 2024-11-01 DIAGNOSIS — G89.29 CHRONIC BILATERAL LOW BACK PAIN WITH RIGHT-SIDED SCIATICA: Primary | ICD-10-CM

## 2024-11-01 DIAGNOSIS — M54.16 LUMBAR RADICULAR PAIN: ICD-10-CM

## 2024-11-01 DIAGNOSIS — M47.816 LUMBAR FACET JOINT SYNDROME: ICD-10-CM

## 2024-11-01 DIAGNOSIS — R20.0 NUMBNESS AND TINGLING IN BOTH HANDS: ICD-10-CM

## 2024-11-01 PROCEDURE — 1036F TOBACCO NON-USER: CPT | Performed by: NURSE PRACTITIONER

## 2024-11-01 PROCEDURE — G8427 DOCREV CUR MEDS BY ELIG CLIN: HCPCS | Performed by: NURSE PRACTITIONER

## 2024-11-01 PROCEDURE — 99214 OFFICE O/P EST MOD 30 MIN: CPT | Performed by: NURSE PRACTITIONER

## 2024-11-01 PROCEDURE — G8417 CALC BMI ABV UP PARAM F/U: HCPCS | Performed by: NURSE PRACTITIONER

## 2024-11-01 PROCEDURE — PBSHW PBB SHADOW CHARGE: Performed by: NURSE PRACTITIONER

## 2024-11-01 PROCEDURE — G8484 FLU IMMUNIZE NO ADMIN: HCPCS | Performed by: NURSE PRACTITIONER

## 2024-11-01 PROCEDURE — 3017F COLORECTAL CA SCREEN DOC REV: CPT | Performed by: NURSE PRACTITIONER

## 2024-11-01 RX ORDER — TRAMADOL HYDROCHLORIDE 50 MG/1
50 TABLET ORAL 2 TIMES DAILY PRN
Qty: 60 TABLET | Refills: 2 | Status: SHIPPED | OUTPATIENT
Start: 2024-11-01 | End: 2024-12-01

## 2024-11-01 RX ORDER — PREGABALIN 225 MG/1
225 CAPSULE ORAL 2 TIMES DAILY
Qty: 60 CAPSULE | Refills: 2 | Status: SHIPPED | OUTPATIENT
Start: 2024-11-01 | End: 2024-12-01

## 2024-11-01 RX ORDER — KETOROLAC TROMETHAMINE 30 MG/ML
60 INJECTION, SOLUTION INTRAMUSCULAR; INTRAVENOUS ONCE
Status: COMPLETED | OUTPATIENT
Start: 2024-11-01 | End: 2024-11-01

## 2024-11-01 RX ORDER — TRIAMCINOLONE ACETONIDE 40 MG/ML
40 INJECTION, SUSPENSION INTRA-ARTICULAR; INTRAMUSCULAR ONCE
Status: COMPLETED | OUTPATIENT
Start: 2024-11-01 | End: 2024-11-01

## 2024-11-01 RX ADMIN — TRIAMCINOLONE ACETONIDE 40 MG: 40 INJECTION, SUSPENSION INTRA-ARTICULAR; INTRAMUSCULAR at 11:03

## 2024-11-01 RX ADMIN — KETOROLAC TROMETHAMINE 60 MG: 30 INJECTION, SOLUTION INTRAMUSCULAR at 11:02

## 2024-11-01 ASSESSMENT — ENCOUNTER SYMPTOMS
CONSTIPATION: 0
BACK PAIN: 1
RESPIRATORY NEGATIVE: 1
EYES NEGATIVE: 1
DIARRHEA: 0

## 2024-11-01 NOTE — PROGRESS NOTES
[facility]  [address] [place]. [address]    Patient Name: Tariq Duckworth  MRN: [id number]  Encounter Date: 11/1/2024     SUBJECTIVE:  Tariq Duckworth is a 59 y.o., male being seen today regarding   Chief Complaint   Patient presents with    Back Pain    and routine medication management.    Lyrica not working as well as it used to    Functionality Assessment & Goals:  On a scale of 0 (Does not Interfere) to 10 (Completely Interferes)  Which number describes how during the past week pain has interfered with the following:   A.  General Activity: 7    B.  Mood:  9   C.  Walking Ability:  6   D.  Normal Work (Includes both work outside the home and housework):  8   E.  Relations with Other People:  8   F.  Sleep:  9    G.  Enjoyment of Life:  7  2.  Patient prefers to Take their Pain Medications:   [x] On a regular basis   [] Only when necessary   [] Does not take pain medications  3.  What are the Patient's Goals/ Expectations for Visiting Pain Management?   [] Learn about my pain   [] Physical Therapy   [] Receive Injections   [] Deal with Anxiety and Stress   [x] Receive Medication   [] Treat Depression    [] Treat Sleep   [] Treat Opioid Dependence/ Addiction    Most recent Oswestry Disability Questionnaire completed by patient on 12/1/23     Current Pain Assessment:         11/1/2024     9:41 AM   AMB PAIN ASSESSMENT   Location of Pain Back   Location Modifiers Medial;Inferior;Posterior   Severity of Pain 8   Quality of Pain Throbbing;Dull;Sharp;Aching   Duration of Pain Persistent   Frequency of Pain Constant   Aggravating Factors Bending;Stretching;Straightening;Exercise;Kneeling;Squatting;Standing;Walking;Stairs   Limiting Behavior Yes   Relieving Factors Rest   Result of Injury No   Work-Related Injury No   Are there other pain locations you wish to document? No        Current Medications & Allergies:   Current Outpatient Medications   Medication Instructions    albuterol

## 2024-11-11 ENCOUNTER — OFFICE VISIT (OUTPATIENT)
Dept: PAIN MANAGEMENT | Age: 59
End: 2024-11-11
Payer: MEDICARE

## 2024-11-11 ENCOUNTER — HOSPITAL ENCOUNTER (OUTPATIENT)
Age: 59
Setting detail: SPECIMEN
Discharge: HOME | End: 2024-11-11
Payer: MEDICARE

## 2024-11-11 VITALS
WEIGHT: 213 LBS | HEIGHT: 71 IN | BODY MASS INDEX: 29.82 KG/M2 | HEART RATE: 71 BPM | SYSTOLIC BLOOD PRESSURE: 160 MMHG | DIASTOLIC BLOOD PRESSURE: 90 MMHG | OXYGEN SATURATION: 98 %

## 2024-11-11 DIAGNOSIS — M54.41 CHRONIC BILATERAL LOW BACK PAIN WITH RIGHT-SIDED SCIATICA: ICD-10-CM

## 2024-11-11 DIAGNOSIS — Z79.891 ENCOUNTER FOR LONG-TERM OPIATE ANALGESIC USE: ICD-10-CM

## 2024-11-11 DIAGNOSIS — Z02.83 ENCOUNTER FOR DRUG SCREENING: Primary | ICD-10-CM

## 2024-11-11 DIAGNOSIS — Z02.83 ENCOUNTER FOR DRUG SCREENING: ICD-10-CM

## 2024-11-11 DIAGNOSIS — G89.29 CHRONIC BILATERAL LOW BACK PAIN WITH RIGHT-SIDED SCIATICA: ICD-10-CM

## 2024-11-11 PROCEDURE — 3017F COLORECTAL CA SCREEN DOC REV: CPT | Performed by: NURSE PRACTITIONER

## 2024-11-11 PROCEDURE — 1036F TOBACCO NON-USER: CPT | Performed by: NURSE PRACTITIONER

## 2024-11-11 PROCEDURE — G8427 DOCREV CUR MEDS BY ELIG CLIN: HCPCS | Performed by: NURSE PRACTITIONER

## 2024-11-11 PROCEDURE — G0481 DRUG TEST DEF 8-14 CLASSES: HCPCS

## 2024-11-11 PROCEDURE — G8417 CALC BMI ABV UP PARAM F/U: HCPCS | Performed by: NURSE PRACTITIONER

## 2024-11-11 PROCEDURE — 80307 DRUG TEST PRSMV CHEM ANLYZR: CPT

## 2024-11-11 PROCEDURE — G8484 FLU IMMUNIZE NO ADMIN: HCPCS | Performed by: NURSE PRACTITIONER

## 2024-11-11 PROCEDURE — 99214 OFFICE O/P EST MOD 30 MIN: CPT | Performed by: NURSE PRACTITIONER

## 2024-11-11 RX ORDER — HYDROCODONE BITARTRATE AND ACETAMINOPHEN 5; 325 MG/1; MG/1
1 TABLET ORAL 2 TIMES DAILY PRN
Qty: 60 TABLET | Refills: 0 | Status: SHIPPED | OUTPATIENT
Start: 2024-11-11 | End: 2024-12-11

## 2024-11-11 ASSESSMENT — ENCOUNTER SYMPTOMS
BACK PAIN: 1
EYES NEGATIVE: 1
DIARRHEA: 0
CONSTIPATION: 0
RESPIRATORY NEGATIVE: 1

## 2024-11-11 NOTE — PROGRESS NOTES
PHYSICAL EXAM  Physical Exam  Vitals reviewed.   Constitutional:       General: He is not in acute distress.     Appearance: He is well-developed. He is not ill-appearing, toxic-appearing or diaphoretic.   HENT:      Head: Normocephalic and atraumatic.   Cardiovascular:      Rate and Rhythm: Normal rate.   Pulmonary:      Effort: Pulmonary effort is normal. No accessory muscle usage or respiratory distress.   Musculoskeletal:      Comments: MRI OF THE LUMBAR SPINE WITHOUT CONTRAST, 6/11/2018 10:22 am     TECHNIQUE:  Multiplanar multisequence MRI of the lumbar spine was performed without the  administration of intravenous contrast.     COMPARISON:  None     HISTORY:  ORDERING SYSTEM PROVIDED HISTORY: Lumbar facet joint syndrome  TECHNOLOGIST PROVIDED HISTORY:  Reason for exam:->right lumbar radiculopathy  Ordering Physician Provided Reason for Exam:  Low back pain to the right  side, right leg pain. Symptoms for about one week.  Acuity: Acute  Type of Exam: Initial  Additional signs and symptoms:  Lumbar facet syndrome; Right lumbar  radiculopathy     Initial evaluation.     FINDINGS:  BONES/ALIGNMENT: There is normal alignment of the spine. The vertebral body  heights are maintained. The bone marrow signal appears unremarkable.  There  is degenerative disc disease with disc space narrowing and osteophytes at  L3-4, L4-5, and L5-S1.     SPINAL CORD: The conus terminates normally.     SOFT TISSUES: No paraspinal mass identified.  There is a cyst in the left  kidney that is not fully evaluated.  Ultrasound could better evaluate.     L1-L2:  The thecal sac and neural foramina are intact.     L2-L3:  There is mild facet and ligamentum flavum hypertrophy.  The thecal  sac is not stenotic.  Disc and/or osteophytes cause minimal narrowing of the  neural foramina bilaterally.     L3-L4: The thecal sac is not stenotic.   Disc and/or osteophytes result in  mild narrowing of the neural foramina bilaterally.   There is

## 2024-11-14 LAB
6-ACETYLMORPHINE, UR: NOT DETECTED
7-AMINOCLONAZEPAM, URINE: NOT DETECTED
ALPHA-OH-ALPRAZ, URINE: NOT DETECTED
ALPHA-OH-MIDAZOLAM, URINE: NOT DETECTED
ALPRAZOLAM, URINE: NOT DETECTED
AMPHETAMINE, URINE: NOT DETECTED
BARBITURATES, URINE: NORMAL
BENZOYLECGONINE, UR: NEGATIVE
BUPRENORPHINE URINE: NOT DETECTED
CARISOPRODOL, UR: NEGATIVE
CLONAZEPAM, URINE: NOT DETECTED
CODEINE, URINE: NOT DETECTED
CREAT UR-MCNC: 299.7 MG/DL (ref 20–400)
DIAZEPAM, URINE: NOT DETECTED
EER PAIN MGT DRUG PANEL, HIGH RES/EMIT U: NORMAL
ETHYL GLUCURONIDE UR: NORMAL
FENTANYL URINE: NOT DETECTED
GABAPENTIN: NOT DETECTED
HYDROCODONE, URINE: NOT DETECTED
HYDROMORPHONE, URINE: NOT DETECTED
LORAZEPAM, URINE: NOT DETECTED
MARIJUANA METAB, UR: NORMAL
MDA, URINE: NOT DETECTED
MDEA, EVE, UR: NOT DETECTED
MDMA, URINE: NOT DETECTED
MEPERIDINE METAB, UR: NOT DETECTED
METHADONE, URINE: NEGATIVE
METHAMPHETAMINE, URINE: NOT DETECTED
METHYLPHENIDATE: NOT DETECTED
MIDAZOLAM, URINE: NOT DETECTED
MORPHINE, OPI1M: NOT DETECTED
NALOXONE URINE: NOT DETECTED
NORBUPRENORPHINE, URINE: NOT DETECTED
NORDIAZEPAM, URINE: NOT DETECTED
NORFENTANYL, URINE: NOT DETECTED
NORHYDROCODONE, URINE: NOT DETECTED
NOROXYCODONE, URINE: NOT DETECTED
NOROXYMORPHONE, URINE: NOT DETECTED
OXAZEPAM, URINE: NOT DETECTED
OXYCODONE URINE: NOT DETECTED
OXYMORPHONE, URINE: NOT DETECTED
PAIN MGT DRUG PANEL, HI RES, UR: NORMAL
PCP,URINE: NEGATIVE
PHENTERMINE, UR: NOT DETECTED
PREGABALIN: PRESENT
TAPENTADOL, URINE: NOT DETECTED
TAPENTADOL-O-SULFATE, URINE: NOT DETECTED
TEMAZEPAM, URINE: NOT DETECTED
TRAMADOL, URINE: NORMAL
ZOLPIDEM METABOLITE (ZCA), URINE: NOT DETECTED
ZOLPIDEM, URINE: NOT DETECTED

## 2024-12-04 ENCOUNTER — OFFICE VISIT (OUTPATIENT)
Dept: PAIN MANAGEMENT | Age: 59
End: 2024-12-04
Payer: MEDICARE

## 2024-12-04 VITALS
HEIGHT: 71 IN | DIASTOLIC BLOOD PRESSURE: 74 MMHG | OXYGEN SATURATION: 94 % | HEART RATE: 102 BPM | SYSTOLIC BLOOD PRESSURE: 124 MMHG | WEIGHT: 208.8 LBS | RESPIRATION RATE: 18 BRPM | BODY MASS INDEX: 29.23 KG/M2

## 2024-12-04 DIAGNOSIS — M54.41 CHRONIC BILATERAL LOW BACK PAIN WITH RIGHT-SIDED SCIATICA: ICD-10-CM

## 2024-12-04 DIAGNOSIS — M19.042 ARTHRITIS OF BOTH HANDS: ICD-10-CM

## 2024-12-04 DIAGNOSIS — M19.041 ARTHRITIS OF BOTH HANDS: ICD-10-CM

## 2024-12-04 DIAGNOSIS — G44.221 CHRONIC TENSION-TYPE HEADACHE, INTRACTABLE: ICD-10-CM

## 2024-12-04 DIAGNOSIS — G89.29 CHRONIC BILATERAL LOW BACK PAIN WITH RIGHT-SIDED SCIATICA: ICD-10-CM

## 2024-12-04 DIAGNOSIS — M47.816 LUMBAR FACET JOINT SYNDROME: Primary | ICD-10-CM

## 2024-12-04 PROCEDURE — G8427 DOCREV CUR MEDS BY ELIG CLIN: HCPCS | Performed by: NURSE PRACTITIONER

## 2024-12-04 PROCEDURE — 1036F TOBACCO NON-USER: CPT | Performed by: NURSE PRACTITIONER

## 2024-12-04 PROCEDURE — 99214 OFFICE O/P EST MOD 30 MIN: CPT | Performed by: NURSE PRACTITIONER

## 2024-12-04 PROCEDURE — G8484 FLU IMMUNIZE NO ADMIN: HCPCS | Performed by: NURSE PRACTITIONER

## 2024-12-04 PROCEDURE — 3017F COLORECTAL CA SCREEN DOC REV: CPT | Performed by: NURSE PRACTITIONER

## 2024-12-04 PROCEDURE — G8417 CALC BMI ABV UP PARAM F/U: HCPCS | Performed by: NURSE PRACTITIONER

## 2024-12-04 RX ORDER — HYDROCODONE BITARTRATE AND ACETAMINOPHEN 5; 325 MG/1; MG/1
1 TABLET ORAL 2 TIMES DAILY PRN
Qty: 60 TABLET | Refills: 0 | Status: SHIPPED | OUTPATIENT
Start: 2024-12-11 | End: 2025-01-10

## 2024-12-04 ASSESSMENT — ENCOUNTER SYMPTOMS
BACK PAIN: 1
RESPIRATORY NEGATIVE: 1
EYES NEGATIVE: 1
CONSTIPATION: 0
DIARRHEA: 0

## 2024-12-04 NOTE — PROGRESS NOTES
Allergies:   Current Outpatient Medications   Medication Instructions    albuterol (PROVENTIL) 2.5 mg, Nebulization, EVERY 6 HOURS PRN    aspirin-acetaminophen-caffeine (EXCEDRIN MIGRAINE) 250-250-65 MG per tablet 1 tablet, Oral, EVERY 6 HOURS PRN    atorvastatin (LIPITOR) 10 mg, Oral, DAILY    BIKTARVY -25 MG TABS per tablet 1 tablet, Oral, DAILY    Bismuth Subsalicylate (PEPTO-BISMOL PO) QID, 0 Refill(s)    butalbital-acetaminophen-caffeine (FIORICET, ESGIC) -40 MG per tablet TAKE 1 TO 2 TABLETS BY MOUTH TWICE DAILY AS NEEDED FOR HEADACHE    celecoxib (CELEBREX) 100 mg, Oral, DAILY    chlorhexidine gluconate (ANTISEPTIC SKIN CLEANSER) 4 % SOLN external solution Use to wash affected area of scalp, daily.  Do NOT get in ears or eyes.    clobetasol (TEMOVATE) 0.05 % external solution Apply to affected areas of scalp twice daily, as needed, for itching.  Do NOT use on face, groin, or armpits.    Clotrimazole (MYCELEX) 10 mg, Oral, 5 TIMES DAILY    cyclobenzaprine (FLEXERIL) 5 mg, Oral, 2 TIMES DAILY PRN    darunavir-cobicistat 800-150 MG TABS tablet Oral, NIGHTLY    DESCOVY 200-25 MG TABS 1 tablet, Oral, DAILY    diclofenac (VOLTAREN) 75 mg, Oral, 2 TIMES DAILY    diphenoxylate-atropine (LOMOTIL) 2.5-0.025 MG per tablet TAKE 2 TABLETS BY MOUTH THREE TIMES DAILY AS NEEDED FOR LOOSE STOOLS    dolutegravir-rilpivirine (JULUCA) 50-25 MG per tablet 1 tablet, Oral, DAILY    DULoxetine (CYMBALTA) 60 MG extended release capsule TAKE 2 CAPSULES BY MOUTH EVERY DAY    famotidine (PEPCID) 20 mg, Oral, NIGHTLY    fenofibrate (TRICOR) 145 mg, Oral, DAILY    fenofibric acid (TRILIPIX) 135 mg, Oral, DAILY    fluticasone (FLONASE) 50 MCG/ACT nasal spray SHAKE LIQUID AND USE 1 SPRAY IN EACH NOSTRIL DAILY    Glucosamine HCl (GLUCOSAMINE PO) 1 capsule, Oral, DAILY    [START ON 12/11/2024] HYDROcodone-acetaminophen (NORCO) 5-325 MG per tablet 1 tablet, Oral, 2 TIMES DAILY PRN    hydrOXYzine HCl (ATARAX) 10 mg, Oral, 2 TIMES

## 2024-12-20 RX ORDER — GABAPENTIN 800 MG/1
800 TABLET ORAL 3 TIMES DAILY
Qty: 90 TABLET | Refills: 5 | Status: SHIPPED | OUTPATIENT
Start: 2024-12-20 | End: 2025-06-18

## 2025-01-18 ENCOUNTER — OFFICE VISIT (OUTPATIENT)
Dept: PRIMARY CARE CLINIC | Age: 60
End: 2025-01-18
Payer: MEDICARE

## 2025-01-18 VITALS
BODY MASS INDEX: 29.01 KG/M2 | TEMPERATURE: 97.5 F | OXYGEN SATURATION: 99 % | HEART RATE: 86 BPM | WEIGHT: 208 LBS | DIASTOLIC BLOOD PRESSURE: 68 MMHG | SYSTOLIC BLOOD PRESSURE: 126 MMHG | RESPIRATION RATE: 20 BRPM

## 2025-01-18 DIAGNOSIS — H61.23 BILATERAL IMPACTED CERUMEN: ICD-10-CM

## 2025-01-18 DIAGNOSIS — H69.92 CHRONIC DYSFUNCTION OF LEFT EUSTACHIAN TUBE: Primary | ICD-10-CM

## 2025-01-18 PROCEDURE — 69210 REMOVE IMPACTED EAR WAX UNI: CPT | Performed by: FAMILY MEDICINE

## 2025-01-18 PROCEDURE — 99214 OFFICE O/P EST MOD 30 MIN: CPT | Performed by: FAMILY MEDICINE

## 2025-01-18 ASSESSMENT — ENCOUNTER SYMPTOMS
RESPIRATORY NEGATIVE: 1
EYES NEGATIVE: 1
SORE THROAT: 0
RHINORRHEA: 0
COUGH: 0
GASTROINTESTINAL NEGATIVE: 1

## 2025-01-18 NOTE — PROGRESS NOTES
Writer went in room for ear wash. Pt stated he has had this done before. Writer looked in ears and did an ear wash twice on left ear. Pt did not seem to tolerate it. Writer went and got another nurse to try. Nurse rinsed ears again, and patient pulled away in pain, letting the writer and nurse know he could not tolerate it any longer. Writer stated we would let Doctor know. Pt came out of room after 10 minutes and left AMA.   
rebound.   Musculoskeletal:         General: No tenderness. Normal range of motion.      Cervical back: Neck supple.   Skin:     General: Skin is warm and dry.      Findings: No erythema or rash.   Neurological:      Mental Status: He is alert and oriented to person, place, and time.   Psychiatric:         Behavior: Behavior normal.         Thought Content: Thought content normal.         Judgment: Judgment normal.       /68   Pulse 86   Temp 97.5 °F (36.4 °C) (Tympanic)   Resp 20   Wt 94.3 kg (208 lb)   SpO2 99%   BMI 29.01 kg/m²     Assessment:      Encounter Diagnoses   Name Primary?    Chronic dysfunction of left eustachian tube Yes    Bilateral impacted cerumen    Not amendable to curette removal      Plan:     Ears to be flushed clean.     Nursing attempting flushing of left ear.  Patient experienced pain with flushing.  Small amounts coming out, patient had excessive pain and quit the procedure.  He ended up leaving before I could finish with another patient come back in to re address the situation.          Mario Haile MD

## 2025-02-03 ENCOUNTER — OFFICE VISIT (OUTPATIENT)
Dept: PAIN MANAGEMENT | Age: 60
End: 2025-02-03
Payer: MEDICARE

## 2025-02-03 VITALS
BODY MASS INDEX: 31.92 KG/M2 | OXYGEN SATURATION: 96 % | HEIGHT: 71 IN | SYSTOLIC BLOOD PRESSURE: 130 MMHG | RESPIRATION RATE: 17 BRPM | HEART RATE: 97 BPM | WEIGHT: 228 LBS | DIASTOLIC BLOOD PRESSURE: 85 MMHG

## 2025-02-03 DIAGNOSIS — G89.29 CHRONIC BILATERAL LOW BACK PAIN WITH RIGHT-SIDED SCIATICA: Primary | ICD-10-CM

## 2025-02-03 DIAGNOSIS — M48.061 NEURAL FORAMINAL STENOSIS OF LUMBAR SPINE: ICD-10-CM

## 2025-02-03 DIAGNOSIS — M47.817 LUMBOSACRAL SPONDYLOSIS WITHOUT MYELOPATHY: ICD-10-CM

## 2025-02-03 DIAGNOSIS — G43.009 MIGRAINE WITHOUT AURA AND WITHOUT STATUS MIGRAINOSUS, NOT INTRACTABLE: ICD-10-CM

## 2025-02-03 DIAGNOSIS — M54.41 CHRONIC BILATERAL LOW BACK PAIN WITH RIGHT-SIDED SCIATICA: Primary | ICD-10-CM

## 2025-02-03 DIAGNOSIS — G44.221 CHRONIC TENSION-TYPE HEADACHE, INTRACTABLE: ICD-10-CM

## 2025-02-03 PROCEDURE — 99214 OFFICE O/P EST MOD 30 MIN: CPT | Performed by: NURSE PRACTITIONER

## 2025-02-03 PROCEDURE — 3017F COLORECTAL CA SCREEN DOC REV: CPT | Performed by: NURSE PRACTITIONER

## 2025-02-03 PROCEDURE — 1036F TOBACCO NON-USER: CPT | Performed by: NURSE PRACTITIONER

## 2025-02-03 PROCEDURE — G8427 DOCREV CUR MEDS BY ELIG CLIN: HCPCS | Performed by: NURSE PRACTITIONER

## 2025-02-03 PROCEDURE — G8417 CALC BMI ABV UP PARAM F/U: HCPCS | Performed by: NURSE PRACTITIONER

## 2025-02-03 ASSESSMENT — ENCOUNTER SYMPTOMS
CONSTIPATION: 0
DIARRHEA: 0
EYES NEGATIVE: 1
RESPIRATORY NEGATIVE: 1
BACK PAIN: 1

## 2025-02-03 NOTE — PROGRESS NOTES
[facility]  [address] [place]. [address]    Patient Name: Tariq Duckworth  MRN: [id number]  Encounter Date: 2/3/2025     SUBJECTIVE:  Tariq Duckworth is a 59 y.o., male being seen today regarding   Chief Complaint   Patient presents with    Back Pain     lumbar    and routine medication management.    Lyrica not working as well as it used to, started on tramadol-allergic reaction. Norco rx-Unsure if he's taking or not    Restarted gabapentin 800 with relief    Functionality Assessment & Goals:  On a scale of 0 (Does not Interfere) to 10 (Completely Interferes)  Which number describes how during the past week pain has interfered with the following:   A.  General Activity: 7    B.  Mood:  9   C.  Walking Ability:  6   D.  Normal Work (Includes both work outside the home and housework):  8   E.  Relations with Other People:  8   F.  Sleep:  9    G.  Enjoyment of Life:  7  2.  Patient prefers to Take their Pain Medications:   [x] On a regular basis   [] Only when necessary   [] Does not take pain medications  3.  What are the Patient's Goals/ Expectations for Visiting Pain Management?   [] Learn about my pain   [] Physical Therapy   [] Receive Injections   [] Deal with Anxiety and Stress   [x] Receive Medication   [] Treat Depression    [] Treat Sleep   [] Treat Opioid Dependence/ Addiction    Most recent Oswestry Disability Questionnaire completed by patient on 12/1/23     Current Pain Assessment:         2/3/2025     9:39 AM   AMB PAIN ASSESSMENT   Location of Pain Back   Location Modifiers Posterior;Medial;Lateral   Severity of Pain 2   Quality of Pain Dull;Aching   Duration of Pain Persistent   Frequency of Pain Constant   Aggravating Factors Bending;Stretching;Straightening;Exercise;Kneeling;Squatting;Standing;Walking;Stairs   Limiting Behavior Yes   Relieving Factors Rest   Result of Injury No   Work-Related Injury No   Are there other pain locations you wish to document? No

## 2025-02-14 RX ORDER — ROPINIROLE 0.5 MG/1
0.5 TABLET, FILM COATED ORAL
Qty: 90 TABLET | Refills: 2 | Status: SHIPPED | OUTPATIENT
Start: 2025-02-14

## 2025-02-14 NOTE — TELEPHONE ENCOUNTER
Last Appt:  8/2/2024  Next Appt:   2/19/2025  Med verified in [facility]     Patient is requesting a refill of requip    [facility]

## 2025-02-19 ENCOUNTER — OFFICE VISIT (OUTPATIENT)
Dept: NEUROLOGY | Age: 60
End: 2025-02-19
Payer: MEDICARE

## 2025-02-19 VITALS
HEIGHT: 71 IN | OXYGEN SATURATION: 95 % | WEIGHT: 225 LBS | SYSTOLIC BLOOD PRESSURE: 110 MMHG | HEART RATE: 100 BPM | BODY MASS INDEX: 31.5 KG/M2 | DIASTOLIC BLOOD PRESSURE: 64 MMHG

## 2025-02-19 DIAGNOSIS — Z87.891 FORMER SMOKER: ICD-10-CM

## 2025-02-19 DIAGNOSIS — R41.3 MEMORY PROBLEM: ICD-10-CM

## 2025-02-19 DIAGNOSIS — M62.838 MUSCLE SPASM: ICD-10-CM

## 2025-02-19 DIAGNOSIS — M54.2 NECK PAIN: ICD-10-CM

## 2025-02-19 DIAGNOSIS — G89.29 CHRONIC INTRACTABLE HEADACHE, UNSPECIFIED HEADACHE TYPE: ICD-10-CM

## 2025-02-19 DIAGNOSIS — F31.9 BIPOLAR AFFECTIVE DISORDER, REMISSION STATUS UNSPECIFIED (HCC): ICD-10-CM

## 2025-02-19 DIAGNOSIS — G43.009 MIGRAINE WITHOUT AURA AND WITHOUT STATUS MIGRAINOSUS, NOT INTRACTABLE: Primary | ICD-10-CM

## 2025-02-19 DIAGNOSIS — M47.817 LUMBOSACRAL SPONDYLOSIS WITHOUT MYELOPATHY: ICD-10-CM

## 2025-02-19 DIAGNOSIS — M54.41 CHRONIC BILATERAL LOW BACK PAIN WITH RIGHT-SIDED SCIATICA: ICD-10-CM

## 2025-02-19 DIAGNOSIS — Z87.891 EX-SMOKER: ICD-10-CM

## 2025-02-19 DIAGNOSIS — Z21 ASYMPTOMATIC HIV INFECTION (HCC): ICD-10-CM

## 2025-02-19 DIAGNOSIS — G44.221 CHRONIC TENSION-TYPE HEADACHE, INTRACTABLE: ICD-10-CM

## 2025-02-19 DIAGNOSIS — M54.16 LUMBAR RADICULAR PAIN: ICD-10-CM

## 2025-02-19 DIAGNOSIS — G44.59 OTHER COMPLICATED HEADACHE SYNDROME: ICD-10-CM

## 2025-02-19 DIAGNOSIS — G89.29 CHRONIC BILATERAL LOW BACK PAIN WITH RIGHT-SIDED SCIATICA: ICD-10-CM

## 2025-02-19 DIAGNOSIS — R51.9 CHRONIC INTRACTABLE HEADACHE, UNSPECIFIED HEADACHE TYPE: ICD-10-CM

## 2025-02-19 PROCEDURE — 3017F COLORECTAL CA SCREEN DOC REV: CPT | Performed by: PSYCHIATRY & NEUROLOGY

## 2025-02-19 PROCEDURE — 1036F TOBACCO NON-USER: CPT | Performed by: PSYCHIATRY & NEUROLOGY

## 2025-02-19 PROCEDURE — 99214 OFFICE O/P EST MOD 30 MIN: CPT | Performed by: PSYCHIATRY & NEUROLOGY

## 2025-02-19 PROCEDURE — G8427 DOCREV CUR MEDS BY ELIG CLIN: HCPCS | Performed by: PSYCHIATRY & NEUROLOGY

## 2025-02-19 PROCEDURE — G8417 CALC BMI ABV UP PARAM F/U: HCPCS | Performed by: PSYCHIATRY & NEUROLOGY

## 2025-02-19 RX ORDER — GABAPENTIN 800 MG/1
800 TABLET ORAL 3 TIMES DAILY
Qty: 90 TABLET | Refills: 5 | Status: SHIPPED | OUTPATIENT
Start: 2025-02-19 | End: 2025-08-18

## 2025-02-19 RX ORDER — ONDANSETRON 4 MG/1
4 TABLET, ORALLY DISINTEGRATING ORAL EVERY 8 HOURS PRN
Qty: 30 TABLET | Refills: 2 | Status: SHIPPED | OUTPATIENT
Start: 2025-02-19

## 2025-02-19 RX ORDER — BUTALBITAL, ACETAMINOPHEN AND CAFFEINE 50; 325; 40 MG/1; MG/1; MG/1
TABLET ORAL
Qty: 60 TABLET | Refills: 2 | Status: SHIPPED | OUTPATIENT
Start: 2025-02-19

## 2025-02-19 RX ORDER — TOPIRAMATE 50 MG/1
50 TABLET, FILM COATED ORAL 2 TIMES DAILY
Qty: 180 TABLET | Refills: 1 | Status: SHIPPED | OUTPATIENT
Start: 2025-02-19

## 2025-02-19 RX ORDER — RIZATRIPTAN BENZOATE 10 MG/1
TABLET, ORALLY DISINTEGRATING ORAL
Qty: 12 TABLET | Refills: 2 | Status: SHIPPED | OUTPATIENT
Start: 2025-02-19

## 2025-02-19 RX ORDER — OMEPRAZOLE 40 MG/1
40 CAPSULE, DELAYED RELEASE ORAL DAILY
Qty: 90 CAPSULE | Refills: 1 | Status: SHIPPED | OUTPATIENT
Start: 2025-02-19

## 2025-02-19 ASSESSMENT — ENCOUNTER SYMPTOMS
CONSTIPATION: 0
SORE THROAT: 0
SINUS PRESSURE: 0
EYE DISCHARGE: 0
SCALP TENDERNESS: 0
SHORTNESS OF BREATH: 0
COLOR CHANGE: 0
TROUBLE SWALLOWING: 0
ABDOMINAL PAIN: 0
FACIAL SWEATING: 0
RHINORRHEA: 0
SWOLLEN GLANDS: 0
BLOOD IN STOOL: 0
EYE ITCHING: 0
DIARRHEA: 0
APNEA: 0
VOMITING: 0
ABDOMINAL DISTENTION: 0
EYE PAIN: 0
VOICE CHANGE: 0
WHEEZING: 0
CHOKING: 0
EYE REDNESS: 0
CHEST TIGHTNESS: 0
BLURRED VISION: 0
FACIAL SWELLING: 0
PHOTOPHOBIA: 1
BACK PAIN: 1
VISUAL CHANGE: 0
EYE WATERING: 0
NAUSEA: 1
COUGH: 0

## 2025-02-19 NOTE — PROGRESS NOTES
Substance and Sexual Activity    Alcohol use: Yes     Comment: socially    Drug use: Yes     Types: Marijuana (Weed)     Comment: PRN for pain (gummies)    Sexual activity: Not on file   Other Topics Concern    Not on file   Social History Narrative    Not on file     Social Determinants of Health     Financial Resource Strain: Not on file   Food Insecurity: No Food Insecurity (11/19/2024)    Received from [facility]    Hunger Screening     Within the past 12 months we worried whether our food would run out before we got money to buy more.: Never True     Within the past 12 months the food we bought just didn't last and we didn't have money to get more.: Never True   Transportation Needs: Not on file   Physical Activity: Not on file   Stress: Not on file   Social Connections: Not on file   Intimate Partner Violence: Not on file   Housing Stability: Not on file       Vitals:    02/19/25 0931   BP: 110/64   Pulse: 100   SpO2: 95%         Wt Readings from Last 3 Encounters:   02/19/25 102.1 kg (225 lb)   02/03/25 103.4 kg (228 lb)   01/18/25 94.3 kg (208 lb)         BP Readings from Last 3 Encounters:   02/19/25 110/64   02/03/25 130/85   01/18/25 126/68       Hematology and Coagulation  Lab Results   Component Value Date/Time    WBC 6.8 06/23/2022 09:05 AM    RBC 4.51 06/23/2022 09:05 AM    HGB 14.2 06/23/2022 09:05 AM    HCT 45.1 06/23/2022 09:05 AM    .0 06/23/2022 09:05 AM    MCH 31.5 06/23/2022 09:05 AM    MCHC 31.5 06/23/2022 09:05 AM    RDW 13.1 06/23/2022 09:05 AM     06/23/2022 09:05 AM    MPV 9.1 06/23/2022 09:05 AM       Chemistries  Lab Results   Component Value Date/Time     09/20/2023 12:03 PM    K 4.4 09/20/2023 12:03 PM     09/20/2023 12:03 PM    CO2 25 09/20/2023 12:03 PM    BUN 21 09/20/2023 12:03 PM    CREATININE 1.0 09/20/2023 12:03 PM    CALCIUM 9.4 09/20/2023 12:03 PM    BILITOT 0.2 09/20/2023 12:03 PM    ALKPHOS 79 09/20/2023 12:03 PM    AST 15 09/20/2023

## 2025-03-24 ENCOUNTER — OFFICE VISIT (OUTPATIENT)
Dept: DERMATOLOGY | Age: 60
End: 2025-03-24
Payer: MEDICARE

## 2025-03-24 VITALS — DIASTOLIC BLOOD PRESSURE: 82 MMHG | OXYGEN SATURATION: 96 % | SYSTOLIC BLOOD PRESSURE: 130 MMHG | HEART RATE: 88 BPM

## 2025-03-24 DIAGNOSIS — L73.9 FOLLICULITIS: Primary | ICD-10-CM

## 2025-03-24 PROCEDURE — G8427 DOCREV CUR MEDS BY ELIG CLIN: HCPCS | Performed by: PHYSICIAN ASSISTANT

## 2025-03-24 PROCEDURE — 1036F TOBACCO NON-USER: CPT | Performed by: PHYSICIAN ASSISTANT

## 2025-03-24 PROCEDURE — 3017F COLORECTAL CA SCREEN DOC REV: CPT | Performed by: PHYSICIAN ASSISTANT

## 2025-03-24 PROCEDURE — 99213 OFFICE O/P EST LOW 20 MIN: CPT | Performed by: PHYSICIAN ASSISTANT

## 2025-03-24 PROCEDURE — G8417 CALC BMI ABV UP PARAM F/U: HCPCS | Performed by: PHYSICIAN ASSISTANT

## 2025-03-24 RX ORDER — FLUOCINONIDE TOPICAL SOLUTION USP, 0.05% 0.5 MG/ML
SOLUTION TOPICAL
Qty: 60 ML | Refills: 2 | Status: SHIPPED | OUTPATIENT
Start: 2025-03-24

## 2025-03-24 NOTE — PROGRESS NOTES
Dermatology Patient Note  [facility] SPECIALY CARE, [facility]  [facility] DERM AND SKIN A DEPARTMENT OF [facility]  [address]  [facility] [address]  Dept: [phone]  Dept Fax: [phone]      VISITDATE: 3/24/2025   REFERRING PROVIDER: No ref. provider found      Tariq Duckworth is a 59 y.o. male  who presents today in the office for:    Hair/Scalp Problem      HISTORY OF PRESENT ILLNESS:  As above.  Pt c/o intermittent \"pimples\" on his scalp.  Ketoconazole shampoo has been helpful with these, and with his scaling and pruritus of the scalp.    MEDICAL PROBLEMS:  Patient Active Problem List    Diagnosis Date Noted    Bipolar affective disorder, current episode manic ([facility]) 10/19/2022     Priority: Medium    Arthritis of both hands 08/31/2022     Priority: Medium    Migraine without aura and without status migrainosus, not intractable 11/02/2023    Chronic tension-type headache, intractable 11/02/2023    Asymptomatic HIV infection (HCC) 01/21/2022    Former smoker 09/23/2020    Intractable headache 05/28/2020    Ventral hernia without obstruction or gangrene     Left inguinal hernia     Bipolar disorder (HCC) 11/21/2019     follows with Dr. Rajput      Memory problem 11/21/2019    Abnormal CT of the chest 11/21/2019    Ex-smoker 11/21/2019    Neck pain 11/21/2019    Other complicated headache syndrome 11/21/2019    Lumbosacral spondylosis without myelopathy 12/11/2018    Neural foraminal stenosis of lumbar spine 12/11/2018    Muscle spasm 07/03/2018    Centrilobular emphysema (HCC)     Abnormal EKG     Panniculitis involving lumbar region 12/14/2017    Lumbar facet joint syndrome 12/14/2017    Lumbar radicular pain 09/26/2017    Chronic bilateral low back pain with right-sided sciatica 09/26/2017    IFG (impaired fasting glucose) 11/08/2016    HIV (human immunodeficiency virus infection) ([facility])        CURRENT MEDICATIONS:   Current Outpatient Medications   Medication Sig

## 2025-04-03 ENCOUNTER — OFFICE VISIT (OUTPATIENT)
Dept: PAIN MANAGEMENT | Age: 60
End: 2025-04-03
Payer: MEDICARE

## 2025-04-03 ENCOUNTER — HOSPITAL ENCOUNTER (OUTPATIENT)
Age: 60
Discharge: HOME | End: 2025-04-03
Payer: MEDICARE

## 2025-04-03 VITALS
SYSTOLIC BLOOD PRESSURE: 126 MMHG | DIASTOLIC BLOOD PRESSURE: 76 MMHG | WEIGHT: 220 LBS | HEART RATE: 73 BPM | HEIGHT: 71 IN | OXYGEN SATURATION: 98 % | BODY MASS INDEX: 30.8 KG/M2 | RESPIRATION RATE: 18 BRPM

## 2025-04-03 DIAGNOSIS — M54.2 NECK PAIN: Primary | ICD-10-CM

## 2025-04-03 LAB
ALBUMIN SERPL-MCNC: 4.5 G/DL (ref 3.5–5.2)
ALBUMIN/GLOB SERPL: 2 {RATIO} (ref 1–2.5)
ALP SERPL-CCNC: 83 U/L (ref 40–129)
ALT SERPL-CCNC: 26 U/L (ref 10–50)
ANION GAP SERPL CALCULATED.3IONS-SCNC: 10 MMOL/L (ref 9–16)
AST SERPL-CCNC: 24 U/L (ref 10–50)
BASOPHILS # BLD: 0.1 K/UL (ref 0–0.2)
BASOPHILS NFR BLD: 2 % (ref 0–2)
BILIRUB SERPL-MCNC: 0.3 MG/DL (ref 0–1.2)
BUN SERPL-MCNC: 18 MG/DL (ref 6–20)
BUN/CREAT SERPL: 14 (ref 9–20)
CALCIUM SERPL-MCNC: 9.7 MG/DL (ref 8.6–10.4)
CHLORIDE SERPL-SCNC: 109 MMOL/L (ref 98–107)
CHOLEST SERPL-MCNC: 161 MG/DL (ref 0–199)
CHOLESTEROL/HDL RATIO: 3
CO2 SERPL-SCNC: 23 MMOL/L (ref 20–31)
CREAT SERPL-MCNC: 1.3 MG/DL (ref 0.7–1.2)
EOSINOPHIL # BLD: 0.43 K/UL (ref 0–0.44)
EOSINOPHILS RELATIVE PERCENT: 8 % (ref 1–4)
ERYTHROCYTE [DISTWIDTH] IN BLOOD BY AUTOMATED COUNT: 12.5 % (ref 11.8–14.4)
GFR, ESTIMATED: 63 ML/MIN/1.73M2
GLUCOSE SERPL-MCNC: 106 MG/DL (ref 74–99)
HCT VFR BLD AUTO: 45 % (ref 40.7–50.3)
HDLC SERPL-MCNC: 53 MG/DL
HGB BLD-MCNC: 14.9 G/DL (ref 13–17)
IMM GRANULOCYTES # BLD AUTO: 0.03 K/UL (ref 0–0.3)
IMM GRANULOCYTES NFR BLD: 1 %
LDLC SERPL CALC-MCNC: 70 MG/DL (ref 0–100)
LYMPHOCYTES NFR BLD: 2.24 K/UL (ref 1.1–3.7)
LYMPHOCYTES RELATIVE PERCENT: 40 % (ref 24–43)
MCH RBC QN AUTO: 31.6 PG (ref 25.2–33.5)
MCHC RBC AUTO-ENTMCNC: 33.1 G/DL (ref 25.2–33.5)
MCV RBC AUTO: 95.5 FL (ref 82.6–102.9)
MONOCYTES NFR BLD: 0.45 K/UL (ref 0.1–1.2)
MONOCYTES NFR BLD: 8 % (ref 3–12)
NEUTROPHILS NFR BLD: 41 % (ref 36–65)
NEUTS SEG NFR BLD: 2.4 K/UL (ref 1.5–8.1)
NRBC BLD-RTO: 0 PER 100 WBC
PLATELET # BLD AUTO: 300 K/UL (ref 138–453)
PMV BLD AUTO: 9.3 FL (ref 8.1–13.5)
POTASSIUM SERPL-SCNC: 4.4 MMOL/L (ref 3.7–5.3)
PROT SERPL-MCNC: 6.8 G/DL (ref 6.6–8.7)
PSA SERPL-MCNC: 5.06 NG/ML (ref 0–4)
RBC # BLD AUTO: 4.71 M/UL (ref 4.21–5.77)
SODIUM SERPL-SCNC: 142 MMOL/L (ref 136–145)
TRIGL SERPL-MCNC: 188 MG/DL
VLDLC SERPL CALC-MCNC: 38 MG/DL (ref 1–30)
WBC OTHER # BLD: 5.7 K/UL (ref 3.5–11.3)

## 2025-04-03 PROCEDURE — 85025 COMPLETE CBC W/AUTO DIFF WBC: CPT

## 2025-04-03 PROCEDURE — G8417 CALC BMI ABV UP PARAM F/U: HCPCS | Performed by: NURSE PRACTITIONER

## 2025-04-03 PROCEDURE — G0103 PSA SCREENING: HCPCS

## 2025-04-03 PROCEDURE — 3017F COLORECTAL CA SCREEN DOC REV: CPT | Performed by: NURSE PRACTITIONER

## 2025-04-03 PROCEDURE — 1036F TOBACCO NON-USER: CPT | Performed by: NURSE PRACTITIONER

## 2025-04-03 PROCEDURE — 80061 LIPID PANEL: CPT

## 2025-04-03 PROCEDURE — 80053 COMPREHEN METABOLIC PANEL: CPT

## 2025-04-03 PROCEDURE — G8427 DOCREV CUR MEDS BY ELIG CLIN: HCPCS | Performed by: NURSE PRACTITIONER

## 2025-04-03 PROCEDURE — 36415 COLL VENOUS BLD VENIPUNCTURE: CPT

## 2025-04-03 PROCEDURE — 99214 OFFICE O/P EST MOD 30 MIN: CPT | Performed by: NURSE PRACTITIONER

## 2025-04-03 RX ORDER — ACYCLOVIR 50 MG/G
OINTMENT TOPICAL
COMMUNITY
Start: 2025-02-27

## 2025-04-03 RX ORDER — TRIAMCINOLONE ACETONIDE 1 MG/G
1 OINTMENT TOPICAL 3 TIMES DAILY
COMMUNITY
Start: 2025-04-01

## 2025-04-03 RX ORDER — PREGABALIN 225 MG/1
225 CAPSULE ORAL 2 TIMES DAILY
COMMUNITY
Start: 2024-09-07

## 2025-04-03 ASSESSMENT — ENCOUNTER SYMPTOMS
BACK PAIN: 1
RESPIRATORY NEGATIVE: 1
EYES NEGATIVE: 1
DIARRHEA: 0
CONSTIPATION: 0

## 2025-04-03 NOTE — PROGRESS NOTES
[facility]  [address] [place]. [address]    Patient Name: Tariq Duckworth  MRN: [id number]  Encounter Date: 4/3/2025     SUBJECTIVE:  Tariq Duckworth is a 59 y.o., male being seen today regarding   Chief Complaint   Patient presents with    Back Pain    and routine medication management.    History of Present Illness  The patient is a 59-year-old male who presents for evaluation of right foot pain.    He has been experiencing persistent right foot pain for over a month, which he attributes to a previous toe fracture. He is scheduled for a surgical procedure on his right foot, specifically a hammertoe correction involving the fusion of two joints and tendon shortening. The surgery is expected to take place after Easter. He has an upcoming appointment with podiatry on 04/22/2025. He reports that his hand condition is currently stable. He does not require any medication refills at this time. He believes the pain originates from his foot rather than his back. He has been managing his pain with gabapentin, which he reports as effective.    He has been diagnosed with neuropathy in his leg via EMG done in [place] by Dr. Tafoya    MEDICATIONS  Gabapentin.    Functionality Assessment & Goals:  On a scale of 0 (Does not Interfere) to 10 (Completely Interferes)  Which number describes how during the past week pain has interfered with the following:   A.  General Activity: 7    B.  Mood:  9   C.  Walking Ability:  6   D.  Normal Work (Includes both work outside the home and housework):  8   E.  Relations with Other People:  8   F.  Sleep:  9    G.  Enjoyment of Life:  7  2.  Patient prefers to Take their Pain Medications:   [x] On a regular basis   [] Only when necessary   [] Does not take pain medications  3.  What are the Patient's Goals/ Expectations for Visiting Pain Management?   [] Learn about my pain   [] Physical Therapy   [] Receive Injections   [] Deal with Anxiety and Stress   [x]

## 2025-04-14 RX ORDER — DICLOFENAC SODIUM 75 MG/1
75 TABLET, DELAYED RELEASE ORAL 2 TIMES DAILY
Qty: 60 TABLET | Refills: 11 | Status: SHIPPED | OUTPATIENT
Start: 2025-04-14

## 2025-05-14 ENCOUNTER — HOSPITAL ENCOUNTER (OUTPATIENT)
Dept: NON INVASIVE DIAGNOSTICS | Age: 60
Discharge: HOME | End: 2025-05-14
Payer: MEDICARE

## 2025-05-14 LAB
EKG ATRIAL RATE: 78 BPM
EKG P AXIS: 72 DEGREES
EKG P-R INTERVAL: 158 MS
EKG Q-T INTERVAL: 384 MS
EKG QRS DURATION: 108 MS
EKG QTC CALCULATION (BAZETT): 437 MS
EKG R AXIS: 8 DEGREES
EKG T AXIS: 37 DEGREES
EKG VENTRICULAR RATE: 78 BPM

## 2025-05-14 PROCEDURE — 93005 ELECTROCARDIOGRAM TRACING: CPT | Performed by: NURSE PRACTITIONER

## 2025-07-02 ENCOUNTER — OFFICE VISIT (OUTPATIENT)
Dept: UROLOGY | Age: 60
End: 2025-07-02
Payer: MEDICARE

## 2025-07-02 VITALS
TEMPERATURE: 98 F | SYSTOLIC BLOOD PRESSURE: 118 MMHG | DIASTOLIC BLOOD PRESSURE: 80 MMHG | BODY MASS INDEX: 30.68 KG/M2 | HEART RATE: 73 BPM | OXYGEN SATURATION: 97 % | HEIGHT: 71 IN

## 2025-07-02 DIAGNOSIS — N40.1 BPH WITH OBSTRUCTION/LOWER URINARY TRACT SYMPTOMS: Primary | ICD-10-CM

## 2025-07-02 DIAGNOSIS — N13.8 BPH WITH OBSTRUCTION/LOWER URINARY TRACT SYMPTOMS: Primary | ICD-10-CM

## 2025-07-02 DIAGNOSIS — R97.20 ELEVATED PSA: ICD-10-CM

## 2025-07-02 DIAGNOSIS — R39.12 WEAK URINARY STREAM: ICD-10-CM

## 2025-07-02 PROCEDURE — 99212 OFFICE O/P EST SF 10 MIN: CPT | Performed by: UROLOGY

## 2025-07-02 RX ORDER — CLINDAMYCIN HYDROCHLORIDE 300 MG/1
CAPSULE ORAL
COMMUNITY
Start: 2025-06-04

## 2025-07-02 RX ORDER — TAMSULOSIN HYDROCHLORIDE 0.4 MG/1
0.4 CAPSULE ORAL DAILY
Qty: 90 CAPSULE | Refills: 3 | Status: SHIPPED | OUTPATIENT
Start: 2025-07-02

## 2025-07-02 RX ORDER — HYDROCODONE BITARTRATE AND ACETAMINOPHEN 5; 325 MG/1; MG/1
TABLET ORAL
COMMUNITY
Start: 2025-05-29

## 2025-07-02 RX ORDER — ALBUTEROL SULFATE 90 UG/1
INHALANT RESPIRATORY (INHALATION)
COMMUNITY
Start: 2025-06-23

## 2025-07-02 NOTE — PROGRESS NOTES
Cedric Whittington MD   Urology Clinic office Visit      Patient:  Tariq Duckworth  YOB: 1965  Date: 7/2/2025    HISTORY OF PRESENT ILLNESS:   The patient is a 59 y.o. male who presents today for evaluation of the following problem(s):      1. BPH with obstruction/lower urinary tract symptoms    2. Weak urinary stream    3. Elevated PSA           Overall the problem(s) : are worsening.  Associated Symptoms: No dysuria, gross hematuria.  Pain Severity:      Summary of old records: N/A  (Patient's old records, notes and chart reviewed and summarized above.)      Onset months  Severity is described as moderate to severe.   The course of symptoms over time is worsening.  Alleviating factors: none  Worsening factors: none  Lower urinary tract symptoms: decreased urinary stream    I independently reviewed and verified the images and reports from:    No results found.      Last several PSA's:  Lab Results   Component Value Date    PSA 5.06 (H) 04/03/2025    PSA 4.68 (H) 09/20/2023    PSA 4.2 (H) 02/01/2023       Last total testosterone:  Lab Results   Component Value Date    TESTOSTERONE 282 11/07/2016       Urinalysis today:  No results found for this visit on 07/02/25.      Last BUN and creatinine:  Lab Results   Component Value Date    BUN 18 04/03/2025     Lab Results   Component Value Date    CREATININE 1.3 (H) 04/03/2025       Imaging Reviewed during this Office Visit:   (results were independently reviewed by physician and radiology report verified)    PAST MEDICAL, FAMILY AND SOCIAL HISTORY:  Past Medical History:   Diagnosis Date    Bipolar disorder ([facility])     follows with Dr. Rajput    Centrilobular emphysema ([facility])     HIV (human immunodeficiency virus infection) ([facility]) 2001    followed by Sharita Maguire (Infectious Disease at [facility])    Pneumonia 06/11/2017     Past Surgical History:   Procedure Laterality Date    ANESTHESIA NERVE BLOCK Bilateral 1/12/2018    Bilat L2 L3 L4 L5 Diagnostic

## 2025-08-04 ENCOUNTER — HOSPITAL ENCOUNTER (OUTPATIENT)
Dept: LAB | Age: 60
Discharge: HOME | End: 2025-08-04
Payer: MEDICARE

## 2025-08-04 DIAGNOSIS — R39.12 WEAK URINARY STREAM: ICD-10-CM

## 2025-08-04 DIAGNOSIS — N40.1 BPH WITH OBSTRUCTION/LOWER URINARY TRACT SYMPTOMS: ICD-10-CM

## 2025-08-04 DIAGNOSIS — N13.8 BPH WITH OBSTRUCTION/LOWER URINARY TRACT SYMPTOMS: ICD-10-CM

## 2025-08-04 LAB — PSA SERPL-MCNC: 6.33 NG/ML (ref 0–4)

## 2025-08-04 PROCEDURE — 36415 COLL VENOUS BLD VENIPUNCTURE: CPT

## 2025-08-04 PROCEDURE — 84153 ASSAY OF PSA TOTAL: CPT

## 2025-08-05 ENCOUNTER — HOSPITAL ENCOUNTER (OUTPATIENT)
Age: 60
Setting detail: SPECIMEN
Discharge: HOME | End: 2025-08-05
Payer: MEDICARE

## 2025-08-05 DIAGNOSIS — N40.1 BPH WITH OBSTRUCTION/LOWER URINARY TRACT SYMPTOMS: ICD-10-CM

## 2025-08-05 DIAGNOSIS — R39.12 WEAK URINARY STREAM: ICD-10-CM

## 2025-08-05 DIAGNOSIS — N13.8 BPH WITH OBSTRUCTION/LOWER URINARY TRACT SYMPTOMS: ICD-10-CM

## 2025-08-05 LAB
BILIRUB UR QL STRIP: NEGATIVE
CLARITY UR: CLEAR
COLOR UR: YELLOW
COMMENT: NORMAL
GLUCOSE UR STRIP-MCNC: NEGATIVE MG/DL
HGB UR QL STRIP.AUTO: NEGATIVE
KETONES UR STRIP-MCNC: NEGATIVE MG/DL
LEUKOCYTE ESTERASE UR QL STRIP: NEGATIVE
NITRITE UR QL STRIP: NEGATIVE
PH UR STRIP: 6 [PH] (ref 5–6)
PROT UR STRIP-MCNC: NEGATIVE MG/DL
SP GR UR STRIP: 1.01 (ref 1.01–1.02)
UROBILINOGEN UR STRIP-ACNC: NORMAL EU/DL (ref 0–1)

## 2025-08-05 PROCEDURE — 81003 URINALYSIS AUTO W/O SCOPE: CPT

## 2025-08-07 PROBLEM — S62.308A CLOSED FRACTURE OF FIFTH METACARPAL BONE: Status: ACTIVE | Noted: 2024-09-16

## 2025-08-07 PROBLEM — H93.90 EAR PROBLEM: Status: ACTIVE | Noted: 2024-07-01

## 2025-08-07 PROBLEM — E66.3 OVERWEIGHT (BMI 25.0-29.9): Status: ACTIVE | Noted: 2021-03-30

## 2025-08-07 PROBLEM — E78.1 HIGH TRIGLYCERIDES: Status: ACTIVE | Noted: 2025-08-07

## 2025-08-07 PROBLEM — M79.606 PAIN IN LOWER LIMB: Status: ACTIVE | Noted: 2025-02-20

## 2025-08-07 PROBLEM — J34.89 NASAL DISCHARGE: Status: ACTIVE | Noted: 2025-08-07

## 2025-08-07 PROBLEM — G25.81 RESTLESS LEGS SYNDROME: Status: ACTIVE | Noted: 2024-03-04

## 2025-08-07 PROBLEM — K13.0 CHEILITIS: Status: ACTIVE | Noted: 2025-08-07

## 2025-08-07 PROBLEM — K21.9 GERD (GASTROESOPHAGEAL REFLUX DISEASE): Status: ACTIVE | Noted: 2025-08-07

## 2025-08-07 PROBLEM — M77.41 METATARSALGIA OF RIGHT FOOT: Status: ACTIVE | Noted: 2025-02-20

## 2025-08-07 PROBLEM — H60.509 ACUTE OTITIS EXTERNA: Status: ACTIVE | Noted: 2024-07-01

## 2025-08-07 PROBLEM — J20.5 RESPIRATORY SYNCYTIAL VIRUS BRONCHITIS: Status: ACTIVE | Noted: 2025-08-07

## 2025-08-07 PROBLEM — R05.9 COUGH, UNSPECIFIED: Status: ACTIVE | Noted: 2024-11-19

## 2025-08-07 PROBLEM — R06.2 WHEEZING: Status: ACTIVE | Noted: 2024-11-19

## 2025-08-07 PROBLEM — R50.9 FEVER: Status: ACTIVE | Noted: 2024-11-27

## 2025-08-07 PROBLEM — H91.92 HEARING LOSS OF LEFT EAR: Status: ACTIVE | Noted: 2021-08-18

## 2025-08-07 PROBLEM — J40 BRONCHITIS: Status: ACTIVE | Noted: 2024-11-19

## 2025-08-07 PROBLEM — U07.1 DISEASE DUE TO SEVERE ACUTE RESPIRATORY SYNDROME CORONAVIRUS 2 (SARS-COV-2): Status: ACTIVE | Noted: 2024-09-16

## 2025-08-07 PROBLEM — R61 HYPERHIDROSIS: Status: ACTIVE | Noted: 2025-08-07

## 2025-08-07 PROBLEM — E66.811 CLASS 1 OBESITY: Status: ACTIVE | Noted: 2025-08-07

## 2025-08-07 PROBLEM — F41.9 ANXIETY DISORDER: Status: ACTIVE | Noted: 2025-08-07

## 2025-08-07 PROBLEM — R06.02 SHORTNESS OF BREATH: Status: ACTIVE | Noted: 2024-11-27

## 2025-08-11 ENCOUNTER — OFFICE VISIT (OUTPATIENT)
Dept: INFECTIOUS DISEASES | Age: 60
End: 2025-08-11
Payer: MEDICARE

## 2025-08-11 VITALS
HEART RATE: 86 BPM | RESPIRATION RATE: 18 BRPM | WEIGHT: 220 LBS | SYSTOLIC BLOOD PRESSURE: 118 MMHG | DIASTOLIC BLOOD PRESSURE: 78 MMHG | HEIGHT: 71 IN | TEMPERATURE: 97.7 F | BODY MASS INDEX: 30.8 KG/M2 | OXYGEN SATURATION: 96 %

## 2025-08-11 DIAGNOSIS — G44.59 OTHER COMPLICATED HEADACHE SYNDROME: ICD-10-CM

## 2025-08-11 DIAGNOSIS — Z21 ASYMPTOMATIC HIV INFECTION, WITH NO HISTORY OF HIV-RELATED ILLNESS (HCC): Primary | ICD-10-CM

## 2025-08-11 DIAGNOSIS — G44.221 CHRONIC TENSION-TYPE HEADACHE, INTRACTABLE: ICD-10-CM

## 2025-08-11 PROCEDURE — 3017F COLORECTAL CA SCREEN DOC REV: CPT | Performed by: STUDENT IN AN ORGANIZED HEALTH CARE EDUCATION/TRAINING PROGRAM

## 2025-08-11 PROCEDURE — G8427 DOCREV CUR MEDS BY ELIG CLIN: HCPCS | Performed by: STUDENT IN AN ORGANIZED HEALTH CARE EDUCATION/TRAINING PROGRAM

## 2025-08-11 PROCEDURE — G8417 CALC BMI ABV UP PARAM F/U: HCPCS | Performed by: STUDENT IN AN ORGANIZED HEALTH CARE EDUCATION/TRAINING PROGRAM

## 2025-08-11 PROCEDURE — 99204 OFFICE O/P NEW MOD 45 MIN: CPT | Performed by: STUDENT IN AN ORGANIZED HEALTH CARE EDUCATION/TRAINING PROGRAM

## 2025-08-11 PROCEDURE — 1036F TOBACCO NON-USER: CPT | Performed by: STUDENT IN AN ORGANIZED HEALTH CARE EDUCATION/TRAINING PROGRAM

## 2025-08-11 RX ORDER — ACETAMINOPHEN 325 MG/1
650 TABLET ORAL EVERY 6 HOURS PRN
COMMUNITY

## 2025-08-11 RX ORDER — GABAPENTIN 300 MG/1
300 CAPSULE ORAL 3 TIMES DAILY
COMMUNITY

## 2025-08-11 RX ORDER — SUMATRIPTAN SUCCINATE 100 MG/1
100 TABLET ORAL
COMMUNITY

## 2025-08-11 RX ORDER — FLUTICASONE PROPIONATE 50 MCG
1 SPRAY, SUSPENSION (ML) NASAL DAILY PRN
COMMUNITY

## 2025-08-11 RX ORDER — ALBUTEROL SULFATE 90 UG/1
2 INHALANT RESPIRATORY (INHALATION) EVERY 6 HOURS PRN
COMMUNITY

## 2025-08-18 RX ORDER — GABAPENTIN 800 MG/1
800 TABLET ORAL 3 TIMES DAILY
Qty: 90 TABLET | Refills: 3 | Status: SHIPPED | OUTPATIENT
Start: 2025-08-18 | End: 2025-12-16

## 2025-08-20 ENCOUNTER — OFFICE VISIT (OUTPATIENT)
Dept: NEUROLOGY | Age: 60
End: 2025-08-20
Payer: MEDICARE

## 2025-08-20 VITALS
OXYGEN SATURATION: 96 % | WEIGHT: 222 LBS | DIASTOLIC BLOOD PRESSURE: 68 MMHG | SYSTOLIC BLOOD PRESSURE: 128 MMHG | HEART RATE: 90 BPM | RESPIRATION RATE: 16 BRPM | BODY MASS INDEX: 30.96 KG/M2

## 2025-08-20 DIAGNOSIS — G89.29 CHRONIC BILATERAL LOW BACK PAIN WITH RIGHT-SIDED SCIATICA: ICD-10-CM

## 2025-08-20 DIAGNOSIS — J43.2 CENTRILOBULAR EMPHYSEMA (HCC): ICD-10-CM

## 2025-08-20 DIAGNOSIS — F31.9 BIPOLAR AFFECTIVE DISORDER, REMISSION STATUS UNSPECIFIED (HCC): ICD-10-CM

## 2025-08-20 DIAGNOSIS — G44.59 OTHER COMPLICATED HEADACHE SYNDROME: Primary | ICD-10-CM

## 2025-08-20 DIAGNOSIS — G25.81 RESTLESS LEGS SYNDROME: ICD-10-CM

## 2025-08-20 DIAGNOSIS — M47.817 LUMBOSACRAL SPONDYLOSIS WITHOUT MYELOPATHY: ICD-10-CM

## 2025-08-20 DIAGNOSIS — Z21 ASYMPTOMATIC HIV INFECTION (HCC): ICD-10-CM

## 2025-08-20 DIAGNOSIS — Z87.891 EX-SMOKER: ICD-10-CM

## 2025-08-20 DIAGNOSIS — G44.221 CHRONIC TENSION-TYPE HEADACHE, INTRACTABLE: ICD-10-CM

## 2025-08-20 DIAGNOSIS — R41.3 MEMORY PROBLEM: ICD-10-CM

## 2025-08-20 DIAGNOSIS — M54.41 CHRONIC BILATERAL LOW BACK PAIN WITH RIGHT-SIDED SCIATICA: ICD-10-CM

## 2025-08-20 DIAGNOSIS — M54.2 NECK PAIN: ICD-10-CM

## 2025-08-20 DIAGNOSIS — M62.838 MUSCLE SPASM: ICD-10-CM

## 2025-08-20 DIAGNOSIS — R73.01 IFG (IMPAIRED FASTING GLUCOSE): ICD-10-CM

## 2025-08-20 DIAGNOSIS — G43.009 MIGRAINE WITHOUT AURA AND WITHOUT STATUS MIGRAINOSUS, NOT INTRACTABLE: ICD-10-CM

## 2025-08-20 PROCEDURE — 99214 OFFICE O/P EST MOD 30 MIN: CPT | Performed by: PSYCHIATRY & NEUROLOGY

## 2025-08-20 PROCEDURE — 3017F COLORECTAL CA SCREEN DOC REV: CPT | Performed by: PSYCHIATRY & NEUROLOGY

## 2025-08-20 PROCEDURE — 99215 OFFICE O/P EST HI 40 MIN: CPT | Performed by: PSYCHIATRY & NEUROLOGY

## 2025-08-20 PROCEDURE — G8427 DOCREV CUR MEDS BY ELIG CLIN: HCPCS | Performed by: PSYCHIATRY & NEUROLOGY

## 2025-08-20 PROCEDURE — G8417 CALC BMI ABV UP PARAM F/U: HCPCS | Performed by: PSYCHIATRY & NEUROLOGY

## 2025-08-20 PROCEDURE — 3023F SPIROM DOC REV: CPT | Performed by: PSYCHIATRY & NEUROLOGY

## 2025-08-20 PROCEDURE — 1036F TOBACCO NON-USER: CPT | Performed by: PSYCHIATRY & NEUROLOGY

## 2025-08-20 RX ORDER — ONDANSETRON 4 MG/1
4 TABLET, ORALLY DISINTEGRATING ORAL EVERY 8 HOURS PRN
Qty: 30 TABLET | Refills: 2 | Status: SHIPPED | OUTPATIENT
Start: 2025-08-20

## 2025-08-20 RX ORDER — OMEPRAZOLE 40 MG/1
40 CAPSULE, DELAYED RELEASE ORAL DAILY
Qty: 90 CAPSULE | Refills: 1 | Status: SHIPPED | OUTPATIENT
Start: 2025-08-20

## 2025-08-20 RX ORDER — ROPINIROLE 0.5 MG/1
0.5 TABLET, FILM COATED ORAL
Qty: 90 TABLET | Refills: 2 | Status: SHIPPED | OUTPATIENT
Start: 2025-08-20

## 2025-08-20 RX ORDER — TOPIRAMATE 50 MG/1
50 TABLET, FILM COATED ORAL 2 TIMES DAILY
Qty: 180 TABLET | Refills: 1 | Status: SHIPPED | OUTPATIENT
Start: 2025-08-20

## 2025-08-20 RX ORDER — BUTALBITAL, ACETAMINOPHEN AND CAFFEINE 50; 325; 40 MG/1; MG/1; MG/1
TABLET ORAL
Qty: 60 TABLET | Refills: 2 | Status: SHIPPED | OUTPATIENT
Start: 2025-08-20

## 2025-08-20 RX ORDER — RIZATRIPTAN BENZOATE 10 MG/1
TABLET, ORALLY DISINTEGRATING ORAL
Qty: 12 TABLET | Refills: 5 | Status: SHIPPED | OUTPATIENT
Start: 2025-08-20

## 2025-08-20 ASSESSMENT — ENCOUNTER SYMPTOMS
CHOKING: 0
PHOTOPHOBIA: 1
SHORTNESS OF BREATH: 0
EYE DISCHARGE: 0
VOICE CHANGE: 0
ABDOMINAL PAIN: 0
SCALP TENDERNESS: 0
BLOOD IN STOOL: 0
BLURRED VISION: 0
FACIAL SWEATING: 0
SINUS PRESSURE: 0
EYE WATERING: 0
BACK PAIN: 1
WHEEZING: 0
DIARRHEA: 0
EYE ITCHING: 0
SORE THROAT: 0
CONSTIPATION: 0
COUGH: 0
SWOLLEN GLANDS: 0
VOMITING: 0
VISUAL CHANGE: 0
FACIAL SWELLING: 0
RHINORRHEA: 0
EYE REDNESS: 0
CHEST TIGHTNESS: 0
NAUSEA: 1
APNEA: 0
EYE PAIN: 0
TROUBLE SWALLOWING: 0
ABDOMINAL DISTENTION: 0
COLOR CHANGE: 0

## 2025-08-20 ASSESSMENT — PATIENT HEALTH QUESTIONNAIRE - PHQ9
2. FEELING DOWN, DEPRESSED OR HOPELESS: NOT AT ALL
SUM OF ALL RESPONSES TO PHQ QUESTIONS 1-9: 0
1. LITTLE INTEREST OR PLEASURE IN DOING THINGS: NOT AT ALL
SUM OF ALL RESPONSES TO PHQ QUESTIONS 1-9: 0

## (undated) DEVICE — COVER LT HNDL BLU PLAS

## (undated) DEVICE — GOWN,AURORA,NONRNF,XL,30/CS: Brand: MEDLINE

## (undated) DEVICE — CHLORAPREP 26ML ORANGE

## (undated) DEVICE — GLOVE ORANGE PI 7   MSG9070

## (undated) DEVICE — TRAY PAIN CUST

## (undated) DEVICE — NEEDLE FLTR 18GA L1.5IN MEM THK5UM BLNT DISP

## (undated) DEVICE — GLOVE SURG SZ 6 THK91MIL LTX FREE SYN POLYISOPRENE ANTI

## (undated) DEVICE — C-ARMOR C-ARM EQUIPMENT COVERS CLEAR STERILE UNIVERSAL FIT 12 PER CASE: Brand: C-ARMOR

## (undated) DEVICE — TROCAR: Brand: KII FIOS FIRST ENTRY

## (undated) DEVICE — GLOVE ORANGE PI 8   MSG9080

## (undated) DEVICE — NEEDLE SPNL 22GA L5IN BLK HUB S STL W/ QNCKE PNT W/OUT

## (undated) DEVICE — PACK SURG PROC REMINDER N WVN DISPOSABLE BEAC TIME OUT

## (undated) DEVICE — GAUZE,SPONGE,2"X2",8PLY,STERILE,LF,2'S: Brand: MEDLINE

## (undated) DEVICE — GLOVE SURG SZ 8 L12IN THK91MIL BRN LTX FREE POLYCHLOROPRENE

## (undated) DEVICE — BANDAGE ADH DIA7/8IN NAT FLEX-FABRIC WVN FOR WND PROTCT

## (undated) DEVICE — CANNULA 15CM, 5MM TIP, 18G: Brand: CANNULARFK

## (undated) DEVICE — CANNULA NSL AD L2IN ETCO2 SAMP SFT CRUSH RESIST FEM AIRLFE

## (undated) DEVICE — LINE SAMP O2 6.5FT W/FEMALE CONN F/ADULT CAPNOLINE PLUS

## (undated) DEVICE — CHLORAPREP 26ML CLEAR

## (undated) DEVICE — GLOVE ORANGE PI 7 1/2   MSG9075

## (undated) DEVICE — CANNULA SEAL

## (undated) DEVICE — SUTURE V-LOC 90 3-0 L9IN ABSRB VLT L26MM V-20 1/2 CIR TAPR VLOCM0644

## (undated) DEVICE — SUTURE MCRYL SZ 4-0 L18IN ABSRB UD L16MM PC-3 3/8 CIR PRIM Y845G

## (undated) DEVICE — TIP COVER ACCESSORY

## (undated) DEVICE — PROCEDURE PACK TRENGUARD 450

## (undated) DEVICE — Z DISCONTINUED USE 2651424 COVER EQUIP D18IN CNTOUR ELAS BND SNUG CLSR

## (undated) DEVICE — GLOVE SURG SZ 65 THK91MIL LTX FREE SYN POLYISOPRENE

## (undated) DEVICE — COVER,TABLE,77X90,STERILE: Brand: MEDLINE

## (undated) DEVICE — GLOVE SURG SZ 65 L12IN THK91MIL BRN LTX FREE

## (undated) DEVICE — INSUFFLATION NEEDLE TO ESTABLISH PNEUMOPERITONEUM.: Brand: INSUFFLATION NEEDLE

## (undated) DEVICE — BLADELESS OBTURATOR: Brand: WECK VISTA

## (undated) DEVICE — SHEET, T, LAPAROTOMY, STERILE: Brand: MEDLINE

## (undated) DEVICE — NEEDLE, QUINCKE, 22GX5": Brand: MEDLINE

## (undated) DEVICE — GOWN,AURORA,NONREINFORCED,LARGE: Brand: MEDLINE

## (undated) DEVICE — 3M™ TEGADERM™ TRANSPARENT FILM DRESSING FRAME STYLE, 1624W, 2-3/8 IN X 2-3/4 IN (6 CM X 7 CM), 100/CT 4CT/CASE: Brand: 3M™ TEGADERM™

## (undated) DEVICE — PAD ELECSURG GRND DISP

## (undated) DEVICE — SUTURE DEV SZ 0 L6IN N ABSORBABLE

## (undated) DEVICE — GARMENT,MEDLINE,DVT,INT,CALF,MED, GEN2: Brand: MEDLINE

## (undated) DEVICE — STRIP,CLOSURE,WOUND,MEDI-STRIP,1/2X4: Brand: MEDLINE

## (undated) DEVICE — DRESSING TRNSPAR W2XL2.75IN FLM SHT SEMIPERMEABLE WIND

## (undated) DEVICE — DRAPE,UTILITY,TAPE,15X26,STERILE: Brand: MEDLINE

## (undated) DEVICE — SUTURE MCRYL SZ 4-0 L18IN ABSRB UD L19MM PS-2 3/8 CIR PRIM Y496G

## (undated) DEVICE — BLANKET WRM W29.9XL79.1IN UP BODY FORC AIR MISTRAL-AIR

## (undated) DEVICE — Device

## (undated) DEVICE — SOLUTION ANTIFOG VIS SYS CLEARIFY LAPSCP

## (undated) DEVICE — MASTISOL ADHESIVE LIQ 2/3ML

## (undated) DEVICE — ARM DRAPE

## (undated) DEVICE — GLOVE SURG SZ 75 L12IN FNGR THK79MIL GRN LTX FREE